# Patient Record
Sex: MALE | Race: WHITE | NOT HISPANIC OR LATINO | Employment: UNEMPLOYED | URBAN - METROPOLITAN AREA
[De-identification: names, ages, dates, MRNs, and addresses within clinical notes are randomized per-mention and may not be internally consistent; named-entity substitution may affect disease eponyms.]

---

## 2017-03-24 ENCOUNTER — OFFICE VISIT - HEALTHEAST (OUTPATIENT)
Dept: FAMILY MEDICINE | Facility: CLINIC | Age: 57
End: 2017-03-24

## 2017-03-24 DIAGNOSIS — I10 ESSENTIAL HYPERTENSION WITH GOAL BLOOD PRESSURE LESS THAN 140/90: ICD-10-CM

## 2017-03-24 DIAGNOSIS — E78.2 MIXED HYPERLIPIDEMIA: ICD-10-CM

## 2017-03-24 DIAGNOSIS — M10.9 GOUT, UNSPECIFIED: ICD-10-CM

## 2017-03-24 ASSESSMENT — MIFFLIN-ST. JEOR: SCORE: 1602.11

## 2017-08-01 ENCOUNTER — COMMUNICATION - HEALTHEAST (OUTPATIENT)
Dept: FAMILY MEDICINE | Facility: CLINIC | Age: 57
End: 2017-08-01

## 2017-08-01 DIAGNOSIS — I10 ESSENTIAL HYPERTENSION WITH GOAL BLOOD PRESSURE LESS THAN 140/90: ICD-10-CM

## 2017-08-01 DIAGNOSIS — M10.9 GOUT, UNSPECIFIED: ICD-10-CM

## 2017-09-11 ENCOUNTER — OFFICE VISIT - HEALTHEAST (OUTPATIENT)
Dept: FAMILY MEDICINE | Facility: CLINIC | Age: 57
End: 2017-09-11

## 2017-09-11 ENCOUNTER — COMMUNICATION - HEALTHEAST (OUTPATIENT)
Dept: FAMILY MEDICINE | Facility: CLINIC | Age: 57
End: 2017-09-11

## 2017-09-11 DIAGNOSIS — M10.9 GOUT, UNSPECIFIED: ICD-10-CM

## 2017-09-11 DIAGNOSIS — E87.6 HYPOKALEMIA: ICD-10-CM

## 2017-09-11 DIAGNOSIS — E78.2 MIXED HYPERLIPIDEMIA: ICD-10-CM

## 2017-09-11 DIAGNOSIS — Z09 HOSPITAL DISCHARGE FOLLOW-UP: ICD-10-CM

## 2017-09-11 DIAGNOSIS — I50.23 ACUTE ON CHRONIC SYSTOLIC HEART FAILURE, NYHA CLASS 3 (H): ICD-10-CM

## 2017-09-11 DIAGNOSIS — I25.10 CAD (CORONARY ARTERY DISEASE): ICD-10-CM

## 2017-09-11 DIAGNOSIS — E83.42 HYPOMAGNESEMIA: ICD-10-CM

## 2017-09-11 DIAGNOSIS — Z86.2 HISTORY OF ANEMIA: ICD-10-CM

## 2017-09-11 DIAGNOSIS — I10 ESSENTIAL HYPERTENSION WITH GOAL BLOOD PRESSURE LESS THAN 140/90: ICD-10-CM

## 2017-09-11 DIAGNOSIS — I73.9 PAD (PERIPHERAL ARTERY DISEASE) (H): ICD-10-CM

## 2017-09-11 DIAGNOSIS — Z87.898 HISTORY OF ALCOHOL USE: ICD-10-CM

## 2017-09-11 LAB
CHOLEST SERPL-MCNC: 218 MG/DL
FASTING STATUS PATIENT QL REPORTED: NO
HDLC SERPL-MCNC: 37 MG/DL
LDLC SERPL CALC-MCNC: 151 MG/DL
TRIGL SERPL-MCNC: 148 MG/DL

## 2017-09-11 ASSESSMENT — MIFFLIN-ST. JEOR: SCORE: 1607.76

## 2017-09-12 ENCOUNTER — COMMUNICATION - HEALTHEAST (OUTPATIENT)
Dept: FAMILY MEDICINE | Facility: CLINIC | Age: 57
End: 2017-09-12

## 2017-09-12 DIAGNOSIS — E78.2 MIXED HYPERLIPIDEMIA: ICD-10-CM

## 2017-09-12 DIAGNOSIS — I25.10 CAD (CORONARY ARTERY DISEASE): ICD-10-CM

## 2017-09-12 DIAGNOSIS — I73.9 PAD (PERIPHERAL ARTERY DISEASE) (H): ICD-10-CM

## 2017-10-06 ENCOUNTER — COMMUNICATION - HEALTHEAST (OUTPATIENT)
Dept: CARDIOLOGY | Facility: CLINIC | Age: 57
End: 2017-10-06

## 2017-10-06 ENCOUNTER — OFFICE VISIT - HEALTHEAST (OUTPATIENT)
Dept: CARDIOLOGY | Facility: CLINIC | Age: 57
End: 2017-10-06

## 2017-10-06 DIAGNOSIS — I42.9 CARDIOMYOPATHY (H): ICD-10-CM

## 2017-10-06 ASSESSMENT — MIFFLIN-ST. JEOR: SCORE: 1597.78

## 2017-10-09 ENCOUNTER — OFFICE VISIT - HEALTHEAST (OUTPATIENT)
Dept: FAMILY MEDICINE | Facility: CLINIC | Age: 57
End: 2017-10-09

## 2017-10-09 DIAGNOSIS — I25.10 CAD (CORONARY ARTERY DISEASE): ICD-10-CM

## 2017-10-09 DIAGNOSIS — I10 ESSENTIAL HYPERTENSION WITH GOAL BLOOD PRESSURE LESS THAN 140/90: ICD-10-CM

## 2017-10-09 DIAGNOSIS — Z86.2 HISTORY OF ANEMIA: ICD-10-CM

## 2017-10-09 DIAGNOSIS — Z12.11 COLON CANCER SCREENING: ICD-10-CM

## 2017-10-09 DIAGNOSIS — E78.2 MIXED HYPERLIPIDEMIA: ICD-10-CM

## 2017-10-09 ASSESSMENT — MIFFLIN-ST. JEOR: SCORE: 1606.73

## 2017-10-12 ENCOUNTER — HOSPITAL ENCOUNTER (OUTPATIENT)
Dept: CARDIOLOGY | Facility: HOSPITAL | Age: 57
Discharge: HOME | End: 2017-10-12
Attending: INTERNAL MEDICINE

## 2017-11-09 ENCOUNTER — COMMUNICATION - HEALTHEAST (OUTPATIENT)
Dept: ADMINISTRATIVE | Facility: CLINIC | Age: 57
End: 2017-11-09

## 2018-01-23 ENCOUNTER — OFFICE VISIT - HEALTHEAST (OUTPATIENT)
Dept: CARDIOLOGY | Facility: CLINIC | Age: 58
End: 2018-01-23

## 2018-01-23 DIAGNOSIS — E78.2 MIXED HYPERLIPIDEMIA: ICD-10-CM

## 2018-01-23 DIAGNOSIS — I10 ESSENTIAL HYPERTENSION WITH GOAL BLOOD PRESSURE LESS THAN 140/90: ICD-10-CM

## 2018-01-23 DIAGNOSIS — Z72.0 TOBACCO USE: ICD-10-CM

## 2018-01-23 DIAGNOSIS — I73.9 PERIPHERAL VASCULAR DISEASE (H): ICD-10-CM

## 2018-01-23 DIAGNOSIS — I25.5 ISCHEMIC CARDIOMYOPATHY: ICD-10-CM

## 2018-01-23 DIAGNOSIS — I25.10 CORONARY ARTERY DISEASE INVOLVING NATIVE CORONARY ARTERY OF NATIVE HEART WITHOUT ANGINA PECTORIS: ICD-10-CM

## 2018-01-23 DIAGNOSIS — I10 ACCELERATED HYPERTENSION: ICD-10-CM

## 2018-01-23 ASSESSMENT — MIFFLIN-ST. JEOR: SCORE: 1615.35

## 2018-01-24 ENCOUNTER — RECORDS - HEALTHEAST (OUTPATIENT)
Dept: ADMINISTRATIVE | Facility: OTHER | Age: 58
End: 2018-01-24

## 2018-01-24 ENCOUNTER — OFFICE VISIT - HEALTHEAST (OUTPATIENT)
Dept: FAMILY MEDICINE | Facility: CLINIC | Age: 58
End: 2018-01-24

## 2018-01-24 DIAGNOSIS — F17.200 SMOKING: ICD-10-CM

## 2018-01-24 DIAGNOSIS — R06.02 SHORTNESS OF BREATH: ICD-10-CM

## 2018-01-24 DIAGNOSIS — I10 ESSENTIAL HYPERTENSION WITH GOAL BLOOD PRESSURE LESS THAN 140/90: ICD-10-CM

## 2018-01-24 DIAGNOSIS — I25.5 ISCHEMIC CARDIOMYOPATHY: ICD-10-CM

## 2018-01-24 DIAGNOSIS — R06.01 ORTHOPNEA: ICD-10-CM

## 2018-01-24 ASSESSMENT — MIFFLIN-ST. JEOR: SCORE: 1588.13

## 2018-01-31 ENCOUNTER — COMMUNICATION - HEALTHEAST (OUTPATIENT)
Dept: FAMILY MEDICINE | Facility: CLINIC | Age: 58
End: 2018-01-31

## 2018-02-27 ENCOUNTER — COMMUNICATION - HEALTHEAST (OUTPATIENT)
Dept: MEDSURG UNIT | Facility: HOSPITAL | Age: 58
End: 2018-02-27

## 2018-02-27 DIAGNOSIS — I50.23 ACUTE ON CHRONIC SYSTOLIC HEART FAILURE, NYHA CLASS 3 (H): ICD-10-CM

## 2018-03-21 ENCOUNTER — COMMUNICATION - HEALTHEAST (OUTPATIENT)
Dept: FAMILY MEDICINE | Facility: CLINIC | Age: 58
End: 2018-03-21

## 2018-07-17 ENCOUNTER — COMMUNICATION - HEALTHEAST (OUTPATIENT)
Dept: FAMILY MEDICINE | Facility: CLINIC | Age: 58
End: 2018-07-17

## 2018-07-19 ENCOUNTER — COMMUNICATION - HEALTHEAST (OUTPATIENT)
Dept: SCHEDULING | Facility: CLINIC | Age: 58
End: 2018-07-19

## 2018-07-19 ENCOUNTER — OFFICE VISIT - HEALTHEAST (OUTPATIENT)
Dept: FAMILY MEDICINE | Facility: CLINIC | Age: 58
End: 2018-07-19

## 2018-07-19 DIAGNOSIS — E83.42 HYPOMAGNESEMIA: ICD-10-CM

## 2018-07-19 DIAGNOSIS — M10.9 GOUT: ICD-10-CM

## 2018-07-19 DIAGNOSIS — I25.5 ISCHEMIC CARDIOMYOPATHY: ICD-10-CM

## 2018-07-19 DIAGNOSIS — E78.2 MIXED HYPERLIPIDEMIA: ICD-10-CM

## 2018-07-19 DIAGNOSIS — Z87.898 HISTORY OF ALCOHOL USE: ICD-10-CM

## 2018-07-19 DIAGNOSIS — R73.09 ELEVATED GLUCOSE: ICD-10-CM

## 2018-07-19 DIAGNOSIS — Z09 HOSPITAL DISCHARGE FOLLOW-UP: ICD-10-CM

## 2018-07-19 DIAGNOSIS — I25.10 CAD (CORONARY ARTERY DISEASE): ICD-10-CM

## 2018-07-19 DIAGNOSIS — I10 ESSENTIAL HYPERTENSION WITH GOAL BLOOD PRESSURE LESS THAN 140/90: ICD-10-CM

## 2018-07-19 DIAGNOSIS — Z91.148 NONCOMPLIANCE WITH MEDICATION REGIMEN: ICD-10-CM

## 2018-07-19 DIAGNOSIS — E78.5 HYPERLIPIDEMIA WITH TARGET LDL LESS THAN 70: ICD-10-CM

## 2018-07-19 DIAGNOSIS — R10.9 RIGHT FLANK PAIN: ICD-10-CM

## 2018-07-19 LAB
ALBUMIN SERPL-MCNC: 3.4 G/DL (ref 3.5–5)
ALP SERPL-CCNC: 73 U/L (ref 45–120)
ALT SERPL W P-5'-P-CCNC: 13 U/L (ref 0–45)
ANION GAP SERPL CALCULATED.3IONS-SCNC: 10 MMOL/L (ref 5–18)
AST SERPL W P-5'-P-CCNC: 16 U/L (ref 0–40)
BILIRUB SERPL-MCNC: 0.6 MG/DL (ref 0–1)
BUN SERPL-MCNC: 14 MG/DL (ref 8–22)
CALCIUM SERPL-MCNC: 9.7 MG/DL (ref 8.5–10.5)
CHLORIDE BLD-SCNC: 102 MMOL/L (ref 98–107)
CHOLEST SERPL-MCNC: 217 MG/DL
CO2 SERPL-SCNC: 28 MMOL/L (ref 22–31)
CREAT SERPL-MCNC: 1.03 MG/DL (ref 0.7–1.3)
FASTING STATUS PATIENT QL REPORTED: ABNORMAL
GFR SERPL CREATININE-BSD FRML MDRD: >60 ML/MIN/1.73M2
GGT SERPL-CCNC: 58 U/L (ref 0–50)
GLUCOSE BLD-MCNC: 56 MG/DL (ref 70–125)
HBA1C MFR BLD: 5.8 % (ref 3.5–6)
HDLC SERPL-MCNC: 46 MG/DL
LDLC SERPL CALC-MCNC: 142 MG/DL
LIPASE SERPL-CCNC: 27 U/L (ref 0–52)
MAGNESIUM SERPL-MCNC: 1.8 MG/DL (ref 1.8–2.6)
POTASSIUM BLD-SCNC: 4 MMOL/L (ref 3.5–5)
PROT SERPL-MCNC: 6.8 G/DL (ref 6–8)
SODIUM SERPL-SCNC: 140 MMOL/L (ref 136–145)
TRIGL SERPL-MCNC: 144 MG/DL
URATE SERPL-MCNC: 7.1 MG/DL (ref 3–8)

## 2018-07-19 ASSESSMENT — MIFFLIN-ST. JEOR: SCORE: 1557.52

## 2018-07-20 ENCOUNTER — HOSPITAL ENCOUNTER (OUTPATIENT)
Dept: ULTRASOUND IMAGING | Facility: HOSPITAL | Age: 58
Discharge: HOME | End: 2018-07-20
Attending: FAMILY MEDICINE

## 2018-07-20 ENCOUNTER — COMMUNICATION - HEALTHEAST (OUTPATIENT)
Dept: NURSING | Facility: CLINIC | Age: 58
End: 2018-07-20

## 2018-07-20 DIAGNOSIS — Z87.898 HISTORY OF ALCOHOL USE: ICD-10-CM

## 2018-07-20 DIAGNOSIS — R10.9 RIGHT FLANK PAIN: ICD-10-CM

## 2018-07-24 ENCOUNTER — COMMUNICATION - HEALTHEAST (OUTPATIENT)
Dept: NURSING | Facility: CLINIC | Age: 58
End: 2018-07-24

## 2018-07-27 ENCOUNTER — COMMUNICATION - HEALTHEAST (OUTPATIENT)
Dept: NURSING | Facility: CLINIC | Age: 58
End: 2018-07-27

## 2018-08-06 ENCOUNTER — AMBULATORY - HEALTHEAST (OUTPATIENT)
Dept: NURSING | Facility: CLINIC | Age: 58
End: 2018-08-06

## 2018-08-06 ENCOUNTER — COMMUNICATION - HEALTHEAST (OUTPATIENT)
Dept: FAMILY MEDICINE | Facility: CLINIC | Age: 58
End: 2018-08-06

## 2018-08-06 ENCOUNTER — COMMUNICATION - HEALTHEAST (OUTPATIENT)
Dept: NURSING | Facility: CLINIC | Age: 58
End: 2018-08-06

## 2018-08-07 ENCOUNTER — COMMUNICATION - HEALTHEAST (OUTPATIENT)
Dept: NURSING | Facility: CLINIC | Age: 58
End: 2018-08-07

## 2018-08-08 ENCOUNTER — AMBULATORY - HEALTHEAST (OUTPATIENT)
Dept: NURSING | Facility: CLINIC | Age: 58
End: 2018-08-08

## 2018-08-09 ENCOUNTER — COMMUNICATION - HEALTHEAST (OUTPATIENT)
Dept: CARE COORDINATION | Facility: CLINIC | Age: 58
End: 2018-08-09

## 2018-08-09 ENCOUNTER — COMMUNICATION - HEALTHEAST (OUTPATIENT)
Dept: NURSING | Facility: CLINIC | Age: 58
End: 2018-08-09

## 2018-08-14 ENCOUNTER — AMBULATORY - HEALTHEAST (OUTPATIENT)
Dept: NURSING | Facility: CLINIC | Age: 58
End: 2018-08-14

## 2018-08-30 ENCOUNTER — COMMUNICATION - HEALTHEAST (OUTPATIENT)
Dept: CARE COORDINATION | Facility: CLINIC | Age: 58
End: 2018-08-30

## 2018-08-31 ENCOUNTER — COMMUNICATION - HEALTHEAST (OUTPATIENT)
Dept: NURSING | Facility: CLINIC | Age: 58
End: 2018-08-31

## 2018-09-11 ENCOUNTER — COMMUNICATION - HEALTHEAST (OUTPATIENT)
Dept: NURSING | Facility: CLINIC | Age: 58
End: 2018-09-11

## 2018-09-12 ENCOUNTER — COMMUNICATION - HEALTHEAST (OUTPATIENT)
Dept: CARE COORDINATION | Facility: CLINIC | Age: 58
End: 2018-09-12

## 2018-09-12 ENCOUNTER — COMMUNICATION - HEALTHEAST (OUTPATIENT)
Dept: NURSING | Facility: CLINIC | Age: 58
End: 2018-09-12

## 2018-09-19 ENCOUNTER — COMMUNICATION - HEALTHEAST (OUTPATIENT)
Dept: NURSING | Facility: CLINIC | Age: 58
End: 2018-09-19

## 2018-09-20 ENCOUNTER — COMMUNICATION - HEALTHEAST (OUTPATIENT)
Dept: NURSING | Facility: CLINIC | Age: 58
End: 2018-09-20

## 2018-10-01 ENCOUNTER — COMMUNICATION - HEALTHEAST (OUTPATIENT)
Dept: NURSING | Facility: CLINIC | Age: 58
End: 2018-10-01

## 2018-10-03 ENCOUNTER — COMMUNICATION - HEALTHEAST (OUTPATIENT)
Dept: CARE COORDINATION | Facility: CLINIC | Age: 58
End: 2018-10-03

## 2018-10-03 ENCOUNTER — COMMUNICATION - HEALTHEAST (OUTPATIENT)
Dept: NURSING | Facility: CLINIC | Age: 58
End: 2018-10-03

## 2018-10-05 ENCOUNTER — COMMUNICATION - HEALTHEAST (OUTPATIENT)
Dept: NURSING | Facility: CLINIC | Age: 58
End: 2018-10-05

## 2018-10-08 ENCOUNTER — COMMUNICATION - HEALTHEAST (OUTPATIENT)
Dept: CARE COORDINATION | Facility: CLINIC | Age: 58
End: 2018-10-08

## 2018-10-26 ENCOUNTER — COMMUNICATION - HEALTHEAST (OUTPATIENT)
Dept: CARE COORDINATION | Facility: CLINIC | Age: 58
End: 2018-10-26

## 2018-10-26 ENCOUNTER — COMMUNICATION - HEALTHEAST (OUTPATIENT)
Dept: NURSING | Facility: CLINIC | Age: 58
End: 2018-10-26

## 2019-05-02 ENCOUNTER — COMMUNICATION - HEALTHEAST (OUTPATIENT)
Dept: FAMILY MEDICINE | Facility: CLINIC | Age: 59
End: 2019-05-02

## 2019-05-14 ENCOUNTER — COMMUNICATION - HEALTHEAST (OUTPATIENT)
Dept: FAMILY MEDICINE | Facility: CLINIC | Age: 59
End: 2019-05-14

## 2019-05-15 ENCOUNTER — COMMUNICATION - HEALTHEAST (OUTPATIENT)
Dept: CARE COORDINATION | Facility: CLINIC | Age: 59
End: 2019-05-15

## 2019-05-16 ENCOUNTER — OFFICE VISIT - HEALTHEAST (OUTPATIENT)
Dept: FAMILY MEDICINE | Facility: CLINIC | Age: 59
End: 2019-05-16

## 2019-05-16 DIAGNOSIS — Z12.11 SCREEN FOR COLON CANCER: ICD-10-CM

## 2019-05-16 DIAGNOSIS — Z09 HOSPITAL DISCHARGE FOLLOW-UP: ICD-10-CM

## 2019-05-16 DIAGNOSIS — Z59.9 FINANCIAL DIFFICULTIES: ICD-10-CM

## 2019-05-16 DIAGNOSIS — I48.0 PAROXYSMAL ATRIAL FIBRILLATION (H): ICD-10-CM

## 2019-05-16 DIAGNOSIS — Z59.71 INSURANCE COVERAGE PROBLEMS: ICD-10-CM

## 2019-05-16 DIAGNOSIS — I48.3 TYPICAL ATRIAL FLUTTER (H): ICD-10-CM

## 2019-05-16 DIAGNOSIS — Z72.0 TOBACCO ABUSE: ICD-10-CM

## 2019-05-16 DIAGNOSIS — I50.23 ACUTE ON CHRONIC SYSTOLIC CONGESTIVE HEART FAILURE (H): ICD-10-CM

## 2019-05-16 DIAGNOSIS — Z91.148 NONCOMPLIANCE WITH MEDICATION REGIMEN: ICD-10-CM

## 2019-05-16 DIAGNOSIS — Z12.11 COLON CANCER SCREENING: ICD-10-CM

## 2019-05-16 DIAGNOSIS — I25.5 ISCHEMIC CARDIOMYOPATHY: ICD-10-CM

## 2019-05-16 DIAGNOSIS — I10 ESSENTIAL HYPERTENSION: ICD-10-CM

## 2019-05-16 SDOH — ECONOMIC STABILITY - INCOME SECURITY: PROBLEM RELATED TO HOUSING AND ECONOMIC CIRCUMSTANCES, UNSPECIFIED: Z59.9

## 2019-05-16 ASSESSMENT — MIFFLIN-ST. JEOR: SCORE: 1591.28

## 2019-05-17 ENCOUNTER — COMMUNICATION - HEALTHEAST (OUTPATIENT)
Dept: PHARMACY | Facility: CLINIC | Age: 59
End: 2019-05-17

## 2019-05-17 ENCOUNTER — COMMUNICATION - HEALTHEAST (OUTPATIENT)
Dept: NURSING | Facility: CLINIC | Age: 59
End: 2019-05-17

## 2019-05-21 ENCOUNTER — COMMUNICATION - HEALTHEAST (OUTPATIENT)
Dept: FAMILY MEDICINE | Facility: CLINIC | Age: 59
End: 2019-05-21

## 2019-05-24 ENCOUNTER — COMMUNICATION - HEALTHEAST (OUTPATIENT)
Dept: NURSING | Facility: CLINIC | Age: 59
End: 2019-05-24

## 2019-05-28 ENCOUNTER — COMMUNICATION - HEALTHEAST (OUTPATIENT)
Dept: NURSING | Facility: CLINIC | Age: 59
End: 2019-05-28

## 2019-06-04 ENCOUNTER — COMMUNICATION - HEALTHEAST (OUTPATIENT)
Dept: FAMILY MEDICINE | Facility: CLINIC | Age: 59
End: 2019-06-04

## 2019-06-04 ENCOUNTER — COMMUNICATION - HEALTHEAST (OUTPATIENT)
Dept: NURSING | Facility: CLINIC | Age: 59
End: 2019-06-04

## 2019-07-12 ENCOUNTER — COMMUNICATION - HEALTHEAST (OUTPATIENT)
Dept: FAMILY MEDICINE | Facility: CLINIC | Age: 59
End: 2019-07-12

## 2019-07-15 ENCOUNTER — AMBULATORY - HEALTHEAST (OUTPATIENT)
Dept: FAMILY MEDICINE | Facility: CLINIC | Age: 59
End: 2019-07-15

## 2019-07-15 ENCOUNTER — COMMUNICATION - HEALTHEAST (OUTPATIENT)
Dept: CARE COORDINATION | Facility: CLINIC | Age: 59
End: 2019-07-15

## 2019-07-15 DIAGNOSIS — Z59.71 INSURANCE COVERAGE PROBLEMS: ICD-10-CM

## 2019-07-15 DIAGNOSIS — Z59.9 FINANCIAL PROBLEMS: ICD-10-CM

## 2019-07-15 DIAGNOSIS — Z91.148 NON COMPLIANCE W MEDICATION REGIMEN: ICD-10-CM

## 2019-07-15 DIAGNOSIS — Z79.899 HIGH RISK MEDICATION USE: ICD-10-CM

## 2019-07-15 SDOH — ECONOMIC STABILITY - INCOME SECURITY: PROBLEM RELATED TO HOUSING AND ECONOMIC CIRCUMSTANCES, UNSPECIFIED: Z59.9

## 2019-07-16 ENCOUNTER — COMMUNICATION - HEALTHEAST (OUTPATIENT)
Dept: NURSING | Facility: CLINIC | Age: 59
End: 2019-07-16

## 2019-07-17 ENCOUNTER — COMMUNICATION - HEALTHEAST (OUTPATIENT)
Dept: FAMILY MEDICINE | Facility: CLINIC | Age: 59
End: 2019-07-17

## 2019-08-01 ENCOUNTER — COMMUNICATION - HEALTHEAST (OUTPATIENT)
Dept: FAMILY MEDICINE | Facility: CLINIC | Age: 59
End: 2019-08-01

## 2019-08-01 DIAGNOSIS — I50.9 ACUTE ON CHRONIC CONGESTIVE HEART FAILURE, UNSPECIFIED HEART FAILURE TYPE (H): ICD-10-CM

## 2019-08-02 ENCOUNTER — COMMUNICATION - HEALTHEAST (OUTPATIENT)
Dept: FAMILY MEDICINE | Facility: CLINIC | Age: 59
End: 2019-08-02

## 2019-08-06 ENCOUNTER — AMBULATORY - HEALTHEAST (OUTPATIENT)
Dept: FAMILY MEDICINE | Facility: CLINIC | Age: 59
End: 2019-08-06

## 2019-08-06 DIAGNOSIS — I48.4 ATYPICAL ATRIAL FLUTTER (H): ICD-10-CM

## 2019-08-06 DIAGNOSIS — I25.5 ISCHEMIC CARDIOMYOPATHY: ICD-10-CM

## 2019-08-06 DIAGNOSIS — I48.0 PAROXYSMAL ATRIAL FIBRILLATION (H): ICD-10-CM

## 2019-10-24 ENCOUNTER — OFFICE VISIT - HEALTHEAST (OUTPATIENT)
Dept: FAMILY MEDICINE | Facility: CLINIC | Age: 59
End: 2019-10-24

## 2019-10-24 ENCOUNTER — RECORDS - HEALTHEAST (OUTPATIENT)
Dept: GENERAL RADIOLOGY | Facility: CLINIC | Age: 59
End: 2019-10-24

## 2019-10-24 DIAGNOSIS — Z91.148 NON COMPLIANCE W MEDICATION REGIMEN: ICD-10-CM

## 2019-10-24 DIAGNOSIS — I10 ESSENTIAL HYPERTENSION: ICD-10-CM

## 2019-10-24 DIAGNOSIS — M25.551 HIP PAIN, RIGHT: ICD-10-CM

## 2019-10-24 DIAGNOSIS — Z59.71 INSURANCE COVERAGE PROBLEMS: ICD-10-CM

## 2019-10-24 DIAGNOSIS — I48.4 ATYPICAL ATRIAL FLUTTER (H): ICD-10-CM

## 2019-10-24 DIAGNOSIS — M25.551 PAIN IN RIGHT HIP: ICD-10-CM

## 2019-10-24 DIAGNOSIS — I48.0 PAROXYSMAL ATRIAL FIBRILLATION (H): ICD-10-CM

## 2019-10-24 ASSESSMENT — MIFFLIN-ST. JEOR: SCORE: 1623.03

## 2019-10-31 ENCOUNTER — COMMUNICATION - HEALTHEAST (OUTPATIENT)
Dept: FAMILY MEDICINE | Facility: CLINIC | Age: 59
End: 2019-10-31

## 2019-11-20 ENCOUNTER — COMMUNICATION - HEALTHEAST (OUTPATIENT)
Dept: ADMINISTRATIVE | Facility: CLINIC | Age: 59
End: 2019-11-20

## 2019-12-02 ENCOUNTER — RECORDS - HEALTHEAST (OUTPATIENT)
Dept: GENERAL RADIOLOGY | Facility: CLINIC | Age: 59
End: 2019-12-02

## 2019-12-02 ENCOUNTER — OFFICE VISIT - HEALTHEAST (OUTPATIENT)
Dept: FAMILY MEDICINE | Facility: CLINIC | Age: 59
End: 2019-12-02

## 2019-12-02 DIAGNOSIS — Z72.0 TOBACCO ABUSE: ICD-10-CM

## 2019-12-02 DIAGNOSIS — I50.9 ACUTE ON CHRONIC CONGESTIVE HEART FAILURE, UNSPECIFIED HEART FAILURE TYPE (H): ICD-10-CM

## 2019-12-02 DIAGNOSIS — I25.10 CAD (CORONARY ARTERY DISEASE): ICD-10-CM

## 2019-12-02 DIAGNOSIS — Z79.899 POLYPHARMACY: ICD-10-CM

## 2019-12-02 DIAGNOSIS — R06.02 SHORTNESS OF BREATH: ICD-10-CM

## 2019-12-02 DIAGNOSIS — E87.1 HYPONATREMIA: ICD-10-CM

## 2019-12-02 DIAGNOSIS — Z91.148 POOR COMPLIANCE WITH MEDICATION: ICD-10-CM

## 2019-12-02 DIAGNOSIS — I73.9 PAD (PERIPHERAL ARTERY DISEASE) (H): ICD-10-CM

## 2019-12-02 DIAGNOSIS — Z09 ENCOUNTER FOR FOLLOW-UP EXAMINATION AFTER COMPLETED TREATMENT FOR CONDITIONS OTHER THAN MALIGNANT NEOPLASM: ICD-10-CM

## 2019-12-02 DIAGNOSIS — I48.0 PAROXYSMAL ATRIAL FIBRILLATION (H): ICD-10-CM

## 2019-12-02 DIAGNOSIS — Z09 HOSPITAL DISCHARGE FOLLOW-UP: ICD-10-CM

## 2019-12-02 DIAGNOSIS — I42.9 CARDIOMYOPATHY (H): ICD-10-CM

## 2019-12-02 DIAGNOSIS — I48.4 ATYPICAL ATRIAL FLUTTER (H): ICD-10-CM

## 2019-12-02 DIAGNOSIS — J18.1 LOBAR PNEUMONIA, UNSPECIFIED ORGANISM (H): ICD-10-CM

## 2019-12-02 DIAGNOSIS — R50.81 FEVER IN OTHER DISEASES: ICD-10-CM

## 2019-12-02 DIAGNOSIS — R50.81 FEVER PRESENTING WITH CONDITIONS CLASSIFIED ELSEWHERE: ICD-10-CM

## 2019-12-02 DIAGNOSIS — J18.9 COMMUNITY ACQUIRED PNEUMONIA OF LEFT LOWER LOBE OF LUNG: ICD-10-CM

## 2019-12-02 DIAGNOSIS — E78.2 MIXED HYPERLIPIDEMIA: ICD-10-CM

## 2019-12-02 DIAGNOSIS — D72.829 LEUKOCYTOSIS, UNSPECIFIED TYPE: ICD-10-CM

## 2019-12-02 DIAGNOSIS — I25.5 ISCHEMIC CARDIOMYOPATHY: ICD-10-CM

## 2019-12-02 LAB
ANION GAP SERPL CALCULATED.3IONS-SCNC: 13 MMOL/L (ref 5–18)
BASOPHILS # BLD AUTO: 0 THOU/UL (ref 0–0.2)
BASOPHILS NFR BLD AUTO: 0 % (ref 0–2)
BUN SERPL-MCNC: 9 MG/DL (ref 8–22)
C REACTIVE PROTEIN LHE: 28.5 MG/DL (ref 0–0.8)
CALCIUM SERPL-MCNC: 8.9 MG/DL (ref 8.5–10.5)
CHLORIDE BLD-SCNC: 97 MMOL/L (ref 98–107)
CO2 SERPL-SCNC: 25 MMOL/L (ref 22–31)
CREAT SERPL-MCNC: 0.95 MG/DL (ref 0.7–1.3)
EOSINOPHIL # BLD AUTO: 0 THOU/UL (ref 0–0.4)
EOSINOPHIL NFR BLD AUTO: 0 % (ref 0–6)
ERYTHROCYTE [DISTWIDTH] IN BLOOD BY AUTOMATED COUNT: 14.4 % (ref 11–14.5)
FLUAV AG SPEC QL IA: NORMAL
FLUBV AG SPEC QL IA: NORMAL
GFR SERPL CREATININE-BSD FRML MDRD: >60 ML/MIN/1.73M2
GLUCOSE BLD-MCNC: 88 MG/DL (ref 70–125)
HCT VFR BLD AUTO: 43.5 % (ref 40–54)
HGB BLD-MCNC: 14.4 G/DL (ref 14–18)
LACTATE SERPL-SCNC: 1.6 MMOL/L (ref 0.5–2.2)
LYMPHOCYTES # BLD AUTO: 1.4 THOU/UL (ref 0.8–4.4)
LYMPHOCYTES NFR BLD AUTO: 15 % (ref 20–40)
MCH RBC QN AUTO: 27.2 PG (ref 27–34)
MCHC RBC AUTO-ENTMCNC: 33.1 G/DL (ref 32–36)
MCV RBC AUTO: 82 FL (ref 80–100)
MONOCYTES # BLD AUTO: 1.8 THOU/UL (ref 0–0.9)
MONOCYTES NFR BLD AUTO: 19 % (ref 2–10)
NEUTROPHILS # BLD AUTO: 5.9 THOU/UL (ref 2–7.7)
NEUTROPHILS NFR BLD AUTO: 65 % (ref 50–70)
PLATELET # BLD AUTO: 196 THOU/UL (ref 140–440)
PMV BLD AUTO: 11.7 FL (ref 8.5–12.5)
POTASSIUM BLD-SCNC: 3.1 MMOL/L (ref 3.5–5)
PROCALCITONIN SERPL-MCNC: 0.3 NG/ML (ref 0–0.49)
RBC # BLD AUTO: 5.29 MILL/UL (ref 4.4–6.2)
SODIUM SERPL-SCNC: 135 MMOL/L (ref 136–145)
WBC: 9.1 THOU/UL (ref 4–11)

## 2019-12-02 ASSESSMENT — MIFFLIN-ST. JEOR: SCORE: 1618.5

## 2019-12-03 LAB
ATRIAL RATE - MUSE: 87 BPM
DIASTOLIC BLOOD PRESSURE - MUSE: NORMAL
INTERPRETATION ECG - MUSE: NORMAL
P AXIS - MUSE: NORMAL
PR INTERVAL - MUSE: NORMAL
QRS DURATION - MUSE: 102 MS
QT - MUSE: 394 MS
QTC - MUSE: 474 MS
R AXIS - MUSE: 6 DEGREES
SYSTOLIC BLOOD PRESSURE - MUSE: NORMAL
T AXIS - MUSE: 88 DEGREES
VENTRICULAR RATE- MUSE: 87 BPM

## 2019-12-04 ENCOUNTER — COMMUNICATION - HEALTHEAST (OUTPATIENT)
Dept: FAMILY MEDICINE | Facility: CLINIC | Age: 59
End: 2019-12-04

## 2019-12-05 ENCOUNTER — COMMUNICATION - HEALTHEAST (OUTPATIENT)
Dept: CARE COORDINATION | Facility: CLINIC | Age: 59
End: 2019-12-05

## 2019-12-06 ENCOUNTER — OFFICE VISIT - HEALTHEAST (OUTPATIENT)
Dept: FAMILY MEDICINE | Facility: CLINIC | Age: 59
End: 2019-12-06

## 2019-12-06 DIAGNOSIS — J18.9 PNEUMONIA OF LEFT LOWER LOBE DUE TO INFECTIOUS ORGANISM: ICD-10-CM

## 2019-12-06 DIAGNOSIS — Z09 HOSPITAL DISCHARGE FOLLOW-UP: ICD-10-CM

## 2019-12-06 ASSESSMENT — MIFFLIN-ST. JEOR: SCORE: 1615.64

## 2019-12-09 ENCOUNTER — COMMUNICATION - HEALTHEAST (OUTPATIENT)
Dept: FAMILY MEDICINE | Facility: CLINIC | Age: 59
End: 2019-12-09

## 2019-12-12 ENCOUNTER — COMMUNICATION - HEALTHEAST (OUTPATIENT)
Dept: FAMILY MEDICINE | Facility: CLINIC | Age: 59
End: 2019-12-12

## 2019-12-13 ENCOUNTER — COMMUNICATION - HEALTHEAST (OUTPATIENT)
Dept: FAMILY MEDICINE | Facility: CLINIC | Age: 59
End: 2019-12-13

## 2019-12-16 ENCOUNTER — AMBULATORY - HEALTHEAST (OUTPATIENT)
Dept: PHARMACY | Facility: CLINIC | Age: 59
End: 2019-12-16

## 2019-12-16 DIAGNOSIS — I48.92 PAROXYSMAL ATRIAL FLUTTER (H): ICD-10-CM

## 2019-12-16 DIAGNOSIS — Z72.0 TOBACCO ABUSE: ICD-10-CM

## 2019-12-16 DIAGNOSIS — J18.9 PNEUMONIA: ICD-10-CM

## 2019-12-16 DIAGNOSIS — I50.22 CHRONIC SYSTOLIC HEART FAILURE (H): ICD-10-CM

## 2019-12-16 DIAGNOSIS — I25.5 ISCHEMIC CARDIOMYOPATHY: ICD-10-CM

## 2020-01-13 ENCOUNTER — COMMUNICATION - HEALTHEAST (OUTPATIENT)
Dept: FAMILY MEDICINE | Facility: CLINIC | Age: 60
End: 2020-01-13

## 2020-01-14 ENCOUNTER — COMMUNICATION - HEALTHEAST (OUTPATIENT)
Dept: FAMILY MEDICINE | Facility: CLINIC | Age: 60
End: 2020-01-14

## 2020-02-07 ENCOUNTER — COMMUNICATION - HEALTHEAST (OUTPATIENT)
Dept: FAMILY MEDICINE | Facility: CLINIC | Age: 60
End: 2020-02-07

## 2020-02-27 ENCOUNTER — COMMUNICATION - HEALTHEAST (OUTPATIENT)
Dept: FAMILY MEDICINE | Facility: CLINIC | Age: 60
End: 2020-02-27

## 2020-03-19 ENCOUNTER — COMMUNICATION - HEALTHEAST (OUTPATIENT)
Dept: FAMILY MEDICINE | Facility: CLINIC | Age: 60
End: 2020-03-19

## 2020-03-19 ENCOUNTER — OFFICE VISIT - HEALTHEAST (OUTPATIENT)
Dept: FAMILY MEDICINE | Facility: CLINIC | Age: 60
End: 2020-03-19

## 2020-03-19 ENCOUNTER — COMMUNICATION - HEALTHEAST (OUTPATIENT)
Dept: SCHEDULING | Facility: CLINIC | Age: 60
End: 2020-03-19

## 2020-03-19 DIAGNOSIS — Z91.148 NONCOMPLIANCE WITH MEDICATION REGIMEN: ICD-10-CM

## 2020-03-19 DIAGNOSIS — I25.5 ISCHEMIC CARDIOMYOPATHY: ICD-10-CM

## 2020-03-19 DIAGNOSIS — I50.22 CHRONIC SYSTOLIC HEART FAILURE (H): ICD-10-CM

## 2020-03-19 DIAGNOSIS — R06.02 SHORTNESS OF BREATH: ICD-10-CM

## 2020-08-05 ENCOUNTER — COMMUNICATION - HEALTHEAST (OUTPATIENT)
Dept: FAMILY MEDICINE | Facility: CLINIC | Age: 60
End: 2020-08-05

## 2020-09-23 ENCOUNTER — COMMUNICATION - HEALTHEAST (OUTPATIENT)
Dept: SCHEDULING | Facility: CLINIC | Age: 60
End: 2020-09-23

## 2020-12-28 ENCOUNTER — COMMUNICATION - HEALTHEAST (OUTPATIENT)
Dept: FAMILY MEDICINE | Facility: CLINIC | Age: 60
End: 2020-12-28

## 2020-12-28 DIAGNOSIS — I10 BENIGN ESSENTIAL HYPERTENSION: ICD-10-CM

## 2020-12-31 ENCOUNTER — COMMUNICATION - HEALTHEAST (OUTPATIENT)
Dept: SCHEDULING | Facility: CLINIC | Age: 60
End: 2020-12-31

## 2021-01-05 ENCOUNTER — COMMUNICATION - HEALTHEAST (OUTPATIENT)
Dept: FAMILY MEDICINE | Facility: CLINIC | Age: 61
End: 2021-01-05

## 2021-03-02 ENCOUNTER — COMMUNICATION - HEALTHEAST (OUTPATIENT)
Dept: FAMILY MEDICINE | Facility: CLINIC | Age: 61
End: 2021-03-02

## 2021-03-02 DIAGNOSIS — I48.91 ATRIAL FIBRILLATION WITH RVR (H): ICD-10-CM

## 2021-03-02 DIAGNOSIS — J44.9 CHRONIC OBSTRUCTIVE PULMONARY DISEASE, UNSPECIFIED COPD TYPE (H): ICD-10-CM

## 2021-03-02 DIAGNOSIS — R06.02 SHORTNESS OF BREATH: ICD-10-CM

## 2021-03-02 DIAGNOSIS — I25.5 ISCHEMIC CARDIOMYOPATHY: ICD-10-CM

## 2021-03-02 DIAGNOSIS — I48.91 ATRIAL FIBRILLATION, RAPID (H): ICD-10-CM

## 2021-03-06 ENCOUNTER — COMMUNICATION - HEALTHEAST (OUTPATIENT)
Dept: SCHEDULING | Facility: CLINIC | Age: 61
End: 2021-03-06

## 2021-03-09 ENCOUNTER — RECORDS - HEALTHEAST (OUTPATIENT)
Dept: ADMINISTRATIVE | Facility: OTHER | Age: 61
End: 2021-03-09

## 2021-03-09 ENCOUNTER — HOSPITAL ENCOUNTER (INPATIENT)
Facility: CLINIC | Age: 61
LOS: 14 days | Discharge: HOME HEALTH | DRG: 057 | End: 2021-03-23
Attending: PHYSICAL MEDICINE & REHABILITATION | Admitting: PHYSICAL MEDICINE & REHABILITATION
Payer: COMMERCIAL

## 2021-03-09 DIAGNOSIS — I63.50 RIGHT PONTINE STROKE (H): Primary | ICD-10-CM

## 2021-03-09 PROCEDURE — 128N000003 HC R&B REHAB

## 2021-03-09 PROCEDURE — 99221 1ST HOSP IP/OBS SF/LOW 40: CPT | Performed by: PHYSICAL MEDICINE & REHABILITATION

## 2021-03-09 PROCEDURE — 250N000013 HC RX MED GY IP 250 OP 250 PS 637: Performed by: PHYSICIAN ASSISTANT

## 2021-03-09 RX ORDER — GABAPENTIN 300 MG/1
300 CAPSULE ORAL DAILY
Status: DISCONTINUED | OUTPATIENT
Start: 2021-03-10 | End: 2021-03-23 | Stop reason: HOSPADM

## 2021-03-09 RX ORDER — AMOXICILLIN 250 MG
1-2 CAPSULE ORAL 2 TIMES DAILY PRN
Status: DISCONTINUED | OUTPATIENT
Start: 2021-03-09 | End: 2021-03-23 | Stop reason: HOSPADM

## 2021-03-09 RX ORDER — ATORVASTATIN CALCIUM 80 MG/1
80 TABLET, FILM COATED ORAL EVERY EVENING
Status: DISCONTINUED | OUTPATIENT
Start: 2021-03-09 | End: 2021-03-23 | Stop reason: HOSPADM

## 2021-03-09 RX ORDER — GABAPENTIN 300 MG/1
300 CAPSULE ORAL DAILY
COMMUNITY
End: 2022-03-25

## 2021-03-09 RX ORDER — SPIRONOLACTONE 25 MG/1
25 TABLET ORAL DAILY
Status: ON HOLD | COMMUNITY
End: 2021-03-22

## 2021-03-09 RX ORDER — LOSARTAN POTASSIUM 100 MG/1
100 TABLET ORAL DAILY
Status: ON HOLD | COMMUNITY
End: 2021-03-22

## 2021-03-09 RX ORDER — SPIRONOLACTONE 25 MG/1
25 TABLET ORAL DAILY
Status: DISCONTINUED | OUTPATIENT
Start: 2021-03-10 | End: 2021-03-23 | Stop reason: HOSPADM

## 2021-03-09 RX ORDER — ALBUTEROL SULFATE 90 UG/1
2 AEROSOL, METERED RESPIRATORY (INHALATION) EVERY 6 HOURS PRN
Status: DISCONTINUED | OUTPATIENT
Start: 2021-03-09 | End: 2021-03-23 | Stop reason: HOSPADM

## 2021-03-09 RX ORDER — METOPROLOL TARTRATE 50 MG
50 TABLET ORAL 2 TIMES DAILY
Status: DISCONTINUED | OUTPATIENT
Start: 2021-03-09 | End: 2021-03-23 | Stop reason: HOSPADM

## 2021-03-09 RX ORDER — FUROSEMIDE 20 MG
40 TABLET ORAL DAILY
Status: DISCONTINUED | OUTPATIENT
Start: 2021-03-10 | End: 2021-03-23 | Stop reason: HOSPADM

## 2021-03-09 RX ORDER — ATORVASTATIN CALCIUM 80 MG/1
80 TABLET, FILM COATED ORAL EVERY EVENING
Status: ON HOLD | COMMUNITY
End: 2021-03-22

## 2021-03-09 RX ORDER — MAGNESIUM OXIDE 400 MG/1
400 TABLET ORAL 3 TIMES DAILY
Status: DISCONTINUED | OUTPATIENT
Start: 2021-03-09 | End: 2021-03-23 | Stop reason: HOSPADM

## 2021-03-09 RX ORDER — ALBUTEROL SULFATE 90 UG/1
2 AEROSOL, METERED RESPIRATORY (INHALATION) EVERY 6 HOURS PRN
COMMUNITY
End: 2022-03-25

## 2021-03-09 RX ORDER — FUROSEMIDE 40 MG
40 TABLET ORAL DAILY
Status: ON HOLD | COMMUNITY
End: 2021-03-22

## 2021-03-09 RX ORDER — ASPIRIN 81 MG/1
81 TABLET, CHEWABLE ORAL DAILY
Status: ON HOLD | COMMUNITY
End: 2021-03-22

## 2021-03-09 RX ORDER — METOPROLOL TARTRATE 50 MG
50 TABLET ORAL 2 TIMES DAILY
Status: ON HOLD | COMMUNITY
End: 2021-03-22

## 2021-03-09 RX ORDER — ASPIRIN 81 MG/1
81 TABLET, CHEWABLE ORAL DAILY
Status: DISCONTINUED | OUTPATIENT
Start: 2021-03-10 | End: 2021-03-23 | Stop reason: HOSPADM

## 2021-03-09 RX ORDER — POLYETHYLENE GLYCOL 3350 17 G/17G
17 POWDER, FOR SOLUTION ORAL DAILY PRN
Status: DISCONTINUED | OUTPATIENT
Start: 2021-03-09 | End: 2021-03-23 | Stop reason: HOSPADM

## 2021-03-09 RX ORDER — ACETAMINOPHEN 325 MG/1
325-650 TABLET ORAL EVERY 6 HOURS PRN
Status: DISCONTINUED | OUTPATIENT
Start: 2021-03-09 | End: 2021-03-23 | Stop reason: HOSPADM

## 2021-03-09 RX ORDER — POLYETHYLENE GLYCOL 3350 17 G/17G
17 POWDER, FOR SOLUTION ORAL DAILY
Status: DISCONTINUED | OUTPATIENT
Start: 2021-03-10 | End: 2021-03-09

## 2021-03-09 RX ORDER — LOSARTAN POTASSIUM 100 MG/1
100 TABLET ORAL DAILY
Status: DISCONTINUED | OUTPATIENT
Start: 2021-03-10 | End: 2021-03-23 | Stop reason: HOSPADM

## 2021-03-09 RX ADMIN — MAGNESIUM OXIDE TAB 400 MG (241.3 MG ELEMENTAL MG) 400 MG: 400 (241.3 MG) TAB at 20:24

## 2021-03-09 RX ADMIN — ATORVASTATIN CALCIUM 80 MG: 80 TABLET, FILM COATED ORAL at 20:24

## 2021-03-09 RX ADMIN — METOPROLOL TARTRATE 50 MG: 50 TABLET, FILM COATED ORAL at 20:24

## 2021-03-09 RX ADMIN — RIVAROXABAN 20 MG: 10 TABLET, FILM COATED ORAL at 18:22

## 2021-03-09 RX ADMIN — MAGNESIUM OXIDE TAB 400 MG (241.3 MG ELEMENTAL MG) 400 MG: 400 (241.3 MG) TAB at 14:42

## 2021-03-09 ASSESSMENT — MIFFLIN-ST. JEOR: SCORE: 1618.19

## 2021-03-09 NOTE — PLAN OF CARE
FOCUS/GOAL  Bladder management, Nutrition/Feeding/Swallowing precautions, and Mobility    ASSESSMENT, INTERVENTIONS AND CONTINUING PLAN FOR GOAL:  Pt arrived to unit at 1230. Is alert and oriented x4. Denies pain or SOB. Transfers with assist of 1 and gait belt with walker. Ate lunch after he arrived. Dinner and breakfast ordered. Is cont of bladder using toilet. Last BM this morning per report from other facility. Pt has left sided weakness affecting arm and leg.

## 2021-03-09 NOTE — H&P
[facility]   Acute Rehabilitation Unit  Admission History and Physical    CHIEF COMPLAINT   Acute right pontine stroke    HISTORY OF PRESENT ILLNESS  Eber Jin is a 60 year old right hand dominant male with PMHx of afib on Xarelto, HTN, CAD, PAD, CHF with EF 40%, ischemic cardiomyopathy, MI, COPD, tobacco use, and HLD who presented on 3/5/21 with left-sided weakness. At presentation, he reported onset of blurry vision, imbalance, and left-sided weakness the day prior.  MRI brain showed a 1.5 cm acute infarct in lower right mata.  MRA neck with 60% stenosis of left ICA.  MRA head with multifocal age-indeterminate vessel occlusion or flow-limiting stenoses.  Considered CTA for further evaluation; however deferred given patient out of window for acute intervention and low NIHSS score.  Determined to be outside window for tPA.  Patient on Xarelto PTA for afib, ASA started 3/5.  Echo with EF 45% and wall motion abnormalities consistent with prior MI in right coronary artery or left circumflex arteries.  Noted to have ongoing left hemiparesis, incoordination, mild dysphagia, mild dysarthria.    During acute hospitalization, patient was seen and evaluated by PT and OT, who collectively recommended that patient would benefit from ongoing therapies in the acute inpatient rehabilitation setting.      In review of the therapy notes, patient presents with impaired LLE strength, impaired balance, impaired coordination, decreased safety awareness, impaired cognition, L neglect, dysphagia, and dysarthria.  He is currently requiring cues for safety and scanning environment.  He also requires standby to contact guard assist for bed mobility, contact guard assist for transfers with cues.  He is ambulating 60' with FWW and contact guard assist with wheelchair follow due to poor navigation and knee buckling.  He is also needing max assist to don socks, min assist for  grooming/hygiene, contact guard assist for seated balance, mod assist for standing balance.  Tolerating regular diet and thin liquids with small sips and chin tucks, VFSS completed 3/8.    Upon arrival to the unit, patient reports most notable concern to be left-sided weakness.  He does not feel this has improved since admission.  He denies pain other than mild left shoulder pain with ROM.  He denies headache, dizziness/lightheadedness.      PAST MEDICAL HISTORY   Reviewed and updated in Epic.  No past medical history on file.    SURGICAL HISTORY  Reviewed and updated in Epic.  No past surgical history on file.    SOCIAL HISTORY  Reviewed and updated in Epic.  Marital Status:   Living situation: lives in [facility] with wife, ~14 stairs within  Family support: spouse, however she has also had a stroke so unclear what level of assistance she is able to provide.  Patient denies any other family or friends in the area to assist.  Vocational History: retired, worked in building materials  Tobacco use: smokes 1/2 ppd x30 years  Alcohol use: 1-2 beers per week  Illicit drug use: denies  Social History     Socioeconomic History     Marital status:      Spouse name: Not on file     Number of children: Not on file     Years of education: Not on file     Highest education level: Not on file   Occupational History     Not on file   Social Needs     Financial resource strain: Not on file     Food insecurity     Worry: Not on file     Inability: Not on file     Transportation needs     Medical: Not on file     Non-medical: Not on file   Tobacco Use     Smoking status: Not on file   Substance and Sexual Activity     Alcohol use: Not on file     Drug use: Not on file     Sexual activity: Not on file   Lifestyle     Physical activity     Days per week: Not on file     Minutes per session: Not on file     Stress: Not on file   Relationships     Social connections     Talks on phone: Not on file     Gets together: Not on  "file     Attends [employer] service: Not on file     Active member of club or organization: Not on file     Attends meetings of clubs or organizations: Not on file     Relationship status: Not on file     Intimate partner violence     Fear of current or ex partner: Not on file     Emotionally abused: Not on file     Physically abused: Not on file     Forced sexual activity: Not on file   Other Topics Concern     Not on file   Social History Narrative     Not on file       FAMILY HISTORY  Reviewed and updated in Epic.  No family history on file.      PRIOR FUNCTIONAL HISTORY   Pt was independent with all ADLs/IADLs, transfers, mobility and gait.      MEDICATIONS  Scheduled meds  No medications prior to admission.       ALLERGIES   Not on File      REVIEW OF SYSTEMS  A 10 point ROS was performed and negative unless otherwise noted in HPI.     PHYSICAL EXAM  VITAL SIGNS:  /72   Pulse 75   Temp 98.2  F (36.8  C) (Oral)   Resp 18   Ht 1.803 m (5' 11\")   Wt 79.6 kg (175 lb 8 oz)   SpO2 98%   BMI 24.48 kg/m    BMI:  Estimated body mass index is 24.48 kg/m  as calculated from the following:    Height as of this encounter: 1.803 m (5' 11\").    Weight as of this encounter: 79.6 kg (175 lb 8 oz).     General: awake, in NAD  HEENT: eomi, ncat, there is slight left sided facial weakness  Pulmonary: no distress, symmetrical chest rise  Cardiovascular: 2+ radial pulse, well perfused  Abdominal: soft, nontender  Extremities: warm, no edema in bilateral lower extremities, no tenderness in calves   1. MSK/neuro:   Mental Status:  alert and oriented x3   2.  Cranial Nerves: grossly normal , slight left sided upper trap and facial weakness   Sensory: Normal to light touch in bilateral upper and lower extremities    Strength: 5/5 in all muscle groups of the upper and lower extremities on R, there is 4- c5, 2 c6, 2 on c7, and 4- finger flexion and abduction out of 5.  There is trace L hip flexion and 4- knee extension, " dorsiflexion on L     Lowery's test: negative bilaterally    Tone per modified Leonardo Scale: none   Abnormal movements: dysdiadochokinesia on L    Coordination: + dysmetria on finger to nose L   Speech: no aphasia but dysarthria    Cognition: appears intact, slight delay    Gait: using FWW with assistance        LABS  K 3.9 on 3/9/2021  Mg 1.6 on 3/9/2021    Lipids 3/7/2021  Triglycerides 110   Cholesterol 160     HDL 32    BMP 3/6/2021  Sodium 136 136 - 145 mmol/L   Potassium 3.7 3.5 - 5.0 mmol/L   Chloride 101 98 - 107 mmol/L   CO2 25 22 - 31 mmol/L   Anion Gap, Calculation 10 5 - 18 mmol/L   Glucose 92 70 - 125 mg/dL   Calcium 9.0 8.5 - 10.5 mg/dL   BUN 20 8 - 22 mg/dL   Creatinine 0.94 0.70 - 1.30 mg/dL   GFR MDRD Af Amer >60 >60 mL/min/1.73m2   GFR MDRD Non Af Amer >60 >60 mL/min/1.73m2     CBC 3/6/2021  WBC 6.4 4.0 - 11.0 thou/uL  LABORATORY     RBC 4.64 4.40 - 6.20 mill/uL  LABORATORY     Hemoglobin 13.5 (L) 14.0 - 18.0 g/dL  LABORATORY     Hematocrit 40.6 40.0 - 54.0 %  LABORATORY     MCV 88 80 - 100 fL  LABORATORY     MCH 29.1 27.0 - 34.0 pg  LABORATORY     MCHC 33.3 32.0 - 36.0 g/dL  LABORATORY     RDW 15.1 (H) 11.0 - 14.5 %  LABORATORY     Platelets 154 140 - 440 thou/uL  LABORATORY     MPV 10.4 8.5 - 12.5 fL  LABORATORY     Neutrophils % 52 50 - 70 %  LABORATORY     Lymphocytes % 33 20 - 40 %  LABORATORY     Monocytes % 14 (H) 2 - 10 %  LABORATORY     Eosinophils % 1 0 - 6 %  LABORATORY     Basophils % 0 0 - 2 %  LABORATORY     Immature Granulocyte % 0 <=0 %  LABORATORY     Neutrophils Absolute 3.3 2.0 - 7.7 thou/uL  LABORATORY     Lymphocytes Absolute 2.1 0.8 - 4.4 thou/uL  LABORATORY     Monocytes Absolute 0.9 0.0 - 0.9 thou/uL  LABORATORY     Eosinophils Absolute 0.1 0.0 - 0.4 thou/uL JN LABORATORY     Basophils Absolute 0.0 0.0 - 0.2 thou/uL  LABORATORY     Immature Granulocyte Absolute 0.0 <=0.0 thou/uL       ECHO 3/7/2021   Left ventricular ejection  fraction is mildly decreased with regional   akinesis. The estimated left ventricular ejection fraction is 45%. Wall   motion abnormalities suggest prior myocardial infarction the the territory   of the right coronary and/or left circumflex arteries.    Normal right ventricular size and systolic function.    The aortic valve is sclerotic without significant aortic stenosis. Mild   aortic regurgitation.    The ascending aorta is mildly dilated measuring 4.0 cm.    When compared to the previous study dated 1/1/2021, the degree of mitral   regurgitation has decreased. Findings are otherwise similar.    MRI head, MRA head/neck 3/5/2021  HEAD MRI:  1.  1.5 cm acute infarct in the lower right mata.  2.  No hemorrhage.  3.  Mild to moderate atrophy. Age-related white matter changes.    HEAD MRA:  1.  There is no flow-related enhancement within the distal intracranial vertebral arteries or proximal to mid basilar artery. This is consistent with multifocal age-indeterminate vessel occlusion or flow-limiting stenoses. Consider evaluation with CTA of   the head.    NECK MRA:  1.  Approximately 60% stenosis within the left internal carotid artery 2 cm from its origin.  2.  No hemodynamically significant narrowing in the right carotid artery by NASCET criteria.  3.  Nondominant right vertebral artery with intermittently visualized antegrade flow likely due to a combination of its small size and superimposed atherosclerotic disease and diminished filling.  4.  Patent, dominant left vertebral artery.      IMPRESSION/PLAN:  Eber Jin is a 60 year old right hand dominant male with PMHx afib on Xarelto, HTN, CAD, PAD, CHF, ischemic cardiomyopathy, MI, COPD, tobacco use, and HLD who presented on 3/5/21 with left-sided weakness and was found to have small acute right pontine stroke.  Admitted to ARU on 3/9 for rehabilitation and ongoing medical management.     Admission to acute inpatient rehab 03/09/21.    Impairment  group code: 01.1 L body involvement, R brain stroke; R acute ischemic pontine stroke      1. PT, OT and SLP 60 minutes of each on a daily basis, in addition to rehab nursing and close management of physiatrist.      2. Impairment of ADL's: Noted to have impairments in strength, coordination, balance, and cognition which are affecting his ability to safely and independently perform ADLs.  Goal for mod I for all ADLs and ADL transfers.    3. Impairment of mobility:  Noted to have impairments in strength, coordination, balance, decreased safety awareness, and L neglect all affecting his ability to safely and independently perform mobility.  Goals for mod I with all transfers and household mobility.    4. Impairment of cognition/language/swallow:  Noted to have mildly impaired oral motor function and impaired cognition.  Goals to tolerate safest, least restrictive diet and improved cognitive/linguistic skills.    5. Medical Conditions  Acute small right pontine stroke  MRI brain with 1.5 cm acute infarct in right lower mata, MRA head with multifocal age indeterminate vessel occlusion or flow-limiting stenosis.  MRA neck with 60% stenosis of left ICA.  Outside window for tPA.  Echo with EF 45%, mild aortic regurgitation, mild dilation of ascending aorta.  EKG/tele with NSR.  .  Last A1c in chart 5.8% on 7/19/2018.  Noted to have ongoing left hemiparesis and incoordination.  Bedside swallow completed by SLP on 3/8, VFSS completed 3/8, noted mild oral and mild pharyngeal dysphagia.   - Continue high dose statin  - Continue PTA Xarelto  - Continue ASA 81 mg daily (restarted 3/6)  - Continue   - BP management as below  - Encourage smoking cessation  - Regular diet, thin liquids by small sips with chin tuck  - Continue PT, OT, SLP    Paroxysmal atrial fibrillation/flutter.  Per chart review, patient admitted 12/31 - 1/3 for frostbite and found to be in afib with RVR with concerns for medication noncompliance.    -  Continue PTA Xarelto 20 mg daily    HTN.  PTA on losartan, furosemide, spironolactone, metoprolol.  Hx poor medication compliance.  Initially allowed permissive HTN, but since resumed losartan, furosemide, and spironolactone.  - Continue losartan 100 mg daily  - Continue furosemide, spironolactone  - Metoprolol on hold, resume as indicated  - Monitor BP and adjust medications as indicated    CAD  Hx MI  CHF with EF 45%  Ischemic cardiomyopathy  - Continue ASA 81 mg daily  - Continue furosemide 40 mg daily  - Continue spironolactone 25 mg daily  - Continue metoprolol tartrate 50 mg BID (resumed 3/8).  Of note, per last hospital admission (discharged 1/3), dose was increased to 100 mg BID at that time.  Resume as appropriate.    HLD.  Unclear whether patient on statin PTA.   on 3/7/21, high-intensity statin therapy recommended, started atorvastatin.  - Continue atorvastatin 80 mg    Hypomagnesemia.  On Mag-Ox PTA.  Most recent Mg on 3/9 at 1.6.  - Continue Mag-Ox 400 mg TID  - Trend BMP    COPD.  PTA on albuterol inhaler PRN.  Also appears to have used Ellipta in the recent past as well.  No evidence of exacerbation during acute hospitalization.  Tobacco use disorder.  Current smoker, 0.5 packs per day, 30 year history.    - Continue albuterol inhaler PRN  - Encourage smoking cessation and provide education      Neuropathic pain.  Patient on gabapentin PTA, he is unaware of indication.  Appears to have been prescribed by provider at burn clinic, so suspect may be related to recent frostbite.  - Continue gabapentin 300 mg daily    6. Adjustment to disability:  Clinical psychology to eval and treat as indicated  7. FEN: regular diet, thin liquids with strategies per SLP  8. Bowel: continent, monitor for constipation, PRN bowel meds available  9. Bladder: continent, monitor PVRs at admission and ISC as indicated  10. DVT Prophylaxis: Xarelto  11. GI Prophylaxis: regular diet  12. Code: full  13. Disposition: home  with family support and home vs. Outpatient therapies  14. ELOS:  10 days  15. Rehab prognosis:  good  16. Follow up Appointments on Discharge: PCP, stroke neurology, PM&R        Discussed with Dr. Patrick Mays, PM&R staff physician     Pilar Negro PA-C  Physical Medicine & Rehabilitation        Physician Attestation   I, Patrick Mays, was present with the YOVANI who participated in the service and in the documentation of the note.  I have verified the history and personally performed the physical exam and medical decision making.  I agree with the assessment and plan of care as documented in the note.      I personally reviewed vital signs, medications, labs and imaging.    Patient with L hemiparesis.  Awake and alert, interacting well with slight delay.  Start post stroke therapy regimen.     Patrick Mays,   Date of Service (when I saw the patient): 03/09/21      I spent a total of 35 minutes face to face and coordinating care of Eber Jin. Over 50% of my time on the unit was spent counseling the patient and /or coordinating care regarding recent CVA with left hemiparesis.

## 2021-03-10 ENCOUNTER — APPOINTMENT (OUTPATIENT)
Dept: SPEECH THERAPY | Facility: CLINIC | Age: 61
End: 2021-03-10
Payer: COMMERCIAL

## 2021-03-10 ENCOUNTER — APPOINTMENT (OUTPATIENT)
Dept: PHYSICAL THERAPY | Facility: CLINIC | Age: 61
End: 2021-03-10
Payer: COMMERCIAL

## 2021-03-10 ENCOUNTER — APPOINTMENT (OUTPATIENT)
Dept: OCCUPATIONAL THERAPY | Facility: CLINIC | Age: 61
End: 2021-03-10
Payer: COMMERCIAL

## 2021-03-10 LAB
ANION GAP SERPL CALCULATED.3IONS-SCNC: 5 MMOL/L (ref 3–14)
BUN SERPL-MCNC: 17 MG/DL (ref 7–30)
CALCIUM SERPL-MCNC: 9 MG/DL (ref 8.5–10.1)
CHLORIDE SERPL-SCNC: 104 MMOL/L (ref 94–109)
CO2 SERPL-SCNC: 28 MMOL/L (ref 20–32)
CREAT SERPL-MCNC: 0.77 MG/DL (ref 0.66–1.25)
ERYTHROCYTE [DISTWIDTH] IN BLOOD BY AUTOMATED COUNT: 15.3 % (ref 10–15)
GFR SERPL CREATININE-BSD FRML MDRD: >90 ML/MIN/{1.73_M2}
GLUCOSE SERPL-MCNC: 84 MG/DL (ref 70–99)
HCT VFR BLD AUTO: 39.9 % (ref 40–53)
HGB BLD-MCNC: 13.2 G/DL (ref 13.3–17.7)
MAGNESIUM SERPL-MCNC: 1.8 MG/DL (ref 1.6–2.3)
MCH RBC QN AUTO: 29.3 PG (ref 26.5–33)
MCHC RBC AUTO-ENTMCNC: 33.1 G/DL (ref 31.5–36.5)
MCV RBC AUTO: 89 FL (ref 78–100)
PLATELET # BLD AUTO: 220 10E9/L (ref 150–450)
POTASSIUM SERPL-SCNC: 4.3 MMOL/L (ref 3.4–5.3)
RBC # BLD AUTO: 4.51 10E12/L (ref 4.4–5.9)
SODIUM SERPL-SCNC: 137 MMOL/L (ref 133–144)
WBC # BLD AUTO: 8.6 10E9/L (ref 4–11)

## 2021-03-10 PROCEDURE — 250N000013 HC RX MED GY IP 250 OP 250 PS 637: Performed by: PHYSICIAN ASSISTANT

## 2021-03-10 PROCEDURE — 128N000003 HC R&B REHAB

## 2021-03-10 PROCEDURE — 83735 ASSAY OF MAGNESIUM: CPT | Performed by: PHYSICIAN ASSISTANT

## 2021-03-10 PROCEDURE — 97535 SELF CARE MNGMENT TRAINING: CPT | Mod: GO | Performed by: OCCUPATIONAL THERAPIST

## 2021-03-10 PROCEDURE — 36415 COLL VENOUS BLD VENIPUNCTURE: CPT | Performed by: PHYSICIAN ASSISTANT

## 2021-03-10 PROCEDURE — 92523 SPEECH SOUND LANG COMPREHEN: CPT | Mod: GN

## 2021-03-10 PROCEDURE — 99232 SBSQ HOSP IP/OBS MODERATE 35: CPT | Performed by: PHYSICAL MEDICINE & REHABILITATION

## 2021-03-10 PROCEDURE — 85027 COMPLETE CBC AUTOMATED: CPT | Performed by: PHYSICIAN ASSISTANT

## 2021-03-10 PROCEDURE — 97166 OT EVAL MOD COMPLEX 45 MIN: CPT | Mod: GO | Performed by: OCCUPATIONAL THERAPIST

## 2021-03-10 PROCEDURE — 97162 PT EVAL MOD COMPLEX 30 MIN: CPT | Mod: GP

## 2021-03-10 PROCEDURE — 80048 BASIC METABOLIC PNL TOTAL CA: CPT | Performed by: PHYSICIAN ASSISTANT

## 2021-03-10 PROCEDURE — 97530 THERAPEUTIC ACTIVITIES: CPT | Mod: GP

## 2021-03-10 RX ADMIN — MAGNESIUM OXIDE TAB 400 MG (241.3 MG ELEMENTAL MG) 400 MG: 400 (241.3 MG) TAB at 20:13

## 2021-03-10 RX ADMIN — ASPIRIN 81 MG CHEWABLE TABLET 81 MG: 81 TABLET CHEWABLE at 07:57

## 2021-03-10 RX ADMIN — GABAPENTIN 300 MG: 300 CAPSULE ORAL at 07:55

## 2021-03-10 RX ADMIN — LOSARTAN POTASSIUM 100 MG: 100 TABLET, FILM COATED ORAL at 07:55

## 2021-03-10 RX ADMIN — FUROSEMIDE 40 MG: 20 TABLET ORAL at 07:55

## 2021-03-10 RX ADMIN — Medication 25 MG: at 07:57

## 2021-03-10 RX ADMIN — METOPROLOL TARTRATE 50 MG: 50 TABLET, FILM COATED ORAL at 07:56

## 2021-03-10 RX ADMIN — MAGNESIUM OXIDE TAB 400 MG (241.3 MG ELEMENTAL MG) 400 MG: 400 (241.3 MG) TAB at 13:15

## 2021-03-10 RX ADMIN — RIVAROXABAN 20 MG: 10 TABLET, FILM COATED ORAL at 17:19

## 2021-03-10 RX ADMIN — ATORVASTATIN CALCIUM 80 MG: 80 TABLET, FILM COATED ORAL at 20:13

## 2021-03-10 RX ADMIN — MAGNESIUM OXIDE TAB 400 MG (241.3 MG ELEMENTAL MG) 400 MG: 400 (241.3 MG) TAB at 07:56

## 2021-03-10 ASSESSMENT — ACTIVITIES OF DAILY LIVING (ADL): PREVIOUS_RESPONSIBILITIES: MEAL PREP;HOUSEKEEPING;LAUNDRY;YARDWORK;MEDICATION MANAGEMENT;FINANCES

## 2021-03-10 NOTE — CONSULTS
"   03/10/21 1320   Living Arrangements   People in home spouse   Able to Return to Prior Arrangements yes   Living Arrangement Comments [facility] with 14 STI    Home Safety   Patient Feels Safe Living in Home? yes   CM/SW Discharge Planning   Expected Discharge Date 21   Patient/Family Anticipates Transition to home with family;home with help/services   Concerns to be Addressed discharge planning   Concerns Comments Wife had stroke, limited physical assist    Patient/family educated on Medicare website which has current facility and service quality ratings no   Transportation Anticipated family or friend will provide  (Wife does not drive, stepchildren assist )   Anticipated Discharge Disposition (CM/SW) Home Care;Outpatient Rehab (PT, OT, SLP, Cardiac or Pulmonary)   Patient/Family in Agreement with Plan yes       Social Work: Initial Assessment with Discharge Plan    Patient Name: Eber Jin  : 1960  Age: 60 year old  MRN: [id number]  Completed assessment with: Chart review and interview with pt at bedside   Admitted to ARU: 2021    Presenting Information   Date of SW assessment: March 10, 2021  Health Care Directive: Health Care Directive Agent (if patient not able to make decisions)  Primary Health Care Agent: Patient/self   Secondary Health Care Agent: Pt spouse, RAJENDRAK   Living Situation: [facility] with spouse in [place], [place]. 14 STI. ANASTASIA unknown.   Previous Functional Status: Pt was independent with all ADLs/IADLs, transfers, mobility and gait. Unemployed. Drove, managed medication and finances PTA. No falls reported.    DME available: See therapy eval   Patient and family understanding of hospitalization: \"I had a stroke\"   Cultural/Language/Spiritual Considerations: 61 y/o  male, english-speaking, -american, Restoration not listed or discussed       Physical Health  Reason for admission: Per H&P--Eber Jin is a 60 year old right hand dominant male with " PMHx of afib on Xarelto, HTN, CAD, PAD, CHF with EF 40%, ischemic cardiomyopathy, MI, COPD, tobacco use, and HLD who presented on 3/5/21 with left-sided weakness. At presentation, he reported onset of blurry vision, imbalance, and left-sided weakness the day prior.  MRI brain showed a 1.5 cm acute infarct in lower right mata.  MRA neck with 60% stenosis of left ICA.  MRA head with multifocal age-indeterminate vessel occlusion or flow-limiting stenoses.  Considered CTA for further evaluation; however deferred given patient out of window for acute intervention and low NIHSS score.  Determined to be outside window for tPA.  Patient on Xarelto PTA for afib, ASA started 3/5.  Echo with EF 45% and wall motion abnormalities consistent with prior MI in right coronary artery or left circumflex arteries.  Noted to have ongoing left hemiparesis, incoordination, mild dysphagia, mild dysarthria.    Provider Information   [facility] [address] [place], [place] [id number]  PH: [phone]  : None reported     Mental Health/Chemical Dependency:   Diagnosis: Pt denied   Alcohol/Tobacco/Narcotis: smokes 1/2 ppd x30 years, 1-2 beers per week and denied illicit drug use  Support/Services in Place: None reported   Services Needed/Recommended: Supportive services available by consult (Health Psychology and [place] services)   Sexuality/Intimacy: Not discussed     Support System  Marital Status: . Pt spouse also had a stroke, physical assistance and ability to help unknown at this time. Uses a walker and does not drive.   Family support: Two step children who live in [place].   Other support available: Pt denied      Community Resources  Current in home services: None reported   Previous services: None reported     Financial/Employment/Education  Employment Status: retired/unempolyed, worked in building materials  Income Source: unemployment   Education:  [employer]  Financial Concerns:  Pt denied   Insurance: [employer] [place] [place] (NO TCU BENEFITS)      Discharge Plan   Patient and family discharge goal: Home with A and HC vs OP pending progress   Provided Education on discharge plan: Yes  Patient agreeable to discharge plan:  Yes  Provided education and attained signature for Medicare IM and IRF Patient Rights and Privacy Information provided to patient : N/A  Provided patient with [place] [facility] Resources: Added to AVS   Barriers to discharge: Physical assist and supervision at home and wife ability to provide    Discharge Recommendations   Disposition: See above   Transportation Needs: Family assistance   Name of Transportation Company and Phone: N/A     Additional comments   ELOS 10 days. Evals scheduled for today. Discharge needs pending. Pt denied additional SW needs or concerns at this time. SW will remain available and continue to follow.     Please invite to Care Conference:  N/A but if needed anticipate pt wife to attend     ERIN Diaz, [facility]  [place] Acute Rehab Unit   Phone: [phone]  I   Pager: [phone]

## 2021-03-10 NOTE — PLAN OF CARE
Discharge Planner Post-Acute Rehab OT:     Discharge Plan: Home with HC therapies     Precautions: Falls, L hemiparesis      Current Status:  ADLs: CGA during functional transfers using fww, CGA toilet transfer using grab bar, CGA g/h while standing EOS with fww, Mod A dressing- physical assistance for LB clothing.   IADLs: to be assessed.   Vision/Cognition: Hospital notes indicate L neglect, however this was not present during initial evaluation, will continue to assess. Mild deficits with recall and problem solving during functional tasks, baseline level unclear.     Assessment: Initial evaluation completed and plan of care established. Pt limited by L sided weakness and incoordination, impacting ADL/IADL performance. Pt previously IND with ADL/IADL and caring for wife. Pt would benefit from skilled occupational therapy to address L side weakness through intervention focusing on functional mobility and neuromuscular reeducation. Goals to progress to Mod I using fww. ELOS 2 weeks.     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 Pt stated that his children live close but support is unclear.   Equipment recommendations: walker, shower chair, and potentially dressing AE pending progress.

## 2021-03-10 NOTE — PLAN OF CARE
Alert and oriented x4, cont of B/B LBM 3/9. Voiding spontaneously, PVR's this shift were 38 and 58. Good appetite ate 75% of dinner. No reports of pain but states baseline numbness in L foot. Skin is intact, Just old scars and bruising. Note left for MD about placing podiatrist consult, toenails look to be growing onto pad of toes. Bed alarm on, pt uses call light appropriately.

## 2021-03-10 NOTE — PHARMACY-MEDICATION REGIMEN REVIEW
Pharmacy Medication Regimen Review  Eber Jin is a 60 year old male who is currently in the Acute Rehab Unit.    Assessment: Upon review of the medications and patient chart the following irregularities were found:    Medications that require additional monitoring include: resuming PTA BP meds as able, current metoprolol tartrate at 50 mg BID (PTA dose = 100 mg BID), also on losartan, spironolactone, furosemide     Plan:   - please add hold parameters to metoprolol,  losartan, spironolactone, furosemide  - continue current medication regimen     Attending provider will be sent this note for review.  If there are any emergent issues noted above, pharmacist will contact provider directly by phone.      Pharmacy will periodically review the resident's medication regimen for any PRN medications not administered in > 72 hours and discontinue them. The pharmacist will discuss gradual dose reductions of psychopharmacologic medications with interdisciplinary team on a regular basis.    Please contact pharmacy if the above does not answer specific medication questions/concerns.    Background:  A pharmacist has reviewed all medications and pertinent medical history today.  Medications were reviewed for appropriate use and any irregularities found are listed with recommendations.      Current Facility-Administered Medications:      acetaminophen (TYLENOL) tablet 325-650 mg, 325-650 mg, Oral, Q6H PRN, Pilar Negro PA-C     albuterol (PROAIR HFA/PROVENTIL HFA/VENTOLIN HFA) 108 (90 Base) MCG/ACT inhaler 2 puff, 2 puff, Inhalation, Q6H PRN, Pilar Negro PA-C     aspirin (ASA) chewable tablet 81 mg, 81 mg, Oral, Daily, Pilar Negro PA-C, 81 mg at 03/10/21 0757     atorvastatin (LIPITOR) tablet 80 mg, 80 mg, Oral, QPM, Pilar Negro PA-C, 80 mg at 03/09/21 2024     furosemide (LASIX) tablet 40 mg, 40 mg, Oral, Daily, Pilar Negro PA-C, 40 mg at 03/10/21 0755     gabapentin  (NEURONTIN) capsule 300 mg, 300 mg, Oral, Daily, Pilar Negro PA-C, 300 mg at 03/10/21 0755     [START ON 3/18/2021] influenza recomb quadrivalent PF (FLUBLOK) injection 0.5 mL, 0.5 mL, Intramuscular, Prior to discharge, Patrick Mays DO     losartan (COZAAR) tablet 100 mg, 100 mg, Oral, Daily, Pilar Negro PA-C, 100 mg at 03/10/21 0755     magnesium oxide (MAG-OX) tablet 400 mg, 400 mg, Oral, TID, Pilar Negro PA-C, 400 mg at 03/10/21 1315     metoprolol tartrate (LOPRESSOR) tablet 50 mg, 50 mg, Oral, BID, Pilar Negro PA-C, 50 mg at 03/10/21 0756     Patient is already receiving anticoagulation with heparin, enoxaparin (LOVENOX), warfarin (COUMADIN)  or other anticoagulant medication, , Does not apply, Continuous PRN, Pilar Negro PA-C     polyethylene glycol (MIRALAX) Packet 17 g, 17 g, Oral, Daily PRN, Pilar Negro PA-C     rivaroxaban ANTICOAGULANT (XARELTO) tablet 20 mg, 20 mg, Oral, Daily with supper, Pilar Negro PA-C, 20 mg at 03/09/21 1822     senna-docusate (SENOKOT-S/PERICOLACE) 8.6-50 MG per tablet 1-2 tablet, 1-2 tablet, Oral, BID PRN, Pilar Negro PA-C     spironolactone (ALDACTONE) half-tab 25 mg, 25 mg, Oral, Daily, Pilar Negro PA-C, 25 mg at 03/10/21 0757  No current outpatient prescriptions on file.   PMH: Acute right pontine stroke, afib on Xarelto, HTN, CAD, PAD, CHF with EF 40%, ischemic cardiomyopathy, MI, COPD, tobacco use, and HLD

## 2021-03-10 NOTE — PROGRESS NOTES
03/10/21 1100   Quick Adds   Type of Visit Initial Occupational Therapy Evaluation       Present no   Language English   Living Environment   People in home spouse   Current Living Arrangements house   Home Accessibility stairs within home   Stair Railings, Within Home, Primary railing on left side (ascending)   Living Environment Comments OT: Pt lives in a town home with spouse, stairs within home. Bathroom set up: no shower chair for tub shower, grab bars next to toilet. Spouse had stroke about 1 yr prior and pt is primary caregiver. Pt reports have 2 children that live nearby but pt uncertain about level of support they can provide.    Self-Care   Usual Activity Tolerance moderate   Current Activity Tolerance fair   Regular Exercise No   Equipment Currently Used at Home none   Activity/Exercise/Self-Care Comment OT: Retired. 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 Pt reports being active with walking up and down stairs, going for walks, fishing off of the dock.    Disability/Function   Hearing Difficulty or Deaf no   Wear Glasses or Blind yes   Vision Management reading glasses   Concentrating, Remembering or Making Decisions Difficulty no   Difficulty Communicating no   Difficulty Eating/Swallowing other (see comments)  (will continue to monitor but pt does not express difficulty )   Walking or Climbing Stairs Difficulty no   Dressing/Bathing Difficulty no   Toileting issues no   Doing Errands Independently Difficulty (such as shopping) no   Fall history within last six months yes   Number of times patient has fallen within last six months 1   Change in Functional Status Since Onset of Current Illness/Injury yes   General Information   Onset of Illness/Injury or Date of Surgery 03/05/21   Referring Physician Dr. Mays   Patient/Family Therapy Goal Statement (OT) To get home as soon as possible.   Additional Occupational Profile Info/Pertinent History of  "Current Problem Eber Jin is a 60 year old right hand dominant male with PMHx of afib on Xarelto, HTN, CAD, PAD, CHF with EF 40%, ischemic cardiomyopathy, MI, COPD, tobacco use, and HLD who presented on 3/5/21 with left-sided weakness. At presentation, he reported onset of blurry vision, imbalance, and left-sided weakness the day prior.  MRI brain showed a 1.5 cm acute infarct in lower right mata.    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    Existing Precautions/Restrictions fall;swallowing   Limitations/Impairments other (see comments)   Left Upper Extremity (Weight-bearing Status) full weight-bearing (FWB)   Right Upper Extremity (Weight-bearing Status) full weight-bearing (FWB)   Left Lower Extremity (Weight-bearing Status) full weight-bearing (FWB)   Right Lower Extremity (Weight-bearing Status) full weight-bearing (FWB)   General Observations and Info Pt currently functioning below baseline due to L side weakness and incoordination.    Cognitive Status Examination   Memory Deficit minimal deficit   Attention Deficit minimal deficit   Executive Function Deficit minimal deficit   Cognitive Status Comments Pt affect appearing flat. No impulsiveness during functional tasks. At times, requiring cueing for comprehension and problem solving. Baseline level unclear.   Visual Perception   Visual Acuity Wears glasses for reading   Visual Attention Hospital charting indicating \"Left neglect\" however pt did not demonstrate significant neglect during testing or functional tasks. Will continue to assess in future sessions.   Impact of Vision Impairment on Function (Vision) Vision appearing to be intact, pt reporting no blurry or double vision. Oculomotor function and peripheral vision appearing to be intact.    Sensory   Sensory Quick Adds No deficits were identified   Sensory Comments Mild deficits with proprioception. Pt able to identify " light touch and proprioception on LUE.   Pain Assessment   Patient Currently in Pain No   Posture   Posture not impaired   Range of Motion Comprehensive   General Range of Motion upper extremity range of motion deficits identified   General Upper Extremity Assessment (Range of Motion)   Upper Extremity: Range of Motion shoulder, left: UE ROM;elbow/forearm, left: UE ROM;wrist, left: UE ROM   Comment: Upper Extremity ROM LUE AROM impacted by stroke sh flexion ~70 degrees, PROM WNL.    Strength Comprehensive (MMT)   General Manual Muscle Testing (MMT) Assessment upper extremity strength deficits identified;hand strength deficits identified   Comment, General Manual Muscle Testing (MMT) Assessment See fugl willingham assessment in POC note for further information. Decreased strength in LUE   Upper Extremity (Manual Muscle Testing)   Comment, MMT: Upper Extremity LUE muscle strength impaired.    Hand Strength   Left hand  (pounds) 28 lbs   Right hand  (pounds) 94 lbs   Muscle Tone Assessment   Muscle Tone Quick Adds No deficits were identified   Coordination   Upper Extremity Coordination Left UE impaired   Left hand, nine hole peg test (seconds) 85   Right hand, nine hole peg test (seconds) 35   Functional Limitations Decreased speed;Fine motor ADL performance impaired;Impaired ability to perform bilateral tasks;Object transport impaired;Reach to targets impaired   ARC Assessment Only   Acute Rehab Functional Assessment See IP Rehab Daily Documentation Flowsheet for Functional Mobility/ADL Assessment   Balance   Balance Comments Static standing balance is fair, dynamic standing balance is poor due to L hemiparesis   Instrumental Activities of Daily Living (IADL)   Previous Responsibilities meal prep;housekeeping;laundry;yardwork;medication management;finances   IADL Comments previously IND, caring for wife and house.    Clinical Impression   Criteria for Skilled Therapeutic Interventions Met (OT) yes   OT Diagnosis  Pt experiencing occupational dysfunction with ADL/IADL post stroke. L sided weakness and incoordination impacting performance.    OT Problem List-Impairments impacting ADL activity tolerance impaired;balance;coordination;mobility;motor control;range of motion (ROM);strength   ADL comments/analysis Decreased IND with ADL currently.    Assessment of Occupational Performance 5 or more Performance Deficits   Identified Performance Deficits dressing, toileting, g/h, house keeping, meal prep, caregiver, functional mobility   Planned Therapy Interventions (OT) ADL retraining;IADL retraining;fine motor coordination training;motor coordination training;neuromuscular re-education;ROM;strengthening;transfer training;home program guidelines;progressive activity/exercise;risk factor education   Intervention Comments Pt would benefit from skilled OT to address ADL/IADL performance deficits.    Clinical Decision Making Complexity (OT) moderate complexity   Therapy Frequency (OT) Daily   Predicted Duration of Therapy 2 weeks   Anticipated Equipment Needs Upon Discharge (OT) bathing equipment;cane, quad;walker, rolling   Risk & Benefits of therapy have been explained risks/benefits reviewed;current/potential barriers reviewed;patient;evaluation/treatment results reviewed;care plan/treatment goals reviewed;participants voiced agreement with care plan;participants included   Comment-Clinical Impression Pt is functioning below baseline and would benefit from skilled occuaptional therapy to address L side hemiparesis.    Total Evaluation Time (Minutes)   Total Evaluation Time (Minutes) 30

## 2021-03-10 NOTE — PLAN OF CARE
FOCUS/GOAL  Bladder management, Medical management, and Mobility    ASSESSMENT, INTERVENTIONS AND CONTINUING PLAN FOR GOAL:  Pt is alert and oriented x4. Denies any pain, SOB or nausea. Is cont of bladder using urinal and toilet. PVR elevated this morning to 168/93, upon recheck after BP meds it was 94/56. Pt is transferring using assist of 1 and walker. Using call light appropriately.

## 2021-03-10 NOTE — PLAN OF CARE
FOCUS/GOAL  Medical management    ASSESSMENT, INTERVENTIONS AND CONTINUING PLAN FOR GOAL:  Patient had a good first night of sleep. Denied pain. VSS  No concerns or complaints. Will continue with POC.

## 2021-03-10 NOTE — PLAN OF CARE
Patient came to ARU on regular/thin diet after dysphagia therapy in previous hospital. aspiration/penetration concerns arose again during evaluation resulting in a need for further dysphagia evaluation. Pt also presents with dysarthria and is aware of this deficit. He is wiling to continue working with SLP on dysarthria strategies to incrase social/expressive language. further testing is needed for cognition to determine exact cognitive targets in therapy but cognitive deficits also noted. Pt is demonstrating swallowing, speech, and cognition below baseline and would benefit from skill intervention to increase indepence, continue being a caregiver, safety, and social interactioins with others.

## 2021-03-10 NOTE — PROGRESS NOTES
03/10/21 1000   Quick Adds   Type of Visit Initial PT Evaluation      Language English    Living Environment   People in home spouse   Current Living Arrangements house   Home Accessibility stairs within home   Number of Stairs, Within Home, Primary other (see comments)  (2 flights with landing between, 14 total )   Stair Railings, Within Home, Primary railing on left side (ascending);railing on right side (ascending)   Transportation Anticipated family or friend will provide   Living Environment Comments PT: Pt lives in a town home with his wife. There are no ANASTASIA with 2 flights and a total of 14 stairs within the home. Railing on L for one flight and railing on R for other. Pt does not own any equipment. Pt and wife are retired and wife is unable to provide any physical assist d/t her previous stroke. Pt states there is no one who will be able to assist physically upon discharge.     Self-Care   Usual Activity Tolerance moderate   Current Activity Tolerance fair   Regular Exercise No   Equipment Currently Used at Home none   Activity/Exercise/Self-Care Comment PT: Pt reports completing outdoor chores for exercise. No regular physical activity. Pt does not own any equipment or ADs.    Disability/Function   Hearing Difficulty or Deaf no   Wear Glasses or Blind yes   Vision Management Reading glasses   Concentrating, Remembering or Making Decisions Difficulty no   Difficulty Communicating no   Difficulty Eating/Swallowing no   Walking or Climbing Stairs Difficulty no   Dressing/Bathing Difficulty no   Toileting issues no   Doing Errands Independently Difficulty (such as shopping) no   Fall history within last six months yes   Number of times patient has fallen within last six months 1  (Fall during initial stroke, no injuries)   Change in Functional Status Since Onset of Current Illness/Injury yes   General Information   Onset of Illness/Injury or Date of Surgery 03/05/21  (Date of stroke)   Referring  Physician Patrick Mays, DO   Patient/Family Therapy Goals Statement (PT) To walk and return home safely    Pertinent History of Current Problem (include personal factors and/or comorbidities that impact the POC) Eber Jin is a 60 year old right hand dominant male with PMHx afib on Xarelto, HTN, CAD, PAD, CHF, ischemic cardiomyopathy, MI, COPD, tobacco use, and HLD who presented on 3/5/21 with left-sided weakness and was found to have small acute right pontine stroke.  Admitted to ARU on 3/9 for rehabilitation and ongoing medical management.    Existing Precautions/Restrictions cardiac;fall   Heart Disease Risk Factors Race;Gender;Medical history;High blood pressure;Lack of physical activity;Smoking   General Observations PT: pt with very flat affect throughout session.  (Simultaneous filing. User may not have seen previous data.)   Cognition   Orientation Status (Cognition) oriented x 4   Affect/Mental Status (Cognition) WFL   Follows Commands (Cognition) WFL   Cognitive Status Comments PT: No overt deficits. Answered questions and followed directions appropriately throughout session.   (Simultaneous filing. User may not have seen previous data.)   Pain Assessment   Patient Currently in Pain No   Integumentary/Edema   Integumentary/Edema Comments PT: Thickened skin over B plantar feet, discoloration over B calves d/t PAD   Posture    Posture Forward head position;Protracted shoulders   Range of Motion (ROM)   ROM Comment PT: PROM WNL B, AROM WNL on R, limited on L in all planes and joints d/t weakness. L ankle PF/DF AROM significantly limited.    Strength   Manual Muscle Testing Quick Adds MMT: Hip;MMT: Knee;MMT: Ankle   MMT: Hip, Rehab Eval   Hip Flexion - Left Side (1/5) trace, left   Hip Flexion - Right Side (4/5) good, right   Hip ABduction - Left Side (3/5) fair, left   Hip ADduction - Left Side (3/5) fair, left   Hip ABduction - Right Side (5/5) normal,right   Hip ADduction - Right Side  (5/5) normal,right   MMT: Knee, Rehab Eval   Knee Flexion - Left Side (3/5) fair, left   Knee Extension - Left Side (3/5) fair, left   Knee Flexion - Right Side (5/5) normal,right   Knee Extension - Right Side (5/5) normal,right   MMT: Ankle, Rehab Eval   Ankle Dorsiflexion - Left Side 1/5) trace, left   Ankle Dorsiflexion - Right Side 5/5) normal,right   Ankle Plantarflexion - Left Side 1/5) trace, left   Ankle Plantarflexion - Right Side 5/5) normal, right   ARC Assessment Only   Acute Rehab Functional Assessment See IP Rehab Daily Documentation Flowsheet for Functional Mobility/ADL Assessment   Gait/Stairs (Locomotion)   Comment (Gait/Stairs) PT: Pt ambulates with L toe drag and L knee buckling/hyperextension d/t instability and weakness. Improved L toe drag with AFO.   (Simultaneous filing. User may not have seen previous data.)   Balance   Balance Comments PT: intact sitting balance without use of UEs, standing balance maintained >30s w/o UE support, however, pt relying heavily on R LE. Decreased stability when instructed to WB evenly through LEs.    Sensory Examination   Sensory Perception Comments PT: light touch, protective sensation, proprioception, and hot/cold detection intact in B LEs   Coordination   Coordination no deficits were identified   Muscle Tone   Muscle Tone no deficits were identified   Clinical Impression   Criteria for Skilled Therapeutic Intervention yes, treatment indicated   PT Diagnosis (PT) PT: L sided weakness    Influenced by the following impairments See clinical impression   Functional limitations due to impairments See clinical impression   Clinical Presentation Evolving/Changing   Clinical Presentation Rationale PT: Pt presents with multisystem involvement including the nervous and cardiac system. Absence of physical assist at discharge and limited home accessibility add to the complexity of clinical decision making.    Clinical Decision Making (Complexity) moderate complexity    Therapy Frequency (PT) Daily   Predicted Duration of Therapy Intervention (days/wks) 14 days    Planned Therapy Interventions (PT) balance training;bed mobility training;gait training;home exercise program;neuromuscular re-education;patient/family education;ROM (range of motion);strengthening;transfer training   Anticipated Equipment Needs at Discharge (PT) walker, standard   Risk & Benefits of therapy have been explained evaluation/treatment results reviewed;care plan/treatment goals reviewed;risks/benefits reviewed;current/potential barriers reviewed;participants voiced agreement with care plan;participants included;patient   Clinical Impression Comments PT: Pt presents with L sided weakness leading to gait impairments, decreased AROM, and impaired balance. Pt will benefit from skilled PT for strengthening, neuromuscular re-education, ROM, and gait/transfer training in order to increase independence with functional mobility. POC complicated by limited home accessibility and lack of physical support at home.    Total Evaluation Time   Total Evaluation Time (Minutes) 30

## 2021-03-10 NOTE — PROGRESS NOTES
"  [facility]   Acute Rehabilitation Unit  Daily progress note    INTERVAL HISTORY  Eber Jin was seen and examined at bedside.  No acute events overnight.  Denies chest pain, shortness of breath, no fever or chills.  BP elevated this AM, will monitor with medications given throughout day.  Labs reviewed, no acute issues.        Functionally, will undergo evals today.       ROS: 10 point ROS neg other than the symptoms noted above in the HPI.          MEDICATIONS  Scheduled meds    aspirin  81 mg Oral Daily     atorvastatin  80 mg Oral QPM     furosemide  40 mg Oral Daily     gabapentin  300 mg Oral Daily     [START ON 3/18/2021] influenza recomb quadrivalent PF  0.5 mL Intramuscular Prior to discharge     losartan  100 mg Oral Daily     magnesium oxide  400 mg Oral TID     metoprolol tartrate  50 mg Oral BID     rivaroxaban ANTICOAGULANT  20 mg Oral Daily with supper     spironolactone  25 mg Oral Daily       PRN meds:  acetaminophen, albuterol, - MEDICATION INSTRUCTIONS -, polyethylene glycol, senna-docusate      PHYSICAL EXAM  BP (!) 168/93 (BP Location: Left arm)   Pulse 64   Temp 98  F (36.7  C) (Oral)   Resp 16   Ht 1.803 m (5' 11\")   Wt 79.6 kg (175 lb 8 oz)   SpO2 98%   BMI 24.48 kg/m    Gen: resting in bed, NAD  HEENT: NCAT, EOMI, L sided facial weakness  Cardio: 2+ radial pulse, well perfused  Pulm: non-labored on RA, symmetrical chest rise  Abd: soft, nontender  Ext: no edema, no calf tenderness  Neuro/MSK: AAOx3, dysarthric, L facial weakness,  Strength: 5/5 in all muscle groups of the upper and lower extremities on R, there is 4- c5, 3 c6, 3 on c7, and 4- finger flexion and abduction out of 5.  There is trace L hip flexion and 4- knee extension, dorsiflexion on L    LABS    Lab Results   Component Value Date    WBC 8.6 03/10/2021     Lab Results   Component Value Date    RBC 4.51 03/10/2021     Lab Results   Component Value Date    HGB 13.2 " 03/10/2021     Lab Results   Component Value Date    HCT 39.9 03/10/2021     Lab Results   Component Value Date    MCV 89 03/10/2021     Lab Results   Component Value Date    MCH 29.3 03/10/2021     Lab Results   Component Value Date    MCHC 33.1 03/10/2021     Lab Results   Component Value Date    RDW 15.3 03/10/2021     Lab Results   Component Value Date     03/10/2021     Last Comprehensive Metabolic Panel:  Sodium   Date Value Ref Range Status   03/10/2021 137 133 - 144 mmol/L Final     Potassium   Date Value Ref Range Status   03/10/2021 4.3 3.4 - 5.3 mmol/L Final     Chloride   Date Value Ref Range Status   03/10/2021 104 94 - 109 mmol/L Final     Carbon Dioxide   Date Value Ref Range Status   03/10/2021 28 20 - 32 mmol/L Final     Anion Gap   Date Value Ref Range Status   03/10/2021 5 3 - 14 mmol/L Final     Glucose   Date Value Ref Range Status   03/10/2021 84 70 - 99 mg/dL Final     Urea Nitrogen   Date Value Ref Range Status   03/10/2021 17 7 - 30 mg/dL Final     Creatinine   Date Value Ref Range Status   03/10/2021 0.77 0.66 - 1.25 mg/dL Final     GFR Estimate   Date Value Ref Range Status   03/10/2021 >90 >60 mL/min/[1.73_m2] Final     Comment:     Non  GFR Calc  Starting 12/18/2018, serum creatinine based estimated GFR (eGFR) will be   calculated using the Chronic Kidney Disease Epidemiology Collaboration   (CKD-EPI) equation.       Calcium   Date Value Ref Range Status   03/10/2021 9.0 8.5 - 10.1 mg/dL Final         ASSESSMENT AND PLAN    Eber Jin is a 60 year old right hand dominant male with PMHx afib on Xarelto, HTN, CAD, PAD, CHF, ischemic cardiomyopathy, MI, COPD, tobacco use, and HLD who presented on 3/5/21 with left-sided weakness and was found to have small acute right pontine stroke.  Admitted to ARU on 3/9 for rehabilitation and ongoing medical management.      Admission to acute inpatient rehab 03/09/21.    Impairment group code: 01.1 L body involvement,  R brain stroke; R acute ischemic pontine stroke        1. PT, OT and SLP 60 minutes of each on a daily basis, in addition to rehab nursing and close management of physiatrist.       2. Impairment of ADL's: Noted to have impairments in strength, coordination, balance, and cognition which are affecting his ability to safely and independently perform ADLs.  Goal for mod I for all ADLs and ADL transfers.     3. Impairment of mobility:  Noted to have impairments in strength, coordination, balance, decreased safety awareness, and L neglect all affecting his ability to safely and independently perform mobility.  Goals for mod I with all transfers and household mobility.     4. Impairment of cognition/language/swallow:  Noted to have mildly impaired oral motor function and impaired cognition.  Goals to tolerate safest, least restrictive diet and improved cognitive/linguistic skills.     5. Medical Conditions  Acute small right pontine stroke  MRI brain with 1.5 cm acute infarct in right lower mata, MRA head with multifocal age indeterminate vessel occlusion or flow-limiting stenosis.  MRA neck with 60% stenosis of left ICA.  Outside window for tPA.  Echo with EF 45%, mild aortic regurgitation, mild dilation of ascending aorta.  EKG/tele with NSR.  .  Last A1c in chart 5.8% on 7/19/2018.  Noted to have ongoing left hemiparesis and incoordination.  Bedside swallow completed by SLP on 3/8, VFSS completed 3/8, noted mild oral and mild pharyngeal dysphagia.   - Continue high dose statin  - Continue PTA Xarelto  - Continue ASA 81 mg daily (restarted 3/6)  - Continue   - BP management as below  - Encourage smoking cessation  - Regular diet, thin liquids by small sips with chin tuck  - Continue PT, OT, SLP     Paroxysmal atrial fibrillation/flutter.  Per chart review, patient admitted 12/31 - 1/3 for frostbite and found to be in afib with RVR with concerns for medication noncompliance.    - Continue PTA Xarelto 20 mg  daily     HTN.  PTA on losartan, furosemide, spironolactone, metoprolol.  Hx poor medication compliance.  Initially allowed permissive HTN, but since resumed losartan, furosemide, and spironolactone.  - Continue losartan 100 mg daily  - Continue furosemide, spironolactone  - Metoprolol on hold, resume as indicated  - Monitor BP and adjust medications as indicated     CAD  Hx MI  CHF with EF 45%  Ischemic cardiomyopathy  - Continue ASA 81 mg daily  - Continue furosemide 40 mg daily  - Continue spironolactone 25 mg daily  - Continue metoprolol tartrate 50 mg BID (resumed 3/8).  Of note, per last hospital admission (discharged 1/3), dose was increased to 100 mg BID at that time.  Resume as appropriate.     HLD.  Unclear whether patient on statin PTA.   on 3/7/21, high-intensity statin therapy recommended, started atorvastatin.  - Continue atorvastatin 80 mg     Hypomagnesemia.  On Mag-Ox PTA.  Most recent Mg on 3/9 at 1.6.  - Continue Mag-Ox 400 mg TID  - Trend BMP  --3/10 1.8      COPD.  PTA on albuterol inhaler PRN.  Also appears to have used Ellipta in the recent past as well.  No evidence of exacerbation during acute hospitalization.  Tobacco use disorder.  Current smoker, 0.5 packs per day, 30 year history.    - Continue albuterol inhaler PRN  - Encourage smoking cessation and provide education        Neuropathic pain.  Patient on gabapentin PTA, he is unaware of indication.  Appears to have been prescribed by provider at burn clinic, so suspect may be related to recent frostbite.  - Continue gabapentin 300 mg daily     6. Adjustment to disability:  Clinical psychology to eval and treat as indicated  7. FEN: regular diet, thin liquids with strategies per SLP  8. Bowel: continent, monitor for constipation, PRN bowel meds available  9. Bladder: continent, monitor PVRs at admission and ISC as indicated  10. DVT Prophylaxis: Xarelto  11. GI Prophylaxis: regular diet  12. Code: full  13. Disposition: home with  family support and home vs. Outpatient therapies  14. ELOS:  10 days  15. Rehab prognosis:  good  16. Follow up Appointments on Discharge: PCP, stroke neurology, PM&R          Patrick Mays, DO  Physical Medicine & Rehabilitation    I spent a total of 25 minutes face to face and coordinating care of Eber Jin.  Over 50% of my time on the unit was spent counseling the patient and /or coordinating care regarding recent CVA.

## 2021-03-10 NOTE — PROGRESS NOTES
03/10/21 1100   General Information   Onset of Illness/Injury or Date of Surgery 03/05/21   Referring Physician Pilar Negro PA-C   Patient/Family Therapy Goal Statement (SLP) to get back to independence and manage himself   General Observations pt is demonstrating awareness of swallowing and speech deficits. Unable to determine if cognition is impaired. Watches television majority of day after FCI with wife who previously had a stroke 8 months ago.   Disability/Function   Hearing Difficulty or Deaf no   Wear Glasses or Blind yes   Vision Management reading glasses   Concentrating, Remembering or Making Decisions Difficulty no   Difficulty Communicating no   Difficulty Eating/Swallowing other (see comments)  (will continue to monitor but pt does not express difficulty )   Walking or Climbing Stairs Difficulty no   Dressing/Bathing Difficulty no   Toileting issues no   Doing Errands Independently Difficulty (such as shopping) no   Equipment Currently Used at Home none   Fall history within last six months yes   Number of times patient has fallen within last six months 1   Change in Functional Status Since Onset of Current Illness/Injury yes   Type of Evaluation   Type of Evaluation Speech, Language, Cognition   Vocal Quality/Secretion Management (Oral Motor)   Vocal Quality (Oral Motor) wet/gurgly   Comment, Vocal Quality/Secretion Management (Oral Motor) Pt had just got done eating at clinician arrival. pt had wet vocal quality which got better as session progressed. Willl continue to assess swallowing fucntion   Speech   Speech Intelligibility (Motor Speech) conversational level   Articulation (Motor Speech) errors with increasing word length;imprecise articulation   Breath Support (Motor Speech) minimal impairment   Deficits in Speech Respiration Shallow   Comment, Motor Speech Assessment pt has dysarthria and is noted to become more fatigued the longer he expresses phrases/stories. Dysarthria  "mildly impacting voice quality in reference to breathiness.    Conversational Level, Speech Intelligibility (Motor Speech) impaired  (longer sentecnes/stories are more fatigued he gets)   Auditory Comprehension   Follows Commands (Auditory Comprehension) 2-step commands   Functional Assessment Scale (Auditory Comprehension) No Impairment   Comment, Assessment (Auditory Comprehension) pt engaged in 2 step command given to him by clinician. In one instance, pt continued to do what the previous command was. no impairment noted.    2 Step, Follows Commands (Auditory Comprehension) over 90% accuracy   Verbal Expression   Word Finding Skills (Verbal Expression) generative naming   Functional Assessment Scale (Verbal Expression) No Impairment   Comment, Assessment (Verbal Expression) score: 29 (avg is mid-high 20s). pt did very well in naming animals in a minute. when pt was asked to name as many 'm' words as he could (was also given rules) he began to say names (proper words). he was then again reminded that he could not use names and struggled to complete this exercise efficiently. Generally intact, assuming due to attention pt was unable to complete effectively     Generative Naming, Word Finding (Verbal Expression) intact;achieved with cues   Reading Comprehension   Oral Reading Ability (Reading Comprehension) sentence level   Comment, Assessment (Reading Comprehension) pt engaged in answering questions after reading a basic sentence/couple sentences. Pt received 8/10. One question pt got wrong was considered \"easy level\".    Sentence Level, Oral Reading Ability (Reading Comprehension) minimal impairment   Written Language   Written Expression (Written Language) single letters/symbols   Letters/Symbols, Written Expression (Written Language) intact   Cognition   Cognitive Function attention deficit;memory deficit   Additional cognitive-linguistic evaluation indicated  Recommended   Cognitive Status Exam Comments Need to " assess cognition further. Language function is adequate to allow for more formal cognitive assessment.   Attention Deficit (Cognition) moderate deficit   Memory Deficit (Cognition) moderate deficit   General Therapy Interventions   Planned Therapy Interventions Dysphagia Treatment;Cognitive Treatment;Language   Dysphagia treatment Instruction of safe swallow strategies   Language Verbal expression   SLP Therapy Assessment/Plan   Criteria for Skilled Therapeutic Interventions Met (SLP Eval) yes   SLP Diagnosis dysphagia, dysarthria   Rehab Potential (SLP Eval) good, to achieve stated therapy goals   Therapy Frequency (SLP Eval) daily   Predicted Duration of Therapy Intervention (SLP Eval) 2 weeks    Activity Limitations Related to Problem List (SLP) pt lives with spouse who recently had a stroke as well. Pt is limited to independence and safety due to dysphagia as well as interactions socially with others due to dysarthria. further evaluation needed for congition.    Comment, Therapy Assessment/Plan (SLP) Patient came to ARU on regular/thin diet. aspiration/penetration concerns arose during evaluation resulting in need for further dysphagia evaluation. Pt also presents with dysarthria and is aware of this deficit. He is wiling to continue working with SLP on dysarthria strategies to incrase social/expressive language. further testing is needed for cognition to determine exact cognitive targets in therapy but cognitive deficits also noted. Pt is demonstrating swallowing, speech, and cognition below baseline and would benefit from skill intervention to increase indepence, continue being a caregiver, safety, and social interactioins with others.     Total Evaluation Time   Total Evaluation Time (Minutes) 60

## 2021-03-10 NOTE — PLAN OF CARE
FUGL-CHU ASSESSMENT   UPPER EXTREMITY  Assessment of sensorimotor function   Stroke specific, performance-based impairment index. It is designed to assess motor functioning, sensation, and joint functioning in patients with post-stroke hemiplegia.    Scoring  The FMA is an impairment measure, consisting of 33 motor tasks and 30 sensory/pain observations.   Motor and sensory items are scored on a 0-2 scale: 0 none, 1 partial, and 2 full.  Motor Function   Upper Extremity 24/36   Wrist 7/10   Hand 12/14   Coordination/Speed 4/6   Total 47/66     Sensory Function   Sensation 11/12   Joint Pain 24/24   Passive Joint Motion 24/24     Motor Classification  Very Severe (0-12)  Severe-Moderate (13-30)  Moderate-Mild (31-47)  Mild (48-60)    Sensory/joint observations (light touch, proprioception, PROM, PMHx impacting UE function):  Mild impairment with proprioception, otherwise light touch and sharp/dull sensation intact.  Pt does not have any passive range or joint pain deficits that would be a barrier to UE functioning.    References  FMA-UE PROTOCOL Rehabilitation Medicine, [facility]  Approved by Alejandral-Chu AR 2010  Alejandral-Brayan ALEXANDRE, Jena L, Marcelino I, Arianne S, Antonio S: The post-stroke hemiplegic patient. A method for evaluation of physical performance. Scand J Rehabil Med 1975, 7:13-31.  Brandee dueñas al.: Determining Levels of Upper Extremity Movement Impairment by Applying a Cluster Analysis to the Fugl-Chu Assessment of the Upper Extremity in Chronic Stroke. Archives of Physical Medicine and Rehabilitation 2016, 98: 456-462.

## 2021-03-10 NOTE — PLAN OF CARE
Discharge Planner Post-Acute Rehab PT:     Discharge Plan: home with FWW and OPPT vs HHPT     Precautions: cardiac, fall, L AFO on during transfers and ambulation     Current Status:  Bed Mobility: min A rolling to R, IND rolling to L   Transfer: CGA with FWW and L AFO  Gait: 150ft with CGA, FWW and L AFO to prevent L toe drag and knee buckling   Stairs: Not yet tested   Balance: Intact sitting balance without UE support, dynamic standing balance with UE support on FWW    Assessment: Pt presents with L sided weakness leading to gait impairments, decreased AROM, and impaired balance. Pt will benefit from skilled PT for strengthening, neuromuscular re-education, ROM, and gait/transfer training in order to increase independence with functional mobility. POC complicated by limited home accessibility and lack of physical support at home.     Other Barriers to Discharge (DME, Family Training, etc): Family training, lack of physical support at home, FWW, AFO

## 2021-03-11 ENCOUNTER — APPOINTMENT (OUTPATIENT)
Dept: SPEECH THERAPY | Facility: CLINIC | Age: 61
End: 2021-03-11
Payer: COMMERCIAL

## 2021-03-11 ENCOUNTER — APPOINTMENT (OUTPATIENT)
Dept: PHYSICAL THERAPY | Facility: CLINIC | Age: 61
End: 2021-03-11
Payer: COMMERCIAL

## 2021-03-11 ENCOUNTER — APPOINTMENT (OUTPATIENT)
Dept: OCCUPATIONAL THERAPY | Facility: CLINIC | Age: 61
End: 2021-03-11
Payer: COMMERCIAL

## 2021-03-11 PROCEDURE — 97116 GAIT TRAINING THERAPY: CPT | Mod: GP | Performed by: PHYSICAL THERAPIST

## 2021-03-11 PROCEDURE — 250N000013 HC RX MED GY IP 250 OP 250 PS 637: Performed by: PHYSICIAN ASSISTANT

## 2021-03-11 PROCEDURE — 92610 EVALUATE SWALLOWING FUNCTION: CPT | Mod: GN

## 2021-03-11 PROCEDURE — 97530 THERAPEUTIC ACTIVITIES: CPT | Mod: GP

## 2021-03-11 PROCEDURE — 97110 THERAPEUTIC EXERCISES: CPT | Mod: GP

## 2021-03-11 PROCEDURE — 97116 GAIT TRAINING THERAPY: CPT | Mod: GP

## 2021-03-11 PROCEDURE — 97130 THER IVNTJ EA ADDL 15 MIN: CPT

## 2021-03-11 PROCEDURE — 97129 THER IVNTJ 1ST 15 MIN: CPT

## 2021-03-11 PROCEDURE — 97530 THERAPEUTIC ACTIVITIES: CPT | Mod: GO

## 2021-03-11 PROCEDURE — 128N000003 HC R&B REHAB

## 2021-03-11 PROCEDURE — 97535 SELF CARE MNGMENT TRAINING: CPT | Mod: GO

## 2021-03-11 RX ADMIN — MAGNESIUM OXIDE TAB 400 MG (241.3 MG ELEMENTAL MG) 400 MG: 400 (241.3 MG) TAB at 19:58

## 2021-03-11 RX ADMIN — ASPIRIN 81 MG CHEWABLE TABLET 81 MG: 81 TABLET CHEWABLE at 08:13

## 2021-03-11 RX ADMIN — GABAPENTIN 300 MG: 300 CAPSULE ORAL at 08:13

## 2021-03-11 RX ADMIN — MAGNESIUM OXIDE TAB 400 MG (241.3 MG ELEMENTAL MG) 400 MG: 400 (241.3 MG) TAB at 14:05

## 2021-03-11 RX ADMIN — METOPROLOL TARTRATE 50 MG: 50 TABLET, FILM COATED ORAL at 19:58

## 2021-03-11 RX ADMIN — Medication 25 MG: at 08:13

## 2021-03-11 RX ADMIN — RIVAROXABAN 20 MG: 10 TABLET, FILM COATED ORAL at 17:10

## 2021-03-11 RX ADMIN — METOPROLOL TARTRATE 50 MG: 50 TABLET, FILM COATED ORAL at 08:13

## 2021-03-11 RX ADMIN — ATORVASTATIN CALCIUM 80 MG: 80 TABLET, FILM COATED ORAL at 20:00

## 2021-03-11 RX ADMIN — LOSARTAN POTASSIUM 100 MG: 100 TABLET, FILM COATED ORAL at 08:14

## 2021-03-11 RX ADMIN — FUROSEMIDE 40 MG: 20 TABLET ORAL at 08:13

## 2021-03-11 RX ADMIN — MAGNESIUM OXIDE TAB 400 MG (241.3 MG ELEMENTAL MG) 400 MG: 400 (241.3 MG) TAB at 08:13

## 2021-03-11 ASSESSMENT — MIFFLIN-ST. JEOR: SCORE: 1597.78

## 2021-03-11 NOTE — PLAN OF CARE
FOCUS/GOAL  Medical management    ASSESSMENT, INTERVENTIONS AND CONTINUING PLAN FOR GOAL:  Slept throughout the night w/o any c/o. BP  Was. Used urinal to void. Denied pain. Will continue with POC.

## 2021-03-11 NOTE — PROGRESS NOTES
03/11/21 1000   General Information   Onset of Illness/Injury or Date of Surgery 03/05/21   Referring Physician Pilar Negro PA-C   Patient/Family Therapy Goal Statement (SLP) to get back to independence and manage himself   General Observations Patient was seen by speech therapy during acute care hospitalization for dysphagia management.  VFSS was completed showing effectiveness of chin tuck maneuver to decrease penetration and aspiration risk.  Was discharged on a regular texture diet and thin liquids with instruction to utilize strategies.   Type of Evaluation   Type of Evaluation Swallow Evaluation   Oral Motor   Oral Musculature anomalies present   Structural Abnormalities none present   Dentition (Oral Motor) some missing teeth   Comment, Dentition (Oral Motor) Functional for needs   Facial Symmetry (Oral Motor)   Facial Symmetry (Oral Motor) left side impairment   Left Side Facial Asymmetry minimal impairment   Lip Function (Oral Motor)   Lip Range of Motion (Oral Motor) WNL   Comment, Lip Function (Oral Motor) Some weakness on left side noted functional   Tongue Function (Oral Motor)   Tongue ROM (Oral Motor) WNL   Comment, Tongue Function (Oral Motor) Some weakness on left side noted but again functional   Jaw Function (Oral Motor)   Jaw Function (Oral Motor) WNL   Cough/Swallow/Gag Reflex (Oral Motor)   Volitional Throat Clear/Cough (Oral Motor) WNL   Volitional Swallow (Oral Motor) WNL   General Swallowing Observations   Current Diet/Method of Nutritional Intake (General Swallowing Observations, NIS) thin liquids;regular diet   Comment, General Swallowing Observations Patient has been seen by speech therapy for dysphagia management.   Swallowing Evaluation Clinical swallow evaluation   Clinical Swallow Evaluation   Clinical Swallow Evaluation Textures Trialed Thin Liquids;Puree Textures;Semi-Solid;Solid Foods   Clinical Swallow Eval: Thin Liquid Texture Trial   Mode of Presentation, Thin Liquids  cup;self-fed   Volume of Liquid or Food Presented Single swallows.  1 episode taken multiple swallows   Oral Phase of Swallow WFL   Pharyngeal Phase of Swallow intact   Successful Strategies Trialed During Procedure chin tuck   Diagnostic Statement Towards the end of the meal patient started having some wet vocal quality   Clinical Swallow Evaluation: Puree Solid Texture Trial   Mode of Presentation, Puree spoon;self-fed   Volume of Puree Presented Single bites   Oral Phase, Puree WFL   Pharyngeal Phase, Puree wet vocal quality after swallow   Diagnostic Statement Towards the end of the meal noting some wet vocal quality and patient beginning to take some deeper bites of oatmeal and some liquid drinks   Clinical Swallow Evaluation: Semisolid Texture Trial   Mode of Presentation, Semisolid spoon;self-fed   Volume of Semisolid Food Presented Single bites   Oral Phase, Semisolid WFL   Pharyngeal Phase, Semisolid intact   Diagnostic Statement No difficulties noted   Clinical Swallow Evaluation: Solid Food Texture Trial   Mode of Presentation, Solid spoon   Volume of Solid Food Presented Single bites   Oral Phase, Solid WFL   Pharyngeal Phase, Solid intact   Diagnostic Statement Tolerated without difficulties   Swallowing Recommendations   Diet Consistency Recommendations thin liquids;regular diet   Supervision Level for Intake distant supervision needed   Mode of Delivery Recommendations bolus size, small   Postural Recommendations chin tuck   SLP Therapy Assessment/Plan   Criteria for Skilled Therapeutic Interventions Met (SLP Eval) yes   SLP Diagnosis Mild dysphagia   Rehab Potential (SLP Eval) good, to achieve stated therapy goals   Therapy Frequency (SLP Eval) daily   Predicted Duration of Therapy Intervention (SLP Eval) 1 week   Activity Limitations Related to Problem List (SLP) Decreased swallowing safety   Comment, Therapy Assessment/Plan (SLP) Patient able to tolerate regular texture diet and thin liquids  without overt signs and symptoms of aspiration when sitting fully upright at the edge of the bed and utilizing a head down position.  When patient was seen yesterday was observed casually to be drinking while not in an ideal position and showing overt signs and symptoms of aspiration.  Recommend patient continue with regular/thin but reinforced need to be fully upright and utilizing safe swallow strategies.  We will plan to follow-up with patient over the course of the next few days to ensure consistent tolerance.  Patient would benefit from skilled intervention to maximize swallowing safety.    Total Evaluation Time   Total Evaluation Time (Minutes) 30

## 2021-03-11 NOTE — PLAN OF CARE
FOCUS/GOAL  Bowel management, Bladder management, Pain management, Mobility, and Cognition/Memory/Judgment/Problem solving    ASSESSMENT, INTERVENTIONS AND CONTINUING PLAN FOR GOAL:    A&Ox4, Ax1 WW. LBM 3/10, voids in urinal. Continent B+B. Denies pain.  Held Metoprolol tonight, order parameters not met. Will continue with POC

## 2021-03-11 NOTE — PLAN OF CARE
Discharge Planner Post-Acute Rehab OT:      Discharge Plan: Home with HH therapies      Precautions: Falls, L hemiparesis       Current Status:  ADLs: Incorporates LUE into ADLs without cues, slow pace and incoordination but great rehab potential to improve. SBA UB dressing with ely tech, mod A LB dressing, Min A bathing. CGA with transfers, mobility, and standing for h/g at sink.  IADLs: to be assessed.   Vision/Cognition: Assist for LLE placement during transfers but does not appear as inattention. Mild deficits with recall and problem solving during functional tasks, baseline level unclear.      Assessment: Pt limited by L hemiparesis LLE>LUE and incoordination, impacting ADL/IADL performance. Pt previously IND with ADL/IADL and caring for wife. Requires OT to address L side weakness through intervention focusing on functional mobility and neuromuscular reeducation. Goals to progress to Mod I using fww. ELOS 2 weeks.      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 Pt stated that his children live close but support is unclear.   Equipment recommendations: walker, shower chair, and potentially dressing AE pending progress.

## 2021-03-11 NOTE — PROGRESS NOTES
"  [facility]   Acute Rehabilitation Unit  Daily progress note    INTERVAL HISTORY  Eber Jin was seen and examined at bedside. No reported issues overnight, feeling the same as he did yesterday, no changes in function or concerns. Therapy going \"OK\" so far. Was eating lunch at the time of assessment.    No reported chest pain, N/V/D, HA, abdominal pain, or other concerns.    Speech  Initial Assessment: Patient came to ARU on regular/thin diet. aspiration/penetration concerns arose during evaluation resulting in need for further dysphagia evaluation. Pt also presents with dysarthria and is aware of this deficit. He is wiling to continue working with SLP on dysarthria strategies to incrase social/expressive language. further testing is needed for cognition to determine exact cognitive targets in therapy but cognitive deficits also noted. Pt is demonstrating swallowing, speech, and cognition below baseline and would benefit from skill intervention to increase indepence, continue being a caregiver, safety, and social interactioins with others.     PT  IBed Mobility: min A rolling to R, IND rolling to L   Transfer: CGA with FWW and L AFO  Gait: 150ft with CGA, FWW and L AFO to prevent L toe drag and knee buckling   Stairs: Not yet tested   Balance: Intact sitting balance without UE support, dynamic standing balance with UE support on FWW     Assessment: Pt presents with L sided weakness leading to gait impairments, decreased AROM, and impaired balance. Pt will benefit from skilled PT for strengthening, neuromuscular re-education, ROM, and gait/transfer training in order to increase independence with functional mobility. POC complicated by limited home accessibility and lack of physical support at home.      OT  ADLs: CGA during functional transfers using fww, CGA toilet transfer using grab bar, CGA g/h while standing EOS with fww, Mod A dressing- physical assistance for " LB clothing.   IADLs: to be assessed.   Vision/Cognition: Hospital notes indicate L neglect, however this was not present during initial evaluation, will continue to assess. Mild deficits with recall and problem solving during functional tasks, baseline level unclear.      Assessment: Initial evaluation completed and plan of care established. Pt limited by L sided weakness and incoordination, impacting ADL/IADL performance. Pt previously IND with ADL/IADL and caring for wife. Pt would benefit from skilled occupational therapy to address L side weakness through intervention focusing on functional mobility and neuromuscular reeducation. Goals to progress to Mod I using fww. ELOS 2 weeks.      MEDICATIONS    aspirin  81 mg Oral Daily     atorvastatin  80 mg Oral QPM     furosemide  40 mg Oral Daily     gabapentin  300 mg Oral Daily     [START ON 3/18/2021] influenza recomb quadrivalent PF  0.5 mL Intramuscular Prior to discharge     losartan  100 mg Oral Daily     magnesium oxide  400 mg Oral TID     metoprolol tartrate  50 mg Oral BID     rivaroxaban ANTICOAGULANT  20 mg Oral Daily with supper     spironolactone  25 mg Oral Daily        acetaminophen, albuterol, - MEDICATION INSTRUCTIONS -, polyethylene glycol, senna-docusate     PHYSICAL EXAM  24 Hour Vital Signs Summary:  Temp: 96.5  F (35.8  C) Temp  Min: 96.5  F (35.8  C)  Max: 98  F (36.7  C)  Resp: 15 Resp  Min: 15  Max: 15  SpO2: 98 % SpO2  Min: 97 %  Max: 98 %  Pulse: 63 Pulse  Min: 63  Max: 71    No data recorded  BP: (!) 144/84 Systolic (24hrs), Av , Min:94 , Max:144   Diastolic (24hrs), Av, Min:56, Max:84    Intake/Output Summary (Last 24 hours) at 3/11/2021 0927  Last data filed at 3/11/2021 0500  Gross per 24 hour   Intake 360 ml   Output 2400 ml   Net -2040 ml     Net IO Since Admission: -2,490 mL [21]    Gen: Alert, oriented, cooperative with exam, NAD  HEENT: Normocephalic, atraumatic.  Cardio: RRR, no murmur, rub, or S3/S4  Pulm:  CTAB, no distress  Abd: soft, nontender  Ext: no calf tenderness, no edema  Neuro/MSK: L hemiparesis, dysarthria     LABS    Lab Results   Component Value Date    WBC 8.6 03/10/2021     Lab Results   Component Value Date    RBC 4.51 03/10/2021     Lab Results   Component Value Date    HGB 13.2 03/10/2021     Lab Results   Component Value Date    HCT 39.9 03/10/2021     Lab Results   Component Value Date    MCV 89 03/10/2021     Lab Results   Component Value Date    MCH 29.3 03/10/2021     Lab Results   Component Value Date    MCHC 33.1 03/10/2021     Lab Results   Component Value Date    RDW 15.3 03/10/2021     Lab Results   Component Value Date     03/10/2021     Last Comprehensive Metabolic Panel:  Sodium   Date Value Ref Range Status   03/10/2021 137 133 - 144 mmol/L Final     Potassium   Date Value Ref Range Status   03/10/2021 4.3 3.4 - 5.3 mmol/L Final     Chloride   Date Value Ref Range Status   03/10/2021 104 94 - 109 mmol/L Final     Carbon Dioxide   Date Value Ref Range Status   03/10/2021 28 20 - 32 mmol/L Final     Anion Gap   Date Value Ref Range Status   03/10/2021 5 3 - 14 mmol/L Final     Glucose   Date Value Ref Range Status   03/10/2021 84 70 - 99 mg/dL Final     Urea Nitrogen   Date Value Ref Range Status   03/10/2021 17 7 - 30 mg/dL Final     Creatinine   Date Value Ref Range Status   03/10/2021 0.77 0.66 - 1.25 mg/dL Final     GFR Estimate   Date Value Ref Range Status   03/10/2021 >90 >60 mL/min/[1.73_m2] Final     Comment:     Non  GFR Calc  Starting 12/18/2018, serum creatinine based estimated GFR (eGFR) will be   calculated using the Chronic Kidney Disease Epidemiology Collaboration   (CKD-EPI) equation.       Calcium   Date Value Ref Range Status   03/10/2021 9.0 8.5 - 10.1 mg/dL Final         Rehabilitation - continue comprehensive acute inpatient rehabilitation program with multidisciplinary approach including therapies, rehab nursing, and physiatry following. See  interval history for updates.      ASSESSMENT AND PLAN    Eber Jin is a 60 year old right hand dominant male with PMHx afib on Xarelto, HTN, CAD, PAD, CHF, ischemic cardiomyopathy, MI, COPD, tobacco use, and HLD who presented on 3/5/21 with left-sided weakness and was found to have small acute right pontine stroke.  Admitted to ARU on 3/9 for rehabilitation and ongoing medical management.      Admission to acute inpatient rehab 03/09/21.    Impairment group code: 01.1 L body involvement, R brain stroke; R acute ischemic pontine stroke      1. PT, OT and SLP: 60 minutes of each on a daily basis, in addition to rehab nursing and close management of physiatrist.       2. Impairment of ADL's: Noted to have impairments in strength, coordination, balance, and cognition which are affecting his ability to safely and independently perform ADLs.  Goal for mod I for all ADLs and ADL transfers.     3. Impairment of mobility:  Noted to have impairments in strength, coordination, balance, decreased safety awareness, and L neglect all affecting his ability to safely and independently perform mobility.  Goals for mod I with all transfers and household mobility.     4. Impairment of cognition/language/swallow:  Noted to have mildly impaired oral motor function and impaired cognition.  Goals to tolerate safest, least restrictive diet and improved cognitive/linguistic skills.     5. Medical Conditions  Acute small right pontine stroke  MRI brain with 1.5 cm acute infarct in right lower mata, MRA head with multifocal age indeterminate vessel occlusion or flow-limiting stenosis.  MRA neck with 60% stenosis of left ICA.  Outside window for tPA.  Echo with EF 45%, mild aortic regurgitation, mild dilation of ascending aorta.  EKG/tele with NSR.  .  Last A1c in chart 5.8% on 7/19/2018.  Noted to have ongoing left hemiparesis and incoordination.  Bedside swallow completed by SLP on 3/8, VFSS completed 3/8, noted mild  oral and mild pharyngeal dysphagia.   - Continue high dose statin  - Continue PTA Xarelto  - Continue ASA 81 mg daily (restarted 3/6)  - Continue   - BP management as below  - Encourage smoking cessation  - Regular diet, thin liquids by small sips with chin tuck  - Continue PT, OT, SLP     Paroxysmal atrial fibrillation/flutter  Per chart review, patient admitted 12/31 - 1/3 for frostbite and found to be in afib with RVR with concerns for medication noncompliance.    - Continue PTA Xarelto 20 mg daily     HTN  PTA on losartan, furosemide, spironolactone, metoprolol. Hx poor medication compliance. Initially allowed permissive HTN, but since resumed losartan, furosemide, and spironolactone.  - Continue losartan 100 mg daily  - Continue furosemide, spironolactone  - Metoprolol on hold, resume as indicated  - Monitor BP and adjust medications as indicated     CAD  Hx MI  CHF with EF 45%  Ischemic cardiomyopathy  - Continue ASA 81 mg daily  - Continue furosemide 40 mg daily  - Continue spironolactone 25 mg daily  - Continue metoprolol tartrate 50 mg BID (resumed 3/8).  Of note, per last hospital admission (discharged 1/3), dose was increased to 100 mg BID at that time.  Resume as appropriate.     HLD  Unclear whether patient on statin PTA.   on 3/7/21, high-intensity statin therapy recommended, started atorvastatin.  - Continue atorvastatin 80 mg     Hypomagnesemia  On Mag-Ox PTA.  Most recent Mg on 3/9 at 1.6.  - Continue Mag-Ox 400 mg TID  - Trend BMP  --3/10 1.8      COPD  Tobacco use disorder  PTA on albuterol inhaler PRN.  Also appears to have used Ellipta in the recent past as well.  No evidence of exacerbation during acute hospitalization. Current smoker, 0.5 packs per day, 30 year history.    - Continue albuterol inhaler PRN  - Encourage smoking cessation and provide education      Neuropathic pain  Patient on gabapentin PTA, he is unaware of indication.  Appears to have been prescribed by provider at burn  clinic, so suspect may be related to recent frostbite.  - Continue gabapentin 300 mg daily     6. Adjustment to disability:  Clinical psychology to eval and treat as indicated  7. FEN: regular diet, thin liquids with strategies per SLP  8. Bowel: continent, monitor for constipation, PRN bowel meds available  9. Bladder: continent, monitor PVRs at admission and ISC as indicated  10. DVT Prophylaxis: Xarelto  11. GI Prophylaxis: regular diet  12. Code: full  13. Disposition: home with family support and home vs. Outpatient therapies  14. ELOS:  10 days  15. Rehab prognosis:  good  16. Follow up Appointments on Discharge: PCP, stroke neurology, PM&R    Patient was seen and discussed with the Attending Physician, Dr. Mays.    Boston Quick, MS4  [facility]      I spent a total of 25 minutes face to face and coordinating care of Eber Jin. Over 50% of my time on the unit was spent counseling the patient and /or coordinating care regarding recent CVA.

## 2021-03-11 NOTE — PLAN OF CARE
Denies pain, continent of bladder on the toilet, had good appetite at breakfast and lunch, will continue POC

## 2021-03-11 NOTE — PLAN OF CARE
Discharge Planner Post-Acute Rehab PT:     Discharge Plan: home with FWW and OPPT vs HHPT     Precautions: cardiac, fall, L AFO on during transfers and ambulation     Current Status:  Bed Mobility: min A rolling to R, IND rolling to L   Transfer: CGA with FWW and L AFO  Gait: 170ft with CGA, FWW and L AFO to prevent L toe drag and knee buckling   Stairs: CGA/min a 12 steps with B rails  Balance: Intact sitting balance without UE support, dynamic standing balance with UE support on FWW    Assessment:Pt did well on stairs, CGA/min A for reciprocal steps, CGA for single step pattern. Improved amb step thru and weight shift with L  ft x 2 ww, CGA. Pt motivated to improve.  Other Barriers to Discharge (DME, Family Training, etc): Family training, lack of physical support at home, FWW, AFO

## 2021-03-11 NOTE — PROGRESS NOTES
"Repeatable Battery for the Assessment of Neuropsychological Status (RBANS) FORM A   Immediate Memory Visuospatial/  Constructional Language Attention Delayed Memory Total Scale   Index Score 53 81 101 68 56 64   Percentile Rank . 1  10 53 2 . 2 1       SLP:  Pt seen for administration of RBANS Form  A. Results are based on a mean of 100 and a standard deviation of +/- 15.   Interpretation: Pt demonstrating moderate to severe deficits in immediate and delayed memory. Pt expressed after administration that he feels his memory was \"not good\" and he \"would've done better\" before his health decline. Pt is also demonstrating attention deficits. Pt did not appear distracted with noise around him. Will continue with speech therapy to address cognition among other deficits targeting memory and attention.   Face to Face Administration: 30 minutes  Scoring/Interpretation: 15 minutes  Total Time: 45 minutes      "

## 2021-03-12 ENCOUNTER — APPOINTMENT (OUTPATIENT)
Dept: SPEECH THERAPY | Facility: CLINIC | Age: 61
End: 2021-03-12
Payer: COMMERCIAL

## 2021-03-12 ENCOUNTER — APPOINTMENT (OUTPATIENT)
Dept: PHYSICAL THERAPY | Facility: CLINIC | Age: 61
End: 2021-03-12
Payer: COMMERCIAL

## 2021-03-12 ENCOUNTER — APPOINTMENT (OUTPATIENT)
Dept: OCCUPATIONAL THERAPY | Facility: CLINIC | Age: 61
End: 2021-03-12
Payer: COMMERCIAL

## 2021-03-12 PROCEDURE — 92507 TX SP LANG VOICE COMM INDIV: CPT | Mod: GN | Performed by: SPEECH-LANGUAGE PATHOLOGIST

## 2021-03-12 PROCEDURE — 97110 THERAPEUTIC EXERCISES: CPT | Mod: GP | Performed by: PHYSICAL THERAPIST

## 2021-03-12 PROCEDURE — 128N000003 HC R&B REHAB

## 2021-03-12 PROCEDURE — 99232 SBSQ HOSP IP/OBS MODERATE 35: CPT | Performed by: PHYSICAL MEDICINE & REHABILITATION

## 2021-03-12 PROCEDURE — 97116 GAIT TRAINING THERAPY: CPT | Mod: GP | Performed by: PHYSICAL THERAPIST

## 2021-03-12 PROCEDURE — 92526 ORAL FUNCTION THERAPY: CPT | Mod: GN | Performed by: SPEECH-LANGUAGE PATHOLOGIST

## 2021-03-12 PROCEDURE — 97530 THERAPEUTIC ACTIVITIES: CPT | Mod: GP | Performed by: PHYSICAL THERAPIST

## 2021-03-12 PROCEDURE — 97535 SELF CARE MNGMENT TRAINING: CPT | Mod: GO

## 2021-03-12 PROCEDURE — 97110 THERAPEUTIC EXERCISES: CPT | Mod: GO

## 2021-03-12 PROCEDURE — 250N000013 HC RX MED GY IP 250 OP 250 PS 637: Performed by: PHYSICIAN ASSISTANT

## 2021-03-12 RX ADMIN — MAGNESIUM OXIDE TAB 400 MG (241.3 MG ELEMENTAL MG) 400 MG: 400 (241.3 MG) TAB at 07:56

## 2021-03-12 RX ADMIN — MAGNESIUM OXIDE TAB 400 MG (241.3 MG ELEMENTAL MG) 400 MG: 400 (241.3 MG) TAB at 21:16

## 2021-03-12 RX ADMIN — LOSARTAN POTASSIUM 100 MG: 100 TABLET, FILM COATED ORAL at 07:56

## 2021-03-12 RX ADMIN — GABAPENTIN 300 MG: 300 CAPSULE ORAL at 07:56

## 2021-03-12 RX ADMIN — METOPROLOL TARTRATE 50 MG: 50 TABLET, FILM COATED ORAL at 07:56

## 2021-03-12 RX ADMIN — MAGNESIUM OXIDE TAB 400 MG (241.3 MG ELEMENTAL MG) 400 MG: 400 (241.3 MG) TAB at 13:19

## 2021-03-12 RX ADMIN — FUROSEMIDE 40 MG: 20 TABLET ORAL at 07:56

## 2021-03-12 RX ADMIN — ASPIRIN 81 MG CHEWABLE TABLET 81 MG: 81 TABLET CHEWABLE at 07:56

## 2021-03-12 RX ADMIN — RIVAROXABAN 20 MG: 10 TABLET, FILM COATED ORAL at 17:15

## 2021-03-12 RX ADMIN — ATORVASTATIN CALCIUM 80 MG: 80 TABLET, FILM COATED ORAL at 21:16

## 2021-03-12 RX ADMIN — METOPROLOL TARTRATE 50 MG: 50 TABLET, FILM COATED ORAL at 21:16

## 2021-03-12 RX ADMIN — Medication 25 MG: at 07:56

## 2021-03-12 ASSESSMENT — MIFFLIN-ST. JEOR: SCORE: 1607.31

## 2021-03-12 NOTE — PLAN OF CARE
"A&Ox4. Denies pain, SOB, dizziness, numb/tingling, N/V. States he is voiding in toilet okay. LBM today per report. Eats all of his meals by himself- reg, thin diet. Takes pills whole with eater. L AFO on during day, can be taken off at night. Wife (Dinorah) called and stated she would like social work to call her to talk about a \"personal problem\" pt has. Writer called MARIELA and told her about this and gave her Dinorah's cell phone number- MARIELA says she will follow up. Pt is participating in therapies and calling for staff help appropriately. Continue POC.  "

## 2021-03-12 NOTE — PROGRESS NOTES
Individualized Overall Plan Of Care (IOPOC)      Rehab diagnosis/Impairment Group Code: 01.1 l body involvement, r brain stroke; r acute ischemic pontine stroke  Right pontine stroke (h)       Expected functional outcome: to achieve a level of mod I     Clinical Impression Comments:  Left hemiapresis post CVA    Mobility: PT: Pt presents with L sided weakness leading to gait impairments, decreased AROM, and impaired balance. Pt will benefit from skilled PT for strengthening, neuromuscular re-education, ROM, and gait/transfer training in order to increase independence with functional mobility. POC complicated by limited home accessibility and lack of physical support at home.     ADL: Pt is functioning below baseline and would benefit from skilled occuaptional therapy to address L side hemiparesis.     Communication/Cognition/Swallow: Patient able to tolerate regular texture diet and thin liquids without overt signs and symptoms of aspiration when sitting fully upright at the edge of the bed and utilizing a head down position.  When patient was seen yesterday was observed casually to be drinking while not in an ideal position and showing overt signs and symptoms of aspiration.  Recommend patient continue with regular/thin but reinforced need to be fully upright and utilizing safe swallow strategies.  We will plan to follow-up with patient over the course of the next few days to ensure consistent tolerance.  Patient would benefit from skilled intervention to maximize swallowing safety.     Intensity of therapy:   PT 60 minutes, Daily, for 14 days   OT 60 minutes, Daily, for 2 weeks  SLP 60 minutes, daily, for 1 week    Orthotics AFO may be needed   Education stroke  Neuropsychology Testing: No        Medical Prognosis: good       Physician summary statement: patient with CVA and L hemiparesis     Discharge destination: prior home  Discharge rehabilitation needs: home care, PT, OT and SLP      Estimated length of stay:  14 days       Rehabilitation Physician Patrick Mays DO

## 2021-03-12 NOTE — PLAN OF CARE
FOCUS/GOAL  Medical management    ASSESSMENT, INTERVENTIONS AND CONTINUING PLAN FOR GOAL:  Pt is alert and oriented. No complaints of pain. Assist of 1 with walker. Continent of bladder. Appeared to be sleeping on rounds.

## 2021-03-12 NOTE — PLAN OF CARE
Discharge Planner Post-Acute Rehab OT:      Discharge Plan: Home with HH therapies      Precautions: Falls, L hemiparesis       Current Status:  ADLs: Incorporates LUE into ADLs without cues, slow pace and incoordination but great rehab potential to improve. SBA UB dressing with ely tech, mod A LB dressing, Min A bathing. CGA with transfers, mobility, and standing for h/g at sink.  IADLs: CGA standing/hanging clothing, continue to assess IADLS further.    Vision/Cognition: Mild deficits with recall and problem solving during functional tasks, baseline level unclear.      Assessment: pt demo'd use of bilateral upper extremities during functional ADL/IADL tasks without cues, able to complete tasks w/extra time and CGA-SBA when standing for safety. Tx focus today to increase LUE strength and coordination w/tasks, and education re: walker safety. Pt will benefit from further training for LUE and walker safety during tasks. Cont with POC.     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 Pt stated that his children live close but support is unclear.   Equipment recommendations: walker, shower chair, and potentially dressing AE pending progress.

## 2021-03-12 NOTE — PLAN OF CARE
Patient transferred from bed to chair with CGA and walker.    Continent of urine using the urinal independently.    LBM 3/11/21.   States Xarelto is not a new medication for him and he declines the need for further education.  /69. Oral fluids encouraged, has been drinking a lot of gingerale.  At bedtime /72.

## 2021-03-12 NOTE — PROGRESS NOTES
[facility]   Acute Rehabilitation Unit  Daily progress note    INTERVAL HISTORY  Seen at bedside and was reclining in bed napping after having breakfast prior to therapy sessions. Feeling about the same as before today, thinks that his left hand is doing alright. When asked states that L AFO has been helpful for him while engaging in therapies.     No reported chest pain, N/V/D, HA, abdominal pain, or other concerns.    Speech  Assessment: Patient able to tolerate regular texture diet and thin liquids without overt signs and symptoms of aspiration when sitting fully upright at the edge of the bed and utilizing a head down position.  When patient was seen yesterday was observed casually to be drinking while not in an ideal position and showing overt signs and symptoms of aspiration.  Recommend patient continue with regular/thin but reinforced need to be fully upright and utilizing safe swallow strategies.  We will plan to follow-up with patient over the course of the next few days to ensure consistent tolerance.  Patient would benefit from skilled intervention to maximize swallowing safety.    PT  Bed Mobility: min A rolling to R, IND rolling to L   Transfer: CGA with FWW and L AFO  Gait: 170ft with CGA, FWW and L AFO to prevent L toe drag and knee buckling   Stairs: CGA/min a 12 steps with B rails  Balance: Intact sitting balance without UE support, dynamic standing balance with UE support on FWW     Assessment: Pt did well on stairs, CGA/min A for reciprocal steps, CGA for single step pattern. Improved amb step thru and weight shift with L  ft x 2 ww, CGA. Pt motivated to improve.     OT  ADLs: Incorporates LUE into ADLs without cues, slow pace and incoordination but great rehab potential to improve. SBA UB dressing with ely tech, mod A LB dressing, Min A bathing. CGA with transfers, mobility, and standing for h/g at sink.  IADLs: to be  assessed.   Vision/Cognition: Assist for LLE placement during transfers but does not appear as inattention. Mild deficits with recall and problem solving during functional tasks, baseline level unclear.      Assessment: Pt limited by L hemiparesis LLE>LUE and incoordination, impacting ADL/IADL performance. Pt previously IND with ADL/IADL and caring for wife. Requires OT to address L side weakness through intervention focusing on functional mobility and neuromuscular reeducation. Goals to progress to Mod I using fww. ELOS 2 weeks.    MEDICATIONS   Current Facility-Administered Medications   Medication     acetaminophen (TYLENOL) tablet 325-650 mg     albuterol (PROAIR HFA/PROVENTIL HFA/VENTOLIN HFA) 108 (90 Base) MCG/ACT inhaler 2 puff     aspirin (ASA) chewable tablet 81 mg     atorvastatin (LIPITOR) tablet 80 mg     furosemide (LASIX) tablet 40 mg     gabapentin (NEURONTIN) capsule 300 mg     [START ON 3/18/2021] influenza recomb quadrivalent PF (FLUBLOK) injection 0.5 mL     losartan (COZAAR) tablet 100 mg     magnesium oxide (MAG-OX) tablet 400 mg     metoprolol tartrate (LOPRESSOR) tablet 50 mg     Patient is already receiving anticoagulation with heparin, enoxaparin (LOVENOX), warfarin (COUMADIN)  or other anticoagulant medication     polyethylene glycol (MIRALAX) Packet 17 g     rivaroxaban ANTICOAGULANT (XARELTO) tablet 20 mg     senna-docusate (SENOKOT-S/PERICOLACE) 8.6-50 MG per tablet 1-2 tablet     spironolactone (ALDACTONE) half-tab 25 mg       PHYSICAL EXAM  24 Hour Vital Signs Summary:  Temp: 98.1  F (36.7  C) Temp  Min: 98.1  F (36.7  C)  Max: 98.1  F (36.7  C)  Resp: 18 Resp  Min: 18  Max: 18  SpO2: 95 % SpO2  Min: 95 %  Max: 95 %  Pulse: 68 Pulse  Min: 68  Max: 81    No data recorded  BP: (!) 162/86 Systolic (24hrs), Av , Min:107 , Max:162   Diastolic (24hrs), Av, Min:69, Max:86    Gen: Alert, oriented, cooperative with exam, NAD, flat affect  HEENT: Normocephalic, atraumatic.  Cardio: RRR,  no murmur, rub, or S3/S4  Pulm: CTAB, no increased WOB on room air.  Abd: soft, nontender  Ext: no calf tenderness, no edema  Neuro/MSK: L hemiparesis, dysarthria consistent with prior examinations    LABS  CBC RESULTS:   Recent Labs   Lab 03/10/21  0712   WBC 8.6   RBC 4.51   HGB 13.2*   HCT 39.9*   MCV 89   MCH 29.3   MCHC 33.1   RDW 15.3*         CMP/BMP/Hepatic Panel:   Recent Labs   Lab 03/10/21  0712      POTASSIUM 4.3   CHLORIDE 104   CO2 28   ANIONGAP 5   GLC 84   BUN 17   CR 0.77   LISA 9.0     Mg/Phos:   Recent Labs   Lab 03/10/21  0712   MAG 1.8     Rehabilitation - continue comprehensive acute inpatient rehabilitation program with multidisciplinary approach including therapies, rehab nursing, and physiatry following. See interval history for updates.      ASSESSMENT AND PLAN    Eber Jin is a 60 year old right hand dominant male with PMHx afib on Xarelto, HTN, CAD, PAD, CHF, ischemic cardiomyopathy, MI, COPD, tobacco use, and HLD who presented on 3/5/21 with left-sided weakness and was found to have small acute right pontine stroke. Admitted to ARU on 3/9 for rehabilitation and ongoing medical management.      Admission to acute inpatient rehab 03/09/21.  Impairment group code: 01.1 L body involvement, R brain stroke; R acute ischemic pontine stroke      1. PT, OT and SLP: 60 minutes of each on a daily basis, in addition to rehab nursing and close management of physiatrist.     2. Impairment of ADL's: Noted to have impairments in strength, coordination, balance, and cognition which are affecting his ability to safely and independently perform ADLs. Goal for mod I for all ADLs and ADL transfers.     3. Impairment of mobility:  Noted to have impairments in strength, coordination, balance, decreased safety awareness, and L neglect all affecting his ability to safely and independently perform mobility. Goals for mod I with all transfers and household mobility.     4. Impairment of  cognition/language/swallow:  Noted to have mildly impaired oral motor function and impaired cognition. Goals to tolerate safest, least restrictive diet and improved cognitive/linguistic skills.     5. Medical Conditions  Acute small right pontine stroke  MRI brain with 1.5 cm acute infarct in right lower mata, MRA head with multifocal age indeterminate vessel occlusion or flow-limiting stenosis. MRA neck with 60% stenosis of left ICA. Outside window for tPA. Echo with EF 45%, mild aortic regurgitation, mild dilation of ascending aorta. EKG/tele with NSR. . Last A1c in chart 5.8% on 7/19/2018.  Noted to have ongoing left hemiparesis and incoordination. Bedside swallow completed by SLP on 3/8, VFSS completed 3/8, noted mild oral and mild pharyngeal dysphagia.   - Continue high dose statin  - Continue PTA Xarelto  - Continue ASA 81 mg daily (restarted 3/6)  - Continue   - BP management as below  - Encourage smoking cessation  - Regular diet, thin liquids by small sips with chin tuck  - Continue PT, OT, SLP     Paroxysmal atrial fibrillation/flutter  Per chart review, patient admitted 12/31 - 1/3 for frostbite and found to be in afib with RVR with concerns for medication noncompliance.    - Continue PTA Xarelto 20 mg daily     HTN  PTA on losartan, furosemide, spironolactone, metoprolol. Hx poor medication compliance. Initially allowed permissive HTN, but since resumed losartan, furosemide, and spironolactone.  - Continue losartan 100 mg daily  - Continue furosemide, spironolactone  - Metoprolol on hold, resume as indicated  - Monitor BP and adjust medications as indicated     CAD  Hx MI  CHF with EF 45%  Ischemic cardiomyopathy  - Continue ASA 81 mg daily  - Continue furosemide 40 mg daily  - Continue spironolactone 25 mg daily  - Continue metoprolol tartrate 50 mg BID (resumed 3/8).  Of note, per last hospital admission (discharged 1/3), dose was increased to 100 mg BID at that time. Resume as  appropriate.     HLD  Unclear whether patient on statin PTA.  on 3/7/21, high-intensity statin therapy recommended, started atorvastatin.  - Continue atorvastatin 80 mg     Hypomagnesemia  On Mag-Ox PTA. Improved to 1.8 on 3/10  - Continue Mag-Ox 400 mg TID  - Trend BMP q7 days (Mondays)     COPD  Tobacco use disorder  PTA on albuterol inhaler PRN. Also appears to have used Ellipta in the recent past as well.  No evidence of exacerbation during acute hospitalization. Current smoker, 0.5 packs per day, 30 year history.    - Continue albuterol inhaler PRN  - Encourage smoking cessation and provide education      Neuropathic pain  Patient on gabapentin PTA, he is unaware of indication. Appears to have been prescribed by provider at burn clinic, so suspect may be related to recent frostbite.  - Continue gabapentin 300 mg daily     6. Adjustment to disability:  Clinical psychology to eval and treat as indicated  7. FEN: regular diet, thin liquids with strategies per SLP  8. Bowel: continent, monitor for constipation, PRN bowel meds available  9. Bladder: continent, monitor PVRs at admission and ISC as indicated  10. DVT Prophylaxis: Xarelto  11. GI Prophylaxis: regular diet  12. Code: full  13. Disposition: home with family support and home vs. Outpatient therapies  14. ELOS:  10 days  15. Rehab prognosis:  good  16. Follow up Appointments on Discharge: PCP, stroke neurology, PM&R    Patient was seen and discussed with the Attending Physician, Dr. Mays.    Boston Quick, MS4  [facility]      Physician Attestation   I, Patrick Mays, was present with the medical/YOVANI student who participated in the service and in the documentation of the note.  I have verified the history and personally performed the physical exam and medical decision making.  I agree with the assessment and plan of care as documented in the note.      I personally reviewed vital signs, medications and labs.    Patient awake  and alert.  Interacting well.  Left sided hemiparesis and dysarthria. Improving function.  Will continue to monitor BP.      Patrick Mays,   Date of Service (when I saw the patient): 03/12/21      I spent a total of 25 minutes face to face and coordinating care of Eber Jin. Over 50% of my time on the unit was spent counseling the patient and /or coordinating care regarding left hemiparesis 2/2 CVA.

## 2021-03-12 NOTE — PLAN OF CARE
Discharge Planner Post-Acute Rehab SLP:     Discharge Plan: home with home therapies    Precautions: cardiac, fall, L AFO on during transfers and ambulation     Current Status:  Communication: WFL  Cognition:  Pt demonstrating moderate to severe deficits in immediate and delayed memory  Swallow: Regular with thin liquids    Assessment: Demonstrates some insight into deficits, questionable depth of understanding. Open to address STM deficits, including with use of smart phone. Mildly decreased thoroughness of recall of daily activities. Few higher cognitive demand activities at baseline, but include driving.     Appears to be tolerating current diet of regular textures and thin liquids without significant difficulty. Appropriate to continue. Swallowing goal met.     Other Barriers to Discharge (Family Training, etc): none anticipated from speech

## 2021-03-12 NOTE — PLAN OF CARE
"Discharge Planner Post-Acute Rehab PT:     Discharge Plan: home with FWW and OPPT vs HHPT      Precautions: cardiac, fall, L AFO on during transfers and ambulation     Current Status:  Bed Mobility: independent sit to/from supine on therapy mat  Transfer: SBA for sit to/from stand from EOB, armchair & toilet with use of WW  Gait: SBA/CGA to amb on level 200 ft with WW & left AFO  Stairs: SBA/CGA to ascend/descend 6\" steps with single rail & step-to pattern  Balance: sitting - good without U/E support at EOB; standing - fair without U/E support    Assessment:  good participation; motivated to improve gait ability; exhibiting mobility impairments related to left hemiparesis    Other Barriers to Discharge (DME, Family Training, etc): Family training, lack of physical support at home, FWW, AFO    "

## 2021-03-13 ENCOUNTER — APPOINTMENT (OUTPATIENT)
Dept: PHYSICAL THERAPY | Facility: CLINIC | Age: 61
End: 2021-03-13
Payer: COMMERCIAL

## 2021-03-13 ENCOUNTER — APPOINTMENT (OUTPATIENT)
Dept: SPEECH THERAPY | Facility: CLINIC | Age: 61
End: 2021-03-13
Payer: COMMERCIAL

## 2021-03-13 ENCOUNTER — APPOINTMENT (OUTPATIENT)
Dept: OCCUPATIONAL THERAPY | Facility: CLINIC | Age: 61
End: 2021-03-13
Payer: COMMERCIAL

## 2021-03-13 PROCEDURE — 97116 GAIT TRAINING THERAPY: CPT | Mod: GP

## 2021-03-13 PROCEDURE — 250N000013 HC RX MED GY IP 250 OP 250 PS 637: Performed by: PHYSICIAN ASSISTANT

## 2021-03-13 PROCEDURE — 92507 TX SP LANG VOICE COMM INDIV: CPT | Mod: GN | Performed by: SPEECH-LANGUAGE PATHOLOGIST

## 2021-03-13 PROCEDURE — 97530 THERAPEUTIC ACTIVITIES: CPT | Mod: GO

## 2021-03-13 PROCEDURE — 97535 SELF CARE MNGMENT TRAINING: CPT | Mod: GO

## 2021-03-13 PROCEDURE — 128N000003 HC R&B REHAB

## 2021-03-13 PROCEDURE — 97110 THERAPEUTIC EXERCISES: CPT | Mod: GP

## 2021-03-13 RX ADMIN — METOPROLOL TARTRATE 50 MG: 50 TABLET, FILM COATED ORAL at 20:23

## 2021-03-13 RX ADMIN — MAGNESIUM OXIDE TAB 400 MG (241.3 MG ELEMENTAL MG) 400 MG: 400 (241.3 MG) TAB at 07:34

## 2021-03-13 RX ADMIN — MAGNESIUM OXIDE TAB 400 MG (241.3 MG ELEMENTAL MG) 400 MG: 400 (241.3 MG) TAB at 14:01

## 2021-03-13 RX ADMIN — GABAPENTIN 300 MG: 300 CAPSULE ORAL at 07:35

## 2021-03-13 RX ADMIN — FUROSEMIDE 40 MG: 20 TABLET ORAL at 07:34

## 2021-03-13 RX ADMIN — METOPROLOL TARTRATE 50 MG: 50 TABLET, FILM COATED ORAL at 07:35

## 2021-03-13 RX ADMIN — MAGNESIUM OXIDE TAB 400 MG (241.3 MG ELEMENTAL MG) 400 MG: 400 (241.3 MG) TAB at 20:23

## 2021-03-13 RX ADMIN — Medication 25 MG: at 07:34

## 2021-03-13 RX ADMIN — RIVAROXABAN 20 MG: 10 TABLET, FILM COATED ORAL at 17:05

## 2021-03-13 RX ADMIN — ATORVASTATIN CALCIUM 80 MG: 80 TABLET, FILM COATED ORAL at 20:23

## 2021-03-13 RX ADMIN — LOSARTAN POTASSIUM 100 MG: 100 TABLET, FILM COATED ORAL at 07:34

## 2021-03-13 RX ADMIN — ASPIRIN 81 MG CHEWABLE TABLET 81 MG: 81 TABLET CHEWABLE at 07:35

## 2021-03-13 ASSESSMENT — MIFFLIN-ST. JEOR: SCORE: 1608.67

## 2021-03-13 NOTE — PLAN OF CARE
"Discharge Planner Post-Acute Rehab PT:      Discharge Plan: home with FWW and OPPT vs HHPT      Precautions: cardiac, fall, L AFO on during transfers and ambulation      Current Status:  Bed Mobility: independent sit to/from supine on therapy mat  Transfer: SBA for sit to/from stand from EOB, armchair & toilet with use of WW  Gait: SBA/CGA to amb on level 200 ft with WW & left AFO  Stairs: SBA/CGA to ascend/descend 6\" steps with single rail & step-to pattern  Balance: sitting - good without U/E support at EOB; standing - fair without U/E support     Assessment: Activities focused on L knee control, noted improved gait following these activities with improved knee extension during stance, cues for trunk and hip extension; pt noticeably fatigued for afternoon session, decreased gait speed and step height of L LE, modified session to focus on L LE strength in supine. -10 minutes due to fatigue.      Other Barriers to Discharge (DME, Family Training, etc): Family training, lack of physical support at home, FWW, AFO    "

## 2021-03-13 NOTE — PROGRESS NOTES
Pt A&O x4.  Offered no complaints this shift. Denies pain, chest pain, n/v, SOB. Cont B&B; LBM 3-12-21. Transfers with CGA and one assist. Continue with current POC.

## 2021-03-13 NOTE — PLAN OF CARE
Discharge Planner Post-Acute Rehab OT:      Discharge Plan: Home with HH therapies      Precautions: Falls, L hemiparesis       Current Status:  ADLs: Incorporates LUE into ADLs without cues, slow pace and incoordination but great rehab potential to improve. SBA UB dressing with ely tech, CGA A LB dressing (needs increased time and cues for L shoe&AFO), Min A bathing. CGA with transfers, mobility, and standing for h/g at sink.  IADLs: CGA standing/hanging clothing, continue to assess IADLS further.    Vision/Cognition: Mild deficits with recall and problem solving during functional tasks, baseline level unclear.      Assessment: pt demo'd use of bilateral upper extremities during functional ADL/IADL tasks without cues, able to complete tasks w/extra time and CGA-SBA when standing for safety. Tx focus today on L shoe/AFO donning, dynamic standing LUE/LLE WB and reaching,  Pt will benefit from further training for LUE and walker safety during tasks. Cont with POC.      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 Pt stated that his children live close but support is unclear.   Equipment recommendations: walker, shower chair, and potentially dressing AE pending progress.

## 2021-03-13 NOTE — PLAN OF CARE
Pt is alert and oriented x 4. Continent of bowel and bladder, last bm today. Reg diet, thin liquids. CGA with gait belt and walker. Denied pain, nausea or vomiting, numbness or tingling. Alarms on, call light within reach, will continue to monitor.

## 2021-03-13 NOTE — PLAN OF CARE
Discharge Planner Post-Acute Rehab SLP:      Discharge Plan: home with home therapies     Precautions: cardiac, fall, L AFO on during transfers and ambulation      Current Status:  Communication: WFL  Cognition:  Pt demonstrating moderate to severe deficits in immediate and delayed memory  Swallow: Regular with thin liquids     Assessment: SLP educated pt regarding dysarthria coping strategies and provided pt with written handout containing strategies. SLP facilitated visual memory task for which pt had to recall details in picture scene; pt demonstrated 78% accuracy given MILD verbal cues. Pt completed 5-word attribute inclusion task w/ 80% accuracy given MILD verbal cues.     Appears to be tolerating current diet of regular textures and thin liquids without significant difficulty. Appropriate to continue. Swallowing goal met.      Other Barriers to Discharge (Family Training, etc): none anticipated from speech

## 2021-03-13 NOTE — PLAN OF CARE
FOCUS/GOAL  Medical management    ASSESSMENT, INTERVENTIONS AND CONTINUING PLAN FOR GOAL:    Pt is alert and oriented. Uses the call light appropriately. Denies pain, sob and any new weakness. CGAo1 with the walker and gait belt with transfers. Continent of bowel and bladder. LBM this shift 3/13. Uses the urinal independently for voiding w/ staff to empty it. Sleeping on and off, pt said he has been sleeping early evening but is planning to go back to sleep. Will continue POC.

## 2021-03-14 ENCOUNTER — APPOINTMENT (OUTPATIENT)
Dept: OCCUPATIONAL THERAPY | Facility: CLINIC | Age: 61
End: 2021-03-14
Payer: COMMERCIAL

## 2021-03-14 ENCOUNTER — APPOINTMENT (OUTPATIENT)
Dept: SPEECH THERAPY | Facility: CLINIC | Age: 61
End: 2021-03-14
Payer: COMMERCIAL

## 2021-03-14 ENCOUNTER — APPOINTMENT (OUTPATIENT)
Dept: PHYSICAL THERAPY | Facility: CLINIC | Age: 61
End: 2021-03-14
Payer: COMMERCIAL

## 2021-03-14 PROCEDURE — 97130 THER IVNTJ EA ADDL 15 MIN: CPT | Performed by: SPEECH-LANGUAGE PATHOLOGIST

## 2021-03-14 PROCEDURE — 250N000013 HC RX MED GY IP 250 OP 250 PS 637: Performed by: PHYSICIAN ASSISTANT

## 2021-03-14 PROCEDURE — 97110 THERAPEUTIC EXERCISES: CPT | Mod: GP

## 2021-03-14 PROCEDURE — 97116 GAIT TRAINING THERAPY: CPT | Mod: GP

## 2021-03-14 PROCEDURE — 128N000003 HC R&B REHAB

## 2021-03-14 PROCEDURE — 97535 SELF CARE MNGMENT TRAINING: CPT | Mod: GO

## 2021-03-14 PROCEDURE — 97129 THER IVNTJ 1ST 15 MIN: CPT | Performed by: SPEECH-LANGUAGE PATHOLOGIST

## 2021-03-14 PROCEDURE — 99232 SBSQ HOSP IP/OBS MODERATE 35: CPT | Mod: GC | Performed by: PHYSICAL MEDICINE & REHABILITATION

## 2021-03-14 PROCEDURE — 97530 THERAPEUTIC ACTIVITIES: CPT | Mod: GP

## 2021-03-14 RX ADMIN — GABAPENTIN 300 MG: 300 CAPSULE ORAL at 07:51

## 2021-03-14 RX ADMIN — MAGNESIUM OXIDE TAB 400 MG (241.3 MG ELEMENTAL MG) 400 MG: 400 (241.3 MG) TAB at 20:39

## 2021-03-14 RX ADMIN — Medication 25 MG: at 07:50

## 2021-03-14 RX ADMIN — ASPIRIN 81 MG CHEWABLE TABLET 81 MG: 81 TABLET CHEWABLE at 07:50

## 2021-03-14 RX ADMIN — MAGNESIUM OXIDE TAB 400 MG (241.3 MG ELEMENTAL MG) 400 MG: 400 (241.3 MG) TAB at 07:50

## 2021-03-14 RX ADMIN — METOPROLOL TARTRATE 50 MG: 50 TABLET, FILM COATED ORAL at 07:50

## 2021-03-14 RX ADMIN — MAGNESIUM OXIDE TAB 400 MG (241.3 MG ELEMENTAL MG) 400 MG: 400 (241.3 MG) TAB at 13:57

## 2021-03-14 RX ADMIN — FUROSEMIDE 40 MG: 20 TABLET ORAL at 07:51

## 2021-03-14 RX ADMIN — RIVAROXABAN 20 MG: 10 TABLET, FILM COATED ORAL at 17:33

## 2021-03-14 RX ADMIN — ATORVASTATIN CALCIUM 80 MG: 80 TABLET, FILM COATED ORAL at 20:39

## 2021-03-14 RX ADMIN — LOSARTAN POTASSIUM 100 MG: 100 TABLET, FILM COATED ORAL at 07:50

## 2021-03-14 ASSESSMENT — MIFFLIN-ST. JEOR: SCORE: 1605.95

## 2021-03-14 NOTE — PLAN OF CARE
"Discharge Planner Post-Acute Rehab PT:      Discharge Plan: home with FWW and OPPT vs HHPT      Precautions: cardiac, fall, L AFO on during transfers and ambulation      Current Status:  Bed Mobility: independent sit to/from supine on therapy mat  Transfer: SBA for sit to/from stand from EOB, armchair & toilet with use of WW  Gait: SBA/CGA to amb on level 200 ft with WW & left AFO  Stairs: SBA/CGA to ascend/descend 6\" steps with single rail & step-to pattern  Balance: sitting - good without U/E support at EOB; standing - fair without U/E support     Assessment: Pt engaged in dynamic standing and reaching activities without UE support and no overt LOB, able to reach and  objects with L hand. Ambulating to and from gym with FWW, close SBA, cues for trunk, L hip and knee extension.      Other Barriers to Discharge (DME, Family Training, etc): Family training, lack of physical support at home, FWW, AFO  "

## 2021-03-14 NOTE — PLAN OF CARE
FOCUS/GOAL  Medical management    ASSESSMENT, INTERVENTIONS AND CONTINUING PLAN FOR GOAL:    Pt is alert and oriented. Sleeping well tonight without any interruption. Continent of bowel and bladder. Uses the urinal independently for voiding with staff to empty. CGAo1 w/ the walker in transfers. No complaints of pain or sob. Will continue POC.

## 2021-03-14 NOTE — PLAN OF CARE
Discharge Planner Post-Acute Rehab SLP:      Discharge Plan: home with home therapies     Precautions: cardiac, fall, L AFO on during transfers and ambulation      Current Status:  Communication: mild dysarthria  Cognition:  Pt demonstrating moderate deficits in immediate and delayed memory, as well as reasoning  Swallow: Regular with thin liquids    Assessment:SLP engaged in memory tasks on IPAD( matching, copying increasing sequences. ) moderate difficulty especially as number increased. Pt scored 11th percentile in standardized test of verbal reasoning/analogies (WJ-R) added goal for reasoning, executive function         Other Barriers to Discharge (Family Training, etc): none anticipated from speech

## 2021-03-14 NOTE — PROGRESS NOTES
Pt alert and oriented x4. Uses call light to make needs known. Cont of B&B; LBM 3/13; uses urinal indepen, assist with emptying. Denies pain, SOB, chest pain, n/v, numbness/tingling. Pt offers no complaints. Continue with current POC.

## 2021-03-14 NOTE — PROGRESS NOTES
03/14/21 1630   Signing Clinician's Name / Credentials   Signing clinician's name / credentials [facility] [place]   Quick Adds   Rehab Discipline SLP   Additional Documentation   SLP Plan SLP: added goal for reasoning, also working on speech, attention, memory (did flow to 7x7, memory matching, crazy copy this date), may incorporate any cell phone use/apps . ?assess writing, swallow goal reportedly met, can see if pt using recommended chin tuck   Total Session Time   Total Session Time (minutes) 30 minutes  (cogntiive)   SLP - Acute Rehab Center Time   Individual Time (minutes) - enter zero if not applicable - SLP 30   Group Time (minutes) - enter zero if not applicable  - SLP 0   Concurrent Time (minutes) - enter zero if not applicable  - SLP 0   Co-Treatment Time (minutes) - enter zero if not applicable  - SLP 0   ARC Total Session Time (minutes) - SLP 30   Problem Solving   Problem Solving Comment SLP: WJ-R test of verbal analogies pt scored 11th percentile   Memory   Memory Comment SLP engaged in memory tasks on IPAD( matching, copying increasing sequences. ) moderate difficulty especially as number increased

## 2021-03-14 NOTE — PLAN OF CARE
Pt is alert and oriented x 4. Continent of bowel and bladder, had a bm this shift. Reg diet, thin liquids. CGA with gait belt and walker. Pt's wife called to inquire about appropriate shoe for pt, left a sticky note. Denied pain, nausea or vomiting, numbness or tingling. Alarms on, call light within reach, will continue to monitor.

## 2021-03-14 NOTE — PROGRESS NOTES
[facility], [place]   Physical Medicine and Rehabilitation Daily Note           Assessment and Plan of Care:   Eber Jin is a 60 year old right hand dominant male with PMHx afib on Xarelto, HTN, CAD, PAD, CHF, ischemic cardiomyopathy, MI, COPD, tobacco use, and HLD who presented on 3/5/21 with left-sided weakness and was found to have small acute right pontine stroke. Admitted to ARU on 3/9 for rehabilitation and ongoing medical management.     --Vitals stable. No lab today.  --Continue ongoing medical management.  --Continue therapies and plan of care.  --Orthotics consult to modify L AFO.         Interval history:   No acute overnight events. Slept well. Cont of B/B. Denies fever, chills, CP, SOB, N/V, abdominal pain, new pain or weakness/numbness/tingling.           Physical Exam:     Vitals:    03/13/21 1551 03/13/21 2021 03/14/21 0636 03/14/21 0749   BP: 117/81 (!) 146/94  (!) 154/84   BP Location: Right arm Right arm  Left arm   Pulse: 78 78  68   Resp: 16   16   Temp: 97.8  F (36.6  C)   97.7  F (36.5  C)   TempSrc: Oral   Axillary   SpO2: 98%   98%   Weight:   78.4 kg (172 lb 12.8 oz)    Height:         Gen: NAD, working with therapy  Heart: RRR  Lungs: clear breath sounds b/l  Abd: soft and non-tender  Ext: wwp  MSK/neuro: alert and oriented. speech fluent. moves BUE and BLE volitionally.          Data:   Scheduled meds    aspirin  81 mg Oral Daily     atorvastatin  80 mg Oral QPM     furosemide  40 mg Oral Daily     gabapentin  300 mg Oral Daily     [START ON 3/18/2021] influenza recomb quadrivalent PF  0.5 mL Intramuscular Prior to discharge     losartan  100 mg Oral Daily     magnesium oxide  400 mg Oral TID     metoprolol tartrate  50 mg Oral BID     rivaroxaban ANTICOAGULANT  20 mg Oral Daily with supper     spironolactone  25 mg Oral Daily     PRN meds:  acetaminophen, albuterol, - MEDICATION INSTRUCTIONS -, polyethylene glycol, senna-docusate    Geremias  MD [facility]  Physical Medicine and Rehabilitation

## 2021-03-14 NOTE — PLAN OF CARE
Discharge Planner Post-Acute Rehab OT:      Discharge Plan: Home with HH therapies      Precautions: Falls, L hemiparesis       Current Status:  ADLs: SBA with functional mobility in room and bathroom using FWW.  Incorporates LUE into ADLs without cues.      Dressing- SBA (needs increased time and cues for L shoe&AFO),   Bathing -Min A , CGA with transfers,   G/H - SBA  Toileting - SBA using FWW and grab bar    IADLs: CGA standing/hanging clothing, continue to assess IADLS further.    Vision/Cognition: Mild deficits with recall and problem solving during functional tasks, baseline level unclear.      Assessment: pt demo'd use of bilateral upper extremities during functional ADL/IADL tasks without cues, able to complete tasks w/extra time and SBA when standing for safety. Tx focused on L shoe/AFO donning, dynamic standing,  LUE/LLE WB and reaching and L hand FMC.  Pt will benefit from further training for LUE and walker safety during tasks. Cont with POC.      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 Pt stated that his children live close but support is unclear.   Equipment recommendations: walker, shower chair, and potentially dressing AE pending progress

## 2021-03-14 NOTE — PROGRESS NOTES
Pt alert and oriented x4. Uses call light to make needs known. Cont of B&B; LBM 3/14; uses urinal indepen and assist with emptying. Denies pain, SOB, chest pain, n/v, numbness/tingling. New ortho consult for complaints of L, lateral foot tenderness from AFO rubbing on area; skin remains intact. HS metoprolol held due to B/P out of parameters. Continue with current POC.

## 2021-03-15 ENCOUNTER — APPOINTMENT (OUTPATIENT)
Dept: OCCUPATIONAL THERAPY | Facility: CLINIC | Age: 61
End: 2021-03-15
Payer: COMMERCIAL

## 2021-03-15 ENCOUNTER — APPOINTMENT (OUTPATIENT)
Dept: PHYSICAL THERAPY | Facility: CLINIC | Age: 61
End: 2021-03-15
Payer: COMMERCIAL

## 2021-03-15 ENCOUNTER — APPOINTMENT (OUTPATIENT)
Dept: SPEECH THERAPY | Facility: CLINIC | Age: 61
End: 2021-03-15
Payer: COMMERCIAL

## 2021-03-15 LAB
ANION GAP SERPL CALCULATED.3IONS-SCNC: 3 MMOL/L (ref 3–14)
BUN SERPL-MCNC: 16 MG/DL (ref 7–30)
CALCIUM SERPL-MCNC: 9 MG/DL (ref 8.5–10.1)
CHLORIDE SERPL-SCNC: 104 MMOL/L (ref 94–109)
CO2 SERPL-SCNC: 30 MMOL/L (ref 20–32)
CREAT SERPL-MCNC: 0.85 MG/DL (ref 0.66–1.25)
ERYTHROCYTE [DISTWIDTH] IN BLOOD BY AUTOMATED COUNT: 15.5 % (ref 10–15)
GFR SERPL CREATININE-BSD FRML MDRD: >90 ML/MIN/{1.73_M2}
GLUCOSE SERPL-MCNC: 83 MG/DL (ref 70–99)
HCT VFR BLD AUTO: 42.2 % (ref 40–53)
HGB BLD-MCNC: 13.5 G/DL (ref 13.3–17.7)
MCH RBC QN AUTO: 28.8 PG (ref 26.5–33)
MCHC RBC AUTO-ENTMCNC: 32 G/DL (ref 31.5–36.5)
MCV RBC AUTO: 90 FL (ref 78–100)
PLATELET # BLD AUTO: 286 10E9/L (ref 150–450)
POTASSIUM SERPL-SCNC: 4.8 MMOL/L (ref 3.4–5.3)
RBC # BLD AUTO: 4.69 10E12/L (ref 4.4–5.9)
SODIUM SERPL-SCNC: 137 MMOL/L (ref 133–144)
WBC # BLD AUTO: 9.6 10E9/L (ref 4–11)

## 2021-03-15 PROCEDURE — 97535 SELF CARE MNGMENT TRAINING: CPT | Mod: GO | Performed by: OCCUPATIONAL THERAPIST

## 2021-03-15 PROCEDURE — 250N000013 HC RX MED GY IP 250 OP 250 PS 637: Performed by: PHYSICIAN ASSISTANT

## 2021-03-15 PROCEDURE — 97112 NEUROMUSCULAR REEDUCATION: CPT | Mod: GO | Performed by: OCCUPATIONAL THERAPIST

## 2021-03-15 PROCEDURE — 97530 THERAPEUTIC ACTIVITIES: CPT | Mod: GO | Performed by: OCCUPATIONAL THERAPIST

## 2021-03-15 PROCEDURE — 128N000003 HC R&B REHAB

## 2021-03-15 PROCEDURE — 36415 COLL VENOUS BLD VENIPUNCTURE: CPT | Performed by: PHYSICIAN ASSISTANT

## 2021-03-15 PROCEDURE — 97130 THER IVNTJ EA ADDL 15 MIN: CPT

## 2021-03-15 PROCEDURE — 85027 COMPLETE CBC AUTOMATED: CPT | Performed by: PHYSICIAN ASSISTANT

## 2021-03-15 PROCEDURE — 80048 BASIC METABOLIC PNL TOTAL CA: CPT | Performed by: PHYSICIAN ASSISTANT

## 2021-03-15 PROCEDURE — 97116 GAIT TRAINING THERAPY: CPT | Mod: GP

## 2021-03-15 PROCEDURE — 99232 SBSQ HOSP IP/OBS MODERATE 35: CPT | Performed by: PHYSICAL MEDICINE & REHABILITATION

## 2021-03-15 PROCEDURE — 97750 PHYSICAL PERFORMANCE TEST: CPT | Mod: GP

## 2021-03-15 PROCEDURE — 97129 THER IVNTJ 1ST 15 MIN: CPT

## 2021-03-15 RX ADMIN — RIVAROXABAN 20 MG: 10 TABLET, FILM COATED ORAL at 20:20

## 2021-03-15 RX ADMIN — GABAPENTIN 300 MG: 300 CAPSULE ORAL at 07:37

## 2021-03-15 RX ADMIN — ASPIRIN 81 MG CHEWABLE TABLET 81 MG: 81 TABLET CHEWABLE at 07:37

## 2021-03-15 RX ADMIN — ATORVASTATIN CALCIUM 80 MG: 80 TABLET, FILM COATED ORAL at 20:06

## 2021-03-15 RX ADMIN — METOPROLOL TARTRATE 50 MG: 50 TABLET, FILM COATED ORAL at 07:38

## 2021-03-15 RX ADMIN — MAGNESIUM OXIDE TAB 400 MG (241.3 MG ELEMENTAL MG) 400 MG: 400 (241.3 MG) TAB at 13:15

## 2021-03-15 RX ADMIN — FUROSEMIDE 40 MG: 20 TABLET ORAL at 07:38

## 2021-03-15 RX ADMIN — METOPROLOL TARTRATE 50 MG: 50 TABLET, FILM COATED ORAL at 20:07

## 2021-03-15 RX ADMIN — MAGNESIUM OXIDE TAB 400 MG (241.3 MG ELEMENTAL MG) 400 MG: 400 (241.3 MG) TAB at 07:37

## 2021-03-15 RX ADMIN — LOSARTAN POTASSIUM 100 MG: 100 TABLET, FILM COATED ORAL at 07:37

## 2021-03-15 RX ADMIN — MAGNESIUM OXIDE TAB 400 MG (241.3 MG ELEMENTAL MG) 400 MG: 400 (241.3 MG) TAB at 20:06

## 2021-03-15 RX ADMIN — Medication 25 MG: at 07:37

## 2021-03-15 NOTE — PLAN OF CARE
FOCUS/GOAL  Medication management, Mobility, and Safety management    ASSESSMENT, INTERVENTIONS AND CONTINUING PLAN FOR GOAL:  Pt is Aox4 able to make needs known.  VSS,  BP was slightly high this morning but corrected from scheduled medications.  LSCTA, denies cough, sob, pain, N/V, N/T, dizziness.  Pt reports LBM today. Reg/thin diet, took pill whole with water.  Working with therapy. Nursing will continue to monitor.

## 2021-03-15 NOTE — PROGRESS NOTES
LATE ENTRY (03/15/21): William notified by RN that pt wife requesting to speak with Caroliner about 'personal matter'. With permission from pt, SWer returned Dinorah's call PH: [phone]. Dinorah appreciative of SW return call and stated that she wanted the team to be aware of pt alcohol abuse at home, wanting to team to address it with the pt but not wanting the pt to know that Dinorah brought it to the team's attention. Dinorah stated that since pt was laid off in 2020 pt alcohol use has 'gotten out of control'. Pt has a hx of DUI (2 years ago on Mother's Day) and since his DUI, pt has continued to drive and has lost the set of keys twice. Dinorah stated that his  has been notified but does not appear supportive. Now that his keys are misplaced, pt will either have a friend or get a cab to go to the liquor store and drinks about a 5th/day. Dinorah has contacted that friend multiple times and demanded/requested that the friend does not drive him to the liquor store as he is enabling the pt addiction. Pt has been hiding alcohol in the house and 'becomes the devil' when he drinks. Dinorah is a  by background and believes that pt has mental health issues and possibly PTSD from his childhood when his mom  when he was 13. Pt and Dinorah have been  since . Pt did not know his father and was the oldest of 8 children. Pt family all in [place] and pt was moved to [place] with his aunt and uncle. Pt has many cousins and extended family in the [facility] but an even closer relationship with his sister and brother in [place]. Since pt hospitalization, Dinorah has called pt's brother and sister (who he is mostly close with) to share her concerns with them as well and to ask for their support. Pt does have a son who lives in [place] but pt does not have a relationship with his son, despite his son wanting one. Dinorah stated that pt did raise 7 kids from his ex-wife but that relationship at this time is unknown.  Dinorah shared that the pt has been raising his step-grandson and feels that the step-grandson could be a big motivation for pt since the step-grandson does not have a father and needs a positive role model. Pt and the step-grandson have a good relationship and he is 7 y/o. Dinorah shared that she had a stroke recently and goes to HD 3x/week. Dinorah's dtr drives her to HD but Dinorah acknowledged that they do not have a lot of physical support at home. Dinorah appreciative of SW time. Dinorah plans to look for a home more accessible in the near future.     SW plans to meet with pt and to ask about alcohol use/mental health concerns and offer resources and CD consult. Dinorah stated that she is unsure of pt's willingness to change or insight at this time. Team notified.     ERIN Diaz, [facility]   Phone: [phone]  I   Pager: [phone]

## 2021-03-15 NOTE — PLAN OF CARE
Discharge Planner Post-Acute Rehab SLP:      Discharge Plan: home with home therapies     Precautions: cardiac, fall, L AFO on during transfers and ambulation      Current Status:  Communication: mild dysarthria  Cognition:  Pt demonstrating moderate deficits in immediate and delayed memory, as well as reasoning  Swallow: Regular with thin liquids     Assessment: patient engaging in multiple activities throughout day with clinician.  Cognition: pt engaged in problem solving tasks that encouraged him to use strategies as well as basic reasoning to determine best possible answers. He also engaged in memory tasks such as remembering his dysarthria strategies or things the clinician has told him throughout day. Pt did well with problem solving. He needed moderate cueing with some discussion in majority of instances to complete tasks accurately, but is working towards baseline and increasing care giver/independent abilities.     Language: Pt using dysarthria strategies. Noted differences between reading/speaking without strategies vs with. Pt beginning to understand how to use these strategies during tasks. Will continue to engage him in activities that target clear and intelligible language    Swallowing: Pt demonstrating a wet vocal quality. May look back at swallowing in hopes to gain more insight on why vocal quality is changing throughout the day. Pt agreed to revisiting at a mealtime in the near future.          Other Barriers to Discharge (Family Training, etc): none anticipated from speech

## 2021-03-15 NOTE — PLAN OF CARE
FOCUS/GOAL  Medical management    ASSESSMENT, INTERVENTIONS AND CONTINUING PLAN FOR GOAL:    Pt is A&Ox4. Make needs known. Sleeping good tonight. No complaints of pain or sob. Continent of bowel and bladder. Uses the urinal ind with staff to empty. CGAo1 with the walker and gb but stayed in bed all night. Will continue POC.

## 2021-03-15 NOTE — PROGRESS NOTES
[facility]   Acute Rehabilitation Unit  Daily progress note    INTERVAL HISTORY  Seen while sitting upright in chair next to bed. No concerns at this time, feels he is working hard in therapy and maybe doing a bit better. Some discomfort at the gonzalez of foot at a site where the AFO is rubbing, slightly better today, however.     No reported chest pain, N/V/D, HA, abdominal pain, or other concerns.    Speech  Communication: mild dysarthria  Cognition:  Pt demonstrating moderate deficits in immediate and delayed memory, as well as reasoning  Swallow: Regular with thin liquids     Assessment:SLP engaged in memory tasks on IPAD( matching, copying increasing sequences. ) moderate difficulty especially as number increased. Pt scored 11th percentile in standardized test of verbal reasoning/analogies (WJ-R) added goal for reasoning, executive function    PT  ADLs: SBA with functional mobility in room and bathroom using FWW.  Incorporates LUE into ADLs without cues.       Dressing- SBA (needs increased time and cues for L shoe&AFO),   Bathing -Min A , CGA with transfers,   G/H - SBA  Toileting - SBA using FWW and grab bar     IADLs: CGA standing/hanging clothing, continue to assess IADLS further.    Vision/Cognition: Mild deficits with recall and problem solving during functional tasks, baseline level unclear.      Assessment: pt demo'd use of bilateral upper extremities during functional ADL/IADL tasks without cues, able to complete tasks w/extra time and SBA when standing for safety. Tx focused on L shoe/AFO donning, dynamic standing,  LUE/LLE WB and reaching and L hand FMC.  Pt will benefit from further training for LUE and walker safety during tasks. Cont with POC.     OT  Bed Mobility: independent sit to/from supine on therapy mat  Transfer: SBA for sit to/from stand from EOB, armchair & toilet with use of WW  Gait: SBA/CGA to amb on level 200 ft with WW & left AFO  Stairs: SBA/CGA  "to ascend/descend 6\" steps with single rail & step-to pattern  Balance: sitting - good without U/E support at EOB; standing - fair without U/E support     Assessment: Pt engaged in dynamic standing and reaching activities without UE support and no overt LOB, able to reach and  objects with L hand. Ambulating to and from gym with FWW, close SBA, cues for trunk, L hip and knee extension.    MEDICATIONS   Current Facility-Administered Medications   Medication     acetaminophen (TYLENOL) tablet 325-650 mg     albuterol (PROAIR HFA/PROVENTIL HFA/VENTOLIN HFA) 108 (90 Base) MCG/ACT inhaler 2 puff     aspirin (ASA) chewable tablet 81 mg     atorvastatin (LIPITOR) tablet 80 mg     furosemide (LASIX) tablet 40 mg     gabapentin (NEURONTIN) capsule 300 mg     [START ON 3/18/2021] influenza recomb quadrivalent PF (FLUBLOK) injection 0.5 mL     losartan (COZAAR) tablet 100 mg     magnesium oxide (MAG-OX) tablet 400 mg     metoprolol tartrate (LOPRESSOR) tablet 50 mg     Patient is already receiving anticoagulation with heparin, enoxaparin (LOVENOX), warfarin (COUMADIN)  or other anticoagulant medication     polyethylene glycol (MIRALAX) Packet 17 g     rivaroxaban ANTICOAGULANT (XARELTO) tablet 20 mg     senna-docusate (SENOKOT-S/PERICOLACE) 8.6-50 MG per tablet 1-2 tablet     spironolactone (ALDACTONE) half-tab 25 mg     PHYSICAL EXAM  24 Hour Vital Signs Summary:  Temp: 96.6  F (35.9  C) Temp  Min: 96.4  F (35.8  C)  Max: 96.6  F (35.9  C)  Resp: 16 Resp  Min: 16  Max: 16  SpO2: 98 % SpO2  Min: 98 %  Max: 99 %  Pulse: 69 Pulse  Min: 61  Max: 76  BP: 108/84 Systolic (24hrs), Av , Min:105 , Max:141   Diastolic (24hrs), Av, Min:74, Max:98    Gen: Alert, oriented, cooperative with exam, NAD, more reactive affect.  HEENT: Normocephalic, atraumatic  Cardio: RRR, no murmur, rub, or S3/S4  Pulm: CTAB, no increased WOB on room air  Abd: Soft, non-tender, non-distended. NBS  Ext: No peripheral edema, no " tenderness.  Neuro/MSK: L hemiparesis, dysarthria consistent with prior examinations. Slowness of left hand with rapid alternating movement ongoing. Some improvement in ability to close and open left hand.    LABS  CBC RESULTS:   Recent Labs   Lab 03/15/21  0605 03/10/21  0712   WBC 9.6 8.6   RBC 4.69 4.51   HGB 13.5 13.2*   HCT 42.2 39.9*   MCV 90 89   MCH 28.8 29.3   MCHC 32.0 33.1   RDW 15.5* 15.3*    220      CMP/BMP/Hepatic Panel:   Recent Labs   Lab 03/15/21  0605 03/10/21  0712    137   POTASSIUM 4.8 4.3   CHLORIDE 104 104   CO2 30 28   ANIONGAP 3 5   GLC 83 84   BUN 16 17   CR 0.85 0.77   LISA 9.0 9.0       Rehabilitation - continue comprehensive acute inpatient rehabilitation program with multidisciplinary approach including therapies, rehab nursing, and physiatry following. See interval history for updates.      ASSESSMENT AND PLAN    Eber Jin is a 60 year old right hand dominant male with PMHx afib on Xarelto, HTN, CAD, PAD, CHF, ischemic cardiomyopathy, MI, COPD, tobacco use, and HLD who presented on 3/5/21 with left-sided weakness and was found to have small acute right pontine stroke. Admitted to ARU on 3/9 for rehabilitation and ongoing medical management.      Admission to acute inpatient rehab 03/09/21.  Impairment group code: 01.1 L body involvement, R brain stroke; R acute ischemic pontine stroke      1. PT, OT and SLP: 60 minutes of each on a daily basis, in addition to rehab nursing and close management of physiatrist.     2. Impairment of ADL's: Noted to have impairments in strength, coordination, balance, and cognition which are affecting his ability to safely and independently perform ADLs. Goal for mod I for all ADLs and ADL transfers.     3. Impairment of mobility:  Noted to have impairments in strength, coordination, balance, decreased safety awareness, and L neglect all affecting his ability to safely and independently perform mobility. Goals for mod I with all  transfers and household mobility.     4. Impairment of cognition/language/swallow:  Noted to have mildly impaired oral motor function and impaired cognition. Goals to tolerate safest, least restrictive diet and improved cognitive/linguistic skills.     5. Medical Conditions  Acute small right pontine stroke  MRI brain with 1.5 cm acute infarct in right lower mata, MRA head with multifocal age indeterminate vessel occlusion or flow-limiting stenosis. MRA neck with 60% stenosis of left ICA. Outside window for tPA. Echo with EF 45%, mild aortic regurgitation, mild dilation of ascending aorta. EKG/tele with NSR. . Last A1c in chart 5.8% on 7/19/2018.  Noted to have ongoing left hemiparesis and incoordination. Bedside swallow completed by SLP on 3/8, VFSS completed 3/8, noted mild oral and mild pharyngeal dysphagia.   - Continue high dose statin  - Continue PTA Xarelto  - Continue ASA 81 mg daily (restarted 3/6)  - BP management as below  - Encourage smoking cessation  - Regular diet, thin liquids by small sips with chin tuck  - Continue PT, OT, SLP     Paroxysmal atrial fibrillation/flutter  Per chart review, patient admitted 12/31 - 1/3 for frostbite and found to be in afib with RVR with concerns for medication noncompliance.    - Continue PTA Xarelto 20 mg daily     HTN  PTA on losartan, furosemide, spironolactone, metoprolol. Hx poor medication compliance. Initially allowed permissive HTN, but since resumed losartan, furosemide, and spironolactone.  - Continue losartan 100 mg daily  - Continue furosemide, spironolactone  - Metoprolol on hold, resume as indicated  - Monitor BP and adjust medications as indicated     CAD  Hx MI  CHF with EF 45%  Ischemic cardiomyopathy  - Continue ASA 81 mg daily  - Continue furosemide 40 mg daily  - Continue spironolactone 25 mg daily  - Continue metoprolol tartrate 50 mg BID (resumed 3/8).  Of note, per last hospital admission (discharged 1/3), dose was increased to 100 mg BID  at that time. Resume as appropriate.     HLD  Unclear whether patient on statin PTA.  on 3/7/21, high-intensity statin therapy recommended, started atorvastatin.  - Continue atorvastatin 80 mg     Hypomagnesemia  On Mag-Ox PTA. Improved to 1.8 on 3/10  - Continue Mag-Ox 400 mg TID  - Trend BMP q7 days (Mondays)     COPD  Tobacco use disorder  PTA on albuterol inhaler PRN. Also appears to have used Ellipta in the recent past as well.  No evidence of exacerbation during acute hospitalization. Current smoker, 0.5 packs per day, 30 year history.    - Continue albuterol inhaler PRN  - Encourage smoking cessation and provide education      Neuropathic pain  Patient on gabapentin PTA, he is unaware of indication. Appears to have been prescribed by provider at burn clinic, so suspect may be related to recent frostbite.  - Continue gabapentin 300 mg daily     6. Adjustment to disability:  Clinical psychology to eval and treat as indicated  7. FEN: regular diet, thin liquids with strategies per SLP  8. Bowel: continent, monitor for constipation, PRN bowel meds available  9. Bladder: continent, monitor PVRs at admission and ISC as indicated  10. DVT Prophylaxis: Xarelto  11. GI Prophylaxis: regular diet  12. Code: full  13. Disposition: home with family support and home vs. Outpatient therapies  14. ELOS:  10 days  15. Rehab prognosis:  good  16. Follow up Appointments on Discharge: PCP, stroke neurology, PM&R    Patient was seen and discussed with the Attending Physician, Dr. Mays.    Boston Quick, MS4  [facility]        Physician Attestation   I, Patrick Mays, was present with the medical/YOVANI student who participated in the service and in the documentation of the note.  I have verified the history and personally performed the physical exam and medical decision making.  I agree with the assessment and plan of care as documented in the note.      I personally reviewed vital signs, medications and  labs.    Patient is awake and alert, interacting well.  Making gains with therapy regimen.  Continue to monitor BP.      Patrick Mays,   Date of Service (when I saw the patient): 03/15/21      I spent a total of 25 minutes face to face and coordinating care of Eber Jin. Over 50% of my time on the unit was spent counseling the patient and /or coordinating care regarding recent CVA.

## 2021-03-15 NOTE — PROGRESS NOTES
S: patient seen today at [facility] for evaluation and casting for left  as ordered by     O/G: (prevent foot drop)(assist in ambulation)    A: Patient was evaluated for a AFO. I was able to speak with the PT and a carbon fiber AFO was suggested.   Patient stated his shoe size was 10.5. I have ordered the Left Large anthony Emily walk on AFO. Patient will be fit 3/16/2021  P: patient instructed to contact this clinic if any issues arise  JAQUELIN Coleman.

## 2021-03-15 NOTE — PROGRESS NOTES
Discharge Planner Post-Acute Rehab OT:      Discharge Plan: Home with HH therapies      Precautions: Falls, L hemiparesis       Current Status:  ADLs: SBA with functional mobility in room with FWW.  Incorporates LUE into ADLs without cues.    Dressing- SBA with FWW cues for safety. G/H - SBA standing with FWW. Toileting - SBA using FWW and grab bar     IADLs: CGA standing/hanging clothing, continue to assess IADLS further.    Vision/Cognition: Mild deficits with recall and problem solving during functional tasks, baseline level unclear.      Assessment: Pt continuing to progress with ADLs with one handed dressing technique and with FWW. PT progressed pt to SEC with mobility and will address safety with SEC with ADLs next session. Pt would benefit from skilled OT services to maximize IND and safety with ADLs/IADLs, further assess cognition and improve LUE strengthening and coordination.      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 Pt stated that his children live close but support is unclear.   Equipment recommendations: walker, shower chair, and potentially dressing AE pending progress

## 2021-03-15 NOTE — PROGRESS NOTES
03/15/21 1000   Signing Clinician's Name / Credentials   Signing clinician's name / credentials Evelin Bridges, DPDA   Sorensen Balance Scale (GRIFFIN MCMAHAN, MAI S, BARBIE SR, AYLA MUSTAFA: MEASURING BALANCE IN THE ELDERLY: VALIDATION OF AN INSTRUMENT. CAN. J. PUB. HEALTH, JULY/AUGUST SUPPLEMENT 2:S7-11, 1992.)   Sit To Stand 4   Standing Unsupported 4   Sitting Unsupported 4   Stand to Sit 4   Transfers 4   Standing with Eyes Closed 4   Standing Unsupported, Feet Together 3   Reach Forward With Outstretched Arm 4   Retrieve Object From Floor 4   Turning to Look Behind 4   Turn 360 Degrees 2   Placing Alternate Foot on Stool (4-6 inches) 1   Unsupported Tandem Stand (Demonstrate to Subject) 2   One Leg Stand 1   Total Score (A score of 45 or less has been correlated with an increased risk of falls)   Total Score (out of 56) 45     Sorensen Balance Scale (BBS) Cutoff Scores for CVA Population:  Patient Score: 45/56    The BBS is a measure of static and dynamic standing balance that has been validated in community dwelling elderly individuals and individuals who have Parkinson's Disease, MS, and those who are s/p CVA and TBI. The test is administered without an assistive device. Scores from the Sorensen are used to determine the probability of falling based on the patient's previous history of falls and their test performance.     0-20 High risk for falling- Corresponded with w/c bound status  21-40 Medium risk for falling- Able to walk with assistance  41-56 Low risk for falling- Able to walk independently  According to [facility].  Available at http://www.strokecenter.org/.  Accessibility verified April 10, 2013.  Minimal Detectable Change = 6.5 according to Carlos & Seney 2008    Assessment (rationale for performing, application to patient s function & care plan): Patient performed BBS with shoes on and L AFO. He had no significant LOB, and appears steady during basic tasks. He demo hesitation and resistance  to relying heavily on LLE for support. Overall the results of this test indicate readiness for gait training with cane or no assistive device, and positive prognosis for ambulation safety in the future.     Minutes billed as physical performance test: 20

## 2021-03-15 NOTE — PLAN OF CARE
"Discharge Planner Post-Acute Rehab PT:      Discharge Plan: home with FWW and OPPT vs HHPT      Precautions: cardiac, fall, L AFO on during transfers and ambulation      Current Status:  Bed Mobility: independent sit to/from supine on therapy mat  Transfer: SBA stand pivot with cane  Gait: SBA with SEC and L AFO up to 200 ft  Stairs: SBA to 6\" stairs with single rail  Balance:     MOYA 45/56 indicating low risk for falls in post-stroke population (3/15/21)     Assessment:  Patient progressing to cane use today for improved mobility and gait speed. Tolerates well. Connected with orthotist to arrange for L walk-on AFO. Hopeful a better fitting AFO will improve knee stability. Will initiate Xcite for ankle muscle re-training.     Other Barriers to Discharge (DME, Family Training, etc): Family training, lack of physical support at home, FWW, AFO    "

## 2021-03-16 ENCOUNTER — APPOINTMENT (OUTPATIENT)
Dept: OCCUPATIONAL THERAPY | Facility: CLINIC | Age: 61
End: 2021-03-16
Payer: COMMERCIAL

## 2021-03-16 ENCOUNTER — APPOINTMENT (OUTPATIENT)
Dept: PHYSICAL THERAPY | Facility: CLINIC | Age: 61
End: 2021-03-16
Payer: COMMERCIAL

## 2021-03-16 ENCOUNTER — APPOINTMENT (OUTPATIENT)
Dept: SPEECH THERAPY | Facility: CLINIC | Age: 61
End: 2021-03-16
Payer: COMMERCIAL

## 2021-03-16 PROCEDURE — 97535 SELF CARE MNGMENT TRAINING: CPT | Mod: GO | Performed by: OCCUPATIONAL THERAPIST

## 2021-03-16 PROCEDURE — 99233 SBSQ HOSP IP/OBS HIGH 50: CPT | Performed by: PHYSICAL MEDICINE & REHABILITATION

## 2021-03-16 PROCEDURE — 999N000150 HC STATISTIC PT MED CONFERENCE < 30 MIN

## 2021-03-16 PROCEDURE — 250N000013 HC RX MED GY IP 250 OP 250 PS 637: Performed by: PHYSICIAN ASSISTANT

## 2021-03-16 PROCEDURE — 128N000003 HC R&B REHAB

## 2021-03-16 PROCEDURE — 999N000125 HC STATISTIC PATIENT MED CONFERENCE < 30 MIN

## 2021-03-16 PROCEDURE — 97129 THER IVNTJ 1ST 15 MIN: CPT

## 2021-03-16 PROCEDURE — 97112 NEUROMUSCULAR REEDUCATION: CPT | Mod: GP

## 2021-03-16 PROCEDURE — 999N000125 HC STATISTIC PATIENT MED CONFERENCE < 30 MIN: Performed by: OCCUPATIONAL THERAPIST

## 2021-03-16 PROCEDURE — 97130 THER IVNTJ EA ADDL 15 MIN: CPT

## 2021-03-16 RX ADMIN — METOPROLOL TARTRATE 50 MG: 50 TABLET, FILM COATED ORAL at 20:22

## 2021-03-16 RX ADMIN — MAGNESIUM OXIDE TAB 400 MG (241.3 MG ELEMENTAL MG) 400 MG: 400 (241.3 MG) TAB at 20:21

## 2021-03-16 RX ADMIN — ASPIRIN 81 MG CHEWABLE TABLET 81 MG: 81 TABLET CHEWABLE at 08:07

## 2021-03-16 RX ADMIN — METOPROLOL TARTRATE 50 MG: 50 TABLET, FILM COATED ORAL at 08:06

## 2021-03-16 RX ADMIN — RIVAROXABAN 20 MG: 10 TABLET, FILM COATED ORAL at 20:21

## 2021-03-16 RX ADMIN — MAGNESIUM OXIDE TAB 400 MG (241.3 MG ELEMENTAL MG) 400 MG: 400 (241.3 MG) TAB at 13:06

## 2021-03-16 RX ADMIN — MAGNESIUM OXIDE TAB 400 MG (241.3 MG ELEMENTAL MG) 400 MG: 400 (241.3 MG) TAB at 08:07

## 2021-03-16 RX ADMIN — LOSARTAN POTASSIUM 100 MG: 100 TABLET, FILM COATED ORAL at 08:06

## 2021-03-16 RX ADMIN — FUROSEMIDE 40 MG: 20 TABLET ORAL at 08:07

## 2021-03-16 RX ADMIN — GABAPENTIN 300 MG: 300 CAPSULE ORAL at 08:07

## 2021-03-16 RX ADMIN — Medication 25 MG: at 08:07

## 2021-03-16 RX ADMIN — ATORVASTATIN CALCIUM 80 MG: 80 TABLET, FILM COATED ORAL at 20:21

## 2021-03-16 ASSESSMENT — MIFFLIN-ST. JEOR: SCORE: 1600.5

## 2021-03-16 NOTE — CARE CONFERENCE
Acute Rehab Care Conference/Team Rounds    Type: Team Rounds    Present:  Dr. Patrick Mays, Resident MD Geremias Arora, Evelin Bridges PT, Cori Sheppard OT, Boston Valentin SLP, Genna SIERRA, Elisabeth Mike RD, Brianna Flores Resident Jer, Jennifer Thomas , Alexi Alcazar RN, Patient Eber Jin    Discharge Barriers/Treatment/Education    Rehab Diagnosis: left hemiparesis 2/2 CVA    Active Medical Co-morbidities/Prognosis:     Patient Active Problem List   Diagnosis Code     Right pontine stroke (H) I63.50     afib on Xarelto, HTN, CAD, PAD, CHF with EF 40%, ischemic cardiomyopathy, MI, COPD, tobacco use, and HLD     Safety: Alert and oriented. Alarms.    Pain: Denies    Medications, Skin, Tubes/Lines: Medications whole. No skin concerns, no lines/drains.    Swallowing/Nutrition: reg with thins.  Patient does continue to present with intermittent wet vocal quality and benefits from use of chin tuck maneuver though inconsistent in following through with utilizing strategy.  We will continue to monitor swallowing function.    Bowel/Bladder: Continent of bowel and bladder. LBM 3/15.    Psychosocial: , lives with wife. Wife recently had stroke. Indep PTA. Unemployed, laid off Nov 2020. Alcohol abuse reported by pt wife. No financial concerns reported.     ADLs/IADLs: Pt making steady progress with ADLs with SEC. Pt requires S/SBA with FB dressing tasks. Pt requires SBA with toileting with SEC and Supervision with G/H tasks standing at EOS with SEC. 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 F/U OT services upon discharge. Will need assistance with transportation upon discharge.     Mobility: Making good progress towards PT goals. Utilizing L AFO to stabilize ankle. Xcite initiated today for sensorimotor reintegration of gastroc and anterior tib. Utilizing cane for improved gait speed and efficiency.   Bed Mobility: independent sit to/from supine on therapy  "mat  Transfer: SBA stand pivot with cane  Gait: SBA with SEC and L AFO up to 200 ft  Stairs: SBA to 6\" stairs with single rail   MOYA 45/56 indicating low risk for falls in post-stroke population (3/15/21)     Cognition/Language: Patient presenting with mild dysarthria but does not significantly impact intelligibility of speech.  Patient with significant memory impairment with limited insights into his impairments.  Reasoning/problem solving tasks, patient has been able to complete with minimal difficulties.  Anticipate need for ongoing SLP interventions upon facility discharge.  Will need to be relatively independent from a cognitive perspective in order to discharge home.    Community Re-Entry: will require assistance with driving, but goal is to achieve a level of mod I     Transportation: Not a  - family will need to provide    Decision maker: self    Plan of Care and goals reviewed and updated.    Discharge Plan/Recommendations    Fall Precautions: continue    Patient/Family input to goals: yes     Estimated length of stay: 14 days     Overall plan for the patient: reach a level of mod I       Utilization Review and Continued Stay Justification    Medical Necessity Criteria:    For any criteria that is not met, please document reason and plan for discharge, transfer, or modification of plan of care to address.    Requires intensive rehabilitation program to treat functional deficits?: Yes    Requires 3x per week or greater involvement of rehabilitation physician to oversee rehabilitation program?: Yes    Requires rehabilitation nursing interventions?: Yes    Patient is making functional progress?: Yes    There is a potential for additional functional progress? Yes    Patient is participating in therapy 3 hours per day a minimum of 5 days per week or 15 hours per week in 7 day period?:Yes    Has discharge needs that require coordinated discharge planning approach?:Yes      Barriers/Concerns related to " meeting medical necessity criteria:  None     Team Plan to Address Concern:  As needed       Final Physician Sign off    Statement of Approval:  Patrick Mays, DO      Patient Goals  Social Work Goals: Confirm discharge recommendations with therapy, coordinate safe discharge plan and remain available to support and assist as needed.     OT Frequency: 60-90 min daily  OT goal: hygiene/grooming: independent  OT goal: upper body dressing: Independent  OT goal: lower body dressing: Modified independent, Independent  OT goal: upper body bathing: Modified independent  OT goal: lower body bathing: Modified independent  OT goal: bed mobility: Independent  OT Goal: transfer: Modified independent  OT goal: toilet transfer/toileting: Modified independent, Independent  OT goal: meal preparation: Modified independent  OT goal: home management: Modified independent, Independent  OT goal: perform aerobic activity with stable cardiovascular response: continuous activity, 10 minutes  OT goal 1: Pt will demo understanding of UB HEP focusing on increasing strength and coordination in LUE prior to discharge.  OT goal 2: Pt will demo safety Mod I shower transfer using fww onto shower bench prior to discharge.     PT Frequency: Daily for 14 days  PT goal: bed mobility: Independent, Supine to/from sit, Rolling, Bridging  PT goal: transfers: Modified independent, Sit to/from stand, Bed to/from chair, Assistive device(LRAD)  PT goal: gait: Greater than 200 feet, Modified independent(LRAD)  PT goal: stairs: Rail on both sides, Greater than 10 stairs, Modified independent  PT goal 1: Pt will demonstrate ability to complete car transfer with SBA safely  PT Goal 2: Pt will complete floor transfer independently and safely     SLP Frequency: daily  SLP Swallow Goal:  Safely tolerate diet without signs/symptoms of aspiration: thin liquids, regular diet, independently   SLP goal 1: patient will demonstrate ability to have clear intelligible  speech while using dysarthria strategies with 90% accuracy.  SLP goal 2: patient will demonstrate ability to use memory strategies for functional tasks relevant to his life with minimal cueing and 80% accuracy  SLP goal 3: with environmental distractions present, patient will attain to a task with limited with one or less instances of loss of attention (change of subject, slowing in task completion, etc) with 80% accuracy and minimal cueing  SLP goal 4: sLP: pt to improve verbal/written reasoning, and executive function 80% at moderate complex level,  min cues for increased independence with IALDs     Nursing Goals  Bowel and Bladder care  Fall prevention   Medication Education  Skin Care protection

## 2021-03-16 NOTE — PROGRESS NOTES
[facility]   Acute Rehabilitation Unit  Daily progress note    INTERVAL HISTORY  Patient seen and examined at bedside.  No acute events overnight.  Was seen by orthotist yesterday due to rubbing/irritation from current AFO; will be fitted with a carbon fiber AFO per orthotist. No noted concerns overnight. No N/V/D, chest pain, abdominal pain, HA, or other issues.    Pt seen as a part of interdisciplinary rounds today; please see associated not for full update on pt progression.    MEDICATIONS   Current Facility-Administered Medications   Medication     acetaminophen (TYLENOL) tablet 325-650 mg     albuterol (PROAIR HFA/PROVENTIL HFA/VENTOLIN HFA) 108 (90 Base) MCG/ACT inhaler 2 puff     aspirin (ASA) chewable tablet 81 mg     atorvastatin (LIPITOR) tablet 80 mg     furosemide (LASIX) tablet 40 mg     gabapentin (NEURONTIN) capsule 300 mg     [START ON 3/18/2021] influenza recomb quadrivalent PF (FLUBLOK) injection 0.5 mL     losartan (COZAAR) tablet 100 mg     magnesium oxide (MAG-OX) tablet 400 mg     metoprolol tartrate (LOPRESSOR) tablet 50 mg     Patient is already receiving anticoagulation with heparin, enoxaparin (LOVENOX), warfarin (COUMADIN)  or other anticoagulant medication     polyethylene glycol (MIRALAX) Packet 17 g     rivaroxaban ANTICOAGULANT (XARELTO) tablet 20 mg     senna-docusate (SENOKOT-S/PERICOLACE) 8.6-50 MG per tablet 1-2 tablet     spironolactone (ALDACTONE) half-tab 25 mg     PHYSICAL EXAM  24 Hour Vital Signs Summary:  Temp: 97.8  F (36.6  C) Temp  Min: 95.9  F (35.5  C)  Max: 97.8  F (36.6  C)  Resp: 16 Resp  Min: 16  Max: 16  SpO2: 99 % SpO2  Min: 99 %  Max: 99 %  Pulse: 77 Pulse  Min: 65  Max: 77  BP: 121/76 Systolic (24hrs), Av , Min:105 , Max:135   Diastolic (24hrs), Av, Min:72, Max:93    Gen: Alert, oriented, cooperative with exam, NAD, more reactive affect.  HEENT: Normocephalic, atraumatic  Cardio: RRR, no murmur, rub, or  S3/S4  Pulm: CTAB, no increased WOB on room air  Abd: Soft, non-tender, non-distended. NBS  Ext: No peripheral edema, no tenderness.  Neuro/MSK: L hemiparesis, dysarthria consistent with prior examinations-improving. Slowness of left hand with rapid alternating movement ongoing. Some improvement in ability to close and open left hand.    LABS  CBC RESULTS:   Recent Labs   Lab 03/15/21  0605 03/10/21  0712   WBC 9.6 8.6   RBC 4.69 4.51   HGB 13.5 13.2*   HCT 42.2 39.9*   MCV 90 89   MCH 28.8 29.3   MCHC 32.0 33.1   RDW 15.5* 15.3*    220      CMP/BMP/Hepatic Panel:   Recent Labs   Lab 03/15/21  0605 03/10/21  0712    137   POTASSIUM 4.8 4.3   CHLORIDE 104 104   CO2 30 28   ANIONGAP 3 5   GLC 83 84   BUN 16 17   CR 0.85 0.77   LISA 9.0 9.0       Rehabilitation - continue comprehensive acute inpatient rehabilitation program with multidisciplinary approach including therapies, rehab nursing, and physiatry following. See interval history for updates.      ASSESSMENT AND PLAN    Eber Jin is a 60 year old right hand dominant male with PMHx afib on Xarelto, HTN, CAD, PAD, CHF, ischemic cardiomyopathy, MI, COPD, tobacco use, and HLD who presented on 3/5/21 with left-sided weakness and was found to have small acute right pontine stroke. Admitted to ARU on 3/9 for rehabilitation and ongoing medical management.      Admission to acute inpatient rehab 03/09/21.  Impairment group code: 01.1 L body involvement, R brain stroke; R acute ischemic pontine stroke      1. PT, OT and SLP: 60 minutes of each on a daily basis, in addition to rehab nursing and close management of physiatrist.     2. Impairment of ADL's: Noted to have impairments in strength, coordination, balance, and cognition which are affecting his ability to safely and independently perform ADLs. Goal for mod I for all ADLs and ADL transfers.     3. Impairment of mobility:  Noted to have impairments in strength, coordination, balance, decreased  safety awareness, and L neglect all affecting his ability to safely and independently perform mobility. Goals for mod I with all transfers and household mobility.     4. Impairment of cognition/language/swallow:  Noted to have mildly impaired oral motor function and impaired cognition. Goals to tolerate safest, least restrictive diet and improved cognitive/linguistic skills.     5. Medical Conditions  Acute small right pontine stroke  MRI brain with 1.5 cm acute infarct in right lower mata, MRA head with multifocal age indeterminate vessel occlusion or flow-limiting stenosis. MRA neck with 60% stenosis of left ICA. Outside window for tPA. Echo with EF 45%, mild aortic regurgitation, mild dilation of ascending aorta. EKG/tele with NSR. . Last A1c in chart 5.8% on 7/19/2018.  Noted to have ongoing left hemiparesis and incoordination. Bedside swallow completed by SLP on 3/8, VFSS completed 3/8, noted mild oral and mild pharyngeal dysphagia.   - Continue high dose statin  - Continue PTA Xarelto  - Continue ASA 81 mg daily (restarted 3/6)  - BP management as below  - Encourage smoking cessation  - Regular diet, thin liquids by small sips with chin tuck  - Continue PT, OT, SLP     Paroxysmal atrial fibrillation/flutter  Per chart review, patient admitted 12/31 - 1/3 for frostbite and found to be in afib with RVR with concerns for medication noncompliance.    - Continue PTA Xarelto 20 mg daily     HTN  PTA on losartan, furosemide, spironolactone, metoprolol. Hx poor medication compliance. Initially allowed permissive HTN, but since resumed losartan, furosemide, and spironolactone.  - Continue losartan 100 mg daily  - Continue furosemide  - Continue spironolactone  - Continue metoprolol tartrate 50 mg BID  - Monitor BP and adjust medications as indicated     CAD  Hx MI  CHF with EF 45%  Ischemic cardiomyopathy  Of note, per last hospital admission (discharged 1/3), metoprolol dose was increased to 100 mg BID at that  time; currently 50mg BID, will consider increasing to PTA dose as appropriate.  - Continue ASA 81 mg daily  - Continue furosemide 40 mg daily  - Continue spironolactone 25 mg daily  - Continue metoprolol tartrate 50 mg BID     HLD  Unclear whether patient on statin PTA.  on 3/7/21, high-intensity statin therapy recommended, started atorvastatin.  - Continue atorvastatin 80 mg     Hypomagnesemia  On Mag-Ox PTA. Improved to 1.8 on 3/10  - Continue Mag-Ox 400 mg TID  - Trend BMP q7 days (Mondays)     COPD  Tobacco use disorder  PTA on albuterol inhaler PRN. Also appears to have used Ellipta in the recent past as well.  No evidence of exacerbation during acute hospitalization. Current smoker, 0.5 packs per day, 30 year history.    - Continue albuterol inhaler PRN  - Encourage smoking cessation and provide education      Neuropathic pain  Patient on gabapentin PTA, he is unaware of indication. Appears to have been prescribed by provider at burn clinic, so suspect may be related to recent frostbite.  - Continue gabapentin 300 mg daily     6. Adjustment to disability:  Clinical psychology to eval and treat as indicated  7. FEN: regular diet, thin liquids with strategies per SLP  8. Bowel: continent, monitor for constipation, PRN bowel meds available  9. Bladder: continent, monitor PVRs at admission and ISC as indicated  10. DVT Prophylaxis: Xarelto  11. GI Prophylaxis: regular diet  12. Code: full  13. Disposition: home with family support and home vs. Outpatient therapies  14. ELOS:  10 days  15. Rehab prognosis:  good  16. Follow up Appointments on Discharge: PCP, stroke neurology, PM&R    Patient was seen and discussed with the Attending Physician, Dr. Mays.    Boston Quick, MS4  [facility]        Physician Attestation   I, Patrick Mays, was present with the medical/YOVANI student who participated in the service and in the documentation of the note.  I have verified the history and personally  performed the physical exam and medical decision making.  I agree with the assessment and plan of care as documented in the note.      I personally reviewed vital signs, medications and labs.    Patient awake and alert, interacting well.  L sided hemiparesis improving.  Discussed progress with patient and family.  Will start planning for discharge home.    Patrick Mays,   Date of Service (when I saw the patient): 03/16/21      I spent a total of 35 minutes face to face and coordinating care of Eber Jin. Over 50% of my time on the unit was spent counseling the patient and /or coordinating care regarding left hemiparesis 2/2 CVA.

## 2021-03-16 NOTE — PLAN OF CARE
FOCUS/GOAL  Medical management    ASSESSMENT, INTERVENTIONS AND CONTINUING PLAN FOR GOAL:  Pt is alert and oriented. No complaints of pain. CGA with cane. Continent of bladder. Appeared to be sleeping on rounds.

## 2021-03-16 NOTE — PROGRESS NOTES
S: patient seen today at [facility] for fitting of a large anthony ann marie walk on AFO    O/G: (prevent drop foot)(assist in ambulation)    A: patient seen today for fitting of left large anthony ann marie walk on . Patient has been instructed on proper donning/doffing, use, care and break-in period.  Patient observed in a walking trial and both the patient and I are satisfied with the fit and function of the AFO at this time.     P: patient has been instructed to contact us if any issues arise.  Skip CARVALHO,LO.

## 2021-03-16 NOTE — PROGRESS NOTES
"CLINICAL NUTRITION SERVICES - ASSESSMENT NOTE     Nutrition Prescription    RECOMMENDATIONS FOR MDs/PROVIDERS TO ORDER:  Consider Thiamine and Folic acid supplementation in light of reported ETOH use prior to hospitalization     Malnutrition Status:    Non-severe malnutrition in the context of acute illness    Recommendations already ordered by Registered Dietitian (RD):  Food snack BID: Baked chips and cheese stick at 2 PM, ice cream and apple pie at 8 PM    Future/Additional Recommendations:  Monitor meal and snack intakes  Monitor weight trends      REASON FOR ASSESSMENT  Eber Jin is a/an 60 year old male assessed by the dietitian for LOS    NUTRITION/MEDICAL HISTORY  Per chart review: Pt admits to ARU for rehabilitation in the setting of acute small right pontine stroke and paroxysmal atrial fibrillation/flutter. Past medical history of HTN, CAD, PAD, CHF with EF 40%, ischemic cardiomyopathy, MI, COPD, tobacco use, and HLD noted.     Per pt visit: Pt reports tolerating meals well, ordering 3 meals per day and taking >50% of all meals. RD reviewed weight trends below, weight slightly down from usual trend, RD reviewed the importance of adequate nutritional intakes for continued improvements in health status, reviewed options for nutrition interventions, pt agreeing to snacks BID as above.     Pt was reviewed during care team rounds, concern for history of ETOH use reviewed by LSW. RD requested Providers consider thiamine and folic acid supplementation in light of information provided.    CURRENT NUTRITION ORDERS  Diet: Regular  Intake/Tolerance: Mostly 100% per flow sheets, only one meal noted at 25%    LABS  Labs reviewed    MEDICATIONS  Mag-ox, Lipitor, Cozaar, Lasix, Spironolactone    ANTHROPOMETRICS  Height: 180.3 cm (5' 11\")  Most Recent Weight: 77.8 kg (171 lb 9.6 oz)    IBW: 78.2 kg  BMI: Normal BMI  UBW: Pt reports around 175 lbs   Weight History:   78.5 kg (173 lb 2 oz) 03/08/2021 Per CE "     Weight assessment: Significant 1.1% weight loss in 1 week.     Dosing Weight: 77.8 kg    ASSESSED NUTRITION NEEDS  Estimated Energy Needs: 3196-3542 kcals/day (25 - 30 kcals/kg)  Justification: Maintenance  Estimated Protein Needs: 60-75 grams protein/day (0.8 - 1 grams of pro/kg)  Justification: Maintenance  Estimated Fluid Needs: 7831-7458 mL/day (25 - 30 mL/kg)    Justification: Maintenance    MALNUTRITION  % Intake: No decreased intake noted  % Weight Loss: Up to 1-2% in 1 week (non-severe)  Subcutaneous Fat Loss: Lower arm: mild  Muscle Loss: Upper/lower arm  (forearm): mild   Fluid Accumulation/Edema: None noted  Malnutrition Diagnosis: Non-severe malnutrition in the context of acute illness     NUTRITION DIAGNOSIS  Predicted inadequate nutrient intake related to LOS/menu fatigue     INTERVENTIONS  Implementation  Nutrition Education: RD reviewed the importance of adequate nutritional intakes for improved health status    Modify composition of meals/snacks - ordered as above      Goals  Patient to consume % of nutritionally adequate meal trays TID, or the equivalent with supplements/snacks.     Monitoring/Evaluation  Progress toward goals will be monitored and evaluated per protocol.    Elisabeth Mike RD, CNSC, LD  [facility] RD pager: [phone]

## 2021-03-16 NOTE — PLAN OF CARE
Discharge Planner Post-Acute Rehab SLP:      Discharge Plan: home with home therapies     Precautions: cardiac, fall, L AFO on during transfers and ambulation      Current Status:  Communication: mild dysarthria  Cognition:  Pt demonstrating moderate deficits in immediate and delayed memory, as well as reasoning  Swallow: Regular with thin liquids     Assessment: pt engaged in problem solving task involving meal planning. He was able to complete planning packet with minimal cueing which simply included redirected. With lumsoity, pt was able to indepednetly download applicaiton onto his phone. he struggled to understand directions for majority of tasks. Attention was most impaired during lumosity tasks. During attn task, was unalbe to engage in multiple tasks at once (something he says he does frequently at home). Pt completed form for [employer] with clinician and discussed MAP pt demonstrating inability to independently recall dysarthria strategies. When using strategies (task at end of session), he is able to be understood and speaks relatively clearly.          Other Barriers to Discharge (Family Training, etc): none anticipated from speech

## 2021-03-16 NOTE — PLAN OF CARE
FOCUS/GOAL  Medication management, Mobility, and Safety management     ASSESSMENT, INTERVENTIONS AND CONTINUING PLAN FOR GOAL:  Pt is Aox4 able to make needs known.  SBA with cane, writer got reports of self transferring from therapy team, watching closely for self transfers and did seem like pt was about to use restroom alone at 1345 but writer just walked in and pt asked for help. VSS.  LSCTA, denies cough, sob, pain, N/V, N/T, dizziness.  Pt reports LBM today. Reg/thin diet, took pill whole with water.  Pt had rounds today progressing well, started on MAP and did call for afternoon medications. Working with therapy. Nursing will continue to monitor.

## 2021-03-16 NOTE — PROGRESS NOTES
Team rounds this morning. Pt on track to discharge home Tues 03/23. MAP starting today. Wife coming in tomorrow for stroke class. HC recommended. Pt agreeable to discharge plans and denied immediate needs or concerns.     SW stayed back to discuss HC. Discussed HC services, SOC, insurance auth, transition to OP and offered choice. Pt denied preference. With permission, referral for RN, PT, OT, SLP, HA and SW sent to [facility]. [facility] willing and able to accept. Contact information added to pt AVS.     SW discussed [facility] and referral. With permission, SW completed referral online on pt behalf and asked for f/u 1-2 weeks post discharge.     SLP assisted pt with completed [employer] application. SW completed other half and placed in the outgoing mailbox this afternoon. Pt aware that it can take 20-30 days to get a f/u call about the application. In the meantime, SW called pt insurance company. Pt DOES have transportation benefits. SWer notified pt of this and add information to pt AVS.     Discussed  with pt and provided pt with information and print off RE: their services. Pt appreciative. Stated he is getting unemployment at this time.     SWer address pt wife concerns RE: alcohol abuse. Pt denied his abuse. Stated that he has a DWI from 3 years ago, still on probation and hasn't drank in 6 months. Pt denied any withdrawal, cravings or intentions on drinking at discharge. Pt stated that his family is supportive and denied need for alcohol resources. SWer provided the resources to pt anyway, pt was ok with that.     Pt denied additional needs at this time. Resources (listed below) added to pt AVS and printed sepearately for pt on word document. Discussed all information on the document. Pt expressed gratitude and denied any further questions or concerns.     Genna Thomas, [facility], [facility] Acute Rehab Unit   Phone: [phone]  I   Pager:  [phone]    ---------------------------------------------------------------------------------------------------------------------  Home Health Care:   [facility] PH: [phone] (previously known as: [facility])  Nurse, physical therapy, occupational therapy, speech therapy, home health aide and    -You will get a call after you have discharged from Acute Rehab Hospital to schedule the first home care visit. The home health nurse should come out within the first 24-48 hours. If you don't get a call from them within the first 48 hours, please call to follow up (number above).   ---------------------------------------------------------------------------------------------------------------------  [employer]   Call [phone] to check on the status of your application   *Your application was mailed in on 03/16/2021, they will contact you when reviewed.     [employer]  PH: [phone] or PH: [phone]  FREE SERVICE to follow-up appointment     [employer]  Toll Free: [phone]  Direct: [phone]  Fax: [phone]  [email or web address]    Our representatives will explain how Social Security s rules apply to your claim and help you complete all initial paperwork. They will oversee everything that happens throughout your claim and will also appear with you in front of an  if necessary.  All of our Representatives have passed the rigorous Social Security exam which allows them to be directly paid by the Social Security Administration.    -----------------------------------------------------------------------------  [place] Stroke [facility] Brain Injury [place] and Association  Additional resources and contact information online   Www.[email or web address] & [email or web address]  *Referral on your behalf completed 03/16/2021, a 'resources facilitator' will be in contact with you 1-2 weeks after  your discharge.     Types of services that Stroke/Brain Injury Resource Facilitation offers include:  Emotional support in coping with a  new normal   Finding mental health support and counselors who understand brain injury and stroke  Finding a support group  Finding or re-connecting to rehabilitation services  Finding where and how to get a brain injury assessed  Finding or re-connecting with a primary care physician  Supporting caregivers by connecting them with resources  Education about diagnosis and symptoms -  Is this normal   Helping educate family and loved ones of the survivor s  invisible  symptoms  Figuring out the logistics of returning to work, vocational rehab and understanding ADA rights  If not going back to work, support in finding meaningful ways to structure your day and exploring volunteer options  Coaching on navigation the federal, state and [facility] health and disability service system with additional support and conference calls as needed  Help finding appropriate legal support for appealing and navigating Social Security Disability, providing advocacy on the individual s behalf as needed and connecting with cost effective alternatives to  as needed  Supporting parents/students with how to discuss return to school and sports with educators and connecting to a TBI specialist or parent advocate group if needed  Connecting to addiction (alcohol/drug/gambling/smoking) support and treatment services  Support with creative problem solving to life barriers.  *These are just a few examples of common things that Resource Facilitation can help with. They are not limited to what is listed here so if you are curious if they can help, just ask. Call us at [phone] or [phone].    -----------------------------------------------------------------------  Alcohol Resources:   Substance Use Treatment (Rule 25) Information -     To access chemical dependency treatment you must have an assessment  complete or have an updated assessment within 30 days of starting any program.   Information for this to be completed and to secure funding if you have medical assistance or no insurance can be found through your [facility]'s chemical health intake line. Chemical intake lines can be found below.    [facility] RULE 25 INFORMATION -   [place] - [phone]  [place]  [phone]  [place] - [phone]  [place] - [phone]  [facility] [phone]  [place] - [phone]  [place] - [phone]  [facility] [phone]    If you have private insurance contact the customer service number on the back of your insurance card to determine the required steps for accessing services through your insurance provider.     Once approved for funding you can connect with a facility that does Rule 25 assessment. Treatment locations can be discussed with a Rule 25 , known as a Licensed Drug and Alcohol Counselor (LADC). They will send the completed Rule 25 to the treatment facility of choice.    The following facilities offer Rule 25 assessments -     [facility]  [phone]  [facility] - Outpatient Mental Health and Addiction   [address] [facility] [address] SUDS  [phone]  [address]  [place] [address]   [facility]  [phone]  [address] [facility] [address] [facility] Services   [phone]  [facility]  [address]  [facility] [address]   [facility]   [phone]  [address] [facility] [address] [facility]  [phone] - press 1  Multiple clinic locations   [facility]   [phone]  [address]  [facility] [address] [facility]  [phone]  [address] [facility] [address]   [facility] ([facility])  [phone]  [address] [facility] [address] [place]  [phone]  [address]  [place] [address]     If you are intoxicated You may  be required to detox at a detox facility before starting treatment.   [facility]- [address] [facility]. [place], [place].   [phone]    [facility]: [phone]  [facility]: [phone]  [facility]: [phone]    *All information subject to change by facility.     AA Meetings List Online for the [facility]:  [email or web address]    Mental Health Clinics- [facility]- [phone]  [facility]- [address] [facility]- [address] [facility]- [phone]  [place]- [address] [facility]- [address] [facility]- [address] [place] [address] Mental Health Clinics- [phone]  [place]- [address]  [place]- [address] [facility]- [address]  [place]- [address], [address] [facility]  [place]- [phone] [address], [address] [address]  [place] [phone] [address]  [place]- [phone] [address]   [facility]-   [place]- [phone] [address] [place] [address], [address]  [place]- [phone] [address] [facility], [address]  [place]- [phone] [address] [facility], [address] Walk-In Counseling Services- [phone]  Free counseling offered on a walk-in basis.  [facility]- [address] [facility] [address]  [place] [address], Monday, Wednesday 5-7  [facility]- [address] [place] [address]- Tuesday, Thursday 6-8   [facility]- [phone]  [place]- [phone]- [address] [facility]- [phone]- [address]     [facility] Crisis Phone Numbers    [facility] [phone] [place] [phone]   [place] [phone] [place] [phone]   [facility] [phone] [facility] [phone]     **Please note that due to COVID-19  "Outpatient Mental Health and/or Chemical Dependency services are operating via \"tele health\" meaning appointments and assessments/ meetings are being held via [employer], reBounces, iCurrent, or other face to face computer meetings. Please check with providers for your appointment which platform they are using so that you have the same technology available.**  ---------------------------------------------------------------------------------------------------------------------  "

## 2021-03-16 NOTE — PROGRESS NOTES
FOCUS/GOAL  Bowel management, Bladder management, Medical management, Mobility and Cognition/Memory/Judgment/Problem solving    ASSESSMENT, INTERVENTIONS AND CONTINUING PLAN FOR GOAL:  Alert and oriented x4 used callight appropriately and verbalized needs well   Pt is now using a cane for transfer and ambulation CCGA   Bp before the Metoprolol is 119/73   Use urinal , staff empties and set it up at bedside , continent of BM in the toilet LBM this AM   Continue to assist patient with safe transfer and ambulation in room .

## 2021-03-17 ENCOUNTER — APPOINTMENT (OUTPATIENT)
Dept: OCCUPATIONAL THERAPY | Facility: CLINIC | Age: 61
End: 2021-03-17
Payer: COMMERCIAL

## 2021-03-17 ENCOUNTER — APPOINTMENT (OUTPATIENT)
Dept: SPEECH THERAPY | Facility: CLINIC | Age: 61
End: 2021-03-17
Payer: COMMERCIAL

## 2021-03-17 ENCOUNTER — APPOINTMENT (OUTPATIENT)
Dept: EDUCATION SERVICES | Facility: CLINIC | Age: 61
End: 2021-03-17
Attending: PHYSICAL MEDICINE & REHABILITATION
Payer: COMMERCIAL

## 2021-03-17 LAB
LABORATORY COMMENT REPORT: NORMAL
SARS-COV-2 RNA RESP QL NAA+PROBE: NEGATIVE
SPECIMEN SOURCE: NORMAL

## 2021-03-17 PROCEDURE — 97130 THER IVNTJ EA ADDL 15 MIN: CPT | Performed by: SPEECH-LANGUAGE PATHOLOGIST

## 2021-03-17 PROCEDURE — 250N000013 HC RX MED GY IP 250 OP 250 PS 637: Performed by: PHYSICAL MEDICINE & REHABILITATION

## 2021-03-17 PROCEDURE — 128N000003 HC R&B REHAB

## 2021-03-17 PROCEDURE — 99232 SBSQ HOSP IP/OBS MODERATE 35: CPT | Performed by: PHYSICAL MEDICINE & REHABILITATION

## 2021-03-17 PROCEDURE — 97535 SELF CARE MNGMENT TRAINING: CPT | Mod: GO | Performed by: OCCUPATIONAL THERAPIST

## 2021-03-17 PROCEDURE — 97129 THER IVNTJ 1ST 15 MIN: CPT | Performed by: SPEECH-LANGUAGE PATHOLOGIST

## 2021-03-17 PROCEDURE — 250N000013 HC RX MED GY IP 250 OP 250 PS 637: Performed by: PHYSICIAN ASSISTANT

## 2021-03-17 PROCEDURE — 97110 THERAPEUTIC EXERCISES: CPT | Mod: GO | Performed by: OCCUPATIONAL THERAPIST

## 2021-03-17 PROCEDURE — 87635 SARS-COV-2 COVID-19 AMP PRB: CPT | Performed by: PHYSICAL MEDICINE & REHABILITATION

## 2021-03-17 RX ORDER — NAPROXEN 250 MG/1
500 TABLET ORAL 2 TIMES DAILY WITH MEALS
Status: DISCONTINUED | OUTPATIENT
Start: 2021-03-17 | End: 2021-03-17

## 2021-03-17 RX ORDER — COLCHICINE 0.6 MG/1
1.2 TABLET ORAL DAILY
Status: COMPLETED | OUTPATIENT
Start: 2021-03-17 | End: 2021-03-17

## 2021-03-17 RX ADMIN — GABAPENTIN 300 MG: 300 CAPSULE ORAL at 07:40

## 2021-03-17 RX ADMIN — Medication 25 MG: at 07:40

## 2021-03-17 RX ADMIN — MAGNESIUM OXIDE TAB 400 MG (241.3 MG ELEMENTAL MG) 400 MG: 400 (241.3 MG) TAB at 13:25

## 2021-03-17 RX ADMIN — RIVAROXABAN 20 MG: 10 TABLET, FILM COATED ORAL at 18:12

## 2021-03-17 RX ADMIN — MAGNESIUM OXIDE TAB 400 MG (241.3 MG ELEMENTAL MG) 400 MG: 400 (241.3 MG) TAB at 20:49

## 2021-03-17 RX ADMIN — LOSARTAN POTASSIUM 100 MG: 100 TABLET, FILM COATED ORAL at 07:40

## 2021-03-17 RX ADMIN — ASPIRIN 81 MG CHEWABLE TABLET 81 MG: 81 TABLET CHEWABLE at 07:40

## 2021-03-17 RX ADMIN — MAGNESIUM OXIDE TAB 400 MG (241.3 MG ELEMENTAL MG) 400 MG: 400 (241.3 MG) TAB at 07:39

## 2021-03-17 RX ADMIN — METOPROLOL TARTRATE 50 MG: 50 TABLET, FILM COATED ORAL at 20:49

## 2021-03-17 RX ADMIN — COLCHICINE 1.2 MG: 0.6 TABLET, FILM COATED ORAL at 10:06

## 2021-03-17 RX ADMIN — ATORVASTATIN CALCIUM 80 MG: 80 TABLET, FILM COATED ORAL at 20:49

## 2021-03-17 RX ADMIN — METOPROLOL TARTRATE 50 MG: 50 TABLET, FILM COATED ORAL at 07:40

## 2021-03-17 RX ADMIN — FUROSEMIDE 40 MG: 20 TABLET ORAL at 07:40

## 2021-03-17 RX ADMIN — ACETAMINOPHEN 650 MG: 325 TABLET, FILM COATED ORAL at 20:51

## 2021-03-17 ASSESSMENT — MIFFLIN-ST. JEOR: SCORE: 1602.32

## 2021-03-17 NOTE — PLAN OF CARE
FOCUS/GOAL  Medication management, Mobility, and Safety management     ASSESSMENT, INTERVENTIONS AND CONTINUING PLAN FOR GOAL:  Pt is Aox4 able to make needs known.  SBA with cane, no self transfers reported to writer on shift. VSS.  LSCTA, denies cough, sob, pain, N/V, N/T, dizziness.  Pt reports LBM 3/16/2021. Reg/thin diet, took pill whole with water. Pt participated in MAP this morning but forgot to call for noon medication but knows which med pt needs at noon.  Working with therapy. Nursing will continue to monitor.

## 2021-03-17 NOTE — CONSULTS
Stroke Education Note    The following information has been reviewed with the patient: Offered to call wife too but patient declined.     1. Warning signs of stroke    2. Calling 911 if having warning signs of stroke    3. All modifiable risk factors: hypertension, CAD, atrial fib, diabetes, hypercholesterolemia, smoking, substance abuse, diet, physical inactivity, obesity, sleep apnea.    4. Patient's risk factors for stroke which include: A fib, HTN, CAD, HLD, smokers, unhealthy diet, physical inactivity    5. Follow-up plan for after discharge    6. Discharge medications which include: Aspirin, Lipitor, Lasix, Cozaar, Metoprolol, Xarelto     In addition, the [facility] Stroke Class Handout has been given to the patient.    Learner's response to risk factors / lifestyle modification education: Taking steps     BETY Callaway RN

## 2021-03-17 NOTE — PROGRESS NOTES
[facility]   Acute Rehabilitation Unit  Daily progress note    INTERVAL HISTORY  Patient seen and examined at bedside.  No acute events overnight.  Denies chest pain, shortness of breath, no fever or chills.  States his left great toe and ankle have gout flare.  Will give one time colchicine and monitor.      Functional  OT:  ADLs: SBA with functional mobility in room with FWW.  Incorporates LUE into ADLs without cues.    Dressing- SBA with FWW cues for safety. G/H - SBA standing with FWW. Toileting - SBA using FWW and grab bar     IADLs: CGA standing/hanging clothing, continue to assess IADLS further.    Vision/Cognition: Mild deficits with recall and problem solving during functional tasks, baseline level unclear.      ROS: 10 point ROS neg other than the symptoms noted above in the HPI.      MEDICATIONS   Current Facility-Administered Medications   Medication     - Medication Assessment Program - Rehab Services     acetaminophen (TYLENOL) tablet 325-650 mg     albuterol (PROAIR HFA/PROVENTIL HFA/VENTOLIN HFA) 108 (90 Base) MCG/ACT inhaler 2 puff     aspirin (ASA) chewable tablet 81 mg     atorvastatin (LIPITOR) tablet 80 mg     colchicine (COLCYRS) tablet 1.2 mg     furosemide (LASIX) tablet 40 mg     gabapentin (NEURONTIN) capsule 300 mg     [START ON 3/18/2021] influenza recomb quadrivalent PF (FLUBLOK) injection 0.5 mL     losartan (COZAAR) tablet 100 mg     magnesium oxide (MAG-OX) tablet 400 mg     metoprolol tartrate (LOPRESSOR) tablet 50 mg     Patient is already receiving anticoagulation with heparin, enoxaparin (LOVENOX), warfarin (COUMADIN)  or other anticoagulant medication     polyethylene glycol (MIRALAX) Packet 17 g     rivaroxaban ANTICOAGULANT (XARELTO) tablet 20 mg     senna-docusate (SENOKOT-S/PERICOLACE) 8.6-50 MG per tablet 1-2 tablet     spironolactone (ALDACTONE) tablet 25 mg     PHYSICAL EXAM  24 Hour Vital Signs Summary:  Temp: 97.3  F (36.3  C) Temp   Min: 96.4  F (35.8  C)  Max: 97.3  F (36.3  C)  Resp: 16 Resp  Min: 16  Max: 16  SpO2: 98 % SpO2  Min: 97 %  Max: 98 %  Pulse: 67 Pulse  Min: 67  Max: 83  BP: 123/88 Systolic (24hrs), Av , Min:106 , Max:123   Diastolic (24hrs), Av, Min:73, Max:88    Gen: Alert, oriented, cooperative with exam, NAD  HEENT: Normocephalic, atraumatic  Cardio: RRR, no murmur  Pulm: CTAB, no increased WOB on room air  Abd: Soft, non-tender, non-distended. NBS  Ext: No peripheral edema, no tenderness.  Neuro/MSK: L hemiparesis, dysarthria consistent with prior examinations-improving. Slowness of left hand with rapid alternating movement ongoing. Some improvement in ability to close and open left hand.  There is tenderness to L great toe and ankle.     LABS  CBC RESULTS:   Recent Labs   Lab 03/15/21  0605   WBC 9.6   RBC 4.69   HGB 13.5   HCT 42.2   MCV 90   MCH 28.8   MCHC 32.0   RDW 15.5*         CMP/BMP/Hepatic Panel:   Recent Labs   Lab 03/15/21  0605      POTASSIUM 4.8   CHLORIDE 104   CO2 30   ANIONGAP 3   GLC 83   BUN 16   CR 0.85   LISA 9.0       Rehabilitation - continue comprehensive acute inpatient rehabilitation program with multidisciplinary approach including therapies, rehab nursing, and physiatry following. See interval history for updates.      ASSESSMENT AND PLAN    Eber Jin is a 60 year old right hand dominant male with PMHx afib on Xarelto, HTN, CAD, PAD, CHF, ischemic cardiomyopathy, MI, COPD, tobacco use, and HLD who presented on 3/5/21 with left-sided weakness and was found to have small acute right pontine stroke. Admitted to ARU on 3/9 for rehabilitation and ongoing medical management.      Admission to acute inpatient rehab 21.  Impairment group code: 01.1 L body involvement, R brain stroke; R acute ischemic pontine stroke      1. PT, OT and SLP: 60 minutes of each on a daily basis, in addition to rehab nursing and close management of physiatrist.     2. Impairment of  ADL's: Noted to have impairments in strength, coordination, balance, and cognition which are affecting his ability to safely and independently perform ADLs. Goal for mod I for all ADLs and ADL transfers.     3. Impairment of mobility:  Noted to have impairments in strength, coordination, balance, decreased safety awareness, and L neglect all affecting his ability to safely and independently perform mobility. Goals for mod I with all transfers and household mobility.     4. Impairment of cognition/language/swallow:  Noted to have mildly impaired oral motor function and impaired cognition. Goals to tolerate safest, least restrictive diet and improved cognitive/linguistic skills.     5. Medical Conditions  Acute small right pontine stroke  MRI brain with 1.5 cm acute infarct in right lower mata, MRA head with multifocal age indeterminate vessel occlusion or flow-limiting stenosis. MRA neck with 60% stenosis of left ICA. Outside window for tPA. Echo with EF 45%, mild aortic regurgitation, mild dilation of ascending aorta. EKG/tele with NSR. . Last A1c in chart 5.8% on 7/19/2018.  Noted to have ongoing left hemiparesis and incoordination. Bedside swallow completed by SLP on 3/8, VFSS completed 3/8, noted mild oral and mild pharyngeal dysphagia.   - Continue high dose statin  - Continue PTA Xarelto  - Continue ASA 81 mg daily (restarted 3/6)  - BP management as below  - Encourage smoking cessation  - Regular diet, thin liquids by small sips with chin tuck  - Continue PT, OT, SLP     Paroxysmal atrial fibrillation/flutter  Per chart review, patient admitted 12/31 - 1/3 for frostbite and found to be in afib with RVR with concerns for medication noncompliance.    - Continue PTA Xarelto 20 mg daily     HTN  PTA on losartan, furosemide, spironolactone, metoprolol. Hx poor medication compliance. Initially allowed permissive HTN, but since resumed losartan, furosemide, and spironolactone.  - Continue losartan 100 mg  daily  - Continue furosemide  - Continue spironolactone  - Continue metoprolol tartrate 50 mg BID  - Monitor BP and adjust medications as indicated     CAD  Hx MI  CHF with EF 45%  Ischemic cardiomyopathy  Of note, per last hospital admission (discharged 1/3), metoprolol dose was increased to 100 mg BID at that time; currently 50mg BID, will consider increasing to PTA dose as appropriate.  - Continue ASA 81 mg daily  - Continue furosemide 40 mg daily  - Continue spironolactone 25 mg daily  - Continue metoprolol tartrate 50 mg BID     HLD  Unclear whether patient on statin PTA.  on 3/7/21, high-intensity statin therapy recommended, started atorvastatin.  - Continue atorvastatin 80 mg     Hypomagnesemia  On Mag-Ox PTA. Improved to 1.8 on 3/10  - Continue Mag-Ox 400 mg TID  - Trend BMP q7 days (Mondays)     COPD  Tobacco use disorder  PTA on albuterol inhaler PRN. Also appears to have used Ellipta in the recent past as well.  No evidence of exacerbation during acute hospitalization. Current smoker, 0.5 packs per day, 30 year history.    - Continue albuterol inhaler PRN  - Encourage smoking cessation and provide education      Neuropathic pain  Patient on gabapentin PTA, he is unaware of indication. Appears to have been prescribed by provider at burn clinic, so suspect may be related to recent frostbite.  - Continue gabapentin 300 mg daily    Gouty Flare, L great toe  --given start of Xarelto, will avoid NSAID, will give one time dose of colchicine and monitor  --In past patient was on Allopurinol      6. Adjustment to disability:  Clinical psychology to eval and treat as indicated  7. FEN: regular diet, thin liquids with strategies per SLP  8. Bowel: continent, monitor for constipation, PRN bowel meds available  9. Bladder: continent, monitor PVRs at admission and ISC as indicated  10. DVT Prophylaxis: Xarelto  11. GI Prophylaxis: regular diet  12. Code: full  13. Disposition: home with family support and home  vs. Outpatient therapies  14. ELOS:  10 days  15. Rehab prognosis:  good  16. Follow up Appointments on Discharge: PCP, stroke neurology, PM&R      Patrick Mays,   [facility]        I spent a total of 25 minutes face to face and coordinating care of Eber Jin. Over 50% of my time on the unit was spent counseling the patient and /or coordinating care regarding recent CVA.

## 2021-03-17 NOTE — PLAN OF CARE
Discharge Planner Post-Acute Rehab SLP:      Discharge Plan: home with home therapies     Precautions: cardiac, fall, L AFO on during transfers and ambulation      Current Status:  Communication: mild dysarthria  Cognition:  Pt demonstrating moderate deficits in immediate and delayed memory, as well as reasoning  Swallow: Regular with thin liquids     Assessment: Attempted a task involving planning an event based on certain criteria - needing to scan options and choose the bes- started this task but did not have time to finish as pt needing to use the bathroom--- able to reason through some choices independently but missing some details   -10 mins due to needing to use bathroom .          Other Barriers to Discharge (Family Training, etc): none anticipated from speech

## 2021-03-17 NOTE — PLAN OF CARE
PT: pt refused AM session d/t pain in L foot. States he believes he has gout and requested to speak to MD prior to walking on his foot. -60 minutes    Per OT, AFO not fitting well in shoe. Upon inspection it appears AFO base is wider than shoe. Consulted Orthotists to make adjustments and recommendations as necessary.

## 2021-03-17 NOTE — PLAN OF CARE
FOCUS/GOAL  Medical management    ASSESSMENT, INTERVENTIONS AND CONTINUING PLAN FOR GOAL:    Pt is A&O. Sleeping during rounds. Calls appropriately. Denies pain and sob. Continent of bowel and bladder. Uses the urinal for voiding, w/ staff to empty urinal. No attempts to self transfer this shift. A1 with the walker in transfers. Pt on MAP. Will continue POC.

## 2021-03-17 NOTE — PROGRESS NOTES
Discharge Planner Post-Acute Rehab OT:      Discharge Plan: Home with HH therapies. Anticipated discharge 3/23/21     Precautions: Falls, L hemiparesis       Current Status:  ADLs: SBA with functional mobility in room with FWW.  Incorporates LUE into ADLs without cues.    Dressing- SBA with FWW cues for safety. G/H - SBA standing with FWW. Toileting - SBA using FWW and grab bar  IADLs: CGA standing/hanging clothing. Pt requires increased time and supervision with bill paying tasks.   Vision/Cognition: Mild deficits with recall and problem solving during functional tasks, baseline level unclear.      Assessment: Pt limited with mobility d/t left great toe pain and improper fit of left AFO. PT left voicemail to orthotics regarding AFO. Focused on functional cognition with organizational tasks with pt requiring increased time. Pt would benefit from skilled OT services to maximize IND and safety with ADLs/IADLs, further assess cognition and improve LUE strengthening and coordination.      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 Pt stated that his children live close but support is unclear.   Equipment recommendations: walker, shower chair, and potentially dressing AE pending progress    -10 d/t left ft pain

## 2021-03-18 ENCOUNTER — APPOINTMENT (OUTPATIENT)
Dept: SPEECH THERAPY | Facility: CLINIC | Age: 61
End: 2021-03-18
Payer: COMMERCIAL

## 2021-03-18 ENCOUNTER — APPOINTMENT (OUTPATIENT)
Dept: OCCUPATIONAL THERAPY | Facility: CLINIC | Age: 61
End: 2021-03-18
Payer: COMMERCIAL

## 2021-03-18 ENCOUNTER — APPOINTMENT (OUTPATIENT)
Dept: PHYSICAL THERAPY | Facility: CLINIC | Age: 61
End: 2021-03-18
Payer: COMMERCIAL

## 2021-03-18 PROCEDURE — 97530 THERAPEUTIC ACTIVITIES: CPT | Mod: GO | Performed by: OCCUPATIONAL THERAPIST

## 2021-03-18 PROCEDURE — 250N000013 HC RX MED GY IP 250 OP 250 PS 637: Performed by: PHYSICAL MEDICINE & REHABILITATION

## 2021-03-18 PROCEDURE — 250N000013 HC RX MED GY IP 250 OP 250 PS 637: Performed by: PHYSICIAN ASSISTANT

## 2021-03-18 PROCEDURE — 128N000003 HC R&B REHAB

## 2021-03-18 PROCEDURE — 250N000013 HC RX MED GY IP 250 OP 250 PS 637: Performed by: STUDENT IN AN ORGANIZED HEALTH CARE EDUCATION/TRAINING PROGRAM

## 2021-03-18 PROCEDURE — 99232 SBSQ HOSP IP/OBS MODERATE 35: CPT | Performed by: PHYSICAL MEDICINE & REHABILITATION

## 2021-03-18 PROCEDURE — 97130 THER IVNTJ EA ADDL 15 MIN: CPT

## 2021-03-18 PROCEDURE — 97129 THER IVNTJ 1ST 15 MIN: CPT

## 2021-03-18 PROCEDURE — 97535 SELF CARE MNGMENT TRAINING: CPT | Mod: GO | Performed by: OCCUPATIONAL THERAPIST

## 2021-03-18 PROCEDURE — 97110 THERAPEUTIC EXERCISES: CPT | Mod: GP

## 2021-03-18 PROCEDURE — 96125 COGNITIVE TEST BY HC PRO: CPT | Mod: GN

## 2021-03-18 PROCEDURE — 97530 THERAPEUTIC ACTIVITIES: CPT | Mod: GP

## 2021-03-18 RX ORDER — NAPROXEN 250 MG/1
500 TABLET ORAL 2 TIMES DAILY WITH MEALS
Status: COMPLETED | OUTPATIENT
Start: 2021-03-18 | End: 2021-03-19

## 2021-03-18 RX ADMIN — MAGNESIUM OXIDE TAB 400 MG (241.3 MG ELEMENTAL MG) 400 MG: 400 (241.3 MG) TAB at 13:10

## 2021-03-18 RX ADMIN — LOSARTAN POTASSIUM 100 MG: 100 TABLET, FILM COATED ORAL at 08:55

## 2021-03-18 RX ADMIN — ASPIRIN 81 MG CHEWABLE TABLET 81 MG: 81 TABLET CHEWABLE at 08:55

## 2021-03-18 RX ADMIN — ATORVASTATIN CALCIUM 80 MG: 80 TABLET, FILM COATED ORAL at 21:16

## 2021-03-18 RX ADMIN — MAGNESIUM OXIDE TAB 400 MG (241.3 MG ELEMENTAL MG) 400 MG: 400 (241.3 MG) TAB at 08:58

## 2021-03-18 RX ADMIN — MAGNESIUM OXIDE TAB 400 MG (241.3 MG ELEMENTAL MG) 400 MG: 400 (241.3 MG) TAB at 21:16

## 2021-03-18 RX ADMIN — Medication 25 MG: at 08:55

## 2021-03-18 RX ADMIN — METOPROLOL TARTRATE 50 MG: 50 TABLET, FILM COATED ORAL at 08:55

## 2021-03-18 RX ADMIN — RIVAROXABAN 20 MG: 10 TABLET, FILM COATED ORAL at 18:38

## 2021-03-18 RX ADMIN — FUROSEMIDE 40 MG: 20 TABLET ORAL at 08:55

## 2021-03-18 RX ADMIN — NAPROXEN 500 MG: 250 TABLET ORAL at 18:38

## 2021-03-18 RX ADMIN — GABAPENTIN 300 MG: 300 CAPSULE ORAL at 08:55

## 2021-03-18 RX ADMIN — ACETAMINOPHEN 650 MG: 325 TABLET, FILM COATED ORAL at 13:20

## 2021-03-18 ASSESSMENT — MIFFLIN-ST. JEOR: SCORE: 1604.13

## 2021-03-18 NOTE — PLAN OF CARE
FOCUS/GOAL  Bowel management and Medical management    ASSESSMENT, INTERVENTIONS AND CONTINUING PLAN FOR GOAL:    Pt is A&O. Able to make needs known. Denied pain, sob, N/V or any numbness/tingling. Left big toe still red but pt denied pain. Continent of bowel and bladder. Uses the urinal ind with staff to empty. Ambulated to the bathroom for bm, had 4 loose stool this shift (2 small, 1 med & 1 large). Offered ginger ale which helped for a while. Denied any abdominal discomfort. A1 w/ the cane. Tolerating AFO brace on left foot. Pt at one time called for assistance and was found already sitting up on the eob with the gait belt on. Bed alarm was on but did not go off. Reminded to wait for assistance from staff to avoid any falls/injury. Pt understood. On MAP. Will continue POC.

## 2021-03-18 NOTE — PLAN OF CARE
Repeatable Battery for the Assessment of Neuropsychological Status (RBANS) FORM B   Immediate Memory Visuospatial/  Constructional Language Attention Delayed Memory Total Scale   Index Score 90 87 98 82 81 438   Percentile Rank 25 19 45 12 10 13               SLP:  Pt seen for administration of RBANS B Form Results are based on a mean of 100 and a standard deviation of +/- 15.   Interpretation: Pt initially was given the RBANS form A when he arrived at [facility]. Pt is demonstrating positive outcomes from therapy as evidence by scores on RBANS Form B. Both immediate and delayed memory have improved tremendously over the course of pt being at ARU from the . 1 and . 2 percentiles to 25 and 10 percentiles. All scores have improved over time with skilled intervention. Though still in the below average range, pt is making positive progress in therapy.     Face to Face Administration: 35   Scoring/Interpretation: 15  Total Time: 45

## 2021-03-18 NOTE — PLAN OF CARE
"Discharge Planner Post-Acute Rehab PT:      Discharge Plan: home with FWW and OPPT vs HHPT      Precautions: cardiac, fall, L AFO on during transfers and ambulation      Current Status:  Bed Mobility: independent sit to/from supine  Transfer: SBA with cane and L AFO. However, today refused AFO d/t L foot pain.  Gait: SBA with SEC and L AFO up to 200 ft (3/15), today only short distance <> bathroom, no AFO, refused  Stairs: SBA to 6\" stairs with single rail (3/15)  Balance: Steady in standing while washing hands.     MOYA 45/56 indicating low risk for falls in post-stroke population (3/15/21)     Assessment: Pt refused ambulatory activity today d/t L foot pain. He appeared fatigued today, though participated well in sitting and supine strength exercises. Orthotist contacted re: L AFO as of 3/17.     Other Barriers to Discharge (DME, Family Training, etc): Family training, lack of physical support at home, FWW, AFO            "

## 2021-03-18 NOTE — PROGRESS NOTES
Pt A&O. Uses call light to make needs known. Pt complained of gout pain to L great toe this evening; admin x1 dose colcyrs on day shift and prn tylenol admin this evening. Area continues to be reddened, swollen and tender. covid test results negative today. Pt on MAP and called approp and patient chose the correct meds for this time. Offers no further complaints/concerns. Alarms activated and call light within reach. Continue with current POC.

## 2021-03-18 NOTE — PROGRESS NOTES
[facility]   Acute Rehabilitation Unit  Daily progress note    INTERVAL HISTORY  Pt seen at bedside. Notes ongoing pain in left great toe and at the site of the AFO. Pain is limiting ability to engage in PT at this time. Plan to give a single dose of naproxen. Discussed importance of not using alcohol due to number of existing health conditions and increased risk for fall with alcohol use.    Functional:  PT - refused PT yesterday 2/2 foot pain from gout    Current Status - OT  ADLs: SBA with functional mobility in room with FWW.  Incorporates LUE into ADLs without cues.    Dressing- SBA with FWW cues for safety. G/H - SBA standing with FWW. Toileting - SBA using FWW and grab bar  IADLs: CGA standing/hanging clothing. Pt requires increased time and supervision with bill paying tasks.   Vision/Cognition: Mild deficits with recall and problem solving during functional tasks, baseline level unclear.      Assessment: Pt limited with mobility d/t left great toe pain and improper fit of left AFO. PT left voicemail to orthotics regarding AFO. Focused on functional cognition with organizational tasks with pt requiring increased time. Pt would benefit from skilled OT services to maximize IND and safety with ADLs/IADLs, further assess cognition and improve LUE strengthening and coordination.     ROS  10 point ROS neg other than the symptoms noted above in the HPI.    MEDICATIONS   Current Facility-Administered Medications   Medication     - Medication Assessment Program - Rehab Services     acetaminophen (TYLENOL) tablet 325-650 mg     albuterol (PROAIR HFA/PROVENTIL HFA/VENTOLIN HFA) 108 (90 Base) MCG/ACT inhaler 2 puff     aspirin (ASA) chewable tablet 81 mg     atorvastatin (LIPITOR) tablet 80 mg     furosemide (LASIX) tablet 40 mg     gabapentin (NEURONTIN) capsule 300 mg     influenza recomb quadrivalent PF (FLUBLOK) injection 0.5 mL     losartan (COZAAR) tablet 100 mg     magnesium  oxide (MAG-OX) tablet 400 mg     metoprolol tartrate (LOPRESSOR) tablet 50 mg     Patient is already receiving anticoagulation with heparin, enoxaparin (LOVENOX), warfarin (COUMADIN)  or other anticoagulant medication     polyethylene glycol (MIRALAX) Packet 17 g     rivaroxaban ANTICOAGULANT (XARELTO) tablet 20 mg     senna-docusate (SENOKOT-S/PERICOLACE) 8.6-50 MG per tablet 1-2 tablet     spironolactone (ALDACTONE) tablet 25 mg     PHYSICAL EXAM  24 Hour Vital Signs Summary:  Temp: 95.5  F (35.3  C) Temp  Min: 95.5  F (35.3  C)  Max: 97.8  F (36.6  C)  Resp: 18 Resp  Min: 16  Max: 18  SpO2: 99 % SpO2  Min: 98 %  Max: 99 %  Pulse: 71 Pulse  Min: 71  Max: 76  BP: 138/83 Systolic (24hrs), Av , Min:115 , Max:138   Diastolic (24hrs), Av, Min:72, Max:83    Gen: Alert, oriented, cooperative with exam, NAD  HEENT: Normocephalic, atraumatic  Cardio: RRR, no murmur  Pulm: CTAB, no increased WOB on room air  Abd: Soft, non-tender, non-distended. NBS  Ext: No peripheral edema, no tenderness.  Neuro/MSK: L hemiparesis, dysarthria consistent with prior examinations-improving. Slowness of left hand with rapid alternating movement ongoing. Some improvement in ability to close and open left hand. There is tenderness to L great toe and ankle.     LABS  CBC RESULTS:   Recent Labs   Lab 03/15/21  0605   WBC 9.6   RBC 4.69   HGB 13.5   HCT 42.2   MCV 90   MCH 28.8   MCHC 32.0   RDW 15.5*         CMP/BMP/Hepatic Panel:   Recent Labs   Lab 03/15/21  0605      POTASSIUM 4.8   CHLORIDE 104   CO2 30   ANIONGAP 3   GLC 83   BUN 16   CR 0.85   LISA 9.0       Rehabilitation - continue comprehensive acute inpatient rehabilitation program with multidisciplinary approach including therapies, rehab nursing, and physiatry following. See interval history for updates.      ASSESSMENT AND PLAN    Eber Jin is a 60 year old right hand dominant male with PMHx afib on Xarelto, HTN, CAD, PAD, CHF, ischemic  cardiomyopathy, MI, COPD, tobacco use, and HLD who presented on 3/5/21 with left-sided weakness and was found to have small acute right pontine stroke. Admitted to ARU on 3/9 for rehabilitation and ongoing medical management.      Admission to acute inpatient rehab 03/09/21.  Impairment group code: 01.1 L body involvement, R brain stroke; R acute ischemic pontine stroke      1. PT, OT and SLP: 60 minutes of each on a daily basis, in addition to rehab nursing and close management of physiatrist.     2. Impairment of ADL's: Noted to have impairments in strength, coordination, balance, and cognition which are affecting his ability to safely and independently perform ADLs. Goal for mod I for all ADLs and ADL transfers.     3. Impairment of mobility:  Noted to have impairments in strength, coordination, balance, decreased safety awareness, and L neglect all affecting his ability to safely and independently perform mobility. Goals for mod I with all transfers and household mobility.     4. Impairment of cognition/language/swallow:  Noted to have mildly impaired oral motor function and impaired cognition. Goals to tolerate safest, least restrictive diet and improved cognitive/linguistic skills.     5. Medical Conditions  Acute small right pontine stroke  MRI brain with 1.5 cm acute infarct in right lower mata, MRA head with multifocal age indeterminate vessel occlusion or flow-limiting stenosis. MRA neck with 60% stenosis of left ICA. Outside window for tPA. Echo with EF 45%, mild aortic regurgitation, mild dilation of ascending aorta. EKG/tele with NSR. . Last A1c in chart 5.8% on 7/19/2018.  Noted to have ongoing left hemiparesis and incoordination. Bedside swallow completed by SLP on 3/8, VFSS completed 3/8, noted mild oral and mild pharyngeal dysphagia.   - Continue high dose statin  - Continue PTA Xarelto  - Continue ASA 81 mg daily (restarted 3/6)  - BP management as below  - Encourage smoking cessation  -  Regular diet, thin liquids by small sips with chin tuck  - Continue PT, OT, SLP     Paroxysmal atrial fibrillation/flutter  Per chart review, patient admitted 12/31 - 1/3 for frostbite and found to be in afib with RVR with concerns for medication noncompliance.    - Continue PTA Xarelto 20 mg daily     HTN  PTA on losartan, furosemide, spironolactone, metoprolol. Hx poor medication compliance. Initially allowed permissive HTN, but since resumed losartan, furosemide, and spironolactone.  - Continue losartan 100 mg daily  - Continue furosemide  - Continue spironolactone  - Continue metoprolol tartrate 50 mg BID  - Monitor BP and adjust medications as indicated     CAD  Hx MI  CHF with EF 45%  Ischemic cardiomyopathy  Of note, per last hospital admission (discharged 1/3), metoprolol dose was increased to 100 mg BID at that time; currently 50mg BID, will consider increasing to PTA dose as appropriate.  - Continue ASA 81 mg daily  - Continue furosemide 40 mg daily  - Continue spironolactone 25 mg daily  - Continue metoprolol tartrate 50 mg BID     HLD  Unclear whether patient on statin PTA.  on 3/7/21, high-intensity statin therapy recommended, started atorvastatin.  - Continue atorvastatin 80 mg     Hypomagnesemia  On Mag-Ox PTA. Improved to 1.8 on 3/10  - Continue Mag-Ox 400 mg TID  - Trend BMP q7 days (Mondays)     COPD  Tobacco use disorder  PTA on albuterol inhaler PRN. Also appears to have used Ellipta in the recent past as well.  No evidence of exacerbation during acute hospitalization. Current smoker, 0.5 packs per day, 30 year history.    - Continue albuterol inhaler PRN  - Encourage smoking cessation and provide education      Neuropathic pain  Patient on gabapentin PTA, he is unaware of indication. Appears to have been prescribed by provider at burn clinic, so suspect may be related to recent frostbite.  - Continue gabapentin 300 mg daily    Gouty Flare, L great toe  Given start of Xarelto, will avoid  NSAID, gave one time dose of colchicine , then one time dose of naproxen; will continue to monitor.  --In past patient was on Allopurinol      6. Adjustment to disability:  Clinical psychology to eval and treat as indicated  7. FEN: regular diet, thin liquids with strategies per SLP  8. Bowel: continent, monitor for constipation, PRN bowel meds available  9. Bladder: continent, monitor PVRs at admission and ISC as indicated  10. DVT Prophylaxis: Xarelto  11. GI Prophylaxis: regular diet  12. Code: full  13. Disposition: home with family support and home vs. Outpatient therapies  14. ELOS:  10 days  15. Rehab prognosis:  good  16. Follow up Appointments on Discharge: PCP, stroke neurology, PM&R    Patient was seen and discussed with the Attending Physician, Dr. Mays.    Boston Quick, MS4 - [facility]      Physician Attestation   I, Patrick Mays, was present with the medical/YOVANI student who participated in the service and in the documentation of the note.  I have verified the history and personally performed the physical exam and medical decision making.  I agree with the assessment and plan of care as documented in the note.      I personally reviewed vital signs, medications and labs.    Will add daily naproxen if toe pain persists, cautious due to anticoagulation.      Patrick Mays, DO  Date of Service (when I saw the patient): 03/18/21      I spent a total of 25 minutes face to face and coordinating care of Eber Jin. Over 50% of my time on the unit was spent counseling the patient and /or coordinating care regarding recent CVA.

## 2021-03-18 NOTE — PLAN OF CARE
FOCUS/GOAL  Bowel management, Bladder management, Medication management, and Mobility    ASSESSMENT, INTERVENTIONS AND CONTINUING PLAN FOR GOAL:  [id number]: Ao1 with cane/gaitbelt for transfers, pt declined to use AFO on LLE for nursing transfers today d/t pain in left foot. New order noted for scheduled naproxen (see MAR), pt also given PRN Tylenol x1 which was partially effective. Continent of bladder using urinal, continent of x1 loose BM using toilet in bathroom. Independent w/ pericare. Continued on MAP today, called appropriately and was able to select the correct meds from box. Will continue with POC.     [id number]: Pt did not call for evening or HS meds this shift; found pt asleep in bed when in to check on him at HS. Pt was then able to select correct medications after writer woke him up and reminded him about meds. BP at /77, metoprolol held per order parameters; Dr Arora notified with no new orders received. Will continue with POC.

## 2021-03-18 NOTE — PROGRESS NOTES
Discharge Planner Post-Acute Rehab OT:      Discharge Plan: Home with HH therapies. Anticipated discharge 3/23/21     Precautions: Falls, L hemiparesis       Current Status:  ADLs: CGA/min A with functional mobility in room with SEC.  Incorporates LUE into ADLs without cues.    Dressing- SBA with SEC cues for safety. G/H - SBA standing. Toileting - SBA using SEC and grab bar  IADLs: CGA standing/hanging clothing. Pt requires increased time and supervision with bill paying tasks and med mgmt tasks  Vision/Cognition: Mild deficits with recall and problem solving during functional tasks, baseline level unclear.      Assessment: Pt limited with mobility d/t left great toe pain and ankle pain limiting pt's weightbearing into LLE. Pt now requires CGA/min A with functional mobility d/t LLE pain. Focused on functional cognition with driving/maps wksts for pathfinding skills with pt requiring increased time and mod verbal cues to complete correctly.  Pt would benefit from skilled OT services to maximize IND and safety with ADLs/IADLs, further assess cognition and improve LUE strengthening and coordination.      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 Pt stated that his children live close but support is unclear.   Equipment recommendations: walker, shower chair, and potentially dressing AE pending progress    -8 d/t LLE pain

## 2021-03-18 NOTE — PROGRESS NOTES
Received call from pt wife Dinorah, MARIELA and Dinorah spoke for about 35 minutes. Dinorah wondering about conversation with pt about alcohol use at home. Informed Dinorah about HC set up, [employer] mobility application, insurance transportation, disability resources and [employer] resources. Informed Dinorah that pt denied alcohol use but was agreeable to sending pt home with the resources. Dinorah acknowledged alcoholics need insight and motivation to change. Dinorah expressed her appreciation for SW attempt to discuss with pt and denied additional needs at this time.     Genna Thomas, ERIN, [facility] Acute Rehab Unit   Phone: [phone]  I   Pager: [phone]

## 2021-03-18 NOTE — PROGRESS NOTES
Repeatable Battery for the Assessment of Neuropsychological Status (RBANS) FORM B    Immediate Memory Visuospatial/  Constructional Language Attention Delayed Memory Total Scale   Index Score 90 87 98 82 81 83   Percentile Rank 25 19 45 12 10 13         SLP:  Pt seen for administration of RBANS B Form Results are based on a mean of 100 and a standard deviation of +/- 15.   Interpretation: Pt initially was given the RBANS form A when he arrived at [facility]. Pt is demonstrating positive outcomes from therapy as evidence by scores on RBANS Form B. Both immediate and delayed memory have improved tremendously over the course of pt being at ARU from the . 1 and . 2 percentiles to 25 and 10 percentiles. All scores have improved over time with skilled intervention. Though still in the below average range, pt is making positive progress in therapy.      Face to Face Administration: 35    Scoring/Interpretation: 15  Total Time: 50 minutes

## 2021-03-19 ENCOUNTER — APPOINTMENT (OUTPATIENT)
Dept: SPEECH THERAPY | Facility: CLINIC | Age: 61
End: 2021-03-19
Payer: COMMERCIAL

## 2021-03-19 ENCOUNTER — APPOINTMENT (OUTPATIENT)
Dept: OCCUPATIONAL THERAPY | Facility: CLINIC | Age: 61
End: 2021-03-19
Payer: COMMERCIAL

## 2021-03-19 ENCOUNTER — APPOINTMENT (OUTPATIENT)
Dept: PHYSICAL THERAPY | Facility: CLINIC | Age: 61
End: 2021-03-19
Payer: COMMERCIAL

## 2021-03-19 PROCEDURE — 97110 THERAPEUTIC EXERCISES: CPT | Mod: GO

## 2021-03-19 PROCEDURE — 250N000013 HC RX MED GY IP 250 OP 250 PS 637: Performed by: STUDENT IN AN ORGANIZED HEALTH CARE EDUCATION/TRAINING PROGRAM

## 2021-03-19 PROCEDURE — 97530 THERAPEUTIC ACTIVITIES: CPT | Mod: GP

## 2021-03-19 PROCEDURE — 97130 THER IVNTJ EA ADDL 15 MIN: CPT

## 2021-03-19 PROCEDURE — 97530 THERAPEUTIC ACTIVITIES: CPT | Mod: GO

## 2021-03-19 PROCEDURE — 128N000003 HC R&B REHAB

## 2021-03-19 PROCEDURE — 97535 SELF CARE MNGMENT TRAINING: CPT | Mod: GO

## 2021-03-19 PROCEDURE — 250N000013 HC RX MED GY IP 250 OP 250 PS 637: Performed by: PHYSICIAN ASSISTANT

## 2021-03-19 PROCEDURE — 250N000013 HC RX MED GY IP 250 OP 250 PS 637: Performed by: PHYSICAL MEDICINE & REHABILITATION

## 2021-03-19 PROCEDURE — 97129 THER IVNTJ 1ST 15 MIN: CPT

## 2021-03-19 PROCEDURE — 97110 THERAPEUTIC EXERCISES: CPT | Mod: GP

## 2021-03-19 PROCEDURE — 99232 SBSQ HOSP IP/OBS MODERATE 35: CPT | Mod: GC | Performed by: PHYSICAL MEDICINE & REHABILITATION

## 2021-03-19 RX ADMIN — RIVAROXABAN 20 MG: 10 TABLET, FILM COATED ORAL at 17:58

## 2021-03-19 RX ADMIN — MAGNESIUM OXIDE TAB 400 MG (241.3 MG ELEMENTAL MG) 400 MG: 400 (241.3 MG) TAB at 14:42

## 2021-03-19 RX ADMIN — GABAPENTIN 300 MG: 300 CAPSULE ORAL at 08:29

## 2021-03-19 RX ADMIN — METOPROLOL TARTRATE 50 MG: 50 TABLET, FILM COATED ORAL at 08:29

## 2021-03-19 RX ADMIN — METOPROLOL TARTRATE 50 MG: 50 TABLET, FILM COATED ORAL at 20:22

## 2021-03-19 RX ADMIN — Medication 25 MG: at 08:30

## 2021-03-19 RX ADMIN — MAGNESIUM OXIDE TAB 400 MG (241.3 MG ELEMENTAL MG) 400 MG: 400 (241.3 MG) TAB at 20:22

## 2021-03-19 RX ADMIN — FUROSEMIDE 40 MG: 20 TABLET ORAL at 08:27

## 2021-03-19 RX ADMIN — LOSARTAN POTASSIUM 100 MG: 100 TABLET, FILM COATED ORAL at 08:23

## 2021-03-19 RX ADMIN — MAGNESIUM OXIDE TAB 400 MG (241.3 MG ELEMENTAL MG) 400 MG: 400 (241.3 MG) TAB at 08:27

## 2021-03-19 RX ADMIN — ATORVASTATIN CALCIUM 80 MG: 80 TABLET, FILM COATED ORAL at 20:22

## 2021-03-19 RX ADMIN — NAPROXEN 500 MG: 250 TABLET ORAL at 08:30

## 2021-03-19 RX ADMIN — ASPIRIN 81 MG CHEWABLE TABLET 81 MG: 81 TABLET CHEWABLE at 08:26

## 2021-03-19 NOTE — PROGRESS NOTES
Curt jesus walk on was adjusted patient ambulated and stated that it felt better. The patient will trial the adjustment and Follow-up PRN.   JAQUELIN Coleman.

## 2021-03-19 NOTE — PLAN OF CARE
FOCUS/GOAL  Medical management    ASSESSMENT, INTERVENTIONS AND CONTINUING PLAN FOR GOAL:    Pt is alert and oriented. Woke up around 3Am, said he rested well and slept early. Denied pain, sob, N/V or any stomach issues. Left foot looks better, no swelling or redness noted. Continent of bowel and bladder. Uses the urinal independently and staff to empty. A1 with the cane and gait belt in transfers. Encouraged to get more sleep.     At around 0645, NA went in to take pt's vitals. Pt was upset bec of early therapy schedule. Would prefer later therapy times. Left a note for PMR. Will continue POC.

## 2021-03-19 NOTE — PLAN OF CARE
Discharge Planner Post-Acute Rehab OT:      Discharge Plan: Home with HH therapies. Anticipated discharge 3/23/21     Precautions: Falls, L hemiparesis       Current Status:  ADLs: Mod I with functional mobility in room with SEC.  Incorporates LUE into ADLs without cues.    Dressing- SBA with SEC cues for safety. G/H - SBA standing. Toileting - SBA using SEC and grab bar  IADLs: CGA standing/hanging clothing, SBA-Mod I w/cane kitchen mobility tasks. Pt requires increased time and supervision with bill paying tasks and med mgmt tasks  Vision/Cognition: Mild deficits with recall and problem solving during functional tasks, baseline level unclear.      Assessment:  Pt c/o pain with new AFO for LLE, was Mod I w/cane during todays tx sessions. Tx focus today kitchen mobility (SBA-Mod I w/cane), tub transfer w/extended bench (Mod I), and continued strengthening for functional tasks. Pt states he has extended bench at home, and raised toilet seat with handles. Continue with OT POC to maximize [place] and safety with tasks.     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 Pt stated that his children live close but support is unclear.   Equipment recommendations: walker, and potentially dressing AE pending progress,  Pt states he has extended bench at home, and raised toilet seat with handles.

## 2021-03-19 NOTE — PLAN OF CARE
Pt is alert and oriented x 4. Continent of bowel and bladder, last bm yesterday. Reg diet, thin liquids. Mod-I in room with cane. Pt is on MAP and able to identify correct medications. Denied pain, nausea or vomiting, numbness or tingling. No new concern at this time. Will continue to monitor.

## 2021-03-19 NOTE — PROGRESS NOTES
"  [facility]   Acute Rehabilitation Unit  Daily progress note    INTERVAL HISTORY  No acute overnight events. Slept well, though was reported to be awake at 0300. States his foot pain is improving with the naproxen yesterday. Noted to be continent of bowel and bladder. Looking forward to discharge next week. Denies new pain, CP, SOB, nausea, or other acute concern today.    Functional:  Bed Mobility: independent sit to/from supine  Transfer: SBA with cane and L AFO. However, today refused AFO d/t L foot pain.  Gait: SBA with SEC and L AFO up to 200 ft (3/15), today only short distance <> bathroom, no AFO, refused  Stairs: SBA to 6\" stairs with single rail (3/15)  Balance: Steady in standing while washing hands.     MOYA 45/56 indicating low risk for falls in post-stroke population (3/15/21)    MEDICATIONS   Current Facility-Administered Medications   Medication     - Medication Assessment Program - Rehab Services     acetaminophen (TYLENOL) tablet 325-650 mg     albuterol (PROAIR HFA/PROVENTIL HFA/VENTOLIN HFA) 108 (90 Base) MCG/ACT inhaler 2 puff     aspirin (ASA) chewable tablet 81 mg     atorvastatin (LIPITOR) tablet 80 mg     furosemide (LASIX) tablet 40 mg     gabapentin (NEURONTIN) capsule 300 mg     influenza recomb quadrivalent PF (FLUBLOK) injection 0.5 mL     losartan (COZAAR) tablet 100 mg     magnesium oxide (MAG-OX) tablet 400 mg     metoprolol tartrate (LOPRESSOR) tablet 50 mg     Patient is already receiving anticoagulation with heparin, enoxaparin (LOVENOX), warfarin (COUMADIN)  or other anticoagulant medication     polyethylene glycol (MIRALAX) Packet 17 g     rivaroxaban ANTICOAGULANT (XARELTO) tablet 20 mg     senna-docusate (SENOKOT-S/PERICOLACE) 8.6-50 MG per tablet 1-2 tablet     spironolactone (ALDACTONE) tablet 25 mg     PHYSICAL EXAM  24 Hour Vital Signs Summary:  Temp: 97.2  F (36.2  C) Temp  Min: 95.6  F (35.3  C)  Max: 97.2  F (36.2  C)  Resp: 16 Resp  " Min: 16  Max: 18  SpO2: 98 % SpO2  Min: 97 %  Max: 98 %  Pulse: 72 Pulse  Min: 72  Max: 76  BP: (!) 158/76 Systolic (24hrs), Av , Min:100 , Max:158   Diastolic (24hrs), Av, Min:69, Max:91    Gen: Pleasant male, NAD, sitting edge of bed  HEENT: Normocephalic, atraumatic  Cardio: RRR, no murmur  Pulm: CTAB  Abd: Soft, NT, ND  Ext: No tenderness  Neuro/MSK: Speech clear, fluent. Moving all extremities antigravity. Seen walking well down [place] with therapies    LABS  CBC RESULTS:   Recent Labs   Lab 03/15/21  0605   WBC 9.6   RBC 4.69   HGB 13.5   HCT 42.2   MCV 90   MCH 28.8   MCHC 32.0   RDW 15.5*         CMP/BMP/Hepatic Panel:   Recent Labs   Lab 03/15/21  0605      POTASSIUM 4.8   CHLORIDE 104   CO2 30   ANIONGAP 3   GLC 83   BUN 16   CR 0.85   LISA 9.0     Rehabilitation - continue comprehensive acute inpatient rehabilitation program with multidisciplinary approach including therapies, rehab nursing, and physiatry following. See interval history for updates.      ASSESSMENT AND PLAN    Eber Jin is a 60 year old right hand dominant male with PMHx afib on Xarelto, HTN, CAD, PAD, CHF, ischemic cardiomyopathy, MI, COPD, tobacco use, and HLD who presented on 3/5/21 with left-sided weakness and was found to have small acute right pontine stroke. Admitted to ARU on 3/9 for rehabilitation and ongoing medical management.      Admission to acute inpatient rehab 21.  Impairment group code: 01.1 L body involvement, R brain stroke; R acute ischemic pontine stroke      1. PT, OT and SLP: 60 minutes of each on a daily basis, in addition to rehab nursing and close management of physiatrist.     2. Impairment of ADL's: Noted to have impairments in strength, coordination, balance, and cognition which are affecting his ability to safely and independently perform ADLs. Goal for mod I for all ADLs and ADL transfers.     3. Impairment of mobility:  Noted to have impairments in strength,  coordination, balance, decreased safety awareness, and L neglect all affecting his ability to safely and independently perform mobility. Goals for mod I with all transfers and household mobility.     4. Impairment of cognition/language/swallow:  Noted to have mildly impaired oral motor function and impaired cognition. Goals to tolerate safest, least restrictive diet and improved cognitive/linguistic skills.     5. Medical Conditions  Acute small right pontine stroke  MRI brain with 1.5 cm acute infarct in right lower mata, MRA head with multifocal age indeterminate vessel occlusion or flow-limiting stenosis. MRA neck with 60% stenosis of left ICA. Outside window for tPA. Echo with EF 45%, mild aortic regurgitation, mild dilation of ascending aorta. EKG/tele with NSR. . Last A1c in chart 5.8% on 7/19/2018.  Noted to have ongoing left hemiparesis and incoordination. Bedside swallow completed by SLP on 3/8, VFSS completed 3/8, noted mild oral and mild pharyngeal dysphagia.   - Continue high dose statin  - Continue PTA Xarelto  - Continue ASA 81 mg daily (restarted 3/6)  - BP management as below  - Encourage smoking cessation  - Regular diet, thin liquids by small sips with chin tuck  - Continue PT, OT, SLP     Paroxysmal atrial fibrillation/flutter  Per chart review, patient admitted 12/31 - 1/3 for frostbite and found to be in afib with RVR with concerns for medication noncompliance.    - Continue PTA Xarelto 20 mg daily     HTN  PTA on losartan, furosemide, spironolactone, metoprolol. Hx poor medication compliance. Initially allowed permissive HTN, but since resumed losartan, furosemide, and spironolactone.  - Continue losartan 100 mg daily  - Continue furosemide  - Continue spironolactone  - Continue metoprolol tartrate 50 mg BID  - Monitor BP and adjust medications as indicated     CAD  Hx MI  CHF with EF 45%  Ischemic cardiomyopathy  Of note, per last hospital admission (discharged 1/3), metoprolol dose was  increased to 100 mg BID at that time; currently 50mg BID, will consider increasing to PTA dose as appropriate.  - Continue ASA 81 mg daily  - Continue furosemide 40 mg daily  - Continue spironolactone 25 mg daily  - Continue metoprolol tartrate 50 mg BID     HLD  Unclear whether patient on statin PTA.  on 3/7/21, high-intensity statin therapy recommended, started atorvastatin.  - Continue atorvastatin 80 mg     Hypomagnesemia  On Mag-Ox PTA. Improved to 1.8 on 3/10  - Continue Mag-Ox 400 mg TID  - Trend BMP q7 days (Mondays)     COPD  Tobacco use disorder  PTA on albuterol inhaler PRN. Also appears to have used Ellipta in the recent past as well.  No evidence of exacerbation during acute hospitalization. Current smoker, 0.5 packs per day, 30 year history.    - Continue albuterol inhaler PRN  - Encourage smoking cessation and provide education      Neuropathic pain  Patient on gabapentin PTA, he is unaware of indication. Appears to have been prescribed by provider at burn clinic, so suspect may be related to recent frostbite.  - Continue gabapentin 300 mg daily    Gouty Flare, L great toe  Given start of Xarelto, will avoid NSAID, gave one time dose of colchicine, then one time dose of naproxen; will continue to monitor.  --In past patient was on Allopurinol      6. Adjustment to disability:  Clinical psychology to eval and treat as indicated  7. FEN: regular diet, thin liquids with strategies per SLP  8. Bowel: continent, monitor for constipation, PRN bowel meds available  9. Bladder: continent, monitor PVRs at admission and ISC as indicated  10. DVT Prophylaxis: Xarelto  11. GI Prophylaxis: regular diet  12. Code: full  13. Disposition: home with family support and home vs. Outpatient therapies  14. ELOS:  3/23/21  15. Rehab prognosis:  good  16. Follow up Appointments on Discharge: PCP, stroke neurology, PM&R    Geremias Arora MD  PGY-3, PM&R

## 2021-03-19 NOTE — PLAN OF CARE
Discharge Planner Post-Acute Rehab PT:      Discharge Plan: home with FWW and OPPT vs HHPT      Precautions: cardiac, fall, L AFO on during transfers and ambulation      Current Status: OK for MOD I in room with cane, AFO.  Bed Mobility: independent sit to/from supine  Transfer: MOD I with AFO, cane.   Gait: Amb with cane and L AFO, MOD I with WW.  Stairs: SBA with single rail.   Balance: Steady in standing while washing hands.     MOYA 45/56 indicating low risk for falls in post-stroke population (3/15/21)     Assessment: Patient tolerates mobility well - improved foot pain today. Demo stable gait both on and off the unit, and demo appropriate balance reactions when needed.      Other Barriers to Discharge (DME, Family Training, etc): Family training, lack of physical support at home, FWW, AFO

## 2021-03-20 ENCOUNTER — APPOINTMENT (OUTPATIENT)
Dept: SPEECH THERAPY | Facility: CLINIC | Age: 61
End: 2021-03-20
Payer: COMMERCIAL

## 2021-03-20 ENCOUNTER — APPOINTMENT (OUTPATIENT)
Dept: PHYSICAL THERAPY | Facility: CLINIC | Age: 61
End: 2021-03-20
Payer: COMMERCIAL

## 2021-03-20 ENCOUNTER — APPOINTMENT (OUTPATIENT)
Dept: OCCUPATIONAL THERAPY | Facility: CLINIC | Age: 61
End: 2021-03-20
Payer: COMMERCIAL

## 2021-03-20 PROCEDURE — 97112 NEUROMUSCULAR REEDUCATION: CPT | Mod: GO | Performed by: OCCUPATIONAL THERAPIST

## 2021-03-20 PROCEDURE — 97750 PHYSICAL PERFORMANCE TEST: CPT | Mod: GP

## 2021-03-20 PROCEDURE — 97130 THER IVNTJ EA ADDL 15 MIN: CPT | Performed by: SPEECH-LANGUAGE PATHOLOGIST

## 2021-03-20 PROCEDURE — 97535 SELF CARE MNGMENT TRAINING: CPT | Mod: GO | Performed by: OCCUPATIONAL THERAPIST

## 2021-03-20 PROCEDURE — 128N000003 HC R&B REHAB

## 2021-03-20 PROCEDURE — 250N000013 HC RX MED GY IP 250 OP 250 PS 637: Performed by: PHYSICAL MEDICINE & REHABILITATION

## 2021-03-20 PROCEDURE — 99232 SBSQ HOSP IP/OBS MODERATE 35: CPT | Performed by: PHYSICAL MEDICINE & REHABILITATION

## 2021-03-20 PROCEDURE — 250N000013 HC RX MED GY IP 250 OP 250 PS 637: Performed by: PHYSICIAN ASSISTANT

## 2021-03-20 PROCEDURE — 97129 THER IVNTJ 1ST 15 MIN: CPT | Performed by: SPEECH-LANGUAGE PATHOLOGIST

## 2021-03-20 PROCEDURE — 97530 THERAPEUTIC ACTIVITIES: CPT | Mod: GP

## 2021-03-20 RX ORDER — FLUTICASONE PROPIONATE 50 MCG
1 SPRAY, SUSPENSION (ML) NASAL DAILY
Status: COMPLETED | OUTPATIENT
Start: 2021-03-20 | End: 2021-03-22

## 2021-03-20 RX ORDER — LORATADINE 10 MG/1
10 TABLET ORAL DAILY
Status: DISCONTINUED | OUTPATIENT
Start: 2021-03-20 | End: 2021-03-23 | Stop reason: HOSPADM

## 2021-03-20 RX ADMIN — METOPROLOL TARTRATE 50 MG: 50 TABLET, FILM COATED ORAL at 21:24

## 2021-03-20 RX ADMIN — ATORVASTATIN CALCIUM 80 MG: 80 TABLET, FILM COATED ORAL at 21:25

## 2021-03-20 RX ADMIN — GABAPENTIN 300 MG: 300 CAPSULE ORAL at 08:53

## 2021-03-20 RX ADMIN — LOSARTAN POTASSIUM 100 MG: 100 TABLET, FILM COATED ORAL at 08:53

## 2021-03-20 RX ADMIN — METOPROLOL TARTRATE 50 MG: 50 TABLET, FILM COATED ORAL at 08:53

## 2021-03-20 RX ADMIN — MAGNESIUM OXIDE TAB 400 MG (241.3 MG ELEMENTAL MG) 400 MG: 400 (241.3 MG) TAB at 08:53

## 2021-03-20 RX ADMIN — ASPIRIN 81 MG CHEWABLE TABLET 81 MG: 81 TABLET CHEWABLE at 08:53

## 2021-03-20 RX ADMIN — MAGNESIUM OXIDE TAB 400 MG (241.3 MG ELEMENTAL MG) 400 MG: 400 (241.3 MG) TAB at 14:25

## 2021-03-20 RX ADMIN — FLUTICASONE PROPIONATE 1 SPRAY: 50 SPRAY, METERED NASAL at 10:58

## 2021-03-20 RX ADMIN — MAGNESIUM OXIDE TAB 400 MG (241.3 MG ELEMENTAL MG) 400 MG: 400 (241.3 MG) TAB at 21:24

## 2021-03-20 RX ADMIN — RIVAROXABAN 20 MG: 10 TABLET, FILM COATED ORAL at 18:07

## 2021-03-20 RX ADMIN — Medication 25 MG: at 08:53

## 2021-03-20 RX ADMIN — LORATADINE 10 MG: 10 TABLET ORAL at 10:58

## 2021-03-20 RX ADMIN — FUROSEMIDE 40 MG: 20 TABLET ORAL at 08:53

## 2021-03-20 ASSESSMENT — MIFFLIN-ST. JEOR: SCORE: 1612.3

## 2021-03-20 NOTE — PROGRESS NOTES
03/20/21 1100   Signing Clinician's Name / Credentials   Signing clinician's name / credentials Evelin Bridges, DPDA   Sorensen Balance Scale (GRIFFIN MCMAHAN, MAI S, BARBIE SR, AYLA MUSTAFA: MEASURING BALANCE IN THE ELDERLY: VALIDATION OF AN INSTRUMENT. CAN. J. PUB. HEALTH, JULY/AUGUST SUPPLEMENT 2:S7-11, 1992.)   Sit To Stand 4   Standing Unsupported 4   Sitting Unsupported 4   Stand to Sit 4   Transfers 4   Standing with Eyes Closed 4   Standing Unsupported, Feet Together 4   Reach Forward With Outstretched Arm 4   Retrieve Object From Floor 4   Turning to Look Behind 4   Turn 360 Degrees 3   Placing Alternate Foot on Stool (4-6 inches) 2   Unsupported Tandem Stand (Demonstrate to Subject) 2   One Leg Stand 2   Total Score (A score of 45 or less has been correlated with an increased risk of falls)   Total Score (out of 56) 49     Sorensen Balance Scale (BBS) Cutoff Scores for CVA Population:  Patient Score: 49/56    The BBS is a measure of static and dynamic standing balance that has been validated in community dwelling elderly individuals and individuals who have Parkinson's Disease, MS, and those who are s/p CVA and TBI. The test is administered without an assistive device. Scores from the Sorensen are used to determine the probability of falling based on the patient's previous history of falls and their test performance.     0-20 High risk for falling- Corresponded with w/c bound status  21-40 Medium risk for falling- Able to walk with assistance  41-56 Low risk for falling- Able to walk independently  According to [facility].  Available at http://www.strokecenter.org/.  Accessibility verified April 10, 2013.  Minimal Detectable Change = 6.5 according to Carlos & Seney 2008    Assessment (rationale for performing, application to patient s function & care plan): Pt improved his BBS score from 45 to 49/56. This is below the MDC for post-stroke recovery, however he is now above the threshold for falls risk in  community-dwelling older adults. Other confounding factors to the results include use of AFO on last test and no AFO on this test. This writer's impression is that pt no longer requires AFO, and pt has made significant progress in his balance and safety since initial MOYA testing.     Minutes billed as physical performance test: 15

## 2021-03-20 NOTE — PLAN OF CARE
FOCUS/GOAL  Medical management    ASSESSMENT, INTERVENTIONS AND CONTINUING PLAN FOR GOAL:  Pt is alert and oriented. He requested/demanded  sandwich for a snack at the start of the shift. Was provided by ALBERTO flyer. No complain of pain and no sob noted. REJI in room. Independent w/ urinal use. Seen sleeping during safety round checks.

## 2021-03-20 NOTE — PROGRESS NOTES
[facility]   Acute Rehabilitation Unit  Daily progress note    INTERVAL HISTORY  No acute overnight events. Seen and examined at bedside. Denies new pain, CP, SOB, nausea, or abdominal pain.  He does have sinus congestion.  Will add claritin as well as nasal spray.  Continues to make excellent progress, goal is for discharge this week.     Functional:  MOD I in room with cane      ROS: 10 point ROS neg other than the symptoms noted above in the HPI.      MEDICATIONS   Current Facility-Administered Medications   Medication     - Medication Assessment Program - Rehab Services     acetaminophen (TYLENOL) tablet 325-650 mg     albuterol (PROAIR HFA/PROVENTIL HFA/VENTOLIN HFA) 108 (90 Base) MCG/ACT inhaler 2 puff     aspirin (ASA) chewable tablet 81 mg     atorvastatin (LIPITOR) tablet 80 mg     fluticasone (FLONASE) 50 MCG/ACT spray 1 spray     furosemide (LASIX) tablet 40 mg     gabapentin (NEURONTIN) capsule 300 mg     influenza recomb quadrivalent PF (FLUBLOK) injection 0.5 mL     loratadine (CLARITIN) tablet 10 mg     losartan (COZAAR) tablet 100 mg     magnesium oxide (MAG-OX) tablet 400 mg     metoprolol tartrate (LOPRESSOR) tablet 50 mg     Patient is already receiving anticoagulation with heparin, enoxaparin (LOVENOX), warfarin (COUMADIN)  or other anticoagulant medication     polyethylene glycol (MIRALAX) Packet 17 g     rivaroxaban ANTICOAGULANT (XARELTO) tablet 20 mg     senna-docusate (SENOKOT-S/PERICOLACE) 8.6-50 MG per tablet 1-2 tablet     spironolactone (ALDACTONE) tablet 25 mg     PHYSICAL EXAM  24 Hour Vital Signs Summary:  Temp: 96.5  F (35.8  C) Temp  Min: 96.5  F (35.8  C)  Max: 97  F (36.1  C)  Resp: 16 Resp  Min: 16  Max: 16  SpO2: 91 % SpO2  Min: 91 %  Max: 100 %  Pulse: 69 Pulse  Min: 60  Max: 69  BP: 133/74 Systolic (24hrs), Av , Min:104 , Max:133   Diastolic (24hrs), Av, Min:56, Max:74    Gen: Pleasant male, NAD, sitting edge of bed  HEENT:  Normocephalic, atraumatic, nasal congestion  Cardio: RRR, no murmur  Pulm: CTAB  Abd: Soft, NT, ND  Ext: No tenderness, no edema  Neuro/MSK: Speech clear, fluent. Moving all extremities antigravity.     LABS  CBC RESULTS:   Recent Labs   Lab 03/15/21  0605   WBC 9.6   RBC 4.69   HGB 13.5   HCT 42.2   MCV 90   MCH 28.8   MCHC 32.0   RDW 15.5*         CMP/BMP/Hepatic Panel:   Recent Labs   Lab 03/15/21  0605      POTASSIUM 4.8   CHLORIDE 104   CO2 30   ANIONGAP 3   GLC 83   BUN 16   CR 0.85   LISA 9.0     Rehabilitation - continue comprehensive acute inpatient rehabilitation program with multidisciplinary approach including therapies, rehab nursing, and physiatry following. See interval history for updates.      ASSESSMENT AND PLAN    Eber Jin is a 60 year old right hand dominant male with PMHx afib on Xarelto, HTN, CAD, PAD, CHF, ischemic cardiomyopathy, MI, COPD, tobacco use, and HLD who presented on 3/5/21 with left-sided weakness and was found to have small acute right pontine stroke. Admitted to ARU on 3/9 for rehabilitation and ongoing medical management.      Admission to acute inpatient rehab 03/09/21.  Impairment group code: 01.1 L body involvement, R brain stroke; R acute ischemic pontine stroke      1. PT, OT and SLP: 60 minutes of each on a daily basis, in addition to rehab nursing and close management of physiatrist.     2. Impairment of ADL's: Noted to have impairments in strength, coordination, balance, and cognition which are affecting his ability to safely and independently perform ADLs. Goal for mod I for all ADLs and ADL transfers.     3. Impairment of mobility:  Noted to have impairments in strength, coordination, balance, decreased safety awareness, and L neglect all affecting his ability to safely and independently perform mobility. Goals for mod I with all transfers and household mobility.     4. Impairment of cognition/language/swallow:  Noted to have mildly impaired  oral motor function and impaired cognition. Goals to tolerate safest, least restrictive diet and improved cognitive/linguistic skills.     5. Medical Conditions  Acute small right pontine stroke  MRI brain with 1.5 cm acute infarct in right lower mata, MRA head with multifocal age indeterminate vessel occlusion or flow-limiting stenosis. MRA neck with 60% stenosis of left ICA. Outside window for tPA. Echo with EF 45%, mild aortic regurgitation, mild dilation of ascending aorta. EKG/tele with NSR. . Last A1c in chart 5.8% on 7/19/2018.  Noted to have ongoing left hemiparesis and incoordination. Bedside swallow completed by SLP on 3/8, VFSS completed 3/8, noted mild oral and mild pharyngeal dysphagia.   - Continue high dose statin  - Continue PTA Xarelto  - Continue ASA 81 mg daily (restarted 3/6)  - BP management as below  - Encourage smoking cessation  - Regular diet, thin liquids by small sips with chin tuck  - Continue PT, OT, SLP     Paroxysmal atrial fibrillation/flutter  Per chart review, patient admitted 12/31 - 1/3 for frostbite and found to be in afib with RVR with concerns for medication noncompliance.    - Continue PTA Xarelto 20 mg daily     HTN  PTA on losartan, furosemide, spironolactone, metoprolol. Hx poor medication compliance. Initially allowed permissive HTN, but since resumed losartan, furosemide, and spironolactone.  - Continue losartan 100 mg daily  - Continue furosemide  - Continue spironolactone  - Continue metoprolol tartrate 50 mg BID  - Monitor BP and adjust medications as indicated     CAD  Hx MI  CHF with EF 45%  Ischemic cardiomyopathy  Of note, per last hospital admission (discharged 1/3), metoprolol dose was increased to 100 mg BID at that time; currently 50mg BID, will consider increasing to PTA dose as appropriate.  - Continue ASA 81 mg daily  - Continue furosemide 40 mg daily  - Continue spironolactone 25 mg daily  - Continue metoprolol tartrate 50 mg BID     HLD  Unclear  whether patient on statin PTA.  on 3/7/21, high-intensity statin therapy recommended, started atorvastatin.  - Continue atorvastatin 80 mg     Hypomagnesemia  On Mag-Ox PTA. Improved to 1.8 on 3/10  - Continue Mag-Ox 400 mg TID  - Trend BMP q7 days (Mondays)     COPD  Tobacco use disorder  PTA on albuterol inhaler PRN. Also appears to have used Ellipta in the recent past as well.  No evidence of exacerbation during acute hospitalization. Current smoker, 0.5 packs per day, 30 year history.    - Continue albuterol inhaler PRN  - Encourage smoking cessation and provide education      Neuropathic pain  Patient on gabapentin PTA, he is unaware of indication. Appears to have been prescribed by provider at burn clinic, so suspect may be related to recent frostbite.  - Continue gabapentin 300 mg daily    Gouty Flare, L great toe - imrpoved  Given start of Xarelto, will avoid NSAID, gave one time dose of colchicine, then one time dose of naproxen; will continue to monitor.  --In past patient was on Allopurinol     Sinus Congestion  -will add Claritin and flonase      6. Adjustment to disability:  Clinical psychology to eval and treat as indicated  7. FEN: regular diet, thin liquids with strategies per SLP  8. Bowel: continent, monitor for constipation, PRN bowel meds available  9. Bladder: continent, monitor PVRs at admission and ISC as indicated  10. DVT Prophylaxis: Xarelto  11. GI Prophylaxis: regular diet  12. Code: full  13. Disposition: home with family support and home vs. Outpatient therapies  14. ELOS:  3/23/21  15. Rehab prognosis:  good  16. Follow up Appointments on Discharge: PCP, stroke neurology, PM&R        Patrick Mays,         I spent a total of 25 minutes face to face and coordinating care of Eber Jin. Over 50% of my time on the unit was spent counseling the patient and /or coordinating care regarding .

## 2021-03-20 NOTE — PROGRESS NOTES
Discharge Planner Post-Acute Rehab OT:      Discharge Plan: Home with HH therapies. Anticipated discharge 3/23/21     Precautions: Falls, L hemiparesis       Current Status:  ADLs: Mod I with functional mobility in room with SEC.  Incorporates LUE into ADLs without cues.    Pt is mod IND with SEC with toileting, FB dressing, and G/H tasks.   IADLs: SBA with light home mgmt tasks. Mod IND with meal preparation tasks with SEC. CGA transporting grocery items with BUE's while ambulating. Pt requires increased time and supervision with bill paying tasks and med mgmt tasks  Vision/Cognition: Mild deficits with recall and problem solving during functional tasks, baseline level unclear.      Assessment:  Pt continues to demonstrate increased IND and safety with IADLs. Pt demonstrated mod IND with stovetop cooking task and mod IND with kitchen mobility. Pt stating not wanting to use SEC in the house. Focused on IADLs without SEC to ensure safety. Continue with OT POC to maximize [place] and safety with tasks.      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 Pt stated that his children live close but support is unclear.   Equipment recommendations: walker, and potentially dressing AE pending progress,  Pt states he has extended bench at home, and raised toilet seat with handles.

## 2021-03-20 NOTE — PLAN OF CARE
Discharge Planner Post-Acute Rehab PT:      Discharge Plan: home with FWW and HHPT (OK to discharge 3/22 instead of 3/23)     Precautions: use cane      Current Status: OK for MOD I in room with cane.   Bed Mobility: independent sit to/from supine  Transfer: MOD I with cane.   Gait: Amb with cane MOD I   Stairs: SBA with single rail.   Balance: Steady in standing while washing hands.     MOYA 45/56 indicating low risk for falls in post-stroke population (3/15/21)  MOYA 49/56 without AFO. No further bracing needed (3/20/21)     Assessment: Patient no longer needs AFO on L ankle. Tolerates mobility without AFO well, MOYA 49/56 without brace. MMT improved from 1/5 to 4/5 since admission in both L plantarflexion and dorsiflexion.     Other Barriers to Discharge (DME, Family Training, etc): Family training, lack of physical support at home, FWW, AFO

## 2021-03-20 NOTE — PLAN OF CARE
FOCUS/GOAL  Bowel management, Bladder management, Pain management, Mobility, and Safety management    ASSESSMENT, INTERVENTIONS AND CONTINUING PLAN FOR GOAL:  Patient is alert and oriented x 4. Denied pain. Able to make his needs known. Mod I using cane on his left side for transfers. Continent of both bladder and bowel using the toilet in the bathroom. On MAP. Was not able to call for his afternoon med since he was napping. Called for his hs meds and picked his meds correctly.

## 2021-03-20 NOTE — PLAN OF CARE
"FOCUS/GOAL  Mobility    ASSESSMENT, INTERVENTIONS AND CONTINUING PLAN FOR GOAL:  8982-4690: New order noted for Mod I in room today; pt utilizing cane for transfers in the room without difficulty reported. Pt c/o feeling \"stuffy\" today, was started on scheduled Claritin and Flonase. Continued on MAP; called appropriately this morning, but bottles were missing from box so unable to complete. Pharmacy notified to replace missing bottles. Will continue with POC.     [id number]: Pt did not call for afternoon/evening or HS meds, forgot during the afternoon and was sleeping when should've called for HS meds. Pt able to appropriately select meds when reminded, told writer that he normally takes all of his pills in the morning at home. Writer suggested he try using an alarm on his cell phone that can remind him of when meds are due. No other concerns noted this evening, will continue with POC.   "

## 2021-03-20 NOTE — PLAN OF CARE
Discharge Planner Post-Acute Rehab SLP:      Discharge Plan: home with home therapies     Precautions: cardiac, fall, L AFO on during transfers and ambulation      Current Status:  Communication: mild dysarthria  Cognition:  Pt demonstrating moderate deficits in immediate and delayed memory, as well as reasoning  Swallow: Regular with thin liquids     Assessment: Completed memory recall task -using categorization to recall a word list- with using this association strategy- pt able to recall 14/16 words. Completed a written problem solving task- numerical reasoning- word problems- pt at 4/6 accuracy with completing independently.     Other Barriers to Discharge (Family Training, etc): none anticipated from speech

## 2021-03-20 NOTE — PLAN OF CARE
Discharge Planner Post-Acute Rehab SLP:      Discharge Plan: home with home therapies     Precautions: cardiac, fall, L AFO on during transfers and ambulation      Current Status:  Communication: mild dysarthria  Cognition:  Pt demonstrating moderate deficits in immediate and delayed memory, as well as reasoning  Swallow: Regular with thin liquids     Assessment: Completed the CLQT (Cognitive Linguistic Quick Test). Patient's overall composite score was 3.8/4.0, which is WNL for his age group. He scored WNL in the areas of attention, memory, language, and visuospatial skills. His score in executive functioning fell in the mildly impaired range. His score in visuospatial skills was borderline mildly impaired. Recommend ongoing Speech Therapy to continue to address impairment in executive functioning.      Other Barriers to Discharge (Family Training, etc): none anticipated from speech

## 2021-03-21 ENCOUNTER — APPOINTMENT (OUTPATIENT)
Dept: OCCUPATIONAL THERAPY | Facility: CLINIC | Age: 61
End: 2021-03-21
Payer: COMMERCIAL

## 2021-03-21 ENCOUNTER — APPOINTMENT (OUTPATIENT)
Dept: PHYSICAL THERAPY | Facility: CLINIC | Age: 61
End: 2021-03-21
Payer: COMMERCIAL

## 2021-03-21 PROCEDURE — 250N000013 HC RX MED GY IP 250 OP 250 PS 637: Performed by: PHYSICIAN ASSISTANT

## 2021-03-21 PROCEDURE — 97112 NEUROMUSCULAR REEDUCATION: CPT | Mod: GO

## 2021-03-21 PROCEDURE — 97530 THERAPEUTIC ACTIVITIES: CPT | Mod: GO | Performed by: OCCUPATIONAL THERAPIST

## 2021-03-21 PROCEDURE — 250N000013 HC RX MED GY IP 250 OP 250 PS 637: Performed by: PHYSICAL MEDICINE & REHABILITATION

## 2021-03-21 PROCEDURE — 97535 SELF CARE MNGMENT TRAINING: CPT | Mod: GO | Performed by: OCCUPATIONAL THERAPIST

## 2021-03-21 PROCEDURE — 128N000003 HC R&B REHAB

## 2021-03-21 PROCEDURE — 97530 THERAPEUTIC ACTIVITIES: CPT | Mod: GP | Performed by: PHYSICAL THERAPIST

## 2021-03-21 PROCEDURE — 97112 NEUROMUSCULAR REEDUCATION: CPT | Mod: GP | Performed by: PHYSICAL THERAPIST

## 2021-03-21 PROCEDURE — 97112 NEUROMUSCULAR REEDUCATION: CPT | Mod: GO | Performed by: OCCUPATIONAL THERAPIST

## 2021-03-21 PROCEDURE — 97116 GAIT TRAINING THERAPY: CPT | Mod: GP | Performed by: PHYSICAL THERAPIST

## 2021-03-21 RX ADMIN — RIVAROXABAN 20 MG: 10 TABLET, FILM COATED ORAL at 18:21

## 2021-03-21 RX ADMIN — ATORVASTATIN CALCIUM 80 MG: 80 TABLET, FILM COATED ORAL at 21:50

## 2021-03-21 RX ADMIN — MAGNESIUM OXIDE TAB 400 MG (241.3 MG ELEMENTAL MG) 400 MG: 400 (241.3 MG) TAB at 07:51

## 2021-03-21 RX ADMIN — MAGNESIUM OXIDE TAB 400 MG (241.3 MG ELEMENTAL MG) 400 MG: 400 (241.3 MG) TAB at 21:50

## 2021-03-21 RX ADMIN — Medication 25 MG: at 07:50

## 2021-03-21 RX ADMIN — MAGNESIUM OXIDE TAB 400 MG (241.3 MG ELEMENTAL MG) 400 MG: 400 (241.3 MG) TAB at 14:50

## 2021-03-21 RX ADMIN — METOPROLOL TARTRATE 50 MG: 50 TABLET, FILM COATED ORAL at 21:50

## 2021-03-21 RX ADMIN — GABAPENTIN 300 MG: 300 CAPSULE ORAL at 07:51

## 2021-03-21 RX ADMIN — FUROSEMIDE 40 MG: 20 TABLET ORAL at 07:51

## 2021-03-21 RX ADMIN — ASPIRIN 81 MG CHEWABLE TABLET 81 MG: 81 TABLET CHEWABLE at 07:51

## 2021-03-21 RX ADMIN — FLUTICASONE PROPIONATE 1 SPRAY: 50 SPRAY, METERED NASAL at 07:52

## 2021-03-21 RX ADMIN — LOSARTAN POTASSIUM 100 MG: 100 TABLET, FILM COATED ORAL at 07:51

## 2021-03-21 RX ADMIN — LORATADINE 10 MG: 10 TABLET ORAL at 07:50

## 2021-03-21 RX ADMIN — METOPROLOL TARTRATE 50 MG: 50 TABLET, FILM COATED ORAL at 07:51

## 2021-03-21 NOTE — PLAN OF CARE
FOCUS/GOAL  Medical management    ASSESSMENT, INTERVENTIONS AND CONTINUING PLAN FOR GOAL:  Pt is alert and oriented. No complaints of pain. Mod I in room with cane. Continent of bladder. Pt was on phone at beginning of shift. Appeared to sleep on and off.

## 2021-03-21 NOTE — PLAN OF CARE
Discharge Planner Post-Acute Rehab PT:      Discharge Plan: home with FWW and HHPT (OK to discharge 3/22 instead of 3/23)     Precautions: use cane      Current Status: OK for MOD I in room with cane.   Bed Mobility: independent sit to/from supine  Transfer: MOD I with cane.   Gait: Amb with cane MOD I   Stairs: Mod I with single rail and SEC.  Does need to demonstrate with railing on L for ascent using SEC in RUE  Balance: Steady in standing while washing hands.     MOYA 45/56 indicating low risk for falls in post-stroke population (3/15/21)  MOYA 49/56 without AFO. No further bracing needed (3/20/21)     Assessment:Pt continues to progress and is on pace for Tuesday 3/23 discharge.  Did well with amb outside using SEC and completing 22 steps in stairwell.  Home environment has 1 flight of stairs with railing on L for ascent and pt would benefit from trailing with PT.  Curb and car Tx not complete in PT today, but has completed previously.     Other Barriers to Discharge (DME, Family Training, etc): Family training, lack of physical support at home, FWW, AFO

## 2021-03-21 NOTE — PLAN OF CARE
FOCUS/GOAL  Medication management    ASSESSMENT, INTERVENTIONS AND CONTINUING PLAN FOR GOAL:  Did not call for am med's. Writer went into room et freshened water et then asked if needed anything else et then asked for med's. Able to pick them out correctly et knows what they are for. With therapy at present. Did not call for med's before therapy. Will remind him to set alarm on phone for reminders.

## 2021-03-21 NOTE — PROGRESS NOTES
Discharge Planner Post-Acute Rehab OT:      Discharge Plan: Home with HH therapies. Anticipated discharge 3/23/21     Precautions: Falls, L hemiparesis       Current Status:  ADLs: Mod I with functional mobility in room with SEC.  Incorporates LUE into ADLs without cues.    Pt is mod IND with SEC with toileting, FB dressing, and G/H tasks.   IADLs: SBA with light home mgmt tasks. Mod IND with meal preparation tasks with SEC. CGA transporting grocery items with BUE's while ambulating. Pt requires increased time and supervision with bill paying tasks and med mgmt tasks  Vision/Cognition: Mild deficits with recall and problem solving during functional tasks, baseline level unclear.      Assessment:  Pt continues to demonstrate increased IND and safety with IADLs. Focused on LUE neuro re-education throughout both sessions for LUE. Re-assessed pt's  strength and FMC skills with dynamometer and 9 hole pegboard test with pt demonstrating increase strength and coordination. 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 Pt stated that his children live close but support is unclear.   Equipment recommendations: walker, and potentially dressing AE pending progress,  Pt states he has extended bench at home, and raised toilet seat with handles.

## 2021-03-22 ENCOUNTER — APPOINTMENT (OUTPATIENT)
Dept: OCCUPATIONAL THERAPY | Facility: CLINIC | Age: 61
End: 2021-03-22
Payer: COMMERCIAL

## 2021-03-22 ENCOUNTER — APPOINTMENT (OUTPATIENT)
Dept: PHYSICAL THERAPY | Facility: CLINIC | Age: 61
End: 2021-03-22
Payer: COMMERCIAL

## 2021-03-22 ENCOUNTER — APPOINTMENT (OUTPATIENT)
Dept: SPEECH THERAPY | Facility: CLINIC | Age: 61
End: 2021-03-22
Payer: COMMERCIAL

## 2021-03-22 LAB
ANION GAP SERPL CALCULATED.3IONS-SCNC: 3 MMOL/L (ref 3–14)
BUN SERPL-MCNC: 18 MG/DL (ref 7–30)
CALCIUM SERPL-MCNC: 9.1 MG/DL (ref 8.5–10.1)
CHLORIDE SERPL-SCNC: 108 MMOL/L (ref 94–109)
CO2 SERPL-SCNC: 28 MMOL/L (ref 20–32)
CREAT SERPL-MCNC: 0.88 MG/DL (ref 0.66–1.25)
ERYTHROCYTE [DISTWIDTH] IN BLOOD BY AUTOMATED COUNT: 15.1 % (ref 10–15)
GFR SERPL CREATININE-BSD FRML MDRD: >90 ML/MIN/{1.73_M2}
GLUCOSE SERPL-MCNC: 83 MG/DL (ref 70–99)
HCT VFR BLD AUTO: 39.8 % (ref 40–53)
HGB BLD-MCNC: 12.5 G/DL (ref 13.3–17.7)
MCH RBC QN AUTO: 28.3 PG (ref 26.5–33)
MCHC RBC AUTO-ENTMCNC: 31.4 G/DL (ref 31.5–36.5)
MCV RBC AUTO: 90 FL (ref 78–100)
PLATELET # BLD AUTO: 293 10E9/L (ref 150–450)
POTASSIUM SERPL-SCNC: 4.7 MMOL/L (ref 3.4–5.3)
RBC # BLD AUTO: 4.41 10E12/L (ref 4.4–5.9)
SODIUM SERPL-SCNC: 139 MMOL/L (ref 133–144)
WBC # BLD AUTO: 8.2 10E9/L (ref 4–11)

## 2021-03-22 PROCEDURE — 250N000013 HC RX MED GY IP 250 OP 250 PS 637: Performed by: PHYSICAL MEDICINE & REHABILITATION

## 2021-03-22 PROCEDURE — 97535 SELF CARE MNGMENT TRAINING: CPT | Mod: GO | Performed by: OCCUPATIONAL THERAPIST

## 2021-03-22 PROCEDURE — 36415 COLL VENOUS BLD VENIPUNCTURE: CPT | Performed by: PHYSICIAN ASSISTANT

## 2021-03-22 PROCEDURE — 250N000013 HC RX MED GY IP 250 OP 250 PS 637: Performed by: PHYSICIAN ASSISTANT

## 2021-03-22 PROCEDURE — 97530 THERAPEUTIC ACTIVITIES: CPT | Mod: GP

## 2021-03-22 PROCEDURE — 97130 THER IVNTJ EA ADDL 15 MIN: CPT

## 2021-03-22 PROCEDURE — 80048 BASIC METABOLIC PNL TOTAL CA: CPT | Performed by: PHYSICIAN ASSISTANT

## 2021-03-22 PROCEDURE — 99232 SBSQ HOSP IP/OBS MODERATE 35: CPT | Performed by: PHYSICAL MEDICINE & REHABILITATION

## 2021-03-22 PROCEDURE — 97129 THER IVNTJ 1ST 15 MIN: CPT

## 2021-03-22 PROCEDURE — 128N000003 HC R&B REHAB

## 2021-03-22 PROCEDURE — 85027 COMPLETE CBC AUTOMATED: CPT | Performed by: PHYSICIAN ASSISTANT

## 2021-03-22 RX ORDER — LOSARTAN POTASSIUM 100 MG/1
100 TABLET ORAL DAILY
Qty: 30 TABLET | Refills: 0 | Status: SHIPPED | OUTPATIENT
Start: 2021-03-22 | End: 2022-03-25

## 2021-03-22 RX ORDER — METOPROLOL TARTRATE 50 MG
50 TABLET ORAL 2 TIMES DAILY
Qty: 60 TABLET | Refills: 0 | Status: SHIPPED | OUTPATIENT
Start: 2021-03-22 | End: 2022-03-25

## 2021-03-22 RX ORDER — ATORVASTATIN CALCIUM 80 MG/1
80 TABLET, FILM COATED ORAL EVERY EVENING
Qty: 30 TABLET | Refills: 0 | Status: SHIPPED | OUTPATIENT
Start: 2021-03-22 | End: 2022-03-25

## 2021-03-22 RX ORDER — ASPIRIN 81 MG/1
81 TABLET, CHEWABLE ORAL DAILY
Qty: 30 TABLET | Refills: 0 | Status: SHIPPED | OUTPATIENT
Start: 2021-03-22 | End: 2021-04-21

## 2021-03-22 RX ORDER — FUROSEMIDE 40 MG
40 TABLET ORAL DAILY
Qty: 30 TABLET | Refills: 0 | Status: SHIPPED | OUTPATIENT
Start: 2021-03-22 | End: 2022-03-25

## 2021-03-22 RX ORDER — SPIRONOLACTONE 25 MG/1
25 TABLET ORAL DAILY
Qty: 30 TABLET | Refills: 0 | Status: SHIPPED | OUTPATIENT
Start: 2021-03-22 | End: 2022-03-25

## 2021-03-22 RX ORDER — MAGNESIUM OXIDE 400 MG/1
400 TABLET ORAL 3 TIMES DAILY
Qty: 90 TABLET | Refills: 0 | Status: SHIPPED | OUTPATIENT
Start: 2021-03-22 | End: 2021-04-21

## 2021-03-22 RX ADMIN — LOSARTAN POTASSIUM 100 MG: 100 TABLET, FILM COATED ORAL at 09:17

## 2021-03-22 RX ADMIN — RIVAROXABAN 20 MG: 10 TABLET, FILM COATED ORAL at 18:05

## 2021-03-22 RX ADMIN — ATORVASTATIN CALCIUM 80 MG: 80 TABLET, FILM COATED ORAL at 20:09

## 2021-03-22 RX ADMIN — MAGNESIUM OXIDE TAB 400 MG (241.3 MG ELEMENTAL MG) 400 MG: 400 (241.3 MG) TAB at 20:09

## 2021-03-22 RX ADMIN — ASPIRIN 81 MG CHEWABLE TABLET 81 MG: 81 TABLET CHEWABLE at 09:16

## 2021-03-22 RX ADMIN — FLUTICASONE PROPIONATE 1 SPRAY: 50 SPRAY, METERED NASAL at 09:20

## 2021-03-22 RX ADMIN — GABAPENTIN 300 MG: 300 CAPSULE ORAL at 09:17

## 2021-03-22 RX ADMIN — FUROSEMIDE 40 MG: 20 TABLET ORAL at 09:17

## 2021-03-22 RX ADMIN — METOPROLOL TARTRATE 50 MG: 50 TABLET, FILM COATED ORAL at 09:16

## 2021-03-22 RX ADMIN — MAGNESIUM OXIDE TAB 400 MG (241.3 MG ELEMENTAL MG) 400 MG: 400 (241.3 MG) TAB at 13:54

## 2021-03-22 RX ADMIN — LORATADINE 10 MG: 10 TABLET ORAL at 09:17

## 2021-03-22 RX ADMIN — Medication 25 MG: at 09:16

## 2021-03-22 RX ADMIN — MAGNESIUM OXIDE TAB 400 MG (241.3 MG ELEMENTAL MG) 400 MG: 400 (241.3 MG) TAB at 09:16

## 2021-03-22 ASSESSMENT — MIFFLIN-ST. JEOR: SCORE: 1634.98

## 2021-03-22 NOTE — PROGRESS NOTES
Occupational Therapy Discharge Summary    Reason for therapy discharge:    Discharged to home with home therapy.    Progress towards therapy goal(s). See goals on Care Plan in [facility] electronic health record for goal details.  Goals partially met.  Barriers to achieving goals:   discharge from facility.    Therapy recommendation(s):    Continued therapy is recommended.  Rationale/Recommendations:  Recommend HC OT services to complete home safety evaluation and increase IND and safety with ADLs/IADLs, further assess cognition. .     Pt is IND with UBD and mod IND with LBD tasks. Pt is mod IND with toileting with SEC. Pt is IND with G/H tasks standing at EOS. Pt requires supervision with shower transfers and on/off of shower chair. Pt is mod IND with kitchen mobility and simple meal prep with SEC. Pt requires SBA without assistive device with light home mgmt tasks. Recommending initial supervision with money mgmt, and med mgmt upon discharge. Recommending assistance with transportation upon discharge. Provided pt with LUE HEP with blue resistive foam and red thera-putty to increase hand strengthening and FMC skills.

## 2021-03-22 NOTE — PLAN OF CARE
"FOCUS/GOAL  Medication management and Mobility    ASSESSMENT, INTERVENTIONS AND CONTINUING PLAN FOR GOAL:  Mod I with cane in room, without difficulty reported. Continent of B/B, pt reported last BM was yesterday. Continued on MAP today, with inconsistency noted. Pt did not call for morning meds, but writer in to check on him around 9am and pt said \"I think I might need my pills\" at that time. Pt did not select the Flonase that was scheduled for this morning, and reported that he needed one furosemide tablet when he was supposed to take two. Pt did ask for 2pm med and was able to select appropriate med from box. Writer discussed MAP performance with charge RN and MARIELA charge RN to communicate to home care RN to assist with initial med setup and oversight. With patient's permission, writer spoke with pt's wife, Dinorah, by phone to update her on plan for medication management at home. Dinorah verbalized understanding, and also told writer that pickup time for discharge tomorrow 3/23 will be between 1 and 1:30. Charge RN updated. Will continue with POC.     Flu shot offered and pt declined.  "

## 2021-03-22 NOTE — PLAN OF CARE
Physical Therapy Discharge Summary    Reason for therapy discharge:    Discharged to home with home therapy.    Progress towards therapy goal(s). See goals on Care Plan in [facility] electronic health record for goal details.  Goals met    Therapy recommendation(s):    Continued therapy is recommended.  Rationale/Recommendations:  Pt is now close to baseline for functional mobility, but continues to have mild gait deviation and uses cane. Recommending home care safety assessment d/t cognition concerns in setting of new mobility deficit. Anticipate quick transition to OP PT to optimize gait, balance, and LLE strengthening. .

## 2021-03-22 NOTE — PLAN OF CARE
Speech Language Therapy Discharge Summary    Reason for therapy discharge:    Discharged to home with outpatient therapy.    Progress towards therapy goal(s). See goals on Care Plan in [facility] electronic health record for goal details.  Goals partially met.  Barriers to achieving goals:   discharge from facility.    Therapy recommendation(s):    Continued therapy is recommended.  Rationale/Recommendations:  cognition, dysarthria.     Pt arrived to facility with swallowing, dysarthria, and cognition deficits. Pt was seen for all targeted areas. Swallowing goals met shortly after arrival but noticing some wet vocal quality still throughout day. Pt was also seen for dysarthria and was using strategies such as speaking loudly and taking in breaths before speaking. When asked to remember these techniques, cognitive deficits impacted recall leading to more targeted therapy sessions of cognition (functional tasks and memory strategies). Form A was given to pt on initial arrival  and form B was administered shortly before discharge. Pt has made progress towards immediate and delayed memory as well as attention when administered RBANS. Pt would benefit from ongoing speech therapy to revisit dysarthria strategies and encourage memory strategies in functional/reaslistic setting.

## 2021-03-22 NOTE — DISCHARGE INSTRUCTIONS
Follow up Appointments    - Primary Care  You are scheduled to see Dr. Nava on Thursday, April 1st at 1:45 PM.    Address          [facility] - [address], [address]                         [place] [address]  Phone             [phone]    - Stroke Neurology  You are scheduled to see Dr. Russ on Wednesday, April 7th at 11:30 AM. Please arrive at 11:15 AM and bring your photo ID and insurance card.    Address  [facility] [address], [address]                          [place] [address]  Phone   [phone]    - PM&R  You are scheduled to see Dr. Riddle on Monday, April 26th at 1:10 PM.    Address  [facility]                          Clinics and Surgery Center, Floor 3                          [address] [place] [address]  Phone   [phone]    -----------------------------------------------------------------------------------------------------------------------------------  Home Health Care:   [facility] PH: [phone] (previously known as: [facility])  Nurse, physical therapy, occupational therapy, speech therapy, home health aide and    -You will get a call after you have discharged from [facility] to schedule the first home care visit. The home health nurse should come out within the first 24-48 hours. If you don't get a call from them within the first 48 hours, please call to follow up (number above). Home care nurse to assist with initial medication set up post discharge.  -----------------------------------------------------------------------------------------------------------------------------------  [employer]   Call [phone] to check on the status of your application   *Your application was mailed in on 03/16/2021, they will contact you when  reviewed.     [employer]  PH: [phone] or PH: [phone]  -----------------------------------------------------------------------------------------------------------------------------------  Disability [employer]ists  Toll Free: [phone]  Direct: [phone]  Fax: [phone]  [email or web address]    Our representatives will explain how Social Security s rules apply to your claim and help you complete all initial paperwork. They will oversee everything that happens throughout your claim and will also appear with you in front of an  if necessary.  All of our Representatives have passed the rigorous Social Security exam which allows them to be directly paid by the Social Security Administration.  -----------------------------------------------------------------------------------------------------------------------------------  [place] [facility] [place] and Association  Additional resources and contact information online   Www.strokemn.org & www.braininjurymn.org  *referral on your behalf completed 03/16/2021, a 'resources facilitator' will be in contact with you 1-2 weeks after your discharge.     Types of services that Stroke/Brain Injury Resource Facilitation offers include:  Emotional support in coping with a  new normal   Finding mental health support and counselors who understand brain injury and stroke  Finding a support group  Finding or re-connecting to rehabilitation services  Finding where and how to get a brain injury assessed  Finding or re-connecting with a primary care physician  Supporting caregivers by connecting them with resources  Education about diagnosis and symptoms -  Is this normal   Helping educate family and loved ones of the survivor s  invisible  symptoms  Figuring out the logistics of returning to work, vocational rehab and understanding ADA rights  If not going back to work, support in finding  meaningful ways to structure your day and exploring volunteer options  Coaching on navigation the federal, [facility] health and disability service system with additional support and conference calls as needed  Help finding appropriate legal support for appealing and navigating Social Security Disability, providing advocacy on the individual s behalf as needed and connecting with cost effective alternatives to  as needed  Supporting parents/students with how to discuss return to school and sports with educators and connecting to a TBI specialist or parent advocate group if needed  Connecting to addiction (alcohol/drug/gambling/smoking) support and treatment services  Support with creative problem solving to life barriers.  *These are just a few examples of common things that Resource Facilitation can help with. They are not limited to what is listed here so if you are curious if they can help, just ask. Call us at [phone] or [phone].    -----------------------------------------------------------------------------------------------------------------------------------  Alcohol Resources:   Substance Use Treatment (Rule 25) Information -     To access chemical dependency treatment you must have an assessment complete or have an updated assessment within 30 days of starting any program.   Information for this to be completed and to secure funding if you have medical assistance or no insurance can be found through your [facility]'s chemical health intake line. Chemical intake lines can be found below.    [facility] RULE 25 INFORMATION -   [place] - [phone]  [place] - [phone]  [place] - [phone]  [place] - [phone]  [place] - [phone]  [place] - [phone]  [place] - [phone]  [facility] [phone]    If you have private insurance contact the customer service number on the back of your insurance card to determine the required steps for accessing services through your insurance  provider.     Once approved for funding you can connect with a facility that does Rule 25 assessment. Treatment locations can be discussed with a Rule 25 , known as a Licensed Drug and Alcohol Counselor (LADC). They will send the completed Rule 25 to the treatment facility of choice.    The following facilities offer Rule 25 assessments -     [facility]  [phone]  [facility] - Outpatient Mental Health and Addiction   [address] [facility] [address] SUDS  [phone]  [address]  [place] [address]   [facility]  [phone]  [address] [facility] [address] [facility]   [phone]  [facility]  [address] [facility] [address]   [facility] [phone]  [address] [facility] [address] [facility]  [phone] - press 1  Multiple clinic locations   [facility]   [phone]  [address] [facility] [address]  [facility] [address] [facility]  [phone]  [address] [facility] [address]   [facility])  [phone]  [address] [facility] [address] [place]  [phone]  [address] [facility] [address]  [place] [address]     If you are intoxicated You may be required to detox at a detox facility before starting treatment.   [facility]- [address] [place] [address]. [place], [place].   [phone]    [facility]: [phone]  [facility]: [phone]  [facility]: [phone]    *All information subject to change by facility.     AA Meetings List Online for the [facility]:  [email or web address]    Mental Health Clinics- [facility]- [phone]  [facility]- [facility]- [address] [facility]- [phone]  [place]- [address] [place] [address] [address] [address]  "[facility] Mental Health Clinics- [phone]  [place]- [address]  [place]- [address] [facility]- [address]  [place]- [address] [facility]  [place]- [phone] [address] [place] [address]  [place] ([place] [phone] [address]  [place]- [phone] [address]   [facility]-   [place]- [phone] [address]  [place]- [phone] [address] [facility], [address]  [place]- [phone] [address] [facility], [address] Walk-In Counseling Services- [phone]  Free counseling offered on a walk-in basis.  [facility]- [address] [facility]  [place] [address], Monday, Wednesday 5-7  [facility]- [address] [place] [address]- Tuesday, Thursday 6-8   [facility]- [phone]  [place]- [phone]- [address]  [place]- [phone]- [address]     [facility] Crisis Phone Numbers    [facility] [phone] [place] [phone]   [place] [phone] [place] [phone]   [facility] [phone] [facility] [phone]     **Please note that due to COVID-19 Outpatient Mental Health and/or Chemical Dependency services are operating via \"tele health\" meaning appointments and assessments/ meetings are being held via Skype, Zoom, Facetime, or other face to face computer meetings. Please check with providers for your appointment which platform they are using so that you have the same technology available.**  -----------------------------------------------------------------------------------------------------------------------------------    "

## 2021-03-22 NOTE — PLAN OF CARE
FOCUS/GOAL  Bladder management, Nutrition/Feeding/Swallowing precautions, Medication management, and Mobility    ASSESSMENT, INTERVENTIONS AND CONTINUING PLAN FOR GOAL:  Pt is alert and oriented x4. Is mod-I in room with cane. Cont of bladder using toilet. Patient is on MAP program. Had not been calling for medications when due, pt stated he did not know how to set alarms on phone for reminders. Writer set alarms on phone and pt called appropriately for all medications the remainder of shift. Will continue MAP for 1 more day to assess. No other concerns at this time.

## 2021-03-22 NOTE — PROGRESS NOTES
Checked in with pt during OT session. Pt stepdtr will transport home tomorrow. No additional needs at this time. Bedside RN planned to connect with pt wife to discuss oversight with med-management. [facility] notified of pt needing med step up at home and additional education from home RN. No additional needs at this time.     ADDENDUM: Per RN request, MARIELA returned Dinorah call. Dinorah expressed appreciation for support during pt ARU stay and talking to pt about his alcohol use, despite pt denying use PTA. MARIELA spoke with pt at bedside, reiterated the importance of sobriety and resources in his AVS. No additional needs at this time.     Genna Thomas, ERIN, [facility]  [facility]   Phone: [phone]  I   Pager: [phone]

## 2021-03-22 NOTE — PLAN OF CARE
FOCUS/GOAL  Medical management    ASSESSMENT, INTERVENTIONS AND CONTINUING PLAN FOR GOAL:  Pt is alert and oriented. No complaints of pain. Mod I in room with cane. Continent of bladder using toilet in the BR. Appeared to be sleeping on rounds.

## 2021-03-22 NOTE — PROGRESS NOTES
[facility]   Acute Rehabilitation Unit  Daily progress note    INTERVAL HISTORY  No events overnight, reports that he is feeling well and no issues with therapy. Eager to get back home and out of the hospital.    No chest pain, N/V/D, abdominal pain, SOB, or other complaints.    Functional Status:  Current Status - PT  OK for MOD I in room with cane.   Bed Mobility: independent sit to/from supine  Transfer: MOD I with cane.   Gait: Amb with cane MOD I   Stairs: Mod I with single rail and SEC.  Does need to demonstrate with railing on L for ascent using SEC in RUE  Balance: Steady in standing while washing hands.     MOYA 45/56 indicating low risk for falls in post-stroke population (3/15/21)  MOYA 49/56 without AFO. No further bracing needed (3/20/21)     Assessment: Pt continues to progress and is on pace for Tuesday 3/23 discharge. Did well with amb outside using SEC and completing 22 steps in stairwell.  Home environment has 1 flight of stairs with railing on L for ascent and pt would benefit from trailing with PT.  Curb and car Tx not complete in PT today, but has completed previously.    ROS: 10 point ROS neg other than the symptoms noted above in the HPI.    MEDICATIONS   Current Facility-Administered Medications   Medication     - Medication Assessment Program - Rehab Services     acetaminophen (TYLENOL) tablet 325-650 mg     albuterol (PROAIR HFA/PROVENTIL HFA/VENTOLIN HFA) 108 (90 Base) MCG/ACT inhaler 2 puff     aspirin (ASA) chewable tablet 81 mg     atorvastatin (LIPITOR) tablet 80 mg     fluticasone (FLONASE) 50 MCG/ACT spray 1 spray     furosemide (LASIX) tablet 40 mg     gabapentin (NEURONTIN) capsule 300 mg     influenza recomb quadrivalent PF (FLUBLOK) injection 0.5 mL     loratadine (CLARITIN) tablet 10 mg     losartan (COZAAR) tablet 100 mg     magnesium oxide (MAG-OX) tablet 400 mg     metoprolol tartrate (LOPRESSOR) tablet 50 mg     Patient is already  receiving anticoagulation with heparin, enoxaparin (LOVENOX), warfarin (COUMADIN)  or other anticoagulant medication     polyethylene glycol (MIRALAX) Packet 17 g     rivaroxaban ANTICOAGULANT (XARELTO) tablet 20 mg     senna-docusate (SENOKOT-S/PERICOLACE) 8.6-50 MG per tablet 1-2 tablet     spironolactone (ALDACTONE) tablet 25 mg     PHYSICAL EXAM  24 Hour Vital Signs Summary:  Temp: (P) 96.2  F (35.7  C) Temp  Min: 95.6  F (35.3  C)  Max: 97.1  F (36.2  C)  Resp: (P) 16 Resp  Min: 16  Max: 16  SpO2: (P) 98 % SpO2  Min: 97 %  Max: 98 %  Pulse: (P) 52 Pulse  Min: 52  Max: 82  BP: (!) (P) 139/96 Systolic (24hrs), Av , Min:108 , Max:139   Diastolic (24hrs), Av, Min:72, Max:96    Gen: Alert, cooperative with exam, NAD, sitting at side of bed during assessment  HEENT: Normocephalic, atraumatic.   Cardio: RRR, no murmur, rub, or S3/S4  Pulm: CTAB, no increased WOB on RA  Abd: Soft, non-distended, non-tender to palpation  Ext: No tenderness or edema.  Neuro/MSK: Dysarthria improving notably; moving all extremities independently with appropriate coordination.    LABS  CBC RESULTS:   Recent Labs   Lab 21  0616   WBC 8.2   RBC 4.41   HGB 12.5*   HCT 39.8*   MCV 90   MCH 28.3   MCHC 31.4*   RDW 15.1*         CMP/BMP/Hepatic Panel:   Recent Labs   Lab 21  0616      POTASSIUM 4.7   CHLORIDE 108   CO2 28   ANIONGAP 3   GLC 83   BUN 18   CR 0.88   LISA 9.1       Rehabilitation - continue comprehensive acute inpatient rehabilitation program with multidisciplinary approach including therapies, rehab nursing, and physiatry following. See interval history for updates.      ASSESSMENT AND PLAN    Eber Jin is a 60 year old right hand dominant male with PMHx afib on Xarelto, HTN, CAD, PAD, CHF, ischemic cardiomyopathy, MI, COPD, tobacco use, and HLD who presented on 3/5/21 with left-sided weakness and was found to have small acute right pontine stroke. Admitted to ARU on 3/9 for  rehabilitation and ongoing medical management.      Admission to acute inpatient rehab 03/09/21.  Impairment group code: 01.1 L body involvement, R brain stroke; R acute ischemic pontine stroke      1. PT, OT and SLP: 60 minutes of each on a daily basis, in addition to rehab nursing and close management of physiatrist.     2. Impairment of ADL's: Noted to have impairments in strength, coordination, balance, and cognition which are affecting his ability to safely and independently perform ADLs. Goal for mod I for all ADLs and ADL transfers.     3. Impairment of mobility:  Noted to have impairments in strength, coordination, balance, decreased safety awareness, and L neglect all affecting his ability to safely and independently perform mobility. Goals for mod I with all transfers and household mobility.     4. Impairment of cognition/language/swallow:  Noted to have mildly impaired oral motor function and impaired cognition. Goals to tolerate safest, least restrictive diet and improved cognitive/linguistic skills.     5. Medical Conditions  Acute small right pontine stroke  MRI brain with 1.5 cm acute infarct in right lower mata, MRA head with multifocal age indeterminate vessel occlusion or flow-limiting stenosis. MRA neck with 60% stenosis of left ICA. Outside window for tPA. Echo with EF 45%, mild aortic regurgitation, mild dilation of ascending aorta. EKG/tele with NSR. . Last A1c in chart 5.8% on 7/19/2018.  Noted to have ongoing left hemiparesis and incoordination. Bedside swallow completed by SLP on 3/8, VFSS completed 3/8, noted mild oral and mild pharyngeal dysphagia.   - Continue high dose statin  - Continue PTA Xarelto  - Continue ASA 81 mg daily (restarted 3/6)  - BP management as below  - Encourage smoking cessation  - Regular diet, thin liquids by small sips with chin tuck  - Continue PT, OT, SLP     Paroxysmal atrial fibrillation/flutter  Per chart review, patient admitted 12/31 - 1/3 for  frostbite and found to be in afib with RVR with concerns for medication noncompliance.    - Continue PTA Xarelto 20 mg daily     HTN  PTA on losartan, furosemide, spironolactone, metoprolol. Hx poor medication compliance. Initially allowed permissive HTN, but since resumed losartan, furosemide, and spironolactone.  - Continue losartan 100 mg daily  - Continue furosemide  - Continue spironolactone  - Continue metoprolol tartrate 50 mg BID  - Monitor BP and adjust medications as indicated     CAD  Hx MI  CHF with EF 45%  Ischemic cardiomyopathy  Of note, per last hospital admission (discharged 1/3), metoprolol dose was increased to 100 mg BID at that time; currently 50mg BID, will consider increasing to PTA dose as appropriate.  - Continue ASA 81 mg daily  - Continue furosemide 40 mg daily  - Continue spironolactone 25 mg daily  - Continue metoprolol tartrate 50 mg BID     HLD  Unclear whether patient on statin PTA.  on 3/7/21, high-intensity statin therapy recommended, started atorvastatin.  - Continue atorvastatin 80 mg     Hypomagnesemia  On Mag-Ox PTA. Improved to 1.8 on 3/10  - Continue Mag-Ox 400 mg TID  - Trend BMP q7 days (Mondays)     COPD  Tobacco use disorder  PTA on albuterol inhaler PRN. Also appears to have used Ellipta in the recent past as well.  No evidence of exacerbation during acute hospitalization. Current smoker, 0.5 packs per day, 30 year history.    - Continue albuterol inhaler PRN  - Encourage smoking cessation and provide education      Neuropathic pain  Patient on gabapentin PTA, he is unaware of indication. Appears to have been prescribed by provider at burn clinic, so suspect may be related to recent frostbite.  - Continue gabapentin 300 mg daily    Gouty Flare, L great toe - imrpoved  Given start of Xarelto, will avoid NSAID, gave one time dose of colchicine, then one time dose of naproxen; will continue to monitor.  --In past patient was on Allopurinol     Sinus Congestion  -will  add Claritin and flonase      6. Adjustment to disability:  Clinical psychology to eval and treat as indicated  7. FEN: regular diet, thin liquids with strategies per SLP  8. Bowel: continent, monitor for constipation, PRN bowel meds available  9. Bladder: continent, monitor PVRs at admission and ISC as indicated  10. DVT Prophylaxis: Xarelto  11. GI Prophylaxis: regular diet  12. Code: full  13. Disposition: home with family support and home vs. Outpatient therapies  14. ELOS:  3/23/21  15. Rehab prognosis:  good  16. Follow up Appointments on Discharge: PCP, stroke neurology, PM&R    Patient was seen and discussed with the Attending Physician, Dr. Mays.    Boston Quick, MS4 - [facility]        Physician Attestation   I, Patrick Mays, was present with the medical/YOVANI student who participated in the service and in the documentation of the note.  I have verified the history and personally performed the physical exam and medical decision making.  I agree with the assessment and plan of care as documented in the note.      I personally reviewed vital signs, medications and labs.    Patient continues to make great progress.  On track for discharge home tomorrow.      Patrick Mays, DO  Date of Service (when I saw the patient): 03/22/21      I spent a total of 25 minutes face to face and coordinating care of Eber Jin. Over 50% of my time on the unit was spent counseling the patient and /or coordinating care regarding recent CVA with L hemiparesis.

## 2021-03-23 ENCOUNTER — RECORDS - HEALTHEAST (OUTPATIENT)
Dept: ADMINISTRATIVE | Facility: OTHER | Age: 61
End: 2021-03-23

## 2021-03-23 ENCOUNTER — PATIENT OUTREACH (OUTPATIENT)
Dept: CARE COORDINATION | Facility: CLINIC | Age: 61
End: 2021-03-23

## 2021-03-23 VITALS
RESPIRATION RATE: 16 BRPM | BODY MASS INDEX: 24.86 KG/M2 | HEIGHT: 71 IN | HEART RATE: 71 BPM | OXYGEN SATURATION: 97 % | TEMPERATURE: 97.2 F | DIASTOLIC BLOOD PRESSURE: 92 MMHG | SYSTOLIC BLOOD PRESSURE: 142 MMHG | WEIGHT: 177.6 LBS

## 2021-03-23 PROCEDURE — 250N000013 HC RX MED GY IP 250 OP 250 PS 637: Performed by: PHYSICIAN ASSISTANT

## 2021-03-23 PROCEDURE — 99239 HOSP IP/OBS DSCHRG MGMT >30: CPT | Performed by: PHYSICAL MEDICINE & REHABILITATION

## 2021-03-23 PROCEDURE — 250N000013 HC RX MED GY IP 250 OP 250 PS 637: Performed by: PHYSICAL MEDICINE & REHABILITATION

## 2021-03-23 RX ADMIN — Medication 25 MG: at 07:47

## 2021-03-23 RX ADMIN — METOPROLOL TARTRATE 50 MG: 50 TABLET, FILM COATED ORAL at 07:47

## 2021-03-23 RX ADMIN — LORATADINE 10 MG: 10 TABLET ORAL at 07:47

## 2021-03-23 RX ADMIN — LOSARTAN POTASSIUM 100 MG: 100 TABLET, FILM COATED ORAL at 07:47

## 2021-03-23 RX ADMIN — GABAPENTIN 300 MG: 300 CAPSULE ORAL at 07:46

## 2021-03-23 RX ADMIN — MAGNESIUM OXIDE TAB 400 MG (241.3 MG ELEMENTAL MG) 400 MG: 400 (241.3 MG) TAB at 07:47

## 2021-03-23 RX ADMIN — ASPIRIN 81 MG CHEWABLE TABLET 81 MG: 81 TABLET CHEWABLE at 07:47

## 2021-03-23 RX ADMIN — FUROSEMIDE 40 MG: 20 TABLET ORAL at 07:46

## 2021-03-23 ASSESSMENT — MIFFLIN-ST. JEOR: SCORE: 1627.72

## 2021-03-23 NOTE — PLAN OF CARE
FOCUS/GOAL  Medication management    ASSESSMENT, INTERVENTIONS AND CONTINUING PLAN FOR GOAL:  Pt was A/O, able to use call light and make needs known to staff. Denies pain, SOB, chest pain nor dizziness. Mod I for transfers. On regular diet, thin liquids, takes medicines whole. Cont of BL, no BM this shift, LBM-3/21/21. On MAP, called on time and identified medications appropriately. VSS. For discharge tomorrow, medicines will be delivered in AM. Will continue with current POC.

## 2021-03-23 NOTE — PROGRESS NOTES
[facility]   Referral received via Care Transitions team for home care services. Patient's service address is in our [facility] service area.  For any questions or concerns regarding home care services please call [phone]. I called and left a message for the patient how to get a hold of home care and nurse would call to set up appointment this later this week.

## 2021-03-23 NOTE — PLAN OF CARE
FOCUS/GOAL  Bowel management, Bladder management, Pain management and Cognition/Memory/Judgment/Problem solving    ASSESSMENT, INTERVENTIONS AND CONTINUING PLAN FOR GOAL:  Pt is alert and oriented x4, slept through the night, no reports or signs of fever, chills, CP, SOB, N/V, abdominal pain, or new weakness/numbness/tingling, continent of bowel and bladder, natanael with cane, no tubes, lines or drains, vs stable, plan to discharge by 1 pm with medications being delivered by pharmacy by that time, no further care concerns at this time continue with POC.

## 2021-03-23 NOTE — DISCHARGE SUMMARY
PM&R DISCHARGE SUMMARY   Patient Name: Eber Jin : 1960 Medical Record: [id number]    Date of Admission: 3/9/2021  Date of Discharge: 3/23/2021  Disposition: Home  Primary Care Physician: Elisabeth Nava  Attending physician: Patrick Mays DO    Allergies   Allergen Reactions     Penicillins Swelling          DISCHARGE DIAGNOSIS:     Rehab diagnosis:  Acute Right Pontine Stroke    Medical Diagnoses:  Paroxysmal atrial fibrillation/flutter  HTN  CAD  Hx MI  CHF with EF 45%  Ischemic cardiomyopathy  HLD  Hypomagnesemia  COPD  Tobacco use disorder  Neuropathic pain  Gouty Flare, L great toe  Sinus Congestion       HISTORY OF PRESENTING ILLNESS (Per H&P):   Eber Jin is a 60 year old right hand dominant male with PMHx of afib on Xarelto, HTN, CAD, PAD, CHF with EF 40%, ischemic cardiomyopathy, MI, COPD, tobacco use, and HLD who presented on 3/5/21 with left-sided weakness. At presentation, he reported onset of blurry vision, imbalance, and left-sided weakness the day prior.  MRI brain showed a 1.5 cm acute infarct in lower right mata.  MRA neck with 60% stenosis of left ICA.  MRA head with multifocal age-indeterminate vessel occlusion or flow-limiting stenoses.  Considered CTA for further evaluation; however deferred given patient out of window for acute intervention and low NIHSS score.  Determined to be outside window for tPA.  Patient on Xarelto PTA for afib, ASA started 3/5.  Echo with EF 45% and wall motion abnormalities consistent with prior MI in right coronary artery or left circumflex arteries.  Noted to have ongoing left hemiparesis, incoordination, mild dysphagia, mild dysarthria.     During acute hospitalization, patient was seen and evaluated by PT and OT, who collectively recommended that patient would benefit from ongoing therapies in the acute inpatient rehabilitation setting.      In review of the therapy notes, patient presents with impaired LLE strength,  impaired balance, impaired coordination, decreased safety awareness, impaired cognition, L neglect, dysphagia, and dysarthria.  He is currently requiring cues for safety and scanning environment.  He also requires standby to contact guard assist for bed mobility, contact guard assist for transfers with cues.  He is ambulating 60' with FWW and contact guard assist with wheelchair follow due to poor navigation and knee buckling.  He is also needing max assist to don socks, min assist for grooming/hygiene, contact guard assist for seated balance, mod assist for standing balance.  Tolerating regular diet and thin liquids with small sips and chin tucks, VFSS completed 3/8.     Upon arrival to the unit, patient reports most notable concern to be left-sided weakness.  He does not feel this has improved since admission.  He denies pain other than mild left shoulder pain with ROM.  He denies headache, dizziness/lightheadedness.       REHAB COURSE:   The patient was evaluated by PT, OT, and SLP while admitted to acute rehab for a total of 3 hours/day. Has made appropriate progress and has met goals established at admission.     Function at discharge:  PT  Bed Mobility: independent sit to/from supine  Transfer: MOD I with cane.   Gait: Amb with cane MOD I   Stairs: Mod I with single rail and SEC. Does need to demonstrate with railing on L for ascent using SEC in RUE  Balance: Steady in standing while washing hands.     MOYA 45/56 indicating low risk for falls in post-stroke population (3/15/21)  MOYA 49/56 without AFO. No further bracing needed (3/20/21)    Recs: Continued therapy is recommended.  Rationale/Recommendations:  Pt is now close to baseline for functional mobility, but continues to have mild gait deviation and uses cane. Recommending home care safety assessment d/t cognition concerns in setting of new mobility deficit. Anticipate quick transition to OP PT to optimize gait, balance, and LLE strengthening.    OT  Pt  is IND with UBD and mod IND with LBD tasks. Pt is mod IND with toileting with SEC. Pt is IND with G/H tasks standing at EOS. Pt requires supervision with shower transfers and on/off of shower chair. Pt is mod IND with kitchen mobility and simple meal prep with SEC. Pt requires SBA without assistive device with light home mgmt tasks. Recommending initial supervision with money mgmt, and med mgmt upon discharge. Recommending assistance with transportation upon discharge. Provided pt with LUE HEP with blue resistive foam and red thera-putty to increase hand strengthening and FMC skills    SLP  Pt arrived to facility with swallowing, dysarthria, and cognition deficits. Pt was seen for all targeted areas. Swallowing goals met shortly after arrival but noticing some wet vocal quality still throughout day. Pt was also seen for dysarthria and was using strategies such as speaking loudly and taking in breaths before speaking. When asked to remember these techniques, cognitive deficits impacted recall leading to more targeted therapy sessions of cognition (functional tasks and memory strategies). Form A was given to pt on initial arrival  and form B was administered shortly before discharge. Pt has made progress towards immediate and delayed memory as well as attention when administered RBANS. Pt would benefit from ongoing speech therapy to revisit dysarthria strategies and encourage memory strategies in functional/reaslistic setting.         MEDICAL COURSE:   Eber gonzalez is a 61 y/o right-hand dominant male with a PMHx of afib on Xarelto, HTN, CAD, pD, CHF with EF of 40%, ischemic cardiomyopathy, MI, COPD, tobacco doris disorder, and HLD admitted to ARU following hospitalization for acute right pontine stroke. At time of admission, pt had noted LLE strength, impaired balance, impaired coordination, decreased safety awareness, impaired cognition, left sided neglect, dysphagia, and dysarthria. Progressed appropriately  through therapy with notable improvements and meeting goals. Course complicated by gouty flare requiring one dose of colchicine, then one dose of naproxen. Flare has now resolved at time of discharge.    Acute Right Pontine Stroke  MRI brain with 1.5 cm acute infarct in right lower mata, MRA head with multifocal age indeterminate vessel occlusion or flow-limiting stenosis. MRA neck with 60% stenosis of left ICA. Outside window for tPA. Echo with EF 45%, mild aortic regurgitation, mild dilation of ascending aorta. EKG/tele with NSR. . Last A1c in chart 5.8% on 7/19/2018.  Noted to have ongoing left hemiparesis and incoordination. Bedside swallow completed by SLP on 3/8, VFSS completed 3/8, noted mild oral and mild pharyngeal dysphagia.   - Continue high dose statin  - Continue PTA Xarelto  - Continue ASA 81 mg daily (restarted 3/6)  - Encourage smoking cessation  - Regular diet, thin liquids by small sips with chin tuck     Paroxysmal atrial fibrillation/flutter  Per chart review, patient admitted 12/31 - 1/3 for frostbite and found to be in afib with RVR with concerns for medication noncompliance.    - Continue PTA Xarelto 20 mg daily     HTN  PTA on losartan, furosemide, spironolactone, metoprolol. Hx poor medication compliance. Initially allowed permissive HTN, but since resumed losartan, furosemide, and spironolactone.  - Continue losartan 100 mg daily  - Continue furosemide  - Continue spironolactone  - Continue metoprolol tartrate 50 mg BID     CAD  Hx MI  CHF with EF 45%  Ischemic cardiomyopathy  Of note, per last hospital admission (discharged 1/3), metoprolol dose was increased to 100 mg BID at that time; currently 50mg BID, will consider increasing to PTA dose as appropriate.  - Continue ASA 81 mg daily  - Continue furosemide 40 mg daily  - Continue spironolactone 25 mg daily  - Continue metoprolol tartrate 50 mg BID     HLD  Unclear whether patient on statin PTA.  on 3/7/21, high-intensity  statin therapy recommended, started atorvastatin.  - Continue atorvastatin 80 mg     Hypomagnesemia  On Mag-Ox PTA. Improved to 1.8 on 3/10  - Continue Mag-Ox 400 mg TID  - Trend BMP q7 days ()     COPD  Tobacco use disorder  PTA on albuterol inhaler PRN. Also appears to have used Ellipta in the recent past as well. No evidence of exacerbation during acute hospitalization. Current smoker, 0.5 packs per day, 30 year history.    - Continue albuterol inhaler PRN  - Encourage smoking cessation and provide education      Neuropathic pain  Patient on gabapentin PTA, he is unaware of indication. Appears to have been prescribed by provider at burn clinic, so suspect may be related to recent frostbite.  - Continue gabapentin 300 mg daily     Gouty Flare, L great toe - improved  Given start of Xarelto, will avoid NSAID, gave one time dose of colchicine, then one time dose of naproxen; will continue to monitor.  - May resume Allopurinol at discharge, but defer to PCP on resuming medication     Sinus Congestion  - Continue Claritin and flonase as needed after discharge       PHYSICAL EXAM AT DISCHARGE   24 Hour Vital Signs Summary:  Temp: 97.2  F (36.2  C) (Temp  Min: 95.9  F (35.5  C)  Max: 97.2  F (36.2  C))  Resp: 16 (Resp  Min: 16  Max: 16)  SpO2: 97 % (SpO2  Min: 97 %  Max: 99 %)  Pulse: 71 (Pulse  Min: 70  Max: 72)  BP: (!) 142/92 Systolic (24hrs), Av , Min:97 , Max:142   Diastolic (24hrs), Av, Min:68, Max:92  General: Alert, oriented, cooperative with exam, NAD  HEENT: Normocephalic, atraumatic, oral mucosa pink and moist  Pulmonary: CTAB, no increased WOB on RA  Cardiovascular: RRR, no murmur, rub, or S3/S4  Abdominal: Soft, non-distended, non-tender; NBS  Extremities: warm, well-perfused, no edema or tenderness.  MSK/neuro: EMOI, PERRL. Dysarthria markedly improved. Moving extremities x4 spontaneously. Coordination improved.   Skin: No visible erythema, rash, or lesions.         RECENT LAB STUDIES      CBC RESULTS:   Recent Labs   Lab 03/22/21  0616   WBC 8.2   RBC 4.41   HGB 12.5*   HCT 39.8*   MCV 90   MCH 28.3   MCHC 31.4*   RDW 15.1*         CMP/BMP/Hepatic Panel:   Recent Labs   Lab 03/22/21  0616      POTASSIUM 4.7   CHLORIDE 108   CO2 28   ANIONGAP 3   GLC 83   BUN 18   CR 0.88   LISA 9.1        RECENT IMAGING STUDIES   None       DISCHARGE INSTRUCTIONS and FOLLOW-UPS     After Care Instructions     Activity      Your activity upon discharge: activity as tolerated and no driving until therapy completed and OT  eval         Diet      Follow this diet upon discharge: Orders Placed This Encounter      Room Service      Snacks/Supplements Adult: Other; Food snack BID: Baked chips and cheese stick at 2 PM, ice cream and apple pie at 8 PM; Between Meals      Combination Diet Regular Diet Adult; Thin Liquids (water, ice chips, juice, milk, gelatin, ice cream, etc) (no straws)             Referrals     Future Labs/Procedures    Home care nursing referral     Comments:    RN extended hours visit. RN to assess vital signs and weight.    Your provider has ordered home care nursing services. If you have not been   contacted within 2 days of your discharge please call the inpatient   department phone number at [phone] .    Home Care OT Referral for Hospital Discharge     Comments:    OT to eval and treat    Your provider has ordered home care - occupational therapy. If you have   not been contacted within 2 days of your discharge please call the   department phone number listed on the top of this document.    Home Care PT Referral for Hospital Discharge     Comments:    PT to eval and treat    Your provider has ordered home care - physical therapy. If you have not   been contacted within 2 days of your discharge please call the department   phone number listed on the top of this document.      Follow-up Appointments     Adult [facility] Follow-up and recommended labs and tests      Follow up  with primary care provider, Elisabeth Nava, within 7 days for   hospital follow- up.  No follow up labs or test are needed.      Appointments on [place] and/or [facility] (with [facility] or [facility]   provider or service). Call [phone] if you haven't heard regarding   these appointments within 7 days of discharge.                DISCHARGE MEDICATIONS      Eber Jin   Home Medication Instructions PANTERA:[id number]    Printed on:03/23/21 1018   Medication Information                      albuterol (PROAIR HFA/PROVENTIL HFA/VENTOLIN HFA) 108 (90 Base) MCG/ACT inhaler  Inhale 2 puffs into the lungs every 6 hours as needed for shortness of breath / dyspnea or wheezing             aspirin (ASA) 81 MG chewable tablet  Take 1 tablet (81 mg) by mouth daily             atorvastatin (LIPITOR) 80 MG tablet  Take 1 tablet (80 mg) by mouth every evening             furosemide (LASIX) 40 MG tablet  Take 1 tablet (40 mg) by mouth daily             gabapentin (NEURONTIN) 300 MG capsule  Take 300 mg by mouth daily             losartan (COZAAR) 100 MG tablet  Take 1 tablet (100 mg) by mouth daily             magnesium oxide (MAG-OX) 400 MG tablet  Take 1 tablet (400 mg) by mouth 3 times daily             metoprolol tartrate (LOPRESSOR) 50 MG tablet  Take 1 tablet (50 mg) by mouth 2 times daily             rivaroxaban ANTICOAGULANT (XARELTO) 20 MG TABS tablet  Take 1 tablet (20 mg) by mouth daily (with dinner)             spironolactone (ALDACTONE) 25 MG tablet  Take 1 tablet (25 mg) by mouth daily               Patient/family counselled and verbalizes good understanding of all discharge instructions.    Patient was seen and discussed with the Attending Physician, Dr. Mays.    Boston Quick, MS4 - [facility]          Physician Attestation   I, Patrick Mays, was present with the medical/YOVANI student who participated in the service and in the documentation of the note.  I have verified the history  and personally performed the physical exam and medical decision making.  I agree with the assessment and plan of care as documented in the note.      I personally reviewed vital signs, medications and labs.    Patient stable for discharge.  Made vast functional gains with left hemiparesis with rehab program.     Patrick Mays,   Date of Service (when I saw the patient): 03/23/21      I spent a total of 35 minutes face to face and coordinating care of Eber Jin. Over 50% of my time on the unit was spent counseling the patient and /or coordinating care regarding recent CVA.

## 2021-03-23 NOTE — PLAN OF CARE
Alert and oriented x4. Continent of bowel and bladder. LBM 3/21. Mod I with cane. Denies pain. Discharge instructions reviewed with patient who voiced understanding. Discharge medications sent with patient. Pt left unit independently and transferred independently into private vehicle.

## 2021-03-24 NOTE — PROGRESS NOTES
[facility]: Post-Discharge Note  SITUATION                                                      Admission:    Admission Date: 03/09/21   Reason for Admission: Acute Right Pontine Stroke  Discharge:   Discharge Date: 03/23/21  Discharge Diagnosis: Acute Right Pontine Stroke    BACKGROUND                                                      Eber Jin is a 60 year old right hand dominant male with PMHx of afib on Xarelto, HTN, CAD, PAD, CHF with EF 40%, ischemic cardiomyopathy, MI, COPD, tobacco use, and HLD who presented on 3/5/21 with left-sided weakness. At presentation, he reported onset of blurry vision, imbalance, and left-sided weakness the day prior.  MRI brain showed a 1.5 cm acute infarct in lower right mata.  MRA neck with 60% stenosis of left ICA.  MRA head with multifocal age-indeterminate vessel occlusion or flow-limiting stenoses.  Considered CTA for further evaluation; however deferred given patient out of window for acute intervention and low NIHSS score.  Determined to be outside window for tPA.  Patient on Xarelto PTA for afib, ASA started 3/5.  Echo with EF 45% and wall motion abnormalities consistent with prior MI in right coronary artery or left circumflex arteries.  Noted to have ongoing left hemiparesis, incoordination, mild dysphagia, mild dysarthria.     During acute hospitalization, patient was seen and evaluated by PT and OT, who collectively recommended that patient would benefit from ongoing therapies in the acute inpatient rehabilitation setting.     ASSESSMENT      Discharge Assessment  Patient reports symptoms are: Improved  Does the patient have all of their medications?: Yes  Does patient know what their new medications are for?: Yes  Does patient have a follow-up appointment scheduled?: Yes  Does patient have any other questions or concerns?: No    Post-op  Did the patient have surgery or a procedure: No  Fever: No  Chills: No  Eating & Drinking: eating and  drinking without complaints/concerns  PO Intake: regular diet  Bowel Function: normal  Urinary Status: voiding without complaint/concerns        PLAN                                                      Outpatient Plan:  Adult [facility] Follow-up and recommended labs and tests      Follow up with primary care provider, Elisabeth Nava, within 7 days for   hospital follow- up.  No follow up labs or test are needed.       Appointments on [place] and/or [facility] (with [facility] or [facility]   provider or service). Call [phone] if you haven't heard regarding   these appointments within 7 days of discharge.     Future Appointments   Date Time Provider Department Center   4/7/2021 11:30 AM Parviz Russ MD [facility]   4/26/2021  1:10 PM Rupa Riddle MD [facility]           [facility]

## 2021-03-29 ENCOUNTER — COMMUNICATION - HEALTHEAST (OUTPATIENT)
Dept: HOME HEALTH SERVICES | Facility: HOME HEALTH | Age: 61
End: 2021-03-29

## 2021-03-30 ENCOUNTER — COMMUNICATION - HEALTHEAST (OUTPATIENT)
Dept: ADMINISTRATIVE | Facility: CLINIC | Age: 61
End: 2021-03-30

## 2021-03-31 ENCOUNTER — COMMUNICATION - HEALTHEAST (OUTPATIENT)
Dept: PHYSICAL THERAPY | Age: 61
End: 2021-03-31

## 2021-04-01 ENCOUNTER — OFFICE VISIT - HEALTHEAST (OUTPATIENT)
Dept: FAMILY MEDICINE | Facility: CLINIC | Age: 61
End: 2021-04-01

## 2021-04-01 ENCOUNTER — COMMUNICATION - HEALTHEAST (OUTPATIENT)
Dept: NURSING | Facility: CLINIC | Age: 61
End: 2021-04-01

## 2021-04-01 DIAGNOSIS — I48.92 PAROXYSMAL ATRIAL FLUTTER (H): ICD-10-CM

## 2021-04-01 DIAGNOSIS — Z91.148 NONCOMPLIANCE WITH MEDICATION REGIMEN: ICD-10-CM

## 2021-04-01 DIAGNOSIS — I50.22 CHRONIC SYSTOLIC HEART FAILURE (H): ICD-10-CM

## 2021-04-01 DIAGNOSIS — Z09 HOSPITAL DISCHARGE FOLLOW-UP: ICD-10-CM

## 2021-04-01 DIAGNOSIS — Z71.6 ENCOUNTER FOR SMOKING CESSATION COUNSELING: ICD-10-CM

## 2021-04-01 DIAGNOSIS — I25.5 ISCHEMIC CARDIOMYOPATHY: ICD-10-CM

## 2021-04-01 DIAGNOSIS — R53.1 LEFT-SIDED WEAKNESS: ICD-10-CM

## 2021-04-01 DIAGNOSIS — I48.91 ATRIAL FIBRILLATION WITH RVR (H): ICD-10-CM

## 2021-04-01 DIAGNOSIS — I10 BENIGN ESSENTIAL HYPERTENSION: ICD-10-CM

## 2021-04-01 DIAGNOSIS — I73.9 PAD (PERIPHERAL ARTERY DISEASE) (H): ICD-10-CM

## 2021-04-01 DIAGNOSIS — Z79.899 POLYPHARMACY: ICD-10-CM

## 2021-04-01 DIAGNOSIS — R79.0 LOW MAGNESIUM LEVEL: ICD-10-CM

## 2021-04-01 DIAGNOSIS — H53.121 TRANSIENT BLINDNESS OF RIGHT EYE: ICD-10-CM

## 2021-04-01 DIAGNOSIS — Z59.9 FINANCIAL PROBLEMS: ICD-10-CM

## 2021-04-01 DIAGNOSIS — J44.9 CHRONIC OBSTRUCTIVE PULMONARY DISEASE, UNSPECIFIED COPD TYPE (H): ICD-10-CM

## 2021-04-01 DIAGNOSIS — I63.50 RIGHT PONTINE STROKE (H): ICD-10-CM

## 2021-04-01 DIAGNOSIS — M79.2 NEUROPATHIC PAIN: ICD-10-CM

## 2021-04-01 SDOH — ECONOMIC STABILITY - INCOME SECURITY: PROBLEM RELATED TO HOUSING AND ECONOMIC CIRCUMSTANCES, UNSPECIFIED: Z59.9

## 2021-04-01 ASSESSMENT — MIFFLIN-ST. JEOR: SCORE: 1627.16

## 2021-04-02 ENCOUNTER — COMMUNICATION - HEALTHEAST (OUTPATIENT)
Dept: ADMINISTRATIVE | Facility: CLINIC | Age: 61
End: 2021-04-02

## 2021-04-02 ENCOUNTER — COMMUNICATION - HEALTHEAST (OUTPATIENT)
Dept: FAMILY MEDICINE | Facility: CLINIC | Age: 61
End: 2021-04-02

## 2021-04-02 DIAGNOSIS — I10 ESSENTIAL HYPERTENSION, BENIGN: ICD-10-CM

## 2021-04-02 DIAGNOSIS — I63.9 CEREBROVASCULAR ACCIDENT (CVA), UNSPECIFIED MECHANISM (H): ICD-10-CM

## 2021-04-19 ENCOUNTER — AMBULATORY - HEALTHEAST (OUTPATIENT)
Dept: NURSING | Facility: CLINIC | Age: 61
End: 2021-04-19

## 2021-05-03 ENCOUNTER — RECORDS - HEALTHEAST (OUTPATIENT)
Dept: ADMINISTRATIVE | Facility: OTHER | Age: 61
End: 2021-05-03

## 2021-05-10 ENCOUNTER — AMBULATORY - HEALTHEAST (OUTPATIENT)
Dept: NURSING | Facility: CLINIC | Age: 61
End: 2021-05-10

## 2021-05-13 ENCOUNTER — OFFICE VISIT - HEALTHEAST (OUTPATIENT)
Dept: FAMILY MEDICINE | Facility: CLINIC | Age: 61
End: 2021-05-13

## 2021-05-13 DIAGNOSIS — Z11.4 ENCOUNTER FOR SCREENING FOR HIV: ICD-10-CM

## 2021-05-13 DIAGNOSIS — I10 ESSENTIAL HYPERTENSION, BENIGN: ICD-10-CM

## 2021-05-13 DIAGNOSIS — Z00.00 ANNUAL PHYSICAL EXAM: ICD-10-CM

## 2021-05-13 DIAGNOSIS — Z91.89 ENCOUNTER FOR HCV SCREENING TEST FOR HIGH RISK PATIENT: ICD-10-CM

## 2021-05-13 DIAGNOSIS — I25.5 ISCHEMIC CARDIOMYOPATHY: ICD-10-CM

## 2021-05-13 DIAGNOSIS — R79.0 LOW MAGNESIUM LEVEL: ICD-10-CM

## 2021-05-13 DIAGNOSIS — Z12.5 SCREENING FOR PROSTATE CANCER: ICD-10-CM

## 2021-05-13 DIAGNOSIS — I63.9 CEREBROVASCULAR ACCIDENT (CVA), UNSPECIFIED MECHANISM (H): ICD-10-CM

## 2021-05-13 DIAGNOSIS — I48.0 PAROXYSMAL ATRIAL FIBRILLATION (H): ICD-10-CM

## 2021-05-13 DIAGNOSIS — Z12.11 SCREEN FOR COLON CANCER: ICD-10-CM

## 2021-05-13 DIAGNOSIS — Z11.59 ENCOUNTER FOR HCV SCREENING TEST FOR HIGH RISK PATIENT: ICD-10-CM

## 2021-05-13 LAB
HIV 1+2 AB+HIV1 P24 AG SERPL QL IA: NEGATIVE
MAGNESIUM SERPL-MCNC: 1.7 MG/DL (ref 1.8–2.6)
PSA SERPL-MCNC: 8.5 NG/ML (ref 0–4.5)

## 2021-05-13 ASSESSMENT — MIFFLIN-ST. JEOR: SCORE: 1648.03

## 2021-05-14 LAB — HCV AB SERPL QL IA: NEGATIVE

## 2021-05-20 ENCOUNTER — AMBULATORY - HEALTHEAST (OUTPATIENT)
Dept: FAMILY MEDICINE | Facility: CLINIC | Age: 61
End: 2021-05-20

## 2021-05-20 ENCOUNTER — COMMUNICATION - HEALTHEAST (OUTPATIENT)
Dept: FAMILY MEDICINE | Facility: CLINIC | Age: 61
End: 2021-05-20

## 2021-05-20 DIAGNOSIS — R79.0 LOW MAGNESIUM LEVEL: ICD-10-CM

## 2021-05-20 DIAGNOSIS — R97.20 ELEVATED PROSTATE SPECIFIC ANTIGEN (PSA): ICD-10-CM

## 2021-05-21 ENCOUNTER — COMMUNICATION - HEALTHEAST (OUTPATIENT)
Dept: ADMINISTRATIVE | Facility: CLINIC | Age: 61
End: 2021-05-21

## 2021-05-21 ENCOUNTER — AMBULATORY - HEALTHEAST (OUTPATIENT)
Dept: FAMILY MEDICINE | Facility: CLINIC | Age: 61
End: 2021-05-21

## 2021-05-21 DIAGNOSIS — M10.9 ACUTE GOUT, UNSPECIFIED CAUSE, UNSPECIFIED SITE: ICD-10-CM

## 2021-05-26 ENCOUNTER — RECORDS - HEALTHEAST (OUTPATIENT)
Dept: ADMINISTRATIVE | Facility: OTHER | Age: 61
End: 2021-05-26

## 2021-05-27 VITALS
HEART RATE: 88 BPM | BODY MASS INDEX: 26.37 KG/M2 | OXYGEN SATURATION: 97 % | WEIGHT: 184.2 LBS | HEIGHT: 70 IN | DIASTOLIC BLOOD PRESSURE: 84 MMHG | SYSTOLIC BLOOD PRESSURE: 120 MMHG | TEMPERATURE: 98.8 F

## 2021-05-28 NOTE — PROGRESS NOTES
HPI - 57 yo male here for hospital f/u.       Admitted to [facility] from 5/10/19 - 5/14/19 for shortness of breath/PVD  Per chart review:   Peripheral vascular disease (H)    Ischemic cardiomyopathy - EF 40%    Tobacco abuse    Essential hypertension    Acute on chronic systolic congestive heart failure (H)    Paroxysmal atrial fibrillation (H)    Atrial flutter (H) - started on eliquis and advised to f/u with cardiology 2-4 weeks  pulm HTN  COPD  abnl chest CT : 5mm lung nodule and left periaortic LN indeterminate and needs repeat in 3 months    Medication nonadherence and not able to refill: Get our  see the patient and work with him to see how we can help him with that. - not on meds for months prior to admission  Needs insurance to get refills on new meds    Insurance problems - financial resource guide in hospital took information to help apply for insurance but he did not get more info about that -    Polypharmacy -   Only has 4  meds that were started in hospital and given 1 month supply and none of the chronic meds including inhaler or other meds    He quitting alcohol 3 months ago but still smoking    Current Outpatient Medications   Medication Sig Note     albuterol (PROAIR HFA;PROVENTIL HFA;VENTOLIN HFA) 90 mcg/actuation inhaler Inhale 2 puffs every 6 (six) hours as needed for wheezing. 5/10/2019: Has not taken for several months d/t no insurance.     allopurinol (ZYLOPRIM) 100 MG tablet Take 100 mg by mouth daily. 5/10/2019: Has not taken for several months d/t no insurance.     apixaban (ELIQUIS) 5 mg Tab tablet Take 1 tablet (5 mg total) by mouth 2 (two) times a day.      aspirin 81 MG EC tablet Take 1 tablet (81 mg total) by mouth daily.      atorvastatin (LIPITOR) 80 MG tablet Take 1 tablet (80 mg total) by mouth at bedtime. 5/10/2019: Has not taken for several months d/t no insurance.     furosemide (LASIX) 40 MG tablet Take 1 tablet (40 mg total) by mouth 2 (two) times a day at 9am and  "6pm.      inhalational spacing device Spcr Use with inhaler every 6 hours as needed for wheezing, coughing or shortness of breath.      losartan (COZAAR) 100 MG tablet Take 1 tablet (100 mg total) by mouth daily. 5/10/2019: Has not taken for several months d/t no insurance.     metoprolol tartrate (LOPRESSOR) 50 MG tablet Take 1 tablet (50 mg total) by mouth 3 (three) times a day.      spironolactone (ALDACTONE) 25 MG tablet Take 0.5 tablets (12.5 mg total) by mouth daily.      Vitals:    05/16/19 1452   BP: 126/84   Pulse: 78   Resp: 18   Temp: 98.6  F (37  C)   TempSrc: Oral   SpO2: 97%   Weight: 171 lb (77.6 kg)   Height: 5' 10.59\" (1.793 m)     OBJECTIVE:  Vitals listed above within normal limits  General appearance: well groomed, pleasant, well hydrated, nontoxic appearing  ENT: PERRL, throat clear  Neck: neck supple, no lymphadenopathy, no thyromegaly  Lungs: lungs clear to auscultation bilaterally, no wheezes or rhonchi  Heart: regular rate and rhythm, no murmurs, rubs or gallops  Abdomen: soft, nontender  Neuro: no focal deficits, CN II-XII grossly intact, alert and oriented  Psych:  mood stable, appears to have good insight and judgment    A/P   Hospital discharge follow-up -   Per D/c summary - advised to f/u with cardiology 2-4 weeks  Ischemic cardiomyopathy - EF 40%  Acute on chronic systolic congestive heart failure (H)  Typical atrial flutter (H) - started on eliquis and   Paroxysmal atrial fibrillation (H)  Essential hypertension  - Ambulatory referral to Care Management (Primary Care)  - Ambulatory referral to Cardiology - needs cardiology f/u in 4 weeks    Insurance coverage problems  Financial difficulties  Noncompliance with medication regimen  - Ambulatory referral to Care Management (Primary Care)  He needs insurance in order to get his medications.     Lung nodule  abnl chest CT : 5mm lung nodule and left periaortic LN indeterminate and needs repeat in 3 months    Polypharmacy -   He is out of " all meds except the few that he was discharged with.   Referral for assistance with insurance, medications, and care navigation    H/o alcohol abuse   Reports quitting 3 months ago    Tobacco abuse  Not ready to discuss quitting     Screen for colon cancer  - Occult Blood(ICT)

## 2021-05-28 NOTE — TELEPHONE ENCOUNTER
Who is calling:  Patient  Reason for Call:  Patient is wondering if they have ever been tested for blood typing. Patient is wondering what blood type they are if they have ever had this done before. Please call the patient back regarding this question, thank you.  Date of last appointment with primary care: 7/19/18  Okay to leave a detailed message: Yes

## 2021-05-28 NOTE — TELEPHONE ENCOUNTER
Let him know that if wife is getting a work up for possible transplant, the transplant center can test any potential donors to see if they are a match.       Dr. Elisabeth Nava  5/6/2019

## 2021-05-28 NOTE — PROGRESS NOTES
Hospital discharge follow up call to pt, no answer.  Left VM message reminding patient of their upcoming appt 5/15/19 w/Dr. Nava at 2:20pm.  RN will attempt call back at another time.    Pecae Russell RN Care Manager, [facility]

## 2021-05-28 NOTE — TELEPHONE ENCOUNTER
Please let him know that the only time we check blood type is if someone might need a blood transfusion. We have never checked it in clinic or a [facility] hospital.     Is there a reason he is asking?       Dr. Elisabeth Nava  5/2/2019

## 2021-05-28 NOTE — PROGRESS NOTES
Second attempt to reach patient for hospital discharge follow up, no answer.  Left VM message reminding pt of their appt today w/Dr. Nava at 2:20pm.  RN has made two unsuccessful attempts to reach patient.  Encounter closed.    Peace Russell RN Care Manager, Population Health

## 2021-05-28 NOTE — TELEPHONE ENCOUNTER
"Caller notified per provider note below. The patient states that writer must forward a message to the provider stating, \"I am completely out of medications and my insurance has collapsed.\" The patient assures writer that the provider will understand the message.     Forwarding to PCP per patient request and will follow up if directed by provider.   "

## 2021-05-28 NOTE — PROGRESS NOTES
Care Guide discussed enrolling in [facility] with Eber and they report they are interested in scheduling a CCC RN Assessment.      CCC RN Assessment scheduled for Wednesday 5/22 at 11am.      Eber completed insurance paperwork while in the hospital. He was previously dismissed from [facility] due to not engaging with CCC staff including the HALIE and SW.        Next Outreach: Care Guide will call patient within 2 weeks of receiving the completed CCC RN Assessment from the CCC RN

## 2021-05-28 NOTE — TELEPHONE ENCOUNTER
New Appointment Needed  What is the reason for the visit:  INF - [facility] - 5/10 thru 5/14 - shortness of breath - f/u within 3 to 5 days  Inpatient/ED Follow Up Appt Request  At what hospital or facility were you seen?: [facility]  What is the reason you were seen?: shortness of breath with CHF  What date were you admitted?: date: 5/10  What date were you discharged?: date: 5/14  What was the recommended timeframe for your follow up appointment?: 3 to 5 days  Provider Preference: PCP only  How soon do you need to be seen?: 3 to 5 days  Waitlist offered?: No  Okay to leave a detailed message:  Yes

## 2021-05-28 NOTE — TELEPHONE ENCOUNTER
CMT left message x 2. Please review message thread below and advise the patient as indicated. Please schedule if necessary or indicated in message thread.

## 2021-05-28 NOTE — TELEPHONE ENCOUNTER
Received MTM referral from transition of care     Patient was not reachable after several attempts, will route to MTM Pharmacist/Provider as an FYI. Left MTM scheduling information on patients voicemail.    Thank you for the referral,    Shyann Landon, MTM Coordinator

## 2021-05-28 NOTE — TELEPHONE ENCOUNTER
"Patient Returning Call  Reason for call:  The patient is returning the call.   Information relayed to patient:  Below message relayed to the patient. The patient was asking because his wife \"needs a kidney donation\".   Patient has additional questions:  No  If YES, what are your questions/concerns:  NA  Okay to leave a detailed message?: Yes  "

## 2021-05-29 NOTE — TELEPHONE ENCOUNTER
Upcoming Appointment Question  When is the appointment: Tomorrow  What is your appointment for?: RN Assessment   Who is your appointment scheduled with?: [facility] CCC RN   What is your question/concern?: Patient called to cancel currently scheduled appointment and would like to reschedule for next week.  Patient was looking at mornings on Monday or Tuesday   Okay to leave a detailed message?: Yes

## 2021-05-29 NOTE — PROGRESS NOTES
Two Attempts were made to reach the patient to reschedule his [facility] RN Assessment on 5/21 and 5/22; See Telephone Encounter for these two attempts    Attempt 3: CHW called patient and left a detailed message requesting a call back to reschedule his RN Assessment for enrollment into [facility].  If this patient is returning my call, please transfer to Amanda Doyle at ext [phone].      Patient will be removed from the [facility] WQ at this time with no further follow up from [facility] to reschedule his [facility] RN Assessment. Patient has CHW phone number on  and can call anytime he would like to re-schedule this assessment.

## 2021-05-29 NOTE — TELEPHONE ENCOUNTER
CHW left a detailed message for Eber regarding him calling [facility] [employer]. CHW left the phone number for [facility] [employer] on the  ([phone]) in case he still needs to contact them with his insurance info and discuss his outstanding balance. CHW requested Eber call the CHW back if he decides he would like to have an appointment with the [facility] SW about any concerns he has.    At this time CHW will cancel Eber's 10am CCC SW appointment at [facility] today and remove his CCC referral from the W. Eber will need to reach out to the clinic or the CHW if he would like support in the future.

## 2021-05-29 NOTE — PROGRESS NOTES
Eber transferred to [facility] by [facility] CHW.      Eber reports he wants to meet with the [facility] SW about his insurance concerns. CHW assisted Eber with scheduling the SW Assessment.    [facility] SW Assessment Wednesday 6/5 at 10am    Eber may need a CCC RN appointment as well in the future due to medication concerns but at this time the [facility] SW was available first and patient needs an assessment before he can work with the FRG around his health insurance concerns.    Next Outreach: TBD once assessment has been completed

## 2021-05-29 NOTE — TELEPHONE ENCOUNTER
3 attempts made to reach Eber to reschedule his RN Assessment.    Patient removed from [facility] WQ.

## 2021-05-29 NOTE — PROGRESS NOTES
CHW left a detailed message for Eber reminding him about his CCC SW appointment that is scheduled for tomorrow at 10am. CHW provided a call back number for Eber to reach the CHW if he needs to reschedule.      Next Outreach: TBD once assessment has been completed

## 2021-05-29 NOTE — TELEPHONE ENCOUNTER
I received a call from patient that he has received his medicare card and no longer needs assistance with applying for insurance or billing issues.     Dr. Elisabeth Nava  6/4/2019

## 2021-05-29 NOTE — TELEPHONE ENCOUNTER
Attempt 2, CHW left message for Eber and requested a call back at the provided phone number. CHW will cancel today's appointment with the CCC RN.

## 2021-05-30 VITALS — HEIGHT: 71 IN | WEIGHT: 173.25 LBS | BODY MASS INDEX: 24.25 KG/M2

## 2021-05-30 NOTE — PROGRESS NOTES
Programs Applying For: Insurance concerns  County:  Case #:  [facility] Worker:   Aaron #:   PMI #:   Tracking:   Date Applied:     Outreach:  7/17/2019: FRG called patient to let him know he will need to reapply for health insurance to get on MA while he is waiting for his Medicare. Patient wants to wait the 2 weeks for his Medicare instead. No further FRG needs, please send new referral if patient would like to pursue MA.   7/16/2019: FRG called [facility] and spoke with Rodriguez. Patient has not had health insurance since 2017 and will need to reapply. FRG called patient and left VM. FRG will call patient in 1 week.  7/16/2019: FRG called patient to discuss health insurance. Patient does not have active health insurance but states he will in August. FRG received his MA ID. FRG called Aaron but MNsure was closed. FRG will make outreach call later today..    Health Insurance Information:       Referral:   --------- FRG ONLY REFERRAL ----------     Hi,     This patient was previously disenrolled from [facility] for not answering his phone and no showing many appointments. It looks like the doctor would like us to try again and connect with him about his health insurance concerns.       Thank you,     Amanda

## 2021-05-30 NOTE — TELEPHONE ENCOUNTER
Medicare benefit office gave the patient an incorrect date to pay his co-pay. He paid on the wrong date which was 06/30/2019. Should have paid on 06/28/2019. Due to this, the patient will have to wait until 08/01/2019 for his insurance to go into effect.     Insurance told the patient that he could appeal this, he called Appeals. Patient informed that appeal could take up to 90 days. Patient declined appeal due to the fact that it would take 3 months.     Patient states that he has medications he needs. Patient usually fills at [facility]. Patient's Eliquis costs $563. Patient cannot afford to pay out of pocket for any medications/treatment.     [facility] notified patient that if insurance is not active until August 1, 2019 and he cannot pay out of pocket, there may not be much recourse in this case. [facility] asked patient if he would be okay with covering provider reviewing-patient agrees.     Can MD recommend any actions for patient without insurance/abilit to pay out-of-pocket? [facility] can give patient number for [employer] Prescription Assistance Program ([phone]) if MD would like. Thank you.

## 2021-05-30 NOTE — TELEPHONE ENCOUNTER
He had been referred to the [place] Assistance Program to help with his meds b/c that is likely the person who gave him the form for the Eliquis and may have more info about whatever paperwork is needed.  He can contact the [place] [facility] to get help with what he needs.    Dr. Elisabeth Nava  7/18/2019

## 2021-05-30 NOTE — TELEPHONE ENCOUNTER
Please scheduled with financial counselor about affording medications.  See below messages. Thanks.

## 2021-05-30 NOTE — TELEPHONE ENCOUNTER
KASIA left detailed message for Eber with [place] Pres[facility] Assistance phone number, asked patient to call and leave message if necessary.     Will forward to PCP for review upon return.

## 2021-05-30 NOTE — TELEPHONE ENCOUNTER
Patient notified per MD note below. The patient verbalizes understanding of provider/CSS instructions for follow-up and continued care per provider message.

## 2021-05-30 NOTE — TELEPHONE ENCOUNTER
Please give patient prescription assistance program number.  I think this can wait for PCP. Will return Monday.    Dr. Jabier Bass  7/12/2019 2:23 PM

## 2021-05-30 NOTE — TELEPHONE ENCOUNTER
Patient Returning Call  Reason for call:  Patient calling back   Information relayed to patient:  there are several open encounters , I did advise him to make appointment- he wants to speak to clinic care coordinator.    Please call patient and advise exactly what is needed,   (he has no insurance)   Patient has additional questions:  Yes  If YES, what are your questions/concerns:  Please call   Okay to leave a detailed message?:  Yes

## 2021-05-30 NOTE — TELEPHONE ENCOUNTER
Who is calling:  Patient   Reason for Call:  He states the county messed up on his insurance paperwork and he has now been out of all his medications for over a week. He is asking if the clinic could assist him with this.  Date of last appointment with primary care: 5/16/2019  Okay to leave a detailed message: Yes

## 2021-05-30 NOTE — PROGRESS NOTES
Clinic Care Coordination Contact    Situation: Patient chart reviewed by care coordinator.    Background: History of no shows/unable to reach; unable to afford medications; financial problems    Assessment: N/A    Plan/Recommendations:     1.) Okay for FRG only referral

## 2021-05-30 NOTE — TELEPHONE ENCOUNTER
Please schedule with financial counselor about affording medications.  See below messages.  Thanks.

## 2021-05-30 NOTE — TELEPHONE ENCOUNTER
Who is calling:  Patient   Reason for Call:  Patient was given a form to send in for a free 3 month supply of the Eliquis.  Patient contacted number on form and was told that patient should have also been given a brochure when given the form.  Please contact patient and advise where to get the brochure.   Date of last appointment with primary care: 5/16/19  Okay to leave a detailed message: Yes

## 2021-05-30 NOTE — TELEPHONE ENCOUNTER
There is a referral in [facility] Pool and one of us will contact patient to see financial person.

## 2021-05-30 NOTE — TELEPHONE ENCOUNTER
The [place] Assistance Program is his best option.     We have no samples or ways to expedite that refill.   Does he wan to talk with a financial counselor to see if there is a way to expedite the appeal?     Dr. Elisabeth Nava  7/15/2019

## 2021-05-31 VITALS — WEIGHT: 173.2 LBS | BODY MASS INDEX: 24.25 KG/M2 | HEIGHT: 71 IN

## 2021-05-31 VITALS — WEIGHT: 171 LBS | BODY MASS INDEX: 23.94 KG/M2 | HEIGHT: 71 IN

## 2021-05-31 VITALS — BODY MASS INDEX: 23.52 KG/M2 | WEIGHT: 168 LBS | HEIGHT: 71 IN

## 2021-05-31 VITALS — BODY MASS INDEX: 24.09 KG/M2 | WEIGHT: 172.1 LBS | HEIGHT: 71 IN

## 2021-05-31 VITALS — WEIGHT: 174 LBS | HEIGHT: 71 IN | BODY MASS INDEX: 24.36 KG/M2

## 2021-05-31 NOTE — TELEPHONE ENCOUNTER
RN cannot approve Refill Request    RN can NOT refill this medication med is not covered by policy/route to provider.       Joslyn Castellanos, Care Connection Triage/Med Refill 8/1/2019    Requested Prescriptions   Pending Prescriptions Disp Refills     apixaban (ELIQUIS) 5 mg Tab tablet 60 tablet 0     Sig: Take 1 tablet (5 mg total) by mouth 2 (two) times a day.       Apixaban/Rivaroxaban/Dabigatran Refill Protocol Failed - 8/1/2019  3:55 PM        Failed - Route to appropriate pool/provider     Last Anticoagulation Summary:           Passed - Renal function test in last year     Creatinine   Date Value Ref Range Status   05/14/2019 0.99 0.70 - 1.30 mg/dL Final             Passed - PCP or prescribing provider visit in past 12 months       Last office visit with prescriber/PCP: 5/16/2019 Elisabeth Nava MD OR same dept: 5/16/2019 Elisabeth Nava MD OR same specialty: 5/16/2019 Elisabeth Nava MD  Last physical: Visit date not found Last MTM visit: Visit date not found   Next visit within 3 mo: Visit date not found  Next physical within 3 mo: Visit date not found  Prescriber OR PCP: Elisabeth Nava MD  Last diagnosis associated with med order: 1. Acute on chronic congestive heart failure, unspecified heart failure type (H)  - apixaban (ELIQUIS) 5 mg Tab tablet; Take 1 tablet (5 mg total) by mouth 2 (two) times a day.  Dispense: 60 tablet; Refill: 0  - furosemide (LASIX) 40 MG tablet; Take 1 tablet (40 mg total) by mouth 2 (two) times a day at 9am and 6pm.  Dispense: 180 tablet; Refill: 2  - metoprolol tartrate (LOPRESSOR) 50 MG tablet; Take 1 tablet (50 mg total) by mouth 3 (three) times a day.  Dispense: 270 tablet; Refill: 2  - spironolactone (ALDACTONE) 25 MG tablet; Take 0.5 tablets (12.5 mg total) by mouth daily.  Dispense: 45 tablet; Refill: 2    If protocol passes may refill for 12 months if within 3 months of last provider visit (or a total of 15 months).           Signed Prescriptions Disp  Refills    furosemide (LASIX) 40 MG tablet 180 tablet 2     Sig: Take 1 tablet (40 mg total) by mouth 2 (two) times a day at 9am and 6pm.       Diuretics/Combination Diuretics Refill Protocol  Passed - 8/1/2019  3:55 PM        Passed - Visit with PCP or prescribing provider visit in past 12 months     Last office visit with prescriber/PCP: 5/16/2019 Elisabeth Nava MD OR same dept: 5/16/2019 Elisabeth Nava MD OR same specialty: 5/16/2019 Elisabeth Nava MD  Last physical: Visit date not found Last MTM visit: Visit date not found   Next visit within 3 mo: Visit date not found  Next physical within 3 mo: Visit date not found  Prescriber OR PCP: Elisabeth Nava MD  Last diagnosis associated with med order: 1. Acute on chronic congestive heart failure, unspecified heart failure type (H)  - apixaban (ELIQUIS) 5 mg Tab tablet; Take 1 tablet (5 mg total) by mouth 2 (two) times a day.  Dispense: 60 tablet; Refill: 0  - furosemide (LASIX) 40 MG tablet; Take 1 tablet (40 mg total) by mouth 2 (two) times a day at 9am and 6pm.  Dispense: 180 tablet; Refill: 2  - metoprolol tartrate (LOPRESSOR) 50 MG tablet; Take 1 tablet (50 mg total) by mouth 3 (three) times a day.  Dispense: 270 tablet; Refill: 2  - spironolactone (ALDACTONE) 25 MG tablet; Take 0.5 tablets (12.5 mg total) by mouth daily.  Dispense: 45 tablet; Refill: 2    If protocol passes may refill for 12 months if within 3 months of last provider visit (or a total of 15 months).             Passed - Serum Potassium in past 12 months      Lab Results   Component Value Date    Potassium 3.9 05/14/2019             Passed - Serum Sodium in past 12 months      Lab Results   Component Value Date    Sodium 136 05/14/2019             Passed - Blood pressure on file in past 12 months     BP Readings from Last 1 Encounters:   05/16/19 126/84             Passed - Serum Creatinine in past 12 months      Creatinine   Date Value Ref Range Status   05/14/2019 0.99  0.70 - 1.30 mg/dL Final            metoprolol tartrate (LOPRESSOR) 50 MG tablet 270 tablet 2     Sig: Take 1 tablet (50 mg total) by mouth 3 (three) times a day.       Beta-Blockers Refill Protocol Passed - 8/1/2019  3:55 PM        Passed - PCP or prescribing provider visit in past 12 months or next 3 months     Last office visit with prescriber/PCP: 5/16/2019 Elisabeth Nava MD OR same dept: 5/16/2019 Elisabeth Nava MD OR same specialty: 5/16/2019 Elisabeth Nava MD  Last physical: Visit date not found Last MTM visit: Visit date not found   Next visit within 3 mo: Visit date not found  Next physical within 3 mo: Visit date not found  Prescriber OR PCP: Elisabeth Nava MD  Last diagnosis associated with med order: 1. Acute on chronic congestive heart failure, unspecified heart failure type (H)  - apixaban (ELIQUIS) 5 mg Tab tablet; Take 1 tablet (5 mg total) by mouth 2 (two) times a day.  Dispense: 60 tablet; Refill: 0  - furosemide (LASIX) 40 MG tablet; Take 1 tablet (40 mg total) by mouth 2 (two) times a day at 9am and 6pm.  Dispense: 180 tablet; Refill: 2  - metoprolol tartrate (LOPRESSOR) 50 MG tablet; Take 1 tablet (50 mg total) by mouth 3 (three) times a day.  Dispense: 270 tablet; Refill: 2  - spironolactone (ALDACTONE) 25 MG tablet; Take 0.5 tablets (12.5 mg total) by mouth daily.  Dispense: 45 tablet; Refill: 2    If protocol passes may refill for 12 months if within 3 months of last provider visit (or a total of 15 months).             Passed - Blood pressure filed in past 12 months     BP Readings from Last 1 Encounters:   05/16/19 126/84            spironolactone (ALDACTONE) 25 MG tablet 45 tablet 2     Sig: Take 0.5 tablets (12.5 mg total) by mouth daily.       Diuretics/Combination Diuretics Refill Protocol  Passed - 8/1/2019  3:55 PM        Passed - Visit with PCP or prescribing provider visit in past 12 months     Last office visit with prescriber/PCP: 5/16/2019 Elisabeth Nava  MD Naomi OR same dept: 5/16/2019 Elisabeth Nava MD OR same specialty: 5/16/2019 Elisabeth Nava MD  Last physical: Visit date not found Last MTM visit: Visit date not found   Next visit within 3 mo: Visit date not found  Next physical within 3 mo: Visit date not found  Prescriber OR PCP: Elisabeth Nava MD  Last diagnosis associated with med order: 1. Acute on chronic congestive heart failure, unspecified heart failure type (H)  - apixaban (ELIQUIS) 5 mg Tab tablet; Take 1 tablet (5 mg total) by mouth 2 (two) times a day.  Dispense: 60 tablet; Refill: 0  - furosemide (LASIX) 40 MG tablet; Take 1 tablet (40 mg total) by mouth 2 (two) times a day at 9am and 6pm.  Dispense: 180 tablet; Refill: 2  - metoprolol tartrate (LOPRESSOR) 50 MG tablet; Take 1 tablet (50 mg total) by mouth 3 (three) times a day.  Dispense: 270 tablet; Refill: 2  - spironolactone (ALDACTONE) 25 MG tablet; Take 0.5 tablets (12.5 mg total) by mouth daily.  Dispense: 45 tablet; Refill: 2    If protocol passes may refill for 12 months if within 3 months of last provider visit (or a total of 15 months).             Passed - Serum Potassium in past 12 months      Lab Results   Component Value Date    Potassium 3.9 05/14/2019             Passed - Serum Sodium in past 12 months      Lab Results   Component Value Date    Sodium 136 05/14/2019             Passed - Blood pressure on file in past 12 months     BP Readings from Last 1 Encounters:   05/16/19 126/84             Passed - Serum Creatinine in past 12 months      Creatinine   Date Value Ref Range Status   05/14/2019 0.99 0.70 - 1.30 mg/dL Final

## 2021-05-31 NOTE — TELEPHONE ENCOUNTER
Refill Request  Did you contact pharmacy: No  Medication name:   Requested Prescriptions     Pending Prescriptions Disp Refills     apixaban (ELIQUIS) 5 mg Tab tablet 60 tablet 0     Sig: Take 1 tablet (5 mg total) by mouth 2 (two) times a day.     furosemide (LASIX) 40 MG tablet 60 tablet 0     Sig: Take 1 tablet (40 mg total) by mouth 2 (two) times a day at 9am and 6pm.     metoprolol tartrate (LOPRESSOR) 50 MG tablet 90 tablet 0     Sig: Take 1 tablet (50 mg total) by mouth 3 (three) times a day.     spironolactone (ALDACTONE) 25 MG tablet 30 tablet 0     Sig: Take 0.5 tablets (12.5 mg total) by mouth daily.     Who prescribed the medication: Discharged with medication on 5/14/19  Pharmacy [facility] and Location: [id number]  Is patient out of medication: Yes  Patient notified refills processed in 72 hours:  yes  Okay to leave a detailed message: yes    Patistates had no insurance until 8/1/19.ent has not had medication for 1 month,

## 2021-05-31 NOTE — TELEPHONE ENCOUNTER
Cardiologist responded with routing comment as below to recommend Xarelto 20 mg daily. CMT reviewed chart and did not see that the specialist prescribed.

## 2021-05-31 NOTE — TELEPHONE ENCOUNTER
xarelto ordered  Pt still needs to see cardiologist and take his meds    Dr. Elisabeth Nava  8/6/2019

## 2021-05-31 NOTE — TELEPHONE ENCOUNTER
Prior Authorization Request  Who s requesting:  Patient  Pharmacy Name and Location: [facility]   Medication Name:  Apixaban (ELIQUIS) 5 mg Tab tablet  Insurance Plan:    Insurance Member ID Number:    Informed patient that prior authorizations can take up to 10 business days for response:   Yes  Okay to leave a detailed message: Yes

## 2021-05-31 NOTE — TELEPHONE ENCOUNTER
Central PA team  [phone]  Pool: HE PA MED ([id number])          PA has been initiated.       PA form completed and faxed insurance via Cover My Meds     Key: [id number]     Medication:  apixaban (ELIQUIS) 5 mg Tab tablet    Insurance:  [employer]        Response will be received via fax and may take up to 5-10 business days depending on plan

## 2021-05-31 NOTE — TELEPHONE ENCOUNTER
Refill Approved    Rx renewed per Medication Renewal Policy. Medication was last renewed on 5/14/19.    Joslyn Castellanos, [facility] Triage/Med Refill 8/1/2019     Requested Prescriptions   Pending Prescriptions Disp Refills     apixaban (ELIQUIS) 5 mg Tab tablet 60 tablet 0     Sig: Take 1 tablet (5 mg total) by mouth 2 (two) times a day.       Apixaban/Rivaroxaban/Dabigatran Refill Protocol Failed - 8/1/2019  3:55 PM        Failed - Route to appropriate pool/provider     Last Anticoagulation Summary:           Passed - Renal function test in last year     Creatinine   Date Value Ref Range Status   05/14/2019 0.99 0.70 - 1.30 mg/dL Final             Passed - PCP or prescribing provider visit in past 12 months       Last office visit with prescriber/PCP: 5/16/2019 Elisabeth Nava MD OR same dept: 5/16/2019 Elisabeth Nava MD OR same specialty: 5/16/2019 Elisabeth Nava MD  Last physical: Visit date not found Last MTM visit: Visit date not found   Next visit within 3 mo: Visit date not found  Next physical within 3 mo: Visit date not found  Prescriber OR PCP: Elisabeth Nava MD  Last diagnosis associated with med order: 1. Acute on chronic congestive heart failure, unspecified heart failure type (H)  - apixaban (ELIQUIS) 5 mg Tab tablet; Take 1 tablet (5 mg total) by mouth 2 (two) times a day.  Dispense: 60 tablet; Refill: 0  - furosemide (LASIX) 40 MG tablet; Take 1 tablet (40 mg total) by mouth 2 (two) times a day at 9am and 6pm.  Dispense: 60 tablet; Refill: 0  - metoprolol tartrate (LOPRESSOR) 50 MG tablet; Take 1 tablet (50 mg total) by mouth 3 (three) times a day.  Dispense: 90 tablet; Refill: 0  - spironolactone (ALDACTONE) 25 MG tablet; Take 0.5 tablets (12.5 mg total) by mouth daily.  Dispense: 30 tablet; Refill: 0    If protocol passes may refill for 12 months if within 3 months of last provider visit (or a total of 15 months).             furosemide (LASIX) 40 MG tablet 60 tablet 0      Sig: Take 1 tablet (40 mg total) by mouth 2 (two) times a day at 9am and 6pm.       Diuretics/Combination Diuretics Refill Protocol  Passed - 8/1/2019  3:55 PM        Passed - Visit with PCP or prescribing provider visit in past 12 months     Last office visit with prescriber/PCP: 5/16/2019 Elisabeth Nava MD OR same dept: 5/16/2019 Elisabeth Nava MD OR same specialty: 5/16/2019 Elisabeth Nava MD  Last physical: Visit date not found Last MTM visit: Visit date not found   Next visit within 3 mo: Visit date not found  Next physical within 3 mo: Visit date not found  Prescriber OR PCP: Elisabeth Nava MD  Last diagnosis associated with med order: 1. Acute on chronic congestive heart failure, unspecified heart failure type (H)  - apixaban (ELIQUIS) 5 mg Tab tablet; Take 1 tablet (5 mg total) by mouth 2 (two) times a day.  Dispense: 60 tablet; Refill: 0  - furosemide (LASIX) 40 MG tablet; Take 1 tablet (40 mg total) by mouth 2 (two) times a day at 9am and 6pm.  Dispense: 60 tablet; Refill: 0  - metoprolol tartrate (LOPRESSOR) 50 MG tablet; Take 1 tablet (50 mg total) by mouth 3 (three) times a day.  Dispense: 90 tablet; Refill: 0  - spironolactone (ALDACTONE) 25 MG tablet; Take 0.5 tablets (12.5 mg total) by mouth daily.  Dispense: 30 tablet; Refill: 0    If protocol passes may refill for 12 months if within 3 months of last provider visit (or a total of 15 months).             Passed - Serum Potassium in past 12 months      Lab Results   Component Value Date    Potassium 3.9 05/14/2019             Passed - Serum Sodium in past 12 months      Lab Results   Component Value Date    Sodium 136 05/14/2019             Passed - Blood pressure on file in past 12 months     BP Readings from Last 1 Encounters:   05/16/19 126/84             Passed - Serum Creatinine in past 12 months      Creatinine   Date Value Ref Range Status   05/14/2019 0.99 0.70 - 1.30 mg/dL Final             metoprolol tartrate  (LOPRESSOR) 50 MG tablet 90 tablet 0     Sig: Take 1 tablet (50 mg total) by mouth 3 (three) times a day.       Beta-Blockers Refill Protocol Passed - 8/1/2019  3:55 PM        Passed - PCP or prescribing provider visit in past 12 months or next 3 months     Last office visit with prescriber/PCP: 5/16/2019 Elisabeth Nava MD OR same dept: 5/16/2019 Elisabeth Nava MD OR same specialty: 5/16/2019 Elisabeth Nava MD  Last physical: Visit date not found Last MTM visit: Visit date not found   Next visit within 3 mo: Visit date not found  Next physical within 3 mo: Visit date not found  Prescriber OR PCP: Elisabeth Nava MD  Last diagnosis associated with med order: 1. Acute on chronic congestive heart failure, unspecified heart failure type (H)  - apixaban (ELIQUIS) 5 mg Tab tablet; Take 1 tablet (5 mg total) by mouth 2 (two) times a day.  Dispense: 60 tablet; Refill: 0  - furosemide (LASIX) 40 MG tablet; Take 1 tablet (40 mg total) by mouth 2 (two) times a day at 9am and 6pm.  Dispense: 60 tablet; Refill: 0  - metoprolol tartrate (LOPRESSOR) 50 MG tablet; Take 1 tablet (50 mg total) by mouth 3 (three) times a day.  Dispense: 90 tablet; Refill: 0  - spironolactone (ALDACTONE) 25 MG tablet; Take 0.5 tablets (12.5 mg total) by mouth daily.  Dispense: 30 tablet; Refill: 0    If protocol passes may refill for 12 months if within 3 months of last provider visit (or a total of 15 months).             Passed - Blood pressure filed in past 12 months     BP Readings from Last 1 Encounters:   05/16/19 126/84             spironolactone (ALDACTONE) 25 MG tablet 30 tablet 0     Sig: Take 0.5 tablets (12.5 mg total) by mouth daily.       Diuretics/Combination Diuretics Refill Protocol  Passed - 8/1/2019  3:55 PM        Passed - Visit with PCP or prescribing provider visit in past 12 months     Last office visit with prescriber/PCP: 5/16/2019 Elisabeth Nava MD OR same dept: 5/16/2019 Elisabeth Nava MD OR  same specialty: 5/16/2019 Elisabeth Nava MD  Last physical: Visit date not found Last MTM visit: Visit date not found   Next visit within 3 mo: Visit date not found  Next physical within 3 mo: Visit date not found  Prescriber OR PCP: Elisabeth Nava MD  Last diagnosis associated with med order: 1. Acute on chronic congestive heart failure, unspecified heart failure type (H)  - apixaban (ELIQUIS) 5 mg Tab tablet; Take 1 tablet (5 mg total) by mouth 2 (two) times a day.  Dispense: 60 tablet; Refill: 0  - furosemide (LASIX) 40 MG tablet; Take 1 tablet (40 mg total) by mouth 2 (two) times a day at 9am and 6pm.  Dispense: 60 tablet; Refill: 0  - metoprolol tartrate (LOPRESSOR) 50 MG tablet; Take 1 tablet (50 mg total) by mouth 3 (three) times a day.  Dispense: 90 tablet; Refill: 0  - spironolactone (ALDACTONE) 25 MG tablet; Take 0.5 tablets (12.5 mg total) by mouth daily.  Dispense: 30 tablet; Refill: 0    If protocol passes may refill for 12 months if within 3 months of last provider visit (or a total of 15 months).             Passed - Serum Potassium in past 12 months      Lab Results   Component Value Date    Potassium 3.9 05/14/2019             Passed - Serum Sodium in past 12 months      Lab Results   Component Value Date    Sodium 136 05/14/2019             Passed - Blood pressure on file in past 12 months     BP Readings from Last 1 Encounters:   05/16/19 126/84             Passed - Serum Creatinine in past 12 months      Creatinine   Date Value Ref Range Status   05/14/2019 0.99 0.70 - 1.30 mg/dL Final

## 2021-05-31 NOTE — TELEPHONE ENCOUNTER
Patient needs to discuss this with his cardiologist.   Please have him call cardiology for best tx plan for anticoagulation for medical issue and insurance.       Dr. Elisabeth Nava  8/6/2019

## 2021-06-01 VITALS — HEIGHT: 71 IN | BODY MASS INDEX: 22.7 KG/M2 | WEIGHT: 162.13 LBS

## 2021-06-02 ENCOUNTER — RECORDS - HEALTHEAST (OUTPATIENT)
Dept: ADMINISTRATIVE | Facility: CLINIC | Age: 61
End: 2021-06-02

## 2021-06-02 VITALS — WEIGHT: 171 LBS | HEIGHT: 71 IN | BODY MASS INDEX: 23.94 KG/M2

## 2021-06-02 NOTE — TELEPHONE ENCOUNTER
Medication Request  Medication name: Pain medication.  Pharmacy Name and Location: [facility]  Reason for request: States he has right hip pain that he has seen the provider for on 10/24/2019.  States he is on his feet a lot at work and it is causing a lot more pain.    Okay to leave a detailed message: yes

## 2021-06-02 NOTE — TELEPHONE ENCOUNTER
Patient notified per covering provider. The patient states that he cannot afford to go to [facility] and will collect an OTC medication at his pharmacy.

## 2021-06-02 NOTE — PROGRESS NOTES
"HPI - 57yo male here with right hip pain.     After working a few hours at [employer] on feet, the hip hurts.   Sitting helps with the pain  No injury or falls  Pain started a week ago  Started 3 meds last week but not sure which    Afib/Aflutter -   Unable to take Eliquis b/c  too expensive  Changed to xarelto on 8/6/19 - not taking b/c insurance issues   Cardiology appt on 9/9/19 b/c insurance problems and now r/s Cardiology apt 11/4/19 with Dr. Elizondo    HTN - BP wnl     Current Outpatient Medications   Medication Sig Note     albuterol (PROAIR HFA;PROVENTIL HFA;VENTOLIN HFA) 90 mcg/actuation inhaler Inhale 2 puffs every 6 (six) hours as needed for wheezing. 5/10/2019: Has not taken for several months d/t no insurance.     allopurinol (ZYLOPRIM) 100 MG tablet Take 100 mg by mouth daily. 5/10/2019: Has not taken for several months d/t no insurance.     aspirin 81 MG EC tablet Take 1 tablet (81 mg total) by mouth daily.      atorvastatin (LIPITOR) 80 MG tablet Take 1 tablet (80 mg total) by mouth at bedtime. 5/10/2019: Has not taken for several months d/t no insurance.     furosemide (LASIX) 40 MG tablet Take 1 tablet (40 mg total) by mouth 2 (two) times a day at 9am and 6pm.      inhalational spacing device Spcr Use with inhaler every 6 hours as needed for wheezing, coughing or shortness of breath.      losartan (COZAAR) 100 MG tablet Take 1 tablet (100 mg total) by mouth daily. 5/10/2019: Has not taken for several months d/t no insurance.     metoprolol tartrate (LOPRESSOR) 50 MG tablet Take 1 tablet (50 mg total) by mouth 3 (three) times a day.      rivaroxaban (XARELTO) 20 mg tablet Take 1 tablet (20 mg total) by mouth daily.      spironolactone (ALDACTONE) 25 MG tablet Take 0.5 tablets (12.5 mg total) by mouth daily.      Vitals:    10/24/19 1458   BP: 100/78   Pulse: 89   Resp: 14   Temp: 98  F (36.7  C)   TempSrc: Oral   SpO2: 96%   Weight: 178 lb (80.7 kg)   Height: 5' 10.59\" (1.793 m)     PHYSICAL EXAM "   General Appearance: Awake and alert, in no acute distress  HEENT: neck is supple  CV: regular rate  Resp: No respiratory distress. Breathing comfortably  Musculoskeletal: moving limbs comfortably with not deficits or deformities  Skin: no rashes noted    A/P  1. Hip pain, right  - XR Pelvis W 2 Vw Hip Right; Future  - Ambulatory referral to Orthopedic Surgery  - Ambulatory referral to PT/OT    2. AFib/AFlutter  Encouraged to start anticoagulation med  Encouraged to keep cardiology appt on Nov 4    3. HTN - current well controlled

## 2021-06-02 NOTE — TELEPHONE ENCOUNTER
PCP did not Rx pain med. If pain is worsening needs to be seen.   [facility] if [facility].     Dr. Jabier Bass  10/31/2019 3:51 PM

## 2021-06-02 NOTE — TELEPHONE ENCOUNTER
Chart reviewed. Please see PCP notes below.   A/P  1. Hip pain, right  - XR Pelvis W 2 Vw Hip Right; Future  - Ambulatory referral to Orthopedic Surgery  - Ambulatory referral to PT/OT  2. AFib/AFlutter  Encouraged to start anticoagulation med  Encouraged to keep cardiology appt on Nov 4  3. HTN - current well controlled  Study Result   EXAM: XR PELVIS W 2 VW HIP RIGHT  LOCATION: [facility]  DATE/TIME: 10/24/2019 3:45 PM     INDICATION: Pain in right hip  COMPARISON: None.     IMPRESSION:   Mild right hip joint space loss, similar to the left hip. No evidence for dislocation or fracture. Bones of the pelvis intact. Vascular calcifications.

## 2021-06-03 VITALS
SYSTOLIC BLOOD PRESSURE: 100 MMHG | WEIGHT: 178 LBS | TEMPERATURE: 98 F | HEIGHT: 71 IN | BODY MASS INDEX: 24.92 KG/M2 | HEART RATE: 89 BPM | DIASTOLIC BLOOD PRESSURE: 78 MMHG | OXYGEN SATURATION: 96 % | RESPIRATION RATE: 14 BRPM

## 2021-06-03 VITALS
SYSTOLIC BLOOD PRESSURE: 120 MMHG | OXYGEN SATURATION: 92 % | HEIGHT: 71 IN | TEMPERATURE: 102.4 F | RESPIRATION RATE: 14 BRPM | WEIGHT: 177 LBS | BODY MASS INDEX: 24.78 KG/M2 | DIASTOLIC BLOOD PRESSURE: 80 MMHG | HEART RATE: 102 BPM

## 2021-06-03 VITALS
RESPIRATION RATE: 16 BRPM | TEMPERATURE: 97.6 F | WEIGHT: 178.44 LBS | BODY MASS INDEX: 25.55 KG/M2 | HEIGHT: 70 IN | DIASTOLIC BLOOD PRESSURE: 74 MMHG | SYSTOLIC BLOOD PRESSURE: 126 MMHG | OXYGEN SATURATION: 95 % | HEART RATE: 67 BPM

## 2021-06-03 NOTE — PROGRESS NOTES
HPI - 59 yo male with complex medical issues here for ER followup for pneumonia      ER note from 11/29/19 (3 days ago) reviewed  Dx'd with Community acquired pneumonia of left upper lobe of lung and tx'd with doxycycline  PCN allergy  Labs, cxr, ekg reviewed    Today, no change in sx with cough, fever, sweats  Chest pain improved but fever and chills persist   current smoker  No influenza or pneumonia shots      Polypharmacy -   Out of meds  for a couple of month b/c of insurance  Thus not taking anticoagulation med (xarelto) or other cardiac meds.       Current Outpatient Medications   Medication Sig Note     aspirin 81 MG EC tablet Take 1 tablet (81 mg total) by mouth daily.      doxycycline (VIBRAMYCIN) 100 MG capsule Take 1 capsule (100 mg total) by mouth 2 (two) times a day for 10 days.      furosemide (LASIX) 40 MG tablet Take 1 tablet (40 mg total) by mouth 2 (two) times a day at 9am and 6pm.      inhalational spacing device Spcr Use with inhaler every 6 hours as needed for wheezing, coughing or shortness of breath.      albuterol (PROAIR HFA;PROVENTIL HFA;VENTOLIN HFA) 90 mcg/actuation inhaler Inhale 2 puffs every 6 (six) hours as needed for wheezing. 5/10/2019: Has not taken for several months d/t no insurance.     allopurinol (ZYLOPRIM) 100 MG tablet Take 100 mg by mouth daily. 5/10/2019: Has not taken for several months d/t no insurance.     atorvastatin (LIPITOR) 80 MG tablet Take 1 tablet (80 mg total) by mouth at bedtime. 5/10/2019: Has not taken for several months d/t no insurance.     losartan (COZAAR) 100 MG tablet Take 1 tablet (100 mg total) by mouth daily. 5/10/2019: Has not taken for several months d/t no insurance.     metoprolol tartrate (LOPRESSOR) 50 MG tablet Take 1 tablet (50 mg total) by mouth 3 (three) times a day.      rivaroxaban (XARELTO) 20 mg tablet Take 1 tablet (20 mg total) by mouth daily.      spironolactone (ALDACTONE) 25 MG tablet Take 0.5 tablets (12.5 mg total) by mouth  "daily.      Vitals:    12/02/19 1510   BP: 120/80   Pulse: (!) 102   Resp: 14   Temp: (!) 102.4  F (39.1  C)   TempSrc: Oral   SpO2: 92%   Weight: 177 lb (80.3 kg)   Height: 5' 10.59\" (1.793 m)     OBJECTIVE:  Vitals listed above within normal limits  General appearance: well groomed, pleasant, well hydrated, nontoxic appearing  ENT: PERRL, throat clear  Neck: neck supple, no lymphadenopathy, no thyromegaly  Lungs: lungs clear to auscultation bilaterally, no wheezes or rhonchi  Heart: intermittently irregular, no murmurs, rubs or gallops  Abdomen: soft, nontender  Neuro: no focal deficits, CN II-XII grossly intact, alert and oriented  Psych:  mood stable, appears to have good insight and judgment       Ref. Range 12/2/2019 15:44   Influenza  A, Rapid Antigen Latest Ref Range: No Influenza A antigen detected  No Influenza A antigen detected   Influenza B, Rapid Antigen Latest Ref Range: No Influenza B antigen detected  No Influenza B antigen detected     CXR - Left lobe infiltrate significantly larger than prior exam in ER  Increased consolidation in the lingula most consistent with pneumonia. No pneumothorax or pleural effusion. The right lung is clear. Heart size and pulmonary vascularity are normal.    EKG - Normal sinus rhythm   Normal ECG. When compared with ECG of 29-NOV-2019 18:25,   T wave inversion less evident in Lateral leads     A/P  Community acquired pneumonia of left lower lobe of lung (H)  Persistent Fever   Leukocytosis, unspecified type  Failed outpatient antibiotics (doxycycline)  I am very concerned about him. Rechecked CXR, EKG and labs to determine if he may need admission   - HM1 (CBC with Diff)  - Influenza A/B Rapid Test- Nasal Swab  - XR Chest 2 Views; Future  -lactic acid - stat/pending  -procalcitonin -stat/pending  -CRP -stat/pending    --->> given worsen CXR and persistent fever and failed outpatient treatment, advised to return to ER as likely needs IV antibiotics.     Hospital " discharge follow-up  Labs, imaging, EKG, notes reviewed    Hyponatremia  - Basic Metabolic Panel  - HM1(CBC and Differential)  - Influenza A/B Rapid Test- Nasal Swab  - XR Chest 2 Views; Future    Polypharmacy -  Poor med compliance   Insurance problems  -refilled all meds    Paroxysmal atrial fibrillation (H)  Atypical atrial flutter (H)  - Electrocardiogram Perform - Clinic  Restart meds:  - rivaroxaban (XARELTO) 20 mg tablet; Take 1 tablet (20 mg total) by mouth daily.  Dispense: 30 tablet; Refill: 11     CAD (coronary artery disease)  PAD (peripheral artery disease) (H)  Restart meds:  - aspirin 81 MG EC tablet; Take 1 tablet (81 mg total) by mouth daily.; Refill: 0  - atorvastatin (LIPITOR) 80 MG tablet; Take 1 tablet (80 mg total) by mouth at bedtime.  Dispense: 10 tablet; Refill: 0    Acute on chronic congestive heart failure, unspecified heart failure type (H)   Ischemic cardiomyopathy - EF 40%   Cardiomyopathy (H)  Restart meds:  - metoprolol tartrate (LOPRESSOR) 50 MG tablet; Take 1 tablet (50 mg total) by mouth 3 (three) times a day.  Dispense: 270 tablet; Refill: 2  - spironolactone (ALDACTONE) 25 MG tablet; Take 0.5 tablets (12.5 mg total) by mouth daily.  Dispense: 45 tablet; Refill: 2  - losartan (COZAAR) 100 MG tablet; Take 1 tablet (100 mg total) by mouth daily.  Dispense: 90 tablet; Refill: 0

## 2021-06-04 NOTE — PROGRESS NOTES
"Hospital Follow-up Visit:    Assessment/Plan:     Hospital discharge follow-up  Pneumonia of left lower lobe due to infectious organism (H)  Levofloxacin course completed. VSS, no concerns.   Patient refused influenza vaccination, pneumonia vaccination.         Subjective:     Eber Jin is a 58 y.o. male who presents for a hospital discharge follow up.    58 year-old male with HFrEF, paroxysmal atrial flutter, hypertension, COPD presents with left-sided pneumonia    Hospital/Nursing Home/IP Rehab Facility: [facility]  Date of Admission: 12/2/2019  Date of Discharge:12/2/2019  Reason(s) for Admission: Left Lingular Pneumonia            Do you have any problems taking your medication regularly?  None       Have you had any changes in your medication since discharge? None       Have you had any difficulty following your discharge or treatment plan?  No    Summary of hospitalization:  Hospital discharge summary reviewed  Diagnostic Tests/Treatments reviewed.  Follow up needed: None  Other Healthcare Providers Involved in Patient's Care: Patient Care Team:  Elisabeth Nava MD as PCP - General (Family Medicine)  Elisabeth Nava MD as Assigned PCP      Update since discharge: {improved   Information from family, SNF, care coordination: no concerns     Post Discharge Medication Reconciliation: discharge medications reconciled, continue medications without change  Plan of care communicated with: patient and family    Objective:     Vitals:    12/06/19 1145   BP: 126/74   Pulse: 67   Resp: 16   Temp: 97.6  F (36.4  C)   TempSrc: Oral   SpO2: 95%   Weight: 178 lb 7 oz (80.9 kg)   Height: 5' 10\" (1.778 m)         Physical Exam:  General: Alert and oriented, in NAD.  Eyes: PERRL, EOMI, sclera normal.  HENT: Normocephalic, no pharyngeal erythema, MMM.  Neck: Supple, no adenopathy.  Heart: Normal S1 and S2, regular rhythm. No murmurs, gallops, rubs.  Lungs: CTA bilaterally, no wheezes, no crackles, no " rhonchi.  Abdomen: Soft, non-tender, non-distended, BS+.   MSK: Normal ROM of upper and lower extremities.  Neuro: Alert and oriented x 3. Moves all extremities. No focal deficits noted.  Extremities: Peripheral pulses intact, no peripheral edema.  Skin: Warm and well perfused, no rashes noted.  Psych: Normal mood and affect.        Electronically signed by Elliott Baldwin MD 12/06/19 11:44 AM

## 2021-06-04 NOTE — TELEPHONE ENCOUNTER
No openings all next week, should we have pt see someone else, wait for next opening, or are we squeezing them in?

## 2021-06-04 NOTE — PROGRESS NOTES
MTM Transition of Care Encounter  Assessment & Plan                                                     Post Discharge Medication Reconciliation Status: discharge medications reconciled, continue medications without change    1. Pneumonia  Antibiotics completed, symptoms improved.  Intermittent shortness of breath continues with activity, provided education on how to properly administer albuterol inhaler.  This may or may not be significantly beneficial due to documentation of COPD.  As discussed below we talked about smoking cessation potential methods of helping decrease use.  -Education on proper administration of albuterol  -Refill all chronic medications    2. Chronic systolic heart failure (H)  Reviewed current regimen, which is a stable, and he is taking appropriately however operationally is somewhat difficult for him from an adherence standpoint.  Spironolactone tablets are very small and he has a hard time splitting these, will send a prescription for a pill splitter to the pharmacy to see if this is covered.  Additionally he is stable on metoprolol, however has difficulties with taking this 3 times daily, may consider switching to metoprolol succinate formulation for once daily dosing, as he does remember to take his medications every morning.  -Sent prescription for pill splitter  -Consider switching metoprolol tartrate to succinate formulation    3. Paroxysmal atrial flutter (H)  Stable on beta-blocker and chronic anticoagulation with Xarelto.  Identified that sometimes he is not taking Xarelto with food, provided additional education on this.  -Educate to take Xarelto with food for improved absorption    4. Tobacco abuse  Continues to smoke however interested in quitting.  Reviewed potential options for nicotine replacement products.  Consider nicotine lozenges, as these may be better tolerated than nicotine gum, and previous poor effects with the nicotine patch.  Recommend nicotine lozenge 2 mg, as  directed.  -initiate 2-mg lozenge every 1 to 2 hours for weeks 1 to 6; at least 9 lozenges/day for the first 6 weeks should be used; then one 2-mg lozenge every 2 to 4 hours for weeks 7 to 9, then one 2-mg lozenge every 4 to 8 hours for weeks 10 to 12. Do not use more than 5 lozenges in 6 hours or 20 lozenges/day    Follow Up  Return in about 1 week (around 12/23/2019) for Medication Management Pharmacist, By phone.    Subjective & Objective                                                       Eber Jin is a 59 y.o. male called for a transitions of care visit. he was discharged from [facility] on December 4, 2019 for pneumonia.  He was already seen in post hospitalization by another provider, and was found to be improved and stable after completing his antibiotic course.    Chief Complaint: Hospital follow-up    Medication Adherence/Access: Some nonadherence to metoprolol dose to 3 times daily, this happens a few times weekly.  All other medications are taken at 9 AM with or without food.    Pneumonia: Presented with left sided pneumonia, despite being on doxycycline his symptoms worsened it was treated with IV antibiotics.  He was transitioned to oral levofloxacin which he completed a 7-day course.  Notes that he is feeling much better but sometimes he does continue to get shortness of breath.  This is generally with activity and improves with rest.  He does have an albuterol inhaler however when asked how he takes this medication, he does not hold his breath, and he also does not have a spacer.     Heart failure with reduced ejection fraction: Hospitalization earlier this year for heart failure exacerbation, notes that he is not having any problems with fluid at this time.  Continues on metoprolol, losartan, Spironolactone, aspirin.  Notes that it is a pain to cut spironolactone in half, and he would like to see if the pill cutter would be covered by insurance.      Paroxysmal atrial flutter:  "Per hospitalization he was rate controlled, continues on metoprolol 2-3 times daily based on how he remembers.  Continues on chronic anticoagulation with Xarelto, which she takes in the morning with or without food.  Commonly he will eat after he takes his pills.    Tobacco dependence: has \"never gotten around to quitting\", but always interested.  Notes that in the past he did try nicotine patches, however he sweats a lot, and found the patches would fall off.  He has never tried gum, lozenges, etc.    PMH: reviewed in EPIC   Allergies/ADRs: reviewed in EPIC   Alcohol: Not currently   Tobacco:   Social History     Tobacco Use   Smoking Status Current Every Day Smoker     Packs/day: 0.50     Years: 30.00     Pack years: 15.00     Types: Cigarettes   Smokeless Tobacco Never Used   Tobacco Comment    7 cig per day      Recent Vitals:   BP Readings from Last 3 Encounters:   12/06/19 126/74   12/04/19 138/69   12/02/19 120/80      Wt Readings from Last 3 Encounters:   12/06/19 178 lb 7 oz (80.9 kg)   12/03/19 174 lb 14.4 oz (79.3 kg)   12/02/19 177 lb (80.3 kg)     ----------------    The patient declined an after visit summary    I spent 30 minutes with this patient today;  . All changes were made via collaborative practice agreement with Elisabeth Nava MD. A copy of the visit note was provided to the patient's provider.     Mayito River, PharmD, BCACP  Medication Management (MTM) Pharmacist  [facility]    Current Outpatient Medications   Medication Sig Dispense Refill     albuterol (PROAIR HFA;PROVENTIL HFA;VENTOLIN HFA) 90 mcg/actuation inhaler Inhale 2 puffs every 6 (six) hours as needed for wheezing. 1 each 0     aspirin 81 MG EC tablet Take 1 tablet (81 mg total) by mouth daily. 30 tablet 5     atorvastatin (LIPITOR) 80 MG tablet Take 1 tablet (80 mg total) by mouth at bedtime. 30 tablet 5     losartan (COZAAR) 100 MG tablet Take 1 tablet (100 mg total) by mouth daily. 30 " tablet 5     metoprolol tartrate (LOPRESSOR) 50 MG tablet Take 1 tablet (50 mg total) by mouth 3 (three) times a day. 90 tablet 0     spironolactone (ALDACTONE) 25 MG tablet Take 0.5 tablets (12.5 mg total) by mouth daily. 15 tablet 5     medical supply, miscellaneous (TABLET CUTTER) Misc Use daily to cut spironolactone in half 1 each 1     nicotine polacrilex (COMMIT) 2 MG lozenge Apply 1 lozenge (2 mg total) to the mouth or throat as needed for smoking cessation (every 1-2 hours, max 20 lozenges per day). 168 each 1     rivaroxaban (XARELTO) 20 mg tablet Take 1 tablet (20 mg total) by mouth daily. 30 tablet 5     No current facility-administered medications for this visit.

## 2021-06-04 NOTE — TELEPHONE ENCOUNTER
New Appointment Needed  What is the reason for the visit:    Inpatient/ED Follow Up Appt Request  At what hospital or facility were you seen?: [facility]  What is the reason you were seen?: pneumonia  What date were you admitted?: date: 12.2.2019  What date were you discharged?: date: 12.4.19  What was the recommended timeframe for your follow up appointment?: 3-5 days  Provider Preference: PCP only  How soon do you need to be seen?: 3-5 days  Waitlist offered?: No  Okay to leave a detailed message:  Yes

## 2021-06-04 NOTE — TELEPHONE ENCOUNTER
MTM referral from: FRANCISCO    MTM referral outreach attempt #3 on Thursday, 12/12/2019 at 1:18 PM      Outcome: Initially patient was scheduled for a telephone MTM appointment on Friday, 12/06/2019 with Donovan Edmond, but appointment was a no show. We attempted to reschedule this appointment yesterday, Wednesday, 12/11/2019 and today, Thursday, 12/12/2019, but phone was not answered. Left a detailed voice message for patient to contact us back. Will route message to [facility] Pharmacist/Provider as a FYI. Thank you,    See Benjamín [facility] Pharmacy Coordinator

## 2021-06-04 NOTE — TELEPHONE ENCOUNTER
New Appointment Needed  What is the reason for the visit:    Inpatient/ED Follow Up Appt Request  At what hospital or facility were you seen?: [facility]  What is the reason you were seen?: Pneumonia  What date were you admitted?: 12/02/19  What date were you discharged?: 12/04/19  What was the recommended timeframe for your follow up appointment?: 3-5 days.  Provider Preference: PCP only  How soon do you need to be seen?: 3-5 days  Waitlist offered?: No  Okay to leave a detailed message:  Yes.  Please call the patient to schedule.

## 2021-06-04 NOTE — TELEPHONE ENCOUNTER
reports indicate that the patient needs colon cancer screening. If non-English speaking, CMT will schedule patient to discuss colon cancer screening options with PCP. Writer intends to contact the patient to assist in scheduling and appointing for this purpose. Per clinic policy, writer will three times prior to closing encounter.

## 2021-06-04 NOTE — PROGRESS NOTES
.Hospital discharge follow up call to pt, no answer.  Left VM message reminding pt of appt on 12/6. RN will attempt call back at another time.

## 2021-06-04 NOTE — TELEPHONE ENCOUNTER
Chart reviewed. Occult blood (ICT) ordered 05/16/2019 listed in office note but no order in lab. Patient is overdue.

## 2021-06-04 NOTE — TELEPHONE ENCOUNTER
Chart reviewed. Patient was seen in  ED 11/29/19, diagnosed with pneumonia and ED doc started Doxycycline. He was discharged to follow up with PCP on 12/2/19. PCP noted treated with doxycycline. PCP noted that patient failed outpatient Doxycycline. Due to worsening chest radiographs and fever, PCP recommended return to ED for IV antibiotics. Patient saw ED doc on 12/2/19 as recommended by PCP. ER doc started Levofloxacin 750 mg PO bedtime x 7 days. Patient completed Levofloxacin. Doxycycline and Levofloxacin not on medication list because treatment completed. Patient was also given IV Ceftriaxone and Azithryomycine in ED on 12/2/19.     [facility] called Nicole at  and explained the course of the patient's ED and in-office treatment. Nicole states that this clarification is all that she needs at this time and has resolved the issue.     Task complete. Will forward to PCP as FYI.

## 2021-06-04 NOTE — PROGRESS NOTES
Second attempt to reach patient for hospital discharge follow up, no answer.  Left VM message reminding pt of their appt today at 11:20am.  RN has made two unsuccessful attempts to reach patient.  Encounter closed.    Peace Russell RN Care Manager, Population Health

## 2021-06-04 NOTE — TELEPHONE ENCOUNTER
Who is calling:  Nicole núñez/ [employer]   Reason for Call:       calling to state she spoke w/ Patient after DC from Hospital. Upon reviewing medication . His DC states he was taking 100 mg of Doxycycline that was filled on 10 / 29/19 for a 10 day supply and the Levofloxacin on 12/04/19 for a 5 day supply . But neither one are on Med list   FYI     Date of last appointment with primary care: 12/06/19    Okay to leave a detailed message: Yes

## 2021-06-05 VITALS
BODY MASS INDEX: 25.71 KG/M2 | SYSTOLIC BLOOD PRESSURE: 122 MMHG | OXYGEN SATURATION: 95 % | TEMPERATURE: 98.2 F | DIASTOLIC BLOOD PRESSURE: 80 MMHG | HEIGHT: 70 IN | WEIGHT: 179.6 LBS | HEART RATE: 67 BPM | RESPIRATION RATE: 14 BRPM

## 2021-06-05 NOTE — TELEPHONE ENCOUNTER
Medication Request    Cheaper alternative    Medication name:   rivaroxaban (XARELTO) 20 mg tablet 30 tablet 5 12/20/2019  --   Sig - Route: Take 1 tablet (20 mg total) by mouth daily       Requested Pharmacy:   [id number] - [place], [place] - [address]     Reason for request:   Patient states his co pay went up to $25.00 per month for this medication and he can't afford this, question if there is a cheaper alternative?    When did you use medication last?:  02/07/2020    Patient offered appointment:    patient declined     Okay to leave a detailed message:   Yes    Please send alternative medications to patient pharmacy and notify him of the outcome.

## 2021-06-05 NOTE — TELEPHONE ENCOUNTER
Per previous notes.  Xarelto is the best option for this patient per cardiologist and PCP.  Does the patient have different insurance for this year or Xarelto is already approved by insurance ( and co pay is $ 25 )?  Is Xarelto under any of the special program?  Can you try single care or [facility]?    Dr. Jabier Bass  2/7/2020 2:55 PM  ( I am leaving early today)

## 2021-06-05 NOTE — TELEPHONE ENCOUNTER
Please clarify the request . To prescribe Xarelto or alternative to Xarelto?  Is he okay to go back to warfarin?    Dr. Jabier Bass  2/7/2020 1:04 PM

## 2021-06-05 NOTE — TELEPHONE ENCOUNTER
On 8/2/19 PA was started and these three meds were able to be prescribed per pt insurance plan.:  Xarelto and stated Pradexa and Warfarin.  Pt is on Xarelto due to EJ fraction of 40% and has AFib.      Can covering provider please perscribe cheaper med? Thanks.

## 2021-06-05 NOTE — TELEPHONE ENCOUNTER
Who is calling:  Nicole, , [facility]  Reason for Call:  She has been following this patient's care as part of his insurance benefits.  She has tried three times to reach this patient and he is not responding to her calls.  She is closing this service.   Date of last appointment with primary care: 12/6/19  Okay to leave a detailed message: No need to call back.

## 2021-06-05 NOTE — TELEPHONE ENCOUNTER
----- Message from Michelle Sibley PharmD sent at 1/9/2020  4:00 PM CST -----  Regarding: FW: FRANCISCO follow up  Any chance we can schedule a f/u with me for this patient?    Thanks!  [place]  ----- Message -----  From: Mayito River, PharmD  Sent: 1/3/2020   8:47 AM CST  To: Michelle Sibley PharmD  Subject: FRACNISCO follow up                                    Hello!   I spoke with this mike and I think he may be a great one for you to follow up with. He is interested in quitting smoking and hopefully keeping momentum with this. Also, I was hoping to simplify his metoprolol, if I did it over the phone I am not confident that he would take it correctly.  Please see note for details, and call to schedule if interested.  Happy new year!  Tea   ----- Message -----  From: Mayito River, PharmD  Sent: 12/23/2019  To: Mayito River, Donovan  Subject: FU metoprolol?

## 2021-06-06 NOTE — TELEPHONE ENCOUNTER
Who is calling:  Patient     Reason for Call:  Patient would like a mental Health referral in regards to excessive drinking and personal problems. Please advise on next best course of action     Date of last appointment with primary care:   12/02/19    Okay to leave a detailed message: Yes    [phone]  Please leave detailed msg if patient does not answer

## 2021-06-06 NOTE — TELEPHONE ENCOUNTER
Please provide information for fast-tracker.     If patient would like appointment, can schedule that as well.       Elliott Baldwin MD

## 2021-06-06 NOTE — TELEPHONE ENCOUNTER
CMT can schedule appointment for patient or can provider information to [employer] [email or web address] which has links to mental health AND substance use disorder resources. How would covering provider like CMT to proceed? Thank you.

## 2021-06-06 NOTE — TELEPHONE ENCOUNTER
Called pt and relayed the fast-tracker website for mental health and substance abuse resourses.  [email or web address] is website.  Offered pt to call for apt as well.  If pt calls back for apt, please help him to schedule. Thanks.

## 2021-06-06 NOTE — TELEPHONE ENCOUNTER
CMT left message x 1. Please review message thread below and advise the patient as indicated. Please schedule if necessary or indicated in message thread.    CMT reviewed chart. No current/historical alcohol or other drug abuse documented in medical record (only diagnosis of alcohol abuse). Patient needs appointment. Please schedule when he calls back.

## 2021-06-07 NOTE — TELEPHONE ENCOUNTER
Who is calling:  Patient  Reason for Call:  Pt initially called for medication refill, while going through the medication he needs writer can hear Pt sounded shortness of breath, writer asked if Pt has albuterol and Pt stated that does nothing for him.  Writer advised pt to make appointment regarding shortness of breath, and made sure Pt does not have a fever or cough.  Pt agreed, writer transferred Pt to schedulers.  Date of last appointment with primary care: N/A  Okay to leave a detailed message: No

## 2021-06-07 NOTE — TELEPHONE ENCOUNTER
Refill Request  Did you contact pharmacy: No  Medication name:   Requested Prescriptions     Pending Prescriptions Disp Refills     metoprolol tartrate (LOPRESSOR) 50 MG tablet 90 tablet 0     Sig: Take 1 tablet (50 mg total) by mouth 3 (three) times a day.     Who prescribed the medication: Elisabeth Nava MD  Requested Pharmacy: [facility]  Is patient out of medication: Yes  Patient notified refills processed in 3 business days:  yes  Okay to leave a detailed message: no

## 2021-06-07 NOTE — TELEPHONE ENCOUNTER
Pt was triaged.  PCP is gone from clinic until May 11.  Pt scheduled for telephone visit with AK today at 2 pm thanks.

## 2021-06-07 NOTE — TELEPHONE ENCOUNTER
RN triage  Call from pt   Pt transferred from scheduling   Pt states he is calling for a routine f/u visit   Pt states he had pneumonia 4 months ago and wants to see PCP for on going shortness of breath   Pt states he has hx of long standing shortness of breath for several years -- and had shortness of breath after pneumonia  Pt states no worse shortness of breath -- no new symptoms -- and otherwise feeling fine   Pt calling for routine f/u visit for shortness of breath and treatment  Instructed pt to call back w/ any worsening symptoms or any new symptoms   Please advise   Edilia Maddox RN [facility] Connection RN triage

## 2021-06-07 NOTE — PROGRESS NOTES
"Eber Jin is a 59 y.o. male who is being evaluated via a billable telephone visit.      The patient has been notified of following:     \"This telephone visit will be conducted via a call between you and your physician/provider. We have found that certain health care needs can be provided without the need for a physical exam.  This service lets us provide the care you need with a short phone conversation.  If a prescription is necessary we can send it directly to your pharmacy.  If lab work is needed we can place an order for that and you can then stop by our lab to have the test done at a later time.    If during the course of the call the physician/provider feels a telephone visit is not appropriate, you will not be charged for this service.\"         Eber Jin complains of shortness of breath that occurred last week and improved after he took his wife's blood pressure medication.      Patient states that he is been taking his medications faithfully except for the metoprolol which he is having refilled today last week he noticed that it was difficult for him to walk up stairs and he had difficulty breathing at night and felt tired\" and \"pooped out\" he felt better after he took 1 of his wife's blood pressure medications that cause some cramping so he stopped taking it.    Patient denies any current shortness of breath, chest pain, dizziness because of muscle cramps or headache.    I have reviewed and updated the patient's Past Medical History, Social History, Family History and Medication List.    ALLERGIES  Penicillins    Additional provider notes:   Patient is a history of heart failure.  Patient does mention that he is been eating more since he stopped drinking alcohol.    Assessment/Plan:  1. Noncompliance with medication regimen  Patient was asked to reduce sodium in diet immediately.  He needs to check his weight.  We will check daily.  He will start hydrochlorothiazide every morning.  - " hydroCHLOROthiazide (HYDRODIURIL) 25 MG tablet; Take 1 tablet (25 mg total) by mouth daily.  Dispense: 30 tablet; Refill: 1    2. Shortness of breath  Patient was informed to watch his weight if he develops acute shortness of breath he is to proceed to the emergency room immediately.  - hydroCHLOROthiazide (HYDRODIURIL) 25 MG tablet; Take 1 tablet (25 mg total) by mouth daily.  Dispense: 30 tablet; Refill: 1    3. Chronic systolic heart failure (H)    - hydroCHLOROthiazide (HYDRODIURIL) 25 MG tablet; Take 1 tablet (25 mg total) by mouth daily.  Dispense: 30 tablet; Refill: 1        Phone call duration:  11 minutes    Hima Boyle MD

## 2021-06-07 NOTE — TELEPHONE ENCOUNTER
Can covering provider please address as PCP is out until May 11?  See below.  Does pt need a telephone visit?  Thanks.

## 2021-06-09 NOTE — PROGRESS NOTES
"Assessment: /    Plan:    1. Essential hypertension with goal blood pressure less than 140/90  hydroCHLOROthiazide (HYDRODIURIL) 25 MG tablet    losartan (COZAAR) 100 MG tablet    spironolactone (ALDACTONE) 25 MG tablet   2. Mixed hyperlipidemia  atorvastatin (LIPITOR) 80 MG tablet   3. Gout  allopurinol (ZYLOPRIM) 100 MG tablet       Resume medications.  Recheck in 2-3 weeks with Dr. Nava, or sooner if any problems.      Subjective:    HPI:  Eber Jin is a 56-year-old male presenting for medication check.  He was off insurance for 6 months, and has not had his medications.    Social Hx:  He is accompanied today by his wife.    Review of Systems:  He occasionally feels short of breath when lying flat.  No dizziness, chest pain or melena.      Current Outpatient Prescriptions   Medication Sig Dispense Refill     allopurinol (ZYLOPRIM) 100 MG tablet Take 1 tablet (100 mg total) by mouth daily. 30 tablet 1     aspirin 81 MG EC tablet Take 1 tablet (81 mg total) by mouth daily.  0     atorvastatin (LIPITOR) 80 MG tablet Take 1 tablet (80 mg total) by mouth at bedtime. 30 tablet 1     hydroCHLOROthiazide (HYDRODIURIL) 25 MG tablet Take one tablet by mouth one time daily 30 tablet 1     losartan (COZAAR) 100 MG tablet Take one tablet by mouth one time daily 30 tablet 1     miscellaneous medical supply Misc Use 1 Device As Directed daily. 1 each 0     spironolactone (ALDACTONE) 25 MG tablet Take 1 tablet (25 mg total) by mouth daily. 30 tablet 1     No current facility-administered medications for this visit.          Objective:      Vitals:    03/24/17 1402 03/24/17 1409   BP: (!) 198/110 (!) 198/120   Patient Site: Left Arm Left Arm   Patient Position: Sitting Sitting   Cuff Size: Adult Regular Adult Regular   Pulse: (!) 101    Resp: 22    Temp: 98  F (36.7  C)    TempSrc: Oral    SpO2: 94%    Weight: 173 lb 4 oz (78.6 kg)    Height: 5' 10.63\" (1.794 m)        Gen:  NAD, VSS  Lungs:  normal  Heart:  " normal          ADDITIONAL HISTORY SUMMARIZED (2): None.  DECISION TO OBTAIN EXTRA INFORMATION (1): None.   RADIOLOGY TESTS (1): None.  LABS (1): None.  MEDICINE TESTS (1): None.  INDEPENDENT REVIEW (2 each): None.     Total Data Points: 0

## 2021-06-12 NOTE — PROGRESS NOTES
"HPI - 55 yo male here for hospital f/u and BP.     Admitted from 8/24/17 - 8/26/17 for SOB from Heart failure  Principal Problem:    Heart failure  Active Problems:    Acute on chronic systolic heart failure, NYHA class 3    Hypokalemia - replaced    Prolonged Q-T interval on ECG - resolved after lytes improved    Accelerated hypertension - on losartan   Hypomagnesemia - replaced  Plan:  low 2gm Na diet  Consider change to carvedilol   D/c'd with lasix and spironolactone (stop HCTZ)  F/u with heart failure clinic - he did not do this  Advised quit smoking and decrease alcohol intake    Per chart:  History:  Hyperlipidemia  Anemia  Gout  Smoking  Etoh use    Out of all meds since d/c and had been out of med preadmission  Current Outpatient Prescriptions   Medication Sig Note     spironolactone (ALDACTONE) 50 MG tablet Take 1 tablet (50 mg total) by mouth daily.      allopurinol (ZYLOPRIM) 100 MG tablet Take 100 mg by mouth daily. 8/24/2017:  6/5 30 day supply     aspirin 81 MG EC tablet Take 1 tablet (81 mg total) by mouth daily. 8/24/2017: Takes \"here and there\"     atorvastatin (LIPITOR) 80 MG tablet Take 1 tablet (80 mg total) by mouth at bedtime. 8/24/2017:  6/5 30 day supply (pharmacy shows fill 8/1 but he did not )     furosemide (LASIX) 40 MG tablet Take 1 tablet (40 mg total) by mouth daily.      losartan (COZAAR) 100 MG tablet Take one tablet by mouth one time daily 8/24/2017:  6/5 30 day supply     Vitals:    09/11/17 0821   BP: 132/80   Pulse: 82   Resp: 16   Temp: 97.8  F (36.6  C)   TempSrc: Oral   SpO2: 96%   Weight: 173 lb 3.2 oz (78.6 kg)   Height: 5' 11\" (1.803 m)     PHYSICAL EXAM   General Appearance: Awake and alert, in no acute distress  HEENT: neck is supple  CV: regular rate  Resp: No respiratory distress. Breathing comfortably  Musculoskeletal: moving limbs comfortably with not deficits or deformities  Skin: no rashes noted    A/P  Hospital f/u -   Doing better,BP and breathing " better  Not taking meds    HTN-   Teaching with pictures  Start carvedilol   Recheck in 4 weeks    Heart Failure class 3   Started on carvedilol  Teaching with pictures  Advised to my f/u apt with heart failure clinic and given #  Check BNP    Hyperlipidemia  Check lipids  Start lipitor    Anemia  Check CBC    Gout  Check uric acid    Smoking - discussed quitting    etoh use  Discussed cutting back  Check GGT    Spent 40 min face to face with patient with more the 50% spent in counseling, reviewing chart, and coordination of care and discussing problems listed above.

## 2021-06-13 NOTE — PROGRESS NOTES
Thank you for asking the [facility] team to see Eber Jin     Assessment/Plan:     Cardiomyopathy - The patient appears euvolemic.  His blood pressure is not controlled and his lipids are too high but he has just resumed atorvastatin 80 mg.  He says he is pretty compliant with his medications.  Resume losartan 100mg daily.  Continue coreg 12.5mg BID and spironolactone. Stress test to rule out new ischemia.     RTC 3 months.           Current History:   Eber Jin is a 56 y.o. with ischemic cardiomyopathy with an ejection fraction of 40%.  He has coronary disease with recurrent stress testing showing inferior lateral infarct but no inducible ischemia.  The patient also has severe peripheral arterial disease and follows with Dr. Grady.  He has difficult to control hypertension and hyperlipidemia.  He continues to drink and smoke heavily with no plans to cut back on either. Holter has showed normal sinus rhythm without bradycardia or tachycardia and a stress test showed no inducible ischemia but persistent inferolateral infarction.      Since his last visit 2 years ago he was admitted with decompensated heart failure due to running out of his medications in August 2017. Echo at that time showed EF 50% with inferior and posterior hypokinesis and mild AI and MR. It was read as stable compared to 2013.  He denies chest pain or but does not shortness of breath on exertion after 30 minutes of walking.  He walks slowly and so he does not have to stop due to leg pain.  He denies PND or orthopnea.  He notes he has lost some weight lately.          Past Medical History:     Past Medical History:   Diagnosis Date     CAD (coronary artery disease)      CHF (congestive heart failure)      Heart failure      HLD (hyperlipidemia)      HTN (hypertension)      Myocardial infarction      Peripheral vascular disease with claudication        Past Surgical History:     Past Surgical History:   Procedure  "Laterality Date     CARDIAC CATHETERIZATION         Family History:     Family History   Problem Relation Age of Onset     Heart failure Mother        Social History:    reports that he has been smoking.  He has a 15.00 pack-year smoking history. He has never used smokeless tobacco. He reports that he drinks about 0.6 oz of alcohol per week  He reports that he does not use illicit drugs.    Meds:     Current Outpatient Prescriptions   Medication Sig     allopurinol (ZYLOPRIM) 100 MG tablet Take 100 mg by mouth daily.     aspirin 81 MG EC tablet Take 1 tablet (81 mg total) by mouth daily.     atorvastatin (LIPITOR) 80 MG tablet Take 1 tablet (80 mg total) by mouth at bedtime.     carvedilol (COREG) 12.5 MG tablet Take 1 tablet (12.5 mg total) by mouth 2 (two) times a day.     furosemide (LASIX) 40 MG tablet Take 40 mg by mouth daily.     spironolactone (ALDACTONE) 50 MG tablet Take 50 mg by mouth daily.       Allergies:   Penicillins    Review of Systems:   Review of Systems:   General: WNL  Eyes: WNL  Ears/Nose/Throat: WNL  Lungs: Shortness of Breath  Heart: Shortness of Breath with activity  Stomach: WNL  Bladder: WNL  Muscle/Joints: WNL  Skin: WNL  Nervous System: Dizziness, Loss of Balance  Mental Health: WNL     Blood: WNL       Objective:      Physical Exam  @LASTENCWT:3@  5' 11\" (1.803 m)  @BMI:3@  BP (!) 158/99 (Patient Site: Left Arm, Patient Position: Sitting, Cuff Size: Adult Regular)  Pulse 68  Resp 16  Ht 5' 11\" (1.803 m)  Wt 171 lb (77.6 kg)  BMI 23.85 kg/m2    General Appearance:   Alert, cooperative and in no acute distress.   HEENT:  No scleral icterus; the mucous membranes were pink and moist.   Neck: JVP flat. No thyromegaly. No HJR   Chest: The spine was straight. The chest was symmetric.   Lungs:   Respirations unlabored; the lungs are clear to auscultation.   Cardiovascular:   S1 and S2 normal and with 1/6 systolic murmur. No clicks or rubs. No carotid bruits noted. Right DP, PT, and radial " pulses 2+. Left DP, PT, and radial pulses 1+.   Abdomen:  No organomegaly, masses, bruits, or tenderness. Bowels sounds are present   Extremities: No cyanosis, clubbing, or edema.   Skin: No xanthelasma.   Neurologic: Mood and affect are appropriate.         Lab Review   Lab Results   Component Value Date     09/11/2017     08/26/2017     08/24/2017    K 3.9 09/11/2017    K 4.3 08/26/2017    K 3.9 08/25/2017     09/11/2017     08/26/2017     (H) 08/24/2017    CO2 26 09/11/2017    CO2 28 08/26/2017    CO2 23 08/24/2017    BUN 9 09/11/2017    BUN 20 08/26/2017    BUN 18 08/24/2017    CREATININE 0.88 09/11/2017    CREATININE 1.10 08/26/2017    CREATININE 0.99 08/24/2017    CALCIUM 9.4 09/11/2017    CALCIUM 9.8 08/26/2017    CALCIUM 8.9 08/24/2017     Lab Results   Component Value Date    WBC 5.8 09/11/2017    WBC 7.4 08/24/2017    WBC 6.1 07/02/2016    HGB 13.6 (L) 09/11/2017    HGB 11.6 (L) 08/24/2017    HGB 14.2 07/02/2016    HCT 41.0 09/11/2017    HCT 35.2 (L) 08/24/2017    HCT 43.4 07/02/2016    MCV 89 09/11/2017    MCV 90 08/24/2017    MCV 86 07/02/2016     09/11/2017     08/24/2017     07/02/2016     Lab Results   Component Value Date    CHOL 218 (H) 09/11/2017    CHOL 263 (H) 11/21/2013    CHOL 226 (H) 09/10/2012    TRIG 148 09/11/2017    TRIG 150 (H) 11/21/2013    TRIG 226 (H) 09/10/2012    HDL 37 (L) 09/11/2017    HDL 54 11/21/2013    HDL 42 09/10/2012    LDLDIRECT 139 (H) 02/16/2015    LDLDIRECT 135 (H) 06/17/2014    LDLDIRECT 136 (H) 07/23/2013     Lab Results   Component Value Date     (H) 09/11/2017    BNP 1017 (H) 08/24/2017    BNP 31 11/21/2013         Sia Elizondo M.D.

## 2021-06-13 NOTE — PROGRESS NOTES
HPI - 55 yo male here to f/u on labs and BP.     He was seen by cardiology 10/6/17 per consult note:  Cardiomyopathy - The patient appears euvolemic.  His blood pressure is not controlled and his lipids are too high but he has just resumed atorvastatin 80 mg.  He says he is pretty compliant with his medications.  Resume losartan 100mg daily.  Continue coreg 12.5mg BID and spironolactone. Stress test to rule out new ischemia - scheduled 10/12/17  RTC 3 months.      Social:  Works at [employer]     Labs Reviewed.  Recent Results (from the past 1008 hour(s))   Comprehensive Metabolic Panel    Collection Time: 09/11/17  9:06 AM   Result Value Ref Range    Sodium 140 136 - 145 mmol/L    Potassium 3.9 3.5 - 5.0 mmol/L    Chloride 103 98 - 107 mmol/L    CO2 26 22 - 31 mmol/L    Anion Gap, Calculation 11 5 - 18 mmol/L    Glucose 78 70 - 125 mg/dL    BUN 9 8 - 22 mg/dL    Creatinine 0.88 0.70 - 1.30 mg/dL    GFR MDRD Af Amer >60 >60 mL/min/1.73m2    GFR MDRD Non Af Amer >60 >60 mL/min/1.73m2    Bilirubin, Total 0.4 0.0 - 1.0 mg/dL    Calcium 9.4 8.5 - 10.5 mg/dL    Protein, Total 6.7 6.0 - 8.0 g/dL    Albumin 3.4 (L) 3.5 - 5.0 g/dL    Alkaline Phosphatase 59 45 - 120 U/L    AST 13 0 - 40 U/L    ALT 11 0 - 45 U/L   Lipid Cascade    Collection Time: 09/11/17  9:06 AM   Result Value Ref Range    Cholesterol 218 (H) <=199 mg/dL    Triglycerides 148 <=149 mg/dL    HDL Cholesterol 37 (L) >=40 mg/dL    LDL Calculated 151 (H) <=129 mg/dL    Patient Fasting > 8hrs? No    BNP(B-type Natriuretic Peptide)    Collection Time: 09/11/17  9:06 AM   Result Value Ref Range     (H) 0 - 49 pg/mL   HM2(CBC w/o Differential)    Collection Time: 09/11/17  9:06 AM   Result Value Ref Range    WBC 5.8 4.0 - 11.0 thou/uL    RBC 4.59 4.40 - 6.20 mill/uL    Hemoglobin 13.6 (L) 14.0 - 18.0 g/dL    Hematocrit 41.0 40.0 - 54.0 %    MCV 89 80 - 100 fL    MCH 29.5 27.0 - 34.0 pg    MCHC 33.0 32.0 - 36.0 g/dL    RDW 12.2 11.0 - 14.5 %    Platelets 181 140  "- 440 thou/uL    MPV 8.2 7.0 - 10.0 fL   GGT (Gamma GT)    Collection Time: 09/11/17  9:06 AM   Result Value Ref Range    GGT (Gamma GT) 33 0 - 50 U/L   Uric Acid    Collection Time: 09/11/17 10:43 AM   Result Value Ref Range    Uric Acid 6.7 3.0 - 8.0 mg/dL           Current Outpatient Prescriptions   Medication Sig     allopurinol (ZYLOPRIM) 100 MG tablet Take 100 mg by mouth daily.     aspirin 81 MG EC tablet Take 1 tablet (81 mg total) by mouth daily.     atorvastatin (LIPITOR) 80 MG tablet Take 1 tablet (80 mg total) by mouth at bedtime.     carvedilol (COREG) 12.5 MG tablet Take 1 tablet (12.5 mg total) by mouth 2 (two) times a day.     losartan (COZAAR) 100 MG tablet Take 1 tablet (100 mg total) by mouth daily.     furosemide (LASIX) 40 MG tablet Take 40 mg by mouth daily.     spironolactone (ALDACTONE) 50 MG tablet Take 50 mg by mouth daily.     Vitals:    10/09/17 0853 10/09/17 0857   BP: (!) 182/102 (!) 180/100   Pulse: 68    Resp: 18    Temp: 97.5  F (36.4  C)    TempSrc: Oral    SpO2: 93%    Weight: 172 lb 1.6 oz (78.1 kg)    Height: 5' 11.25\" (1.81 m)      PHYSICAL EXAM   General Appearance: Awake and alert, in no acute distress  HEENT: neck is supple  CV: regular rate  Resp: No respiratory distress. Breathing comfortably  Musculoskeletal: moving limbs comfortably with not deficits or deformities  Skin: no rashes noted    A/P  HTN - Very elevated - salty food and etoh last night  Took BP meds about 30min  Continue current regimen that he recently started taking consistently  RTC BP check with MTM    CAD and cardiomyopathy -   start lipitor started by cardiology   Stress test 10/12/17    Anemia -   Check FOBT (neg 2012)      "

## 2021-06-14 NOTE — TELEPHONE ENCOUNTER
Received MTM referral from Hospital Discharge Transitions of Care     Patient was not reachable after several attempts, will route to MTM Pharmacist/Provider as an FYI. Left MTM scheduling information on patients voicemail.    Thank you for the referral,    Erwin Rachel, MTM coordinator

## 2021-06-14 NOTE — TELEPHONE ENCOUNTER
Caller state  he passed out from drinking in the snow for <  1 hr and now his fingers are blistered and the tips are black.   Caller is inside now   Speech is slurred   Triage protocol reviewed    Advised ED immediately; Call EMS if unable to obtain ride   Nora Torrez RN  FNA   .   Reason for Disposition    Slurred speech    Additional Information    Negative: Unconscious    Protocols used: FROSTBITE-A-AH

## 2021-06-15 NOTE — PROGRESS NOTES
Thank you for asking the [facility] Heart Care team to see Eber Jin.      Assessment/Plan:     Cardiomyopathy - The patient appears fluid up, but no severely.  We discussed that he needs to take his medications to control his blood pressure so as to avoid another heart failure admission. He has insurance again and will stop by his pharmacy. If there are medications he cannot afford he is to call us so we can see if there is a cheaper alternative.      RTC 3 months, Dr. Nava in 1-2 weeks           Current History:   Eber Jin is a 57 y.o. with ischemic cardiomyopathy with an ejection fraction of 40%.  He has coronary disease with recurrent stress testing showing inferior lateral infarct but no inducible ischemia.  The patient also has severe peripheral arterial disease and follows with Dr. Grady.  He has difficult to control hypertension and hyperlipidemia.  He continues to drink and smoke heavily with no plans to cut back on either. Holter has showed normal sinus rhythm without bradycardia or tachycardia and a stress test showed no inducible ischemia but persistent inferolateral infarction.       Since his last visit 3 months ago he lost his insurance and has not taken his medications. Eber notes dyspnea when he lays down or walks too fast. His chest pain is rare.  He walks slowly and so he does not have to stop due to leg pain.  He denies PND or orthopnea.  He continues to lose weight and denies loss of appetite.      Past Medical History:     Past Medical History:   Diagnosis Date     CAD (coronary artery disease)      CHF (congestive heart failure)      Heart failure      HLD (hyperlipidemia)      HTN (hypertension)      Myocardial infarction      Peripheral vascular disease with claudication        Past Surgical History:     Past Surgical History:   Procedure Laterality Date     CARDIAC CATHETERIZATION         Family History:     Family History   Problem Relation Age of Onset     Heart  "failure Mother        Social History:    reports that he has been smoking Cigarettes.  He has a 15.00 pack-year smoking history. He has never used smokeless tobacco. He reports that he drinks about 0.6 oz of alcohol per week  He reports that he does not use illicit drugs.    Meds:     Current Outpatient Prescriptions   Medication Sig     allopurinol (ZYLOPRIM) 100 MG tablet Take 100 mg by mouth daily.     aspirin 81 MG EC tablet Take 1 tablet (81 mg total) by mouth daily.     atorvastatin (LIPITOR) 80 MG tablet Take 1 tablet (80 mg total) by mouth at bedtime.     carvedilol (COREG) 12.5 MG tablet Take 1 tablet (12.5 mg total) by mouth 2 (two) times a day.     furosemide (LASIX) 40 MG tablet Take 40 mg by mouth daily.     losartan (COZAAR) 100 MG tablet Take 1 tablet (100 mg total) by mouth daily.     spironolactone (ALDACTONE) 50 MG tablet Take 50 mg by mouth daily.       Allergies:   Penicillins    Review of Systems:   Review of Systems:   General: Night Sweats  Eyes: WNL  Ears/Nose/Throat: WNL  Lungs: Cough, Shortness of Breath  Heart: Chest Pain, Shortness of Breath with activity  Stomach: WNL  Bladder: WNL  Muscle/Joints: WNL  Skin: WNL  Nervous System: Dizziness  Mental Health: WNL     Blood: WNL       Objective:      Physical Exam  @LASTENCWT:3@  5' 11.25\" (1.81 m)  @BMI:3@  BP (!) 176/104 (Patient Site: Right Arm, Patient Position: Sitting, Cuff Size: Adult Large)  Pulse (!) 104  Resp 20  Ht 5' 11.25\" (1.81 m)  Wt 174 lb (78.9 kg)  BMI 24.1 kg/m2    General Appearance:   Alert, cooperative and in no acute distress.   HEENT:  No scleral icterus; the mucous membranes were pink and moist.   Neck: JVP flat. No thyromegaly. No HJR   Chest: The spine was straight. The chest was symmetric.   Lungs:   Respirations unlabored; the lungs are clear to auscultation.   Cardiovascular:   S1 and S2 normal and without murmur. No clicks or rubs. No carotid bruits noted. Right DP, PT, and radial pulses 2+. Left DP, PT, and " radial pulses 2+.   Abdomen:  No organomegaly, masses, bruits, or tenderness. Bowels sounds are present   Extremities: No cyanosis, clubbing, or edema.   Skin: No xanthelasma.   Neurologic: Mood and affect are appropriate.         Lab Review   Lab Results   Component Value Date     09/11/2017     08/26/2017     08/24/2017    K 3.9 09/11/2017    K 4.3 08/26/2017    K 3.9 08/25/2017     09/11/2017     08/26/2017     (H) 08/24/2017    CO2 26 09/11/2017    CO2 28 08/26/2017    CO2 23 08/24/2017    BUN 9 09/11/2017    BUN 20 08/26/2017    BUN 18 08/24/2017    CREATININE 0.88 09/11/2017    CREATININE 1.10 08/26/2017    CREATININE 0.99 08/24/2017    CALCIUM 9.4 09/11/2017    CALCIUM 9.8 08/26/2017    CALCIUM 8.9 08/24/2017     Lab Results   Component Value Date    WBC 5.8 09/11/2017    WBC 7.4 08/24/2017    WBC 6.1 07/02/2016    HGB 13.6 (L) 09/11/2017    HGB 11.6 (L) 08/24/2017    HGB 14.2 07/02/2016    HCT 41.0 09/11/2017    HCT 35.2 (L) 08/24/2017    HCT 43.4 07/02/2016    MCV 89 09/11/2017    MCV 90 08/24/2017    MCV 86 07/02/2016     09/11/2017     08/24/2017     07/02/2016     Lab Results   Component Value Date    CHOL 218 (H) 09/11/2017    CHOL 263 (H) 11/21/2013    CHOL 226 (H) 09/10/2012    TRIG 148 09/11/2017    TRIG 150 (H) 11/21/2013    TRIG 226 (H) 09/10/2012    HDL 37 (L) 09/11/2017    HDL 54 11/21/2013    HDL 42 09/10/2012    LDLDIRECT 139 (H) 02/16/2015    LDLDIRECT 135 (H) 06/17/2014    LDLDIRECT 136 (H) 07/23/2013     Lab Results   Component Value Date     (H) 09/11/2017    BNP 1017 (H) 08/24/2017    BNP 31 11/21/2013         Sia Elizondo M.D.

## 2021-06-15 NOTE — TELEPHONE ENCOUNTER
Last office visit: 12/02/2019  Last ordered: 01/03/2021  Last lab check: 01/02/2021  Next appointment: None     Chart reviewed. Please review findings below.      CAD (coronary artery disease)  PAD (peripheral artery disease) (H)  Restart meds:  - aspirin 81 MG EC tablet; Take 1 tablet (81 mg total) by mouth daily.; Refill: 0  - atorvastatin (LIPITOR) 80 MG tablet; Take 1 tablet (80 mg total) by mouth at bedtime.  Dispense: 10 tablet; Refill: 0

## 2021-06-15 NOTE — TELEPHONE ENCOUNTER
Refill Request  Did you contact pharmacy: No  Medication name:   Requested Prescriptions     Pending Prescriptions Disp Refills     aspirin 81 MG EC tablet 30 tablet 0     Sig: Take 1 tablet (81 mg total) by mouth daily.     Who prescribed the medication: Dr. Nava   [facility]ed [facility]  Is patient out of medication: UNKNOWN  Patient notified refills processed in 3 business days:  yes  Okay to leave a detailed message: no

## 2021-06-15 NOTE — PROGRESS NOTES
MIHAI Jin is a 57 y.o. male here for blood pressure check and shortness of breath.  He had been out of his medications for about 3 months while he did not have insurance.  He saw cardiology yesterday (please see their note for details).  He picked up his medications again yesterday, and took all 7 of them this morning.  He does not know the names, and did not bring them along today.    Gets short of breath when lying down in middle of the night. Easier to breathe when sitting up. Wheezes only very occasionally. No snoring. Feels congested in chest, like there's something he cant' cough up.  He does not have chest pain, and the symptoms do not come on while he is walking around or going up the stairs.  Past Medical History:   Diagnosis Date     CAD (coronary artery disease)      CHF (congestive heart failure)      Heart failure      HLD (hyperlipidemia)      HTN (hypertension)      Myocardial infarction      Peripheral vascular disease with claudication      Current Outpatient Prescriptions on File Prior to Visit   Medication Sig Dispense Refill     allopurinol (ZYLOPRIM) 100 MG tablet Take 100 mg by mouth daily.       aspirin 81 MG EC tablet Take 1 tablet (81 mg total) by mouth daily.  0     atorvastatin (LIPITOR) 80 MG tablet Take 1 tablet (80 mg total) by mouth at bedtime. 90 tablet 0     carvedilol (COREG) 12.5 MG tablet Take 1 tablet (12.5 mg total) by mouth 2 (two) times a day. 60 tablet 11     furosemide (LASIX) 40 MG tablet Take 40 mg by mouth daily.       losartan (COZAAR) 100 MG tablet Take 1 tablet (100 mg total) by mouth daily. 30 tablet 11     spironolactone (ALDACTONE) 50 MG tablet Take 50 mg by mouth daily.       No current facility-administered medications on file prior to visit.      Social Hx:  Smokes about 1/2 ppd, has done so for about 30 years.   ?  ROS:   General: No fevers, chills  Nose: no nasal congestion    CV: No chest pain or palpitations.  Resp: See HPI.   MS: No swelling in  "ankles or feet  ?  O  BP (!) 168/112  Pulse 84  Temp 97.6  F (36.4  C) (Oral)   Resp 20  Ht 5' 11.25\" (1.81 m)  Wt 168 lb (76.2 kg)  BMI 23.27 kg/m2   Vitals reviewed. Nursing note reviewed.  General Appearance: no acute distress  HEENT: TMs clear, oropharynx without edema or exudate, mucous membranes moist, neck supple, no lymphadenopathy  CV: RRR, no murmur, rubs, gallops  Resp: No respiratory distress. Clear to auscultation bilaterally. No wheezes, rales, rhonchi  Ext: No peripheral edema, good distal perfusion  Skin: warm, dry, intact, no rash noted  Neuro: no focal deficits, CNs II-XII normal.   VERONICA/SEGUN Velázquez was seen today for blood pressure check.    Diagnoses and all orders for this visit:    Shortness of breath: Performed spirometry to see if he meets criteria for COPD.  However, his FEV1/FVC is normal, with one value being .78 and the next .81. Even though he does not meet criteria for COPD today, he still may have some bronchoconstriction from his smoking. Will try albuterol inhaler to see if this provides improvement in shortness of breath.   -     Spirometry without bronchodilator  -     albuterol (PROAIR HFA;PROVENTIL HFA;VENTOLIN HFA) 90 mcg/actuation inhaler; Inhale 2 puffs every 6 (six) hours as needed for wheezing.  -     inhalational spacing device Spcr; Use with inhaler every 6 hours as needed for wheezing, coughing or shortness of breath.    Smoking: Encouraged smoking cessation, for the benefit of his lung and heart health.  He did not want to discuss this further today so we did not discuss any smoking cessation aids.    Orthopnea: His symptoms of shortness of breath at nighttime while lying down sound suggestive of orthopnea.  He had been off of his medications for heart failure for the past 3 months, and may have had some mild pulmonary edema causing this.  Today, I do not hear any crackles in his lungs, and he does not have lower extremity edema.  Advised that we should give it at least " "a week of being back on his medications, to see if this improves his symptoms.  Of note, he does not have exertional symptoms suggestive of angina.  Of course, if he develops chest pain or severe shortness of breath, he should go to the ER.  Note from Dr. Elizondo of Cardiology states that he has had \"Recurrent stress testing... Showing no inducible ischemia\" but I am unable to view these results in the medical record.     Ischemic cardiomyopathy - EF 40%: per Dr. Elizondo yesterday, no new recommendations beyond resuming medications.    Essential hypertension with goal blood pressure less than 140/90: His blood pressure is still very poorly controlled today. Readings were 170/110, 168/112, 178/108.  Encouraged him to continue taking his medications and blood pressure will be rechecked in 1 week during visit with PCP Dr. Nava.      Options for treatment and follow-up care were reviewed with the patient and/or guardian. Eber Jin and/or guardian engaged in the decision making process and verbalized understanding of the options discussed and agreed with the final plan.    Graciela Simms MD      "

## 2021-06-16 NOTE — PROGRESS NOTES
Clinic Care Coordination Contact  [facility]/Voicemail    Clinical Data: Care Coordinator Outreach  Outreach attempted x 1.  Left message on patient's voicemail with call back information and requested return call. Plan:will try to reach patient again in 1-2 business days.

## 2021-06-16 NOTE — TELEPHONE ENCOUNTER
Telephone Encounter by Juan Gonzalez CMA at 8/6/2019  3:48 PM     Author: Juan Gonzalez CMA Service: -- Author Type: Certified Medical Assistant    Filed: 8/6/2019  3:49 PM Encounter Date: 8/2/2019 Status: Signed    : Juan Gonzalez CMA (Certified Medical Assistant)       Sia Elizondo MD  You; Elisabeth Nava MD 55 minutes ago (2:52 PM)      Xarelto 20mg daily would be the best option.     EG    Routing comment

## 2021-06-16 NOTE — PROGRESS NOTES
Assessment & Plan     Hospital discharge follow-up  Reviewed discharge summary from hospital and from the TCU  Reviewed imaging, labs, med list  - Ambulatory referral to Care Management (Primary Care)    Right pontine stroke (H)  Left-sided weakness  Currently getting home health and PT and OT.  - Ambulatory referral to Care Management (Primary Care)  Encouraged he attend the follow-up appointments with neurology and physical and rehab medicine as scheduled    Atrial fibrillation with RVR (H)  Paroxysmal atrial flutter (H)  Continue current regimen and discussed the importance of taking meds consistently  - aspirin 81 MG EC tablet  Dispense: 90 tablet; Refill: 3  - metoprolol tartrate (LOPRESSOR) 50 MG tablet  Dispense: 180 tablet; Refill: 3  - rivaroxaban ANTICOAGULANT (XARELTO) 20 mg tablet  Dispense: 90 tablet; Refill: 3      Ischemic cardiomyopathy - EF 40%  Chronic systolic heart failure (H)  10 you current medication  - aspirin 81 MG EC tablet  Dispense: 90 tablet; Refill: 3  - atorvastatin (LIPITOR) 80 MG tablet  Dispense: 90 tablet; Refill: 3  - losartan (COZAAR) 100 MG tablet  Dispense: 90 tablet; Refill: 3  - spironolactone (ALDACTONE) 25 MG tablet  Dispense: 90 tablet; Refill: 3      PAD (peripheral artery disease) (H)  Discussed taking pain meds and quitting smoking    Chronic obstructive pulmonary disease, unspecified COPD type (H)  Currently well managed  - albuterol (PROAIR HFA;PROVENTIL HFA;VENTOLIN HFA) 90 mcg/actuation inhaler  Dispense: 1 each; Refill: 1    Benign essential hypertension  - furosemide (LASIX) 40 MG tablet  Dispense: 90 tablet; Refill: 3    Transient blindness of right eye  - Ambulatory referral to Ophthalmology    Neuropathic pain  - gabapentin (NEURONTIN) 300 MG capsule  Dispense: 90 capsule; Refill: 11    Low magnesium level  - magnesium oxide (MAG-OX) 400 mg (241.3 mg magnesium) tablet  Dispense: 270 tablet; Refill: 3    Polypharmacy  Noncompliance with medication  regimen  Medications reconciled between discharge summary in [facility] med list.  Refills ordered for 3-month supply to try to increase compliance.  He does have home health that just started this week.  - medical supply, miscellaneous (TABLET CUTTER) Misc  Dispense: 1 each; Refill: 1    Financial problems  Since he has had a stroke and has more physical difficulties.  He may benefit from community resources as well as help with navigating his multiple appointments.  - Ambulatory referral to Care Management (Primary Care)    Encounter for smoking cessation counseling  Discussed smoking cessation and he is willing to try the nicotine patch  - nicotine (NICODERM CQ) 21 mg/24 hr  Dispense: 45 patch; Refill: 0      Review of external notes as documented in note  Diagnosis or treatment significantly limited by social determinants of health - Low health literacy and poverty  47 minutes spent on the date of the encounter doing chart review, history and exam, documentation and further activities per the note  [id number]}     Tobacco Cessation:   reports that he has been smoking cigarettes. He has a 15.00 pack-year smoking history. He has never used smokeless tobacco.  I have counseled the patient for tobacco cessation and prescribed the patient a tobacco cessation medication and the follow up will occur  at the next visit.      Return in about 4 weeks (around 4/29/2021) for Annual physical.    Elisabeth Nava MD  [facility]   Eber WIGGINS Davin is 60 y.o. and presents today for the following health issues   HPI   Here for f/u on hospital and recent stroke  He is also c/o Right eye blindness for 10min about 4 days ago and self resolved    Left thigh stiffness and weakness as sequela of CVA  H/o excessive alcohol use prior to stroke - per wife  Social - appointment for social security next week    Hospital Follow-up Visit:    Hospital/Nursing Home/IP Rehab Facility: [facility]  Date of Admission: 3/5/21-  3/9/21 for acute stroke -> hospital to TCU  Date of Discharge: . then 3/9/21 - 3/23/21 at TCU  Reason(s) for Admission:   Acute cerebrovascular accident (H)    Other Dx:  AFib/Flutter  HTN  CAD s/p MI  Ischemic cardiomyopathy - EF 45%   Low Mag  COPD  Neuropathic pain  PVD  smoking        Was your hospitalization related to COVID-19? No  Problems taking medications regularly:  None  Medication changes since discharge: None  Problems adhering to non-medication therapy:  None    Summary of hospitalization:  [facility] discharge summary reviewed  Diagnostic Tests/Treatments reviewed.  Follow up needed: 04/07/2021Office VisitNeurologParviz Madrid MD    Other Healthcare Providers Involved in Patient s Care:         Homecare, Specialist appointment - neurology, physical medicine, Physical Therapy and MTM  Update since discharge: improved.      Post Discharge Medication Reconciliation: unable to reconcile discharge medications due to did not bring bottles to clinic. Home health RN referral in place. Reconciled discharge summary med list with epic clinic chart med list.   Plan of care communicated with patient and family         Review of Systems   Please see above.  The rest of the review of systems are negative for all systems.     Current Outpatient Medications   Medication Sig     aspirin 81 MG EC tablet Take 1 tablet (81 mg total) by mouth daily.     atorvastatin (LIPITOR) 80 MG tablet Take 1 tablet (80 mg total) by mouth at bedtime.     furosemide (LASIX) 40 MG tablet Take 1 tablet (40 mg total) by mouth daily.     losartan (COZAAR) 100 MG tablet Take 1 tablet (100 mg total) by mouth daily.     magnesium oxide (MAG-OX) 400 mg (241.3 mg magnesium) tablet Take 400 mg by mouth 3 (three) times a day.     medical supply, miscellaneous (TABLET CUTTER) Misc Use daily to cut spironolactone in half     metoprolol tartrate (LOPRESSOR) 50  "MG tablet Take 50 mg by mouth 2 (two) times a day.     rivaroxaban ANTICOAGULANT (XARELTO) 20 mg tablet Take 1 tablet (20 mg total) by mouth daily.     spironolactone (ALDACTONE) 25 MG tablet Take 25 mg by mouth daily.     albuterol (PROAIR HFA;PROVENTIL HFA;VENTOLIN HFA) 90 mcg/actuation inhaler Inhale 2 puffs every 6 (six) hours as needed for wheezing or shortness of breath.     gabapentin (NEURONTIN) 300 MG capsule Take 300 mg by mouth daily.           Objective    /80   Pulse 67   Temp 98.2  F (36.8  C) (Oral)   Resp 14   Ht 5' 10.39\" (1.788 m)   Wt 179 lb 9.6 oz (81.5 kg)   SpO2 95%   BMI 25.48 kg/m    Body mass index is 25.48 kg/m .  Physical Exam    Vitals:    04/01/21 1416   BP: 122/80   Pulse: 67   Resp: 14   Temp: 98.2  F (36.8  C)   TempSrc: Oral   SpO2: 95%   Weight: 179 lb 9.6 oz (81.5 kg)   Height: 5' 10.39\" (1.788 m)     OBJECTIVE:  Vitals listed above within normal limits  General appearance: well groomed, pleasant, well hydrated, nontoxic appearing  ENT: PERRL, throat clear  Neck: neck supple, no lymphadenopathy, no thyromegaly  Lungs: lungs clear to auscultation bilaterally, no wheezes or rhonchi  Heart: regular rate and rhythm, no murmurs, rubs or gallops  Abdomen: soft, nontender  Neuro: no focal deficits, CN II-XII grossly intact, alert and oriented  Psych:  mood stable, appears to have good insight and judgment      "

## 2021-06-16 NOTE — TELEPHONE ENCOUNTER
PT HE is asking for verbal ok for PT 2w3 to work on strengthening, gait/transfer training, and balance training.  You can respond to this inbasket with the ok or call and leave a message at [phone].  Thank you

## 2021-06-16 NOTE — TELEPHONE ENCOUNTER
Telephone Encounter by Lakesha Carrasco at 8/6/2019 11:21 AM     Author: Lakesha Carrasco Service: -- Author Type: --    Filed: 8/6/2019 11:22 AM Encounter Date: 8/2/2019 Status: Signed    : Lakesha Carrasco       Please note below, preferred medications through this plan are Pradaxa, Xarelto and warfarin.  Would the provider like to switch to a preferred product?    If not please provide documented failure or why preferred drugs would not be appropriate.

## 2021-06-16 NOTE — PROGRESS NOTES
Clinic Care Coordination Contact  The clinic Community Health Worker talked with the patient today at the request of the PCP to discuss possible Clinic Care Coordination enrollment.  The service was described to the patient and immediate needs were discussed.  The patient declined enrollement at this time.  The PCP is encouraged to refer in the future if the patient's needs change.    Patient is receiving PT/OT at home.

## 2021-06-16 NOTE — TELEPHONE ENCOUNTER
Reason for Call: Request for an order or referral:    Order or referral being requested: BP cuff and machine    Date needed: as soon as possible    Has the patient been seen by the PCP for this problem? YES    Additional comments: Pt states he had a stroke and states his home care RN suggest he get a BP machine. Can Dr. Nava put in an order for this? Ok to wait for PCP on Monday 4/5.    Phone number Patient can be reached at:    Cell number on file:    Telephone Information:   Mobile [phone]       Best Time:  anytime    Can we leave a detailed message on this number?  Yes    Call taken on 4/2/2021 at 12:12 PM by Stefan Garza

## 2021-06-16 NOTE — TELEPHONE ENCOUNTER
Request for Orders    Who s Requesting: Home Care Registered Nurse    Orders being requested:   Admission to home care done 3/28/21.  Requesting  SNv 2x2wks then 1x2wks for BP assess, care plan assess/teach, assess symptoms after recent stroke    PT comprehensive eval for strengthening and exercises    Thank you!    Where to send Orders: Epic

## 2021-06-16 NOTE — TELEPHONE ENCOUNTER
Reason for Call:  Home Health Care    Stalin with [facility] called regarding (reason for call): Verbal Orders    Orders are needed for this patient. OT    PT: N/A    OT: family had originally declined services but changed their minds. Eval was completed to day and RN will need verbal orders to continue for 2x a wk for 2 wks starting next week    Skilled Nursing: N/A    Pt Provider: Dr. Nava    Phone Number Homecare Nurse can be reached at: [phone]    Can we leave a detailed message on this number? Yes     Phone number patient can be reached at: Cell number on file:    Telephone Information:   Mobile [phone]       Best Time: anytime    Call taken on 3/30/2021 at 11:03 AM by Ace Butts

## 2021-06-17 NOTE — TELEPHONE ENCOUNTER
Pt just here for physical with PCP on 5/13/21.  Last time had Allopurinol 100 mg was in the hospital back in 2019.  Can covering provider please order med for pt?  Thanks.

## 2021-06-17 NOTE — TELEPHONE ENCOUNTER
Reason for call:  Patient reporting a symptom    Symptom or request: left foot gout    Duration (how long have symptoms been present): since yesterday, unable to walk    Have you been treated for this before? Yes    Additional comments: Pt is wondering if covering provider can send in a medication to [id number] - [place], [place] - [address] AT [address] OF [address]. Please advise.    Phone Number patient can be reached at:    Cell number on file:    Telephone Information:   Mobile [phone]       Best Time:  anytime    Can we leave a detailed message on this number: Yes    Call taken on 5/21/2021 at 8:04 AM by Stefan Garza

## 2021-06-17 NOTE — TELEPHONE ENCOUNTER
Please deliver message below should pt call back/. Thanks.       ----- Message from Elisabeth Nava MD sent at 5/20/2021  1:41 PM CDT -----  Left VM for patient to call back to him inform him:  1. PSA is a little high and I ordered a urology consult  2. Magnesium is a low - I sent rx for magnesium tabs to take one daily  3. HIV and Hep C were negative.       Thanks,   Dr. Elisabeth Nava  5/20/2021

## 2021-06-17 NOTE — TELEPHONE ENCOUNTER
Sent prescription for indomethacin to take 1 tablet twice a day with meals.  He should be seen next week if no improvement.  Okay to schedule appointment with any available provider.

## 2021-06-18 ENCOUNTER — RECORDS - HEALTHEAST (OUTPATIENT)
Dept: ADMINISTRATIVE | Facility: OTHER | Age: 61
End: 2021-06-18

## 2021-06-18 NOTE — PATIENT INSTRUCTIONS - HE
Patient Instructions by Elisabeth Nava MD at 4/1/2021  2:00 PM     Author: Elisabeth Nava MD Service: -- Author Type: Physician    Filed: 4/1/2021  3:02 PM Encounter Date: 4/1/2021 Status: Addendum    : Elisabeth Nava MD (Physician)    Related Notes: Original Note by Elisabeth Nava MD (Physician) filed at 4/1/2021  2:59 PM       04/07/2021 Office Visit Neurology Parviz Russ MD    [facility]    [address]    [place], [place] [id number] [phone] (Fax)       04/26/2021 Office Visit Physical Medicine and Rehab Rupa Riddle MD    [address], [place] [id number] [phone] (Fax)           Quit Smoking  Nicoderm Patches: Patients smoking >10 cigarettes/day: Begin with step 1 (21 mg/day) for 6 weeks, followed by step 2 (14 mg/day) for 2 weeks; finish with step 3 (7 mg/day) for 2 weeks.  Patient Education   Call [phone] ([phone])     Kicking the Smoking Habit  If you smoke, quitting is one of the best changes you can make for your heart and your overall health. Your risk of heart attack goes down within one day of putting out that last cigarette. As you go longer without smoking, your risk goes down even more. Quitting isnt easy, but millions of people have done it. You can, too. Its never too late to quit.  Getting started  Boost your chances of success by deciding on your quit plan. Your health care provider and cardiac rehab team can help you develop this plan. Even if youve already quit, its easy to slip back into smoking.  Your plan can help you avoid and recover from relapse.  In any case, start by setting a date to quit within a month, and do it.    Keys to your quit plan    Talk to your healthcare provider about prescription medicines and nicotine replacement products that help stop the urge to smoke.     Join a support group or quit smoking program. Talking with others about the challenges of quitting  can help you get through them.    Ask other smokers in your household to quit with you.    Look for the cues in your life that you associate with smoking and avoid them.  Track your triggers  What gives you that X-pubo-r-cigarette feeling? List all the situations that make you want a cigarette. Then think of other ways to deal with these situations. Here are some examples:  Situation How I'll handle it   Finishing a meal Get up from the table and take a walk   Having an argument Find a quiet place and breathe deeply   Feeling lonely or bored Call a friend to talk         Tips for quitting successfully    List the benefits of quitting such as reducing heart risks and saving money. Keep this list and review it whenever you feel like smoking.    Get support. Let your friends know you may call them to chat when you have an urge to smoke.    If youve tried to quit before without success, this time avoid the triggers that may cause the relapse.    Make the most of slip-ups. Try to learn from them, and then get back on track.    Be accountable to your friends and your calendar so that you stay on track.  For family and friends    Be supportive and patient. Quitting smoking can be difficult and stressful.    If you smoke, nows a great time to quit. Even if you dont quit, never smoke around your loved one. Secondhand smoke is dangerous to his or her heart.    The best goals are accomplished in teams. Remember that when your loved one states he or she wants to stop smoking.  Date Last Reviewed: 7/1/2016 2000-2019 [employer]. [address] [place] [address], [place] [address]. All rights reserved. This information is not intended as a substitute for professional medical care. Always follow your healthcare professional's instructions.

## 2021-06-19 NOTE — PROGRESS NOTES
Patient No Show for RN Assessment as scheduled for today.    Care Guide Delegation:  Due Date: Within 2 weeks from today's date 8/6/18  Delegation: No Show  (1) for RN Assessment. Please Follow unsuccessful outreach, no show standard work.

## 2021-06-19 NOTE — PROGRESS NOTES
Attempt 2: CG placed a call to the patient and left a message regarding enrolling in the CCC program and requested a call back at the provided phone number.

## 2021-06-19 NOTE — LETTER
Letter by Mariama Nielsen DO at      Author: Mariama Nielsen DO Service: -- Author Type: --    Filed:  Encounter Date: 12/4/2019 Status: Signed         December 4, 2019     Patient: Eber Jin   YOB: 1960   Date of Visit: 12/4/2019       To Whom It May Concern:    It is my medical opinion that Eber Jin may return to work on 12/6.  Pt absence was required from work from 11/28/19 through 12/4/19 for an ongoing medical issue.  Pt may return to work on 12/16.    If you have any questions or concerns, please don't hesitate to call.    Sincerely,        Electronically signed by Mariama Nielsen DO

## 2021-06-19 NOTE — LETTER
Letter by Mariama Nielsen DO at      Author: Mariama Nielsen DO Service: -- Author Type: --    Filed:  Encounter Date: 12/4/2019 Status: Signed         December 4, 2019     Patient: Eber Jin   YOB: 1960   Date of Visit: 12/4/2019       To Whom It May Concern:    It is my medical opinion that Eber Jin may return to work on 12/6/19.  Mr Jin's absense from work was required 11/28-12/4 secondary to ongoing medical treatment at [facility] under my care.  Please allow for him to return to work on 12/16.    If you have any questions or concerns, please don't hesitate to call.    Sincerely,        Electronically signed by Mariama Nielsen DO

## 2021-06-19 NOTE — PROGRESS NOTES
Programs applying for: Health Insurance/[facility]  Financial Worker:  Case #      9/04/2018: Left message for patient, 3x. Referring back to Care Guide. Closing encounter.    8/28/2018:Left message for patient to reschedule FRG appointment, I will reach out next week.   Attempt X2    8/16/2018: Left message for patient to reschedule FRG appointment, I will reach out next week.   Attempt X1    08/15/2018: Patient NS his appointment to apply for Health Insurance/[facility] Benefits. I will reach out to patient on Thursday, 8/16.    08/09/2018: Spoke with patient, patient works for [employer] part time and they do not offer health insurance benefits. Patient would like to meet with FRG to apply for health insurance through [facility]. Patient reported he hasn't had health insurance for 2 years and does not know why his SSI was termed. Patient is  but going through divorce process. Wife receives SSDI, patient will bring proof of disability. Patient does not have bank account, wife has savings account that he will bring bank statements. Patient is paying $1250 in rent per month and will bring receipt. He will bring utility bills/vehicale bills/medical bills (states there are many medical bills) Scheduled patient to meet with me on 9/15 @ [facility], 9 am. Plan is to apply for insurance through [employer] first to see what patient is eligible for and then complete combined application.    08/09/2018: Spoke with patient, he requested I call him on 8/10 at 8:30 am to discuss [place] insurance and [facility] benefits, I will call him on 8/10.    Referral:  Eber Yee had his PCAM yesterday and would like to have a call from you to schedule a time to complete a CAF and to also apply for [place] or [facility] health insurance. He has a HE debt of around $9,000 at this time because he has been going to the ER a lot and because he has not had health insurance for some time. He is employed by [employer] I believe, and he reports he  owes [facility] $15,000. So his finances are his main concern at this time.          Thanks so much!

## 2021-06-19 NOTE — PROGRESS NOTES
Attempt 1: CG placed a call to the patient and left a message regarding enrolling in the CCC program and requested a call back at the provided phone number.

## 2021-06-19 NOTE — PROGRESS NOTES
My Clinic Care Coordination Care Plan    [facility]  [address] [place]  [address]  [phone]      My Preferred Method of Contact:  Phone: [phone]    My Primary/Preferred Language:  English    Preferred Learning Style:  Reading information, Face to face discussion, Pictures/Diagrams and Hands on teaching    Emergency Contact: Extended Emergency Contact Information  Primary Emergency Contact: Dinorah Baeza   [facility]  Home Phone: [phone]  Mobile Phone: [phone]  Relation: Spouse     PCP:  Elisabeth Nava MD  Specialists:    Care Team            Elisabeth Nava MD PCP - General, Family Medicine    [phone]     he olaf Lacey, RN Registered Nurse    [place]. MEV. [employer] $4 list.     Amanda MARTIN Vivek [facility] Care Coordination Care Guide, Clinic Care Coordination    PH: [phone] Fax: [phone]          Hospitalizations and/or ED Visits  Most Recent: 7/13/18     Previous:  6/12/18  Reason:  Chest Pain and Flank Pain    Concerns regarding my health I am most concerned about my hear condition, high blood pressure and PVD    Advanced Directive/Living Will: I will receive assistance with a Healthcare Directive from [facility] elected to enroll in the [facility] Care Coordination.  Eber was given a copy of the Clinic Care Coordination brochure and full description of how to access their care team both during clinic hours and after hours.   My Care Guide's Name and Phone Number:  Amanda Doyle [phone]  The Care Guide and myself agreed to be in contact monthly.      Medication Update  The patient's medication list is up to date per patient, but patient needs to go to [facility] to get his medications as he has no health insurance at this time and has been unable to take his medications. [facility] has generic medications he can take that are cost effective. This information was provided at nurse assessment.    [facility]  Maintenance and Immunization Update  The patient's preventive health screenings and immunizations are up to date per patient.    My Emergency Plan  Heart Failure    You have been diagnosed with heart failure. The term heart failure sounds scary because it suggests the heart is no longer working. But it actually means the heart isn't doing its job as well as it should. Heart failure happens when your heart muscle can't keep up with your body's need for blood flow. Symptoms of heart failure can be controlled by changes in your lifestyle and by following your doctor's advice.  When to call your doctor  Call your doctor right away if you have any of these signs of worsening heart failure:    Sudden weight gain (more than 2 pounds in 1 day or 5 pounds in 1 week, or whatever weight gain you were told to report by your doctor)    Trouble breathing not related to being active    New or increased swelling of your legs or ankles    Swelling or pain in your abdomen    Breathing trouble at night (waking up short of breath, needing more pillows to breathe)    Frequent coughing that doesn't go away    Feeling much more tired than usual  When to seek emergency medical attention  Call 911 right away if you have:    Severe shortness of breath, such that you can't catch your breath even while resting    Severe shortness of breath    Severe chest pain that does not resolve with rest or nitroglycerin    Pink, foamy mucus with cough and shortness of breath    A continuous rapid or irregular heartbeat    Passing out or fainting    Stroke symptoms such as sudden numbness or weakness on one side of your face, arm, or leg or sudden confusion, trouble speaking or vision changes  Understanding the Link between High Blood Pressure and Stroke    Each day that your blood pressure is too high, your chances of having a stroke are increased. Normal blood pressure is considered to be less than 120 over less than 80 millimeters of mercury (mmHg) or  120/80 mmHg. A stroke is a loss of brain function caused by a lack of blood to the brain. Stroke can result from the damage that ongoing high blood pressure causes in your vessels. If the affected vessel stops supplying blood to the brain, a stroke results.     Know the symptoms of stroke  During a stroke, blood supply to the brain is cut off. But with prompt medical help, a better recovery is more likely. Think of a stroke as a brain attack. Don t wait. Call 911 if you have any of the symptoms below:   Sudden weakness or numbness on one side of the face or body, including a leg or an arm   Sudden trouble seeing with one or both eyes   Sudden double vision   Sudden trouble talking, such as slurred speech   Sudden severe headache   Sudden problems using or understanding words   Sudden dizziness or loss of balance   Seizures for the first time    Any of these symptoms that occur and then resolve      Alcoholism: Getting Help  Facing a problem with alcohol can be hard. Once a person decides to get help, it can be found in many places. Below you will find resources that can give you more information. They can also help you find treatment.     Primary care  Speak with your primary health care provider. Sometimes your health care provider can provide medication to help you stop drinking. If not, he or she can refer you to a specialist.  Professional care  This kind of care can be inpatient. It means you spend a period of time in a facility. Or it can be outpatient. This means you come and go. The facilities have medical support and can help a person detox. Most health insurance plans will cover at least some treatment. To find this kind of care, talk to your health care provider or a counselor. Or go to a mental health clinic and ask for information. You can also go online to: Substance Abuse and Mental Health Services Administration at www.samhsa.gov/treatment.  Alcoholics anonymous (AA)  AA helps members get sober and  stay sober. They help you build healthy patterns of living. Everyone is welcome at an AA meeting. You do not have to identify yourself. Some people find it easier to go to the first meeting with a friend. To find a meeting near you, contact AA online at www.aa.org. Or look in the phone book for the number of a local chapter.  The road to recovery  Many people with alcoholism can give up alcohol for good. But change may not be easy or quick. Treatment is only a start. Relapses can be common. A relapse is not a sign of failure. Instead, it means treatment should continue. Once a person stops drinking, support is needed for them to stay sober. After care programs and groups, such as AA, are good for this kind of support.     Helpful websites    National [place] on Alcohol Abuse and Addiction   www.niaaa.nih.gov/alcohol-health    National [place] on Alcoholism and Drug Dependence, Inc. (NCADD)   www.ncadd.org    Alcoholism Anonymous   www.aa.org    Substance Abuse and Mental Health Services Administration (SAMHSA)   www.samhsa.gov/treatment       All [facility] clinic patients have access to a Nurse 24 hours a day, 7 days a week.  If you have questions or want advice from a Nurse, please know [facility] is here for you.  You can call your clinic and they will connect you or you can call Care [facility] at [phone].  [facility] also has Walk In Care clinics in multiple locations.  Call the number listed above for more information about our Walk In Care clinics or visit the [facility] website at [email or web address].    Patient Support  I will ask my emergency contact for help in supporting me in these goals.  Relationship to patient: Wife  Cell # : [phone] , best time to call anytime

## 2021-06-19 NOTE — PROGRESS NOTES
HPI - 58 yo male here for hospital f/u.       Admitted 7/13-7/14/18 for atypical chest pain with alcohol abuse, HTN urgency, and noncompliance. H/o ischemic cardiomyopathy, PVD, MI, HTN  Per D/C summary -   CXR -neg  Low sodium low 135 on 7/14/18  Mag elevated 2.8  Glucose 135    Abnl ECG   Echo on 7/13/18:     When compared to the previous study dated 8/25/2017, the EF has decreased    Left ventricle ejection fraction is moderately decreased. The calculated left ventricular ejection fraction is 34%.    Mild concentric left ventricular hypertrophy    Normal right ventricular size.    Mild aortic insufficiency  F/u with PCP and CHF clinic on 8/3/18  Low sodium diet  Stop spironolactone    ER on 6/12/18 for rightsided flank pain    Today - he is only taking 3 meds from hospital. Did not bring bottles with him today.   He does not have insurance.   Smoking 1 ppd and drinking alcohol excessively    Current Outpatient Prescriptions   Medication Sig     aspirin 81 MG EC tablet Take 1 tablet (81 mg total) by mouth daily.     atorvastatin (LIPITOR) 80 MG tablet Take 1 tablet (80 mg total) by mouth at bedtime.     carvedilol (COREG) 12.5 MG tablet Take 1 tablet (12.5 mg total) by mouth 2 (two) times a day.     furosemide (LASIX) 40 MG tablet Take 1 tablet (40 mg total) by mouth daily.     inhalational spacing device Spcr Use with inhaler every 6 hours as needed for wheezing, coughing or shortness of breath.     losartan (COZAAR) 100 MG tablet Take 1 tablet (100 mg total) by mouth daily.     albuterol (PROAIR HFA;PROVENTIL HFA;VENTOLIN HFA) 90 mcg/actuation inhaler Inhale 2 puffs every 6 (six) hours as needed for wheezing.     allopurinol (ZYLOPRIM) 100 MG tablet Take 100 mg by mouth daily.     Vitals:    07/19/18 0926   BP: 130/86   Patient Site: Left Arm   Patient Position: Sitting   Cuff Size: Adult Regular   Pulse: 72   Resp: 20   Temp: 97.8  F (36.6  C)   TempSrc: Oral   SpO2: 98%   Weight: 162 lb 2 oz (73.5 kg)  "  Height: 5' 11\" (1.803 m)     OBJECTIVE:  Vitals listed above within normal limits  General appearance: well groomed, pleasant, well hydrated, nontoxic appearing  ENT: PERRL, throat clear  Neck: neck supple, no lymphadenopathy, no thyromegaly  Lungs: lungs clear to auscultation bilaterally, no wheezes or rhonchi  Heart: regular rate and rhythm, no murmurs, rubs or gallops  Abdomen: soft, nontender  Neuro: no focal deficits, CN II-XII grossly intact, alert and oriented  Psych:  mood stable, appears to have good insight and judgment    A/P  Hospital f/u   Review chart, ER notes, labs.   Referral to [facility] care coordination     ischemic cardiomyopathy -   Needs apt with CHF clinic      Alcohol abuse with mildly elevated LFT  Check LFT, GGT, and u/s  Discussed treatment but he is not interested at this time  Not interested in discussing depression or mood     HTN - improved today with meds from ER but unclear which one he is taking    Low sodium - recheck   High Mag - recheck   Elevated glucose - check A1c    Orders Placed This Encounter   Procedures     US Abdomen Complete     Comprehensive Metabolic Panel     GGT (Gamma GT)     Magnesium     Lipase     Glycosylated Hemoglobin A1C     Lipid Cascade     Uric Acid     Ambulatory referral to Care Management (Primary Care)         "

## 2021-06-19 NOTE — PROGRESS NOTES
RN Recommendations and Referrals  Pharm-D consult/follow up: As needed. care guide to assist with scheduling if need more assistance or resources.   Financial resource guide consult/follow-up: HE debt balance, apply for [place]are, SNAP.   CCC : SSI annd student loan.   CCC RN: MEV scheduled,   Dental exam: Care guide offer information or list fo low cost to free dental clinics.  Eye exam: Care guide to offer resources.    Action Plan    RN Will  Schedule MEV (Medication Evaluation Visit)  Schedule CCC  consult  Will add the patient to [facility] RN tracking list    Care Guide Will    Within 2 weeks from the RN Assessment visit of 8/8/18. Review PCAM and reach out to patient to create or add to action plan. Have FRG reach out to patient: He also has HE medical bills and no health insurance.     Goals  Goals        Patient Stated      I want help with finding and applying for health insurance ( [place], M[place]are, etc)  and help with my medical bills I have with  [facility].  (pt-stated)            Action steps to achieve this goal  1.  I will answer my phone and be available to talk with my [facility] team/ staff.   2.  Best phone number to reach me is [phone].   3.  I will provide information required and sign NANO form for the [facility] staff to help me with my applications.     Date goal set:  8/8/18        I want help with understanding  the SSI appeal and I need resoures for my student loan debt.  (pt-stated)            Action steps to achieve this goal  1.  I will meet with the  SW scheduled.   2.  I will talk with my care guide at outreach calls. I can be reached at [phone].  3.  I will sign NANO for the [facility] SW as needed.     Date goal set:  8/8/28         Other      Patient will have improved medication compliance and symptoms are manageable.             Action steps to achieve this goal  1.  Patient will go to [employer] to get a print out of his mediation cost.   2.  Patient will have his medications  "filled at [facility] ( $4 generic) . Patient has the $4 generic list and his medication list.   3.  Patient will met with the Financial Resource Guide to apply for health insurance.   4. Patient will go to Medication Education Visit on 8/14/18 and bring his medication bottles.     Date goal set:  8/8/18            Clinic Care Coordination RN Assessed Needs  Patient Centered Assessment Method-PCAM TOTAL SCORE: 28 (8/8/2018  3:28 PM)  Level 2:  A score of 25-36 indicates that the patient has a moderate initial need for RN or SW intervention at the discretion of the .  The RN will add this patient to her panel and follow closely in partnership with the care guide until stable.  She will reach out to the care guide for support in care coordination needs and graduate the patient to standard care guide outreach when appropriate.      PCAM (Patient Centered Assessment Method)   HEALTH AND WELL-BEING  Physical Health Concerns: History of Heart Attack  Other Physical Health Concerns:: Poor medication compliance due to lack of health insurance. Tobacco smoker 1/2 PPC menthol. Smoking at age 14 years old. Denies SOB or dyspnea. Have not  used an inhlaer \" in a while.\" Working part time at [employer]. Had SSI benefit in the past. [facility] has about 20 tablets in each medicaiton bottles at home and was able to pay for it  RN Assessment: Physical Health Needs: Mild vague physical symptoms or problems- but do not impact on daily life or are not of concern to client  RN Assessment: Physical Health Problems: Mild impact on mental well-being e.g. \"feeling fed-up\", \"reduced enjoyment\"  Mental Health Concerns: Denies concerns that require further investigation  Other Mental Health Concerns:: [facility] have good support from wife. [facility] work every other weekends.   RN Assessment:Other Mental Well-Being Concern: No identified areas of concern  Lifestyle/Habit Concerns: Tobacco use, Alcohol use  Other Lifestyle/Habit Concerns:: Been about " "30 days since he last drank alcoho. States will drink up to a pint of vodka and OJ per stting. Use to drink on weekends he was off work. Denies driving and drinking. States had DWI// DUI in the past \" a while.\" Wife drives patient.   RN Assessment: Lifestyle Behaviors: Mod to severe impact on client's well-being, preventing enjoyment of usual activities  SOCIAL ENVIRONMENT  Home Environment Concerns: Denies concerns that require further investigation  Other Home Environment Concerns:: Reanting a townhouse with wife. Rent $1250 a month. Wife is on LTD benefit and he makes about  $350/ a week.   RN Assessment: Home Environment: Consistently safe, supportive, stable, no identified problems  Daily Activities Concerns: Denies concerns that require further investigation  Other Daily Activities Concerns:: States work hours varies. This week is  the afternoon \" 1pm.\".    RN Assessment: Daily Activites: Adequate participation with social networks  Social Network Concerns: Denies concerns that require further investigation  Other Social Network Concerns:: States have cousins in [place].   RN Assessment: Social Network: Adequate participation with social networks  Financial Status and Service Concerns: Disabled, Uninsured  Other Financial Status and Service Concerns:: States his SSI ended when he got  about two years ago and he owes the SS about $15K. SSI in appeal process and poor understanding of applications & forms sent to him and what he needs to do with it. Has student loan abotu 10 K for online courses. HE debt Balance for hosp and lcinic bills. Need ot take medications for BP/ heart failure.   RN Assessment: Financial Resources: Financially insecure, very few resources, immediate challenges  HEALTH LITERACY AND COMMUNICATION  Understanding of Health and Wellbeing Concerns: Little understanding which impacts on their ability to undertake better management  Other Undertanding of Health and Wellbeing Concerns:: Agree to " ROYA. Has [facility] $4 RX list.   RN Assessment: Health Literacy: Little understanding which impacts on their ability to undertake better management  Engagement Concerns: Some difficulties in communication with or without moderate barriers  Other Engagement Concerns:: No health insurance. Financial problems and debts. Had SSI benefit of $740 in the past.   RN Assessment: Engagement: Some difficulties in communication with or without moderate barriers  Barriers to Compliance with Medical Recommendations: Literacy, Transportation, Financial  SERVICE COORDINATION  Other Services: Other care/services in place and adequate  Coordination of Services: Required care/services in place with some coordination barriers  PCAM TOTAL SCORE: 28      Emergency Plan  Heart Failure    You have been diagnosed with heart failure. The term heart failure sounds scary because it suggests the heart is no longer working. But it actually means the heart isn't doing its job as well as it should. Heart failure happens when your heart muscle can't keep up with your body's need for blood flow. Symptoms of heart failure can be controlled by changes in your lifestyle and by following your doctor's advice.  When to call your doctor  Call your doctor right away if you have any of these signs of worsening heart failure:    Sudden weight gain (more than 2 pounds in 1 day or 5 pounds in 1 week, or whatever weight gain you were told to report by your doctor)    Trouble breathing not related to being active    New or increased swelling of your legs or ankles    Swelling or pain in your abdomen    Breathing trouble at night (waking up short of breath, needing more pillows to breathe)    Frequent coughing that doesn't go away    Feeling much more tired than usual  When to seek emergency medical attention  Call 911 right away if you have:    Severe shortness of breath, such that you can't catch your breath even while resting    Severe shortness of  breath    Severe chest pain that does not resolve with rest or nitroglycerin    Pink, foamy mucus with cough and shortness of breath    A continuous rapid or irregular heartbeat    Passing out or fainting    Stroke symptoms such as sudden numbness or weakness on one side of your face, arm, or leg or sudden confusion, trouble speaking or vision changes  Understanding the Link between High Blood Pressure and Stroke    Each day that your blood pressure is too high, your chances of having a stroke are increased. Normal blood pressure is considered to be less than 120 over less than 80 millimeters of mercury (mmHg) or 120/80 mmHg. A stroke is a loss of brain function caused by a lack of blood to the brain. Stroke can result from the damage that ongoing high blood pressure causes in your vessels. If the affected vessel stops supplying blood to the brain, a stroke results.    Know the symptoms of stroke  During a stroke, blood supply to the brain is cut off. But with prompt medical help, a better recovery is more likely. Think of a stroke as a brain attack. Don t wait. Call 911 if you have any of the symptoms below:   Sudden weakness or numbness on one side of the face or body, including a leg or an arm   Sudden trouble seeing with one or both eyes   Sudden double vision   Sudden trouble talking, such as slurred speech   Sudden severe headache   Sudden problems using or understanding words   Sudden dizziness or loss of balance   Seizures for the first time    Any of these symptoms that occur and then resolve     Alcoholism: Getting Help  Facing a problem with alcohol can be hard. Once a person decides to get help, it can be found in many places. Below you will find resources that can give you more information. They can also help you find treatment.    Primary care  Speak with your primary health care provider. Sometimes your health care provider can provide medication to help you stop drinking. If not, he or she can refer  you to a specialist.  Professional care  This kind of care can be inpatient. It means you spend a period of time in a facility. Or it can be outpatient. This means you come and go. The facilities have medical support and can help a person detox. Most health insurance plans will cover at least some treatment. To find this kind of care, talk to your health care provider or a counselor. Or go to a mental health clinic and ask for information. You can also go online to: Substance Abuse and Mental Health Services Administration at www.samhsa.gov/treatment.  Alcoholics anonymous (AA)  AA helps members get sober and stay sober. They help you build healthy patterns of living. Everyone is welcome at an AA meeting. You do not have to identify yourself. Some people find it easier to go to the first meeting with a friend. To find a meeting near you, contact AA online at www.aa.org. Or look in the phone book for the number of a local chapter.  The road to recovery  Many people with alcoholism can give up alcohol for good. But change may not be easy or quick. Treatment is only a start. Relapses can be common. A relapse is not a sign of failure. Instead, it means treatment should continue. Once a person stops drinking, support is needed for them to stay sober. After care programs and groups, such as AA, are good for this kind of support.    Helpful websites  National [place] on Alcohol Abuse and Addiction   www.niaaa.nih.gov/alcohol-health  National [place] on Alcoholism and Drug Dependence, Inc. (NCADD)   www.ncadd.org  Alcoholism Anonymous   www.aa.org  Substance Abuse and Mental Health Services Administration (SAMHSA)   www.samhsa.gov/treatment

## 2021-06-19 NOTE — PROGRESS NOTES
Care Guide discussed enrolling in [facility] with Eber and he reports he is interested.    CCC RN Assessment scheduled for Monday 8/6 at 9am      Next Outreach: Care Guide will call patient within 2 weeks of receiving the completed CCC RN Assessment from the [facility] RN

## 2021-06-19 NOTE — PROGRESS NOTES
Care Guide assisted Eber with rescheduling his [facility] RN Assessment for tomorrow Wednesday 8/8 at 10am.    Next Outreach: Within 2 weeks of the [facility] RN sending the Care Guide the completed RN Assessment

## 2021-06-19 NOTE — LETTER
Letter by Mariama Nielsen DO at      Author: Mariama Nielsen DO Service: -- Author Type: --    Filed:  Encounter Date: 12/4/2019 Status: Signed         December 4, 2019     Patient: Eber Jin   YOB: 1960   Date of Visit: 12/4/2019       To Whom It May Concern:    It is my medical opinion that Eber iJn may return to work on 12/6/19.  Mr Jin was under my medical care from 12/2/19-12/4/19.    If you have any questions or concerns, please don't hesitate to call.    Sincerely,        Electronically signed by Mariama Nielsen DO

## 2021-06-19 NOTE — PROGRESS NOTES
Care Guide called and spoke with Eber regarding his CCC Care Plan and goals and he reports that at this time his concerns around his health are his heart condition, high blood pressure and his PVD. Eber reports that he would like assistance with a Healthcare Directive and reports that he understands he can list his wife as his Healthcare Agent. He reports he understands that the Financial Resource Guide will be calling him to schedule a time to meet to complete a CAF form for SNAP and health insurance benefits. Eber is scheduled to meet with the [facility] SW about his SSI appeal (owes money) and other debts; Care Guide will add HC Dir to that visit.    Next Outreach: 9/11/18

## 2021-06-19 NOTE — PROGRESS NOTES
Noted patient is scheduled for a 10:30 appointment today. Phone call to patient and he states he will not be able to make it in today for Medication Education Visit. Inform him of tomorrow's  appointment with Financial Resource Guide and that I am available to see him too.

## 2021-06-19 NOTE — LETTER
Letter by Elisabeth Nava MD at      Author: Elisabeth Nava MD Service: -- Author Type: --    Filed:  Encounter Date: 11/20/2019 Status: Signed        [facility] PATIENT ACCESS  [address] [place] [address] [address]  [place] [address]  [phone]         Eber Jin  [address]  [place] [address]        11/20/19    Dear Eber Jin,     At [facility] we care about your health and well-being. Your primary care provider is committed to ensuring you receive high quality care and has chosen a network of specialists to assist in providing that care. Recently Dr. Nava referred you to Orthopedics for specialty care.      Please call [facility] at [phone] at your earliest convenience for assistance in scheduling an appointment.  If you have already scheduled this appointment, please disregard this notice.  Thank you for choosing [facility] for your healthcare needs.       Sincerely,       [facility] Specialty Scheduling

## 2021-06-19 NOTE — LETTER
Letter by Elisabeth Nava MD at      Author: Elisabeth Nava MD Service: -- Author Type: --    Filed:  Encounter Date: 5/16/2019 Status: (Other)         May 16, 2019     Patient: Eber Jin   YOB: 1960   Date of Visit: 5/16/2019       To Whom it May Concern:    Eber Jin was seen in my clinic on 5/16/2019.    He may return to work on Mon 5/20/19.     If you have any questions or concerns, please don't hesitate to call.    Sincerely,         Electronically signed by Elisabeth Nava MD

## 2021-06-19 NOTE — PROGRESS NOTES
Care Guide was notified by the CCC RN that Eber was a no show for his scheduled CCC RN Assessment.      Attempt 1: Care Guide called patient and left a voicemail message regarding his missed CCC RN Assessment and requested he call the Care Guide back for assistance with rescheduling.  If this patient is returning my call, please transfer to Amanda Doyle at ext [phone].      Next Outreach: 8/7/18

## 2021-06-19 NOTE — LETTER
Letter by Elisabeth Nava MD at      Author: Elisabeth Nava MD Service: -- Author Type: --    Filed:  Encounter Date: 10/24/2019 Status: Signed         October 24, 2019     Patient: Eber Jin   YOB: 1960   Date of Visit: 10/24/2019       To Whom it May Concern:    Eber Jin was seen in my clinic on 10/24/2019.    Please excuse from work for Wed 10/23/19 - Fri 10/25/19.     If you have any questions or concerns, please don't hesitate to call.    Sincerely,         Electronically signed by Elisabeth Nava MD

## 2021-06-20 NOTE — PROGRESS NOTES
Programs applying for: Health Insurance   [facility] Case#:  Financial worker:  [employer] Case #:  Insurance Tracking:  PMI#:    9/26/2018: FRG called pt 9/26 to reschedule his NS on 9/20/2018/ FRG will make outreach next week.   Attempt X1    9/20/2018: Patient NS his appointment with FRG on 9/20/2018. FRG will make outreach call to patient next week to see if he would like to reschedule. Route note to CG.     9/12/2018: Called pt to discuss referral. FRG screened pt and made appointment to apply for Health Insurance through [employer]. Scheduled appointment for 9/20 @ 10:00. Routing outlook evite to .     Referral:  I was able to connect with his patient this morning. He reports he is sorry for not taking your calls and would appreciate a call. He states the best time to call him is in the mornings. He called me this morning just before 9am.    Spoke with patient, patient works for [employer] part time and they do not offer health insurance benefits. Patient would like to meet with FRG to apply for health insurance through [employer]. Patient reported he hasn't had health insurance for 2 years and does not know why his SSI was termed. Patient is  but going through divorce process. Wife receives SSDI, patient will bring proof of disability. Patient does not have bank account, wife has savings account that he will bring bank statements. Patient is paying $1250 in rent per month and will bring receipt. He will bring utility bills/vehicale bills/medical bills (states there are many medical bills) Scheduled patient to meet with me on 9/15 @ [facility], 9 am. Plan is to apply for insurance through [employer] first to see what patient is eligible for and then complete combined application.      [facility] SCREENING QUESTIONS   1. Household Income (Gross) (Employee Income, Social Security, Unemployment, workers compensation, Severance Pay, Pension) Patient is working at [employer] part time   2. How many people in the home? Wife lives at  home, not working. Wife gets SSI $1900 per month  3. Have you applied for county benefits before? If yes, what were they and have you received a letter? (Does it say you are due for renewal, need verification or denied) No  4. Do you have health insurance offered from your employee? And/or are you enrolled into it.  [employer] does not offer health Insurance   5. Have you applied through [employer] (online)? If so, do you know your username and password? No  6. Is any anyone in the immediate pregnant? No  7. FRG ONLY: Did you check the Care guide Screening Questions? If not, ask those questions not answered  8. Assets ( 2nd Home, Cabin, 2nd Car, Bank account, stocks, bonds, TRACY s, 401K) No bank account   9. Do you claim any dependents or are you claimed on someone s taxes or file jointly?  10. CASH/SNAP/ENERGY ASSISTANCE ONLY: Do you pay utilities? (Phone, energy, (Housing cost, Rent, mortgage) Medical Cost (medical bills and prescriptions) Car payment Patient is paying $1250 per month.

## 2021-06-20 NOTE — PROGRESS NOTES
Care Guide was notified that Eber missed his recent [facility] SW appointment.    2nd Attempt: Care Guide called and left Eber a message informing him that if he would like to reschedule the SW appointment he can call the Care Guide back at the provided phone number [phone]. Care Guide also informed Eber that the [facility] has made attempts to reach him as well and has stopped performing outreach calls due to him not connecting with her. Care Guide informed Eber that if he still needs help with applying for health insurance, appealing SSI bill, dealing with student loan debt and finding out how to manage his HE bills he can call the Care Guide back for assistance with rescheduling his [facility] SW appointment.    Next Outreach: 9/14/18

## 2021-06-20 NOTE — PROGRESS NOTES
Care Guide called and assisted Eber with rescheduling his [facility] SW appointment to Friday 10/26 at 10am. Torey Yost informed Eber that it is important he attend the SW appointment if he wants to remain enrolled in [facility]. He reports he understands. Torey Yost informed Eber that it is important he brings any paperwork with him to this appointment that he may have concerns about regarding his SSI appeal and student loan debt.    Next Outreach: 11/5/18

## 2021-06-20 NOTE — PROGRESS NOTES
Care Guide received a call back from Eber to reschedule his [facility] appointment and he states he prefers morning calls.    [facility] appointment Wednesday 10/3 at 9am    Torey Yost will send message to [facility] regarding Eber's request to meet with the [facility]. Eber missed many G calls and he understands that this is why he has not been able to schedule with the [facility]. He reports he will be on alert for the FRG's call      Next Outreach: 10/1/18

## 2021-06-20 NOTE — PROGRESS NOTES
Care Guide notified that patient missed today's [facility] SW appointment.    Attempt 1: Care Guide called patient and left a message requesting a call back to reschedule the [facility] SW appointment.  If this patient is returning my call, please transfer to Amanda Doyle at ext [phone].      Next Outreach: 10/5/18

## 2021-06-20 NOTE — PROGRESS NOTES
Care Guide left a voicemail message for Eber reminding him of [facility]. Care Guide provided a call back number in case Eber needs to reschedule.    Next Outreach: 10/1/18

## 2021-06-20 NOTE — PROGRESS NOTES
Care Guide was notified that Eber missed his recent [facility] SW appointment.    Care Guide called and left Eber a message informing him that if he would like to reschedule the SW appointment he can call the Care Guide back at the provided phone numbers. Care Guide also informed Eber that the Care Guide will be unavailable next Monday 9/3 and Tuesday 9/4 bu does return Wednesday 9/5.    Next Outreach: 9/11/18

## 2021-06-20 NOTE — PROGRESS NOTES
Patient missed his FRG appointment.    Care Guide left message for Eber and requested a call back at [phone]. Care Guide can offer Eber other resources where he can go to complete a [employer] application if getting to the clinic is difficult.    Next Outreach: 10/1/18

## 2021-06-20 NOTE — PROGRESS NOTES
Care Guide called and spoke with Eber about his [facility] SW appointment that is scheduled for Wednesday 10/3 at 9am this week. Eber reports that he will be attending this appointment. Care Guide informed Eber that it is important he brings and paperwork he has concerns about tot his SW appointment.    Eber reports no other concerns or questions today.    Next Outreach: 11/1/18

## 2021-06-20 NOTE — PROGRESS NOTES
Programs applying for: Health Insurance   [facility] Case#:  Financial worker:  [employer] Case #:  Insurance Tracking:  PMI#:    10/16/18: FRG called pt to see if pt would like to reschedule FRG appointment, attempt X3. I have attempted to contact the patient three times and have not been successful. The patient has my contact information and knows I am available for any assistance. I will discontinue outreach unless the patient or Care Guide notifies me of a new need. No further FRG outreach needed at this time.    10/8/18: FRG called pt 10/8 to reschedule his NS on 9/20/2018. FRG will make outreach next week.  AttemptX2    CLOSED ENCOUNTER:   9/26/18: FRG called pt 9/26 to reschedule his NS on 9/20/2018/ FRG will make outreach next week.   Attempt X1     9/20/18: Patient NS his appointment with FRG on 9/20/2018. FRG will make outreach call to patient next week to see if he would like to reschedule. Route note to CG.     9/12/18: Called pt to discuss referral. FRG screened pt and made appointment to apply for Health Insurance through [employer]. Scheduled appointment for 9/20 @ 10:00. Routing outlook evite to .     Referral:  I was able to connect with his patient this morning. He reports he is sorry for not taking your calls and would appreciate a call. He states the best time to call him is in the mornings. He called me this morning just before 9am.    Spoke with patient, patient works for [employer] part time and they do not offer health insurance benefits. Patient would like to meet with FRG to apply for health insurance through [employer]. Patient reported he hasn't had health insurance for 2 years and does not know why his SSI was termed. Patient is  but going through divorce process. Wife receives SSDI, patient will bring proof of disability. Patient does not have bank account, wife has savings account that he will bring bank statements. Patient is paying $1250 in rent per month and will bring receipt. He will bring  utility bills/vehicale bills/medical bills (states there are many medical bills) Scheduled patient to meet with me on 9/15 @ [facility], 9 am. Plan is to apply for insurance through [employer] first to see what patient is eligible for and then complete combined application.      FRG SCREENING QUESTIONS   1. Household Income (Gross) (Employee Income, Social Security, Unemployment, workers compensation, Severance Pay, Pension) Patient is working at [employer] part time   2. How many people in the home? Wife lives at home, not working. Wife gets SSI $1900 per month  3. Have you applied for county benefits before? If yes, what were they and have you received a letter? (Does it say you are due for renewal, need verification or denied) No  4. Do you have health insurance offered from your employee? And/or are you enrolled into it.  [employer] does not offer health Insurance   5. Have you applied through [employer] (online)? If so, do you know your username and password? No  6. Is any anyone in the immediate pregnant? No  7. FRG ONLY: Did you check the Care guide Screening Questions? If not, ask those questions not answered  8. Assets ( 2nd Home, Cabin, 2nd Car, Bank account, stocks, bonds, TRACY s, 401K) No bank account   9. Do you claim any dependents or are you claimed on someone s taxes or file jointly?  10. CASH/SNAP/ENERGY ASSISTANCE ONLY: Do you pay utilities? (Phone, energy, (Housing cost, Rent, mortgage) Medical Cost (medical bills and prescriptions) Car payment Patient is paying $1250 per month.

## 2021-06-21 NOTE — PROGRESS NOTES
Care Guide was notified by the [facility] SW that patient has missed 3  appointments in a row and will now be dismissed from [facility] services.    Care Guide will mail out letter to the patient regarding his dis-enrollment from [facility]. Care Guide will include resources for the patient regarding locating a [facility] support person who can assist him with applying for health insurance as well as the phone number for [facility] Billing so he can reach out to them and discuss [facility] Care.    No further Care Guide outreach will be completed.

## 2021-06-30 NOTE — PROGRESS NOTES
Progress Notes by Elisabeth Nava MD at 5/13/2021  2:00 PM     Author: Elisabeth Nava MD Service: -- Author Type: Physician    Filed: 5/13/2021  2:59 PM Encounter Date: 5/13/2021 Status: Signed    : Elisabeth Nava MD (Physician)       MALE PREVENTATIVE EXAM    Assessment and Plan:   Annual physical exam  Need for colon cancer with Cologuard  Greening for prostate cancer with PSA  Vaccines up-to-date except for shingles and given a handout for shingles vaccine for him to check insurance  He has a dental appointment tomorrow  He has not yet made the appointment for the eye referral and he was given the phone number for [place] [facility] for the referral that I ordered last month     Screen for colon cancer  - Cologuard     Screening for prostate cancer  - PSA, Annual Screen (Prostatic-Specific Antigen)    Encounter for screening for HIV  - HIV Antigen/Antibody Screening [facility]    Encounter for HCV screening test for high risk patient  - Hepatitis C Antibody (Anti-HCV)    Low magnesium level  Continue magnesium oxide and will recheck levels  - Magnesium     Ischemic cardiomyopathy - EF 40%  This is a known issue that I take into account for medical decisions but no current exacerbation or new concerns.  Continue current regimen    Essential hypertension, benign  Well-controlled with current regimen since he has the home health nurse helping with his medications.     Paroxysmal atrial fibrillation (H)  This is a known issue that I take into account for medical decisions but no current exacerbation or new concerns.  Currently in normal sinus rhythm.  Continue with the Xarelto and metoprolol    Cerebrovascular accident (CVA), unspecified mechanism (H)  Still having some residual weakness.  Encouraged to continue the home exercises.  And continue current medications  Also encouraged to quit smoking        Next follow up: Follow-up in 3 months  Immunization Review  Adult Imm Review: No  immunizations due today  working on quitting with the nicotine patch was not changing the patch every 24 hours and keeping it on for 2 to 3 days.  And so he was able to continue smoking even with the patch on.  Encouraged him to try to change the patch every 24 hours.  He is already on a pretty high dose of the nicotine so do not want to increase that at this point.    I discussed the following with the patient:   Adult Healthy Living: Importance of regular exercise  home exercises from PT    I have had an Advance Directives discussion with the patient.  He is full code.  Given copy of honoring wishes form for him to fill out and return to me  Subjective:   Chief Complaint: Eber Jin is an 60 y.o. male here for a preventative health visit.    Patient has been advised of split billing requirements and indicates understanding: Yes  HPI:    Annual exam  Colon cancer screening - prior screening >10 years ago at [facility], no records in chart  Vaccines - covid and tdap up to date  Due for shingles vaccine - will check insurance    Screening HIV and HCV    Low  Magnesium   On Mag-Ox  1.6 on 3/9/21    Recent stroke and completed PT/OT  Walking with cane  Stronger than last visit but still having trouble walking  RN coming for med set up      Afib with RVR and Aflutter  On ASA, Xarelto, metorplol     HTN -   BP wnl today  Metoprolol, losartant, spironolactone, lasix    Ischemic cardiomyopathy - EF 40%  Chronic systolic heart failure (H)  ASA, lipitor, losartan, spironolactone    Smoking cessation   Decreasing with the patch but wearing for 2 days      Transient blindness of right eye  - Ambulatory referral to Ophthalmology ordered 4/1/21  Transient right eye blindness, recent stroke and multiple co-morbidities  appt not yet scheduled    Healthy Habits  Are you taking a daily aspirin? Yes  Do you typically exercising at least 40 min, 3-4 times per week?  Yes  Do you usually eat at least 4 servings of fruit and  "vegetables a day, include whole grains and fiber and avoid regularly eating high fat foods? NO  Have you had an eye exam in the past two years? NO  Do you see a dentist twice per year? NO  Do you have any concerns regarding sleep? No    Safety Screen  If you own firearms, are they secured in a locked gun cabinet or with trigger locks? The patient does not own any firearms  Do you feel you are safe where you are living?: Yes (5/13/2021  1:05 PM)  Do you feel you are safe in your relationship(s)?: Yes (5/13/2021  1:05 PM)      Review of Systems:  Please see above.  The rest of the review of systems are negative for all systems.     Cancer Screening     Patient has no health maintenance due at this time          Patient Care Team:  Elisabeth Nava MD as PCP - General (Family Medicine)  Elisabeth Nava MD as Assigned PCP  Mayito River, PharmD as Pharmacist (Pharmacist)        History     Reviewed By Date/Time Sections Reviewed    Marysol Thomas CMA 5/13/2021  1:09 PM Tobacco            Objective:   Vital Signs:   Visit Vitals  /84   Pulse 88   Temp 98.8  F (37.1  C) (Oral)   Ht 5' 10.39\" (1.788 m)   Wt 184 lb 3.2 oz (83.6 kg)   SpO2 97%   BMI 26.13 kg/m           PHYSICAL EXAM  OBJECTIVE:  Vitals listed above within normal limits  General appearance: well groomed, pleasant, well hydrated, nontoxic appearing  ENT: PERRL, throat clear  Neck: neck supple, no lymphadenopathy, no thyromegaly  Lungs: lungs clear to auscultation bilaterally, no wheezes or rhonchi  Heart: regular rate and rhythm, no murmurs, rubs or gallops  Abdomen: soft, nontender  Neuro: no focal deficits, CN II-XII grossly intact, alert and oriented  Psych:  mood stable, appears to have good insight and judgment        The ASCVD Risk score (Willow KARY Morejon., et al., 2013) failed to calculate for the following reasons:    The patient has a prior MI or stroke diagnosis         Medication List          Accurate as of May 13, 2021  2:08 PM. " If you have any questions, ask your nurse or doctor.            CONTINUE taking these medications    albuterol 90 mcg/actuation inhaler  Also known as: PROAIR HFA;PROVENTIL HFA;VENTOLIN HFA  INSTRUCTIONS: Inhale 2 puffs every 6 (six) hours as needed for wheezing or shortness of breath.  Doctor's comments: May substitute the equivalent medication per insurance preference.        aspirin 81 MG EC tablet  INSTRUCTIONS: Take 1 tablet (81 mg total) by mouth daily.        atorvastatin 80 MG tablet  Also known as: LIPITOR  INSTRUCTIONS: Take 1 tablet (80 mg total) by mouth at bedtime.        furosemide 40 MG tablet  Also known as: LASIX  INSTRUCTIONS: Take 1 tablet (40 mg total) by mouth daily.        gabapentin 300 MG capsule  Also known as: NEURONTIN  INSTRUCTIONS: Take 1 capsule (300 mg total) by mouth daily.        losartan 100 MG tablet  Also known as: COZAAR  INSTRUCTIONS: Take 1 tablet (100 mg total) by mouth daily.        metoprolol tartrate 50 MG tablet  Also known as: LOPRESSOR  INSTRUCTIONS: Take 1 tablet (50 mg total) by mouth 2 (two) times a day.        nicotine 21 mg/24 hr  Also known as: NICODERM CQ  INSTRUCTIONS: Place 1 patch on the skin daily.        rivaroxaban ANTICOAGULANT 20 mg tablet  Also known as: Xarelto  INSTRUCTIONS: Take 1 tablet (20 mg total) by mouth daily.        spironolactone 25 MG tablet  Also known as: ALDACTONE  INSTRUCTIONS: Take 1 tablet (25 mg total) by mouth daily.        Tablet Cutter Misc  INSTRUCTIONS: Use daily to cut spironolactone in half               Additional Screenings Completed Today:

## 2021-07-03 NOTE — ADDENDUM NOTE
Addendum Note by Ramirez Ortega MD at 3/19/2020  2:00 PM     Author: Ramirez Ortega MD Service: -- Author Type: Physician    Filed: 3/19/2020  3:56 PM Encounter Date: 3/19/2020 Status: Signed    : Ramirez Ortega MD (Physician)    Addended by: RAMRIEZ ORTEGA on: 3/19/2020 03:56 PM        Modules accepted: Level of Service

## 2021-07-03 NOTE — ADDENDUM NOTE
Addendum Note by Ruthann Link at 5/16/2019  2:20 PM     Author: Ruthann Link Service: -- Author Type:     Filed: 5/20/2019  6:25 PM Encounter Date: 5/16/2019 Status: Signed    : Ruthann Link ()    Addended by: RUTHANN LINK on: 5/20/2019 06:25 PM        Modules accepted: Orders

## 2021-07-14 PROBLEM — I50.9 HEART FAILURE (H): Status: RESOLVED | Noted: 2017-08-24 | Resolved: 2018-01-23

## 2021-12-09 ENCOUNTER — TELEPHONE (OUTPATIENT)
Dept: FAMILY MEDICINE | Facility: CLINIC | Age: 61
End: 2021-12-09
Payer: COMMERCIAL

## 2021-12-09 NOTE — TELEPHONE ENCOUNTER
Reason for Call:  Other, Med reconcile    Detailed comments: Kristina Daugherty, RN with [facility] called for med reconciliation. She was informed by patient that he wasn't taking 3 medications that's currently on his med list.    Phone Number Kristina can be reached at: Other phone number:  [phone]    Best Time: anytime    Can we leave a detailed message on this number? YES    Call taken on 12/9/2021 at 9:23 AM by Ace Butts

## 2021-12-17 ENCOUNTER — TELEPHONE (OUTPATIENT)
Dept: PHARMACY | Facility: CLINIC | Age: 61
End: 2021-12-17

## 2021-12-17 NOTE — TELEPHONE ENCOUNTER
Per request below, RN left VM for Pat to inquire if care for patient is being taken over by [facility] prior to scheduling MTM appt. Previous call with Pat left impression with RN that [facility] was now providing patient care.     Pat is out of the office 12/17/21, returning 12/20/21. If call not returned prior, RN will call back 12/21/21    Routing to [facility] and [facility] pool for review/follow-up      Message  Received: Yesterday  Michelle Huertas, PharmD  Shana Rubio, RN  I see your recent note about this patient. He would also benefit from a [facility] visit - this can be virtual (ideally before end of year). How did your discussion go with him? Let me know if you think he would be open to this. If so I can call to schedule or if you are willing since recently calling him do you mind calling to schedule visit with me?     [place]       Reason for Call:  Other, Med reconcile     Detailed comments: Kristina Daugherty, RN with [facility] called for med reconciliation. She was informed by patient that he wasn't taking 3 medications that's currently on his med list.     Phone Number Kristina can be reached at: Other phone number:  [phone]     Best Time: anytime     Can we leave a detailed message on this number? YES     Call taken on 12/9/2021 at 9:23 AM by Ace Butts

## 2021-12-21 NOTE — TELEPHONE ENCOUNTER
RN called Pat at  to follow up on patient's future care team. Left VM for Pat,  RN, to call clinic back.     If she does call back, can we find out if patient is pursuing future care through [facility] or ?    Thank you,    Sharita DREW RN

## 2021-12-21 NOTE — TELEPHONE ENCOUNTER
RN spoke to Pat at , they do general education and recommend PharmD follow-up here at [facility].     Routing to PharmD for follow-up.     Shana Rubio RN on 12/21/2021 at 10:58 AM

## 2021-12-22 ENCOUNTER — TELEPHONE (OUTPATIENT)
Dept: FAMILY MEDICINE | Facility: CLINIC | Age: 61
End: 2021-12-22
Payer: COMMERCIAL

## 2021-12-22 NOTE — TELEPHONE ENCOUNTER
"Rn left  for patient to offer MTM appointment per PharmD message from 12/17 (see below)    \"He would also benefit from a MTM visit - this can be virtual (ideally before end of year). How did your discussion go with him? Let me know if you think he would be open to this. If so I can call to schedule or if you are willing since recently calling him do you mind calling to schedule visit with me?\"     Provided clinic number to call for appointment if he choses    Shana Rubio RN on 12/22/2021 at 5:32 PM    "

## 2021-12-22 NOTE — TELEPHONE ENCOUNTER
Please seen phone encounter 12/22/21 for Rn follow-up    Shana Rubio RN on 12/22/2021 at 5:33 PM

## 2021-12-28 ENCOUNTER — VIRTUAL VISIT (OUTPATIENT)
Dept: FAMILY MEDICINE | Facility: CLINIC | Age: 61
End: 2021-12-28
Payer: COMMERCIAL

## 2021-12-28 DIAGNOSIS — Z20.822 EXPOSURE TO 2019 NOVEL CORONAVIRUS: Primary | ICD-10-CM

## 2021-12-28 PROCEDURE — 99212 OFFICE O/P EST SF 10 MIN: CPT | Mod: TEL | Performed by: NURSE PRACTITIONER

## 2021-12-28 NOTE — PATIENT INSTRUCTIONS
"Discharge Instructions for COVID-19 Patients  You have--or may have--COVID-19. Please follow the instructions listed below.   If you have a weakened immune system, discuss with your doctor any other actions you need to take.  How can I protect others?  If you have symptoms (fever, cough, body aches or trouble breathing):    Stay home and away from others (self-isolate) until:  ? Your other symptoms have resolved (gotten better). And   ? You've had no fever--and no medicine that reduces fever--for 1 full day (24 hours). And   ? At least 10 days have passed since your symptoms started. (You may need to wait 20 days. Follow the advice of your care team.)  If you don't show symptoms, but testing showed that you have COVID-19:    Stay home and away from others (self-isolate) until at least 10 days have passed since the date of your first positive COVID-19 test.  During this time    Stay in your own room, even for meals. Use your own bathroom if you can.    Stay away from others in your home. No hugging, kissing or shaking hands. No visitors.    Don't go to work, school or anywhere else.    Clean \"high touch\" surfaces often (doorknobs, counters, handles). Use household cleaning spray or wipes.    You'll find a full list of  on the EPA website: www.epa.gov/pesticide-registration/list-n-disinfectants-use-against-sars-cov-2.    Cover your mouth and nose with a mask or other face covering to avoid spreading germs.    Wash your hands and face often. Use soap and water.    Caregivers in these groups are at risk for severe illness due to COVID-19:  ? People 65 years and older  ? People who live in a nursing home or long-term care facility  ? People with chronic disease (lung, heart, cancer, diabetes, kidney, liver, immunologic)  ? People who have a weakened immune system, including those who:    Are in cancer treatment    Take medicine that weakens the immune system, such as corticosteroids    Had a bone marrow or organ " transplant    Have an immune deficiency    Have poorly controlled HIV or AIDS    Are obese (body mass index of 40 or higher)    Smoke regularly    Caregivers should wear gloves while washing dishes, handling laundry and cleaning bedrooms and bathrooms.    Use caution when washing and drying laundry: Don't shake dirty laundry and use the warmest water setting that you can.    For more tips on managing your health at home, go to www.cdc.gov/coronavirus/2019-ncov/downloads/10Things.pdf.  How can I take care of myself at home?  1. Get lots of rest. Drink extra fluids (unless a doctor has told you not to).  2. Take Tylenol (acetaminophen) for fever or pain. If you have liver or kidney problems, ask your family doctor if it's okay to take Tylenol.   Adults can take either:   ? 650 mg (two 325 mg pills) every 4 to 6 hours, or   ? 1,000 mg (two 500 mg pills) every 8 hours as needed.  ? Note: Don't take more than 3,000 mg in one day. Acetaminophen is found in many medicines (both prescribed and over-the-counter medicines). Read all labels to be sure you don't take too much.   For children, check the Tylenol bottle for the right dose. The dose is based on the child's age or weight.  3. If you have other health problems (like cancer, heart failure, an organ transplant or severe kidney disease): Call your specialty clinic if you don't feel better in the next 2 days.  4. Know when to call 911. Emergency warning signs include:  ? Trouble breathing or shortness of breath  ? Pain or pressure in the chest that doesn't go away  ? Feeling confused like you haven't felt before, or not being able to wake up  ? Bluish-colored lips or face  5. Your doctor may have prescribed a blood thinner medicine. Follow their instructions.  Where can I get more information?     [facility] - About COVID-19:   [email or web address]    CDC - What to Do If You're Sick:  www.cdc.gov/coronavirus/2019-ncov/about/steps-when-sick.html    CDC - Ending Home Isolation: www.cdc.gov/coronavirus/2019-ncov/hcp/disposition-in-home-patients.html    CDC - Caring for Someone: www.cdc.gov/coronavirus/2019-ncov/if-you-are-sick/care-for-someone.html    [facility] - Interim Guidance for Hospital Discharge to Home: www.health.[facility].[place]./diseases/coronavirus/hcp/hospdischarge.pdf    Below are the COVID-19 hotlines at the [place] Department of Health ([facility]). Interpreters are available.  ? For health questions: Call 901-520-0531 or 1-151.894.4995 (7 a.m. to 7 p.m.)  ? For questions about schools and childcare: Call 570-939-9688 or 1-211.128.3706 (7 a.m. to 7 p.m.)    For informational purposes only. Not to replace the advice of your health care provider. Clinically reviewed by Dr. Rashi Arias.   Copyright   2020 [facility]. All rights reserved. [employer] [id number] - REV 01/05/21.

## 2021-12-28 NOTE — PROGRESS NOTES
"Eber is a 61 year old who is being evaluated via a billable telephone visit.      What phone number would you like to be contacted at? [phone]  How would you like to obtain your AVS? Mail a copy    4:18 PM  - 4:23 PM     Assessment & Plan     Exposure to 2019 novel coronavirus  Patient exposed to a positive COVID patient approximately 7 days ago, patient denies any symptoms. Wife has renal failure and he wants to make sure he does not have COVID. Testing ordered.   - Asymptomatic COVID-19 Virus (Coronavirus) by PCR; Future             Tobacco Cessation:   reports that he has been smoking cigarettes. He has a 15.00 pack-year smoking history. He has never used smokeless tobacco.  Tobacco Cessation Action Plan: Deferred to primary care provider    BMI:   Estimated body mass index is 26.13 kg/m  as calculated from the following:    Height as of 5/13/21: 1.788 m (5' 10.39\").    Weight as of 5/13/21: 83.6 kg (184 lb 3.2 oz).       See Patient Instructions    Return in about 1 week (around 1/4/2022), or if symptoms worsen or fail to improve.    Maritza Thomas, NII, APRN-CNP   [facility]    Subjective   Eber is a 61 year old who presents for the following health issues     HPI       Concern for COVID-19  About how many days ago did these symptoms start? Exposure 7 days ago. No symptoms   Is this your first visit for this illness? Yes  In the 14 days before your symptoms started, have you had close contact with someone with COVID-19 (Coronavirus)? Yes, I have been in contact with someone who has COVID-19/Coronavirus (confirmed by lab test). COVID positive in the household.   Do you have a fever or chills? No  Are you having new or worsening difficulty breathing? No  Do you have new or worsening cough? No  Have you had any new or unexplained body aches? No    Have you experienced any of the following NEW symptoms?    Headache: No    Sore throat: No    Loss of taste or smell: No    Chest " pain: No    Diarrhea: No    Rash: No  What treatments have you tried?   Who do you live with? Wife and step-son  Are you, or a household member, a healthcare worker or a ? No  Do you live in a nursing home, group home, or shelter? No  Do you have a way to get food/medications if quarantined? Yes, I have a friend or family member who can help me.    Wife is on dialysis             Review of Systems   Constitutional, HEENT, cardiovascular, pulmonary, gi and gu systems are negative, except as otherwise noted.      Objective           Vitals:  No vitals were obtained today due to virtual visit.    Physical Exam   healthy, alert and no distress  PSYCH: Alert and oriented times 3; coherent speech, normal   rate and volume, able to articulate logical thoughts, able   to abstract reason, no tangential thoughts, no hallucinations   or delusions  His affect is normal  RESP: No cough, no audible wheezing, able to talk in full sentences  Remainder of exam unable to be completed due to telephone visits    Diagnostic Test Results:  Pending COVID           Phone call duration: 5 minutes

## 2022-02-03 ENCOUNTER — TELEPHONE (OUTPATIENT)
Dept: FAMILY MEDICINE | Facility: CLINIC | Age: 62
End: 2022-02-03
Payer: COMMERCIAL

## 2022-02-03 NOTE — TELEPHONE ENCOUNTER
Reason for call:  Care coordinator reporting a symptom - patient not present on call    Symptom or request:   Increase shortness of breath, on and off through out the day  Patient still smokes  Has a non productive cough  He was on Breo at one time  Currently on albuterol but does not work all the time    Duration (how long have symptoms been present): Caller spoke to patient earlier    Have you been treated for this before? No    Additional comments: Pat states she spoke to patient earlier and he reports the symptoms above. Does provider want to start a care plan for breathing? Please call patient for further evaluation if needed. We can call Pat if questions [phone]. Ok to leave detailed message.    Phone Number patient can be reached at:  Cell number on file:    Telephone Information:   Mobile [phone]       Best Time:  any    Can we leave a detailed message on this number:  YES    Call taken on 2/3/2022 at 9:49 AM by Stefan Garza

## 2022-02-03 NOTE — TELEPHONE ENCOUNTER
Author called patient to get more information from him. If patient calls back, please transfer to RN if available.     Left VM for patient to call clinic back at earliest convenience.    If no RN available, can you ask patient if he's been around anyone with covid or any other illness recently?   Can he describe what his breathing is like?   Is he requesting to be on budesonide again because it helped him last time?     Thank you!

## 2022-02-07 NOTE — TELEPHONE ENCOUNTER
Author made another phone call attempt to reach patient. If patient calls back, please see message below and get more information or route to clinic RN if available.    Left VM for patient to call clinic back at earliest convenience.     If no RN available, can you ask patient if he's been around anyone with covid or any other illness recently?   Can he describe what his breathing is like?   Is he requesting to be on budesonide again because it helped him last time?     Thank you!

## 2022-02-08 NOTE — TELEPHONE ENCOUNTER
Third attempt made to reach patient. Will close encounter. If patient calls back, please see message thread and ask questions listed below about his breathing symptoms or transfer to a RN if available.    Author also spoke to CC RN who advised that she hasn't spoken to patient since last Thursday the 3rd. She verified that author was calling the correct phone number where she's had no issue reaching the patient.    Thank you!    Sharita DREW, RN

## 2022-02-23 NOTE — TELEPHONE ENCOUNTER
Patient returning call    -at time of original call p states he was having a hard time breathing, had to rest    -reports he had a 'cold' then    -patient is back to baseline with cold resolution and does not feel he needs to be seen    RN advised if patient is having difficulty breathing or develops chest pain he should be seen in ED  -advised if symptoms return to call clinic and set up appointment to be seen with provider     Shana Rubio, RN on 2/23/2022 at 12:27 PM          Breathing not today had 2 days ago    Hard time breathing    Has a cold -  Stuffy nose    -cold is improving   -breathing improves as cold improves  -no known covid exposure     Improved     No chest pain  Breathing has improved    -

## 2022-03-25 ENCOUNTER — OFFICE VISIT (OUTPATIENT)
Dept: FAMILY MEDICINE | Facility: CLINIC | Age: 62
End: 2022-03-25
Payer: COMMERCIAL

## 2022-03-25 VITALS
DIASTOLIC BLOOD PRESSURE: 84 MMHG | OXYGEN SATURATION: 91 % | BODY MASS INDEX: 26.33 KG/M2 | WEIGHT: 185.56 LBS | HEART RATE: 82 BPM | TEMPERATURE: 99.2 F | SYSTOLIC BLOOD PRESSURE: 132 MMHG

## 2022-03-25 DIAGNOSIS — M79.2 NEUROPATHIC PAIN: ICD-10-CM

## 2022-03-25 DIAGNOSIS — I10 ESSENTIAL HYPERTENSION, BENIGN: ICD-10-CM

## 2022-03-25 DIAGNOSIS — I48.0 PAROXYSMAL ATRIAL FIBRILLATION (H): ICD-10-CM

## 2022-03-25 DIAGNOSIS — I50.22 CHRONIC SYSTOLIC HEART FAILURE (H): ICD-10-CM

## 2022-03-25 DIAGNOSIS — M10.9 ACUTE GOUT, UNSPECIFIED CAUSE, UNSPECIFIED SITE: Primary | ICD-10-CM

## 2022-03-25 DIAGNOSIS — I63.50 RIGHT PONTINE STROKE (H): ICD-10-CM

## 2022-03-25 DIAGNOSIS — J44.9 CHRONIC OBSTRUCTIVE PULMONARY DISEASE, UNSPECIFIED COPD TYPE (H): ICD-10-CM

## 2022-03-25 DIAGNOSIS — E78.2 MIXED HYPERLIPIDEMIA: ICD-10-CM

## 2022-03-25 DIAGNOSIS — Z23 HIGH PRIORITY FOR 2019 NOVEL CORONAVIRUS VACCINATION: ICD-10-CM

## 2022-03-25 PROCEDURE — 0054A COVID-19,PF,PFIZER (12+ YRS): CPT | Performed by: FAMILY MEDICINE

## 2022-03-25 PROCEDURE — 99214 OFFICE O/P EST MOD 30 MIN: CPT | Mod: 25 | Performed by: FAMILY MEDICINE

## 2022-03-25 PROCEDURE — 91305 COVID-19,PF,PFIZER (12+ YRS): CPT | Performed by: FAMILY MEDICINE

## 2022-03-25 RX ORDER — LOSARTAN POTASSIUM 100 MG/1
100 TABLET ORAL DAILY
Qty: 30 TABLET | Refills: 0 | Status: ON HOLD | OUTPATIENT
Start: 2022-03-25 | End: 2022-05-04

## 2022-03-25 RX ORDER — ATORVASTATIN CALCIUM 80 MG/1
80 TABLET, FILM COATED ORAL EVERY EVENING
Qty: 30 TABLET | Refills: 0 | Status: ON HOLD | OUTPATIENT
Start: 2022-03-25 | End: 2022-05-04

## 2022-03-25 RX ORDER — METOPROLOL TARTRATE 50 MG
50 TABLET ORAL 2 TIMES DAILY
Qty: 60 TABLET | Refills: 0 | Status: ON HOLD | OUTPATIENT
Start: 2022-03-25 | End: 2022-05-04

## 2022-03-25 RX ORDER — GABAPENTIN 300 MG/1
300 CAPSULE ORAL DAILY
Qty: 30 CAPSULE | Refills: 11 | Status: SHIPPED | OUTPATIENT
Start: 2022-03-25 | End: 2022-11-25 | Stop reason: ALTCHOICE

## 2022-03-25 RX ORDER — ALBUTEROL SULFATE 90 UG/1
2 AEROSOL, METERED RESPIRATORY (INHALATION) EVERY 6 HOURS PRN
Qty: 18 G | Refills: 11 | Status: SHIPPED | OUTPATIENT
Start: 2022-03-25 | End: 2022-08-31

## 2022-03-25 RX ORDER — SPIRONOLACTONE 25 MG/1
25 TABLET ORAL DAILY
Qty: 30 TABLET | Refills: 0 | Status: ON HOLD | OUTPATIENT
Start: 2022-03-25 | End: 2022-05-04

## 2022-03-25 RX ORDER — ASPIRIN 81 MG/1
81 TABLET, COATED ORAL DAILY
COMMUNITY
Start: 2022-01-19 | End: 2022-06-06

## 2022-03-25 RX ORDER — FUROSEMIDE 40 MG
40 TABLET ORAL DAILY
Qty: 30 TABLET | Refills: 0 | Status: ON HOLD | OUTPATIENT
Start: 2022-03-25 | End: 2022-05-04

## 2022-04-02 NOTE — PROGRESS NOTES
Assessment & Plan     Acute gout, unspecified cause, unspecified site    No need for steroid or other medication at this point because he has had significant improvement already.    I gave him a low purine diet handout, and we had a lengthy discussion about how a combination of a little bit of several high-purine foods could add up to an increased uric acid level that precipitates a gout flare-up.    Neuropathic pain    - gabapentin (NEURONTIN) 300 MG capsule  Dispense: 30 capsule; Refill: 11    Right pontine stroke (H)      High priority for 2019 novel coronavirus vaccination    - COVID-19,PF,PFIZER (12+ Yrs GRAY LABEL)    Essential hypertension, benign    Stable.    - losartan (COZAAR) 100 MG tablet  Dispense: 30 tablet; Refill: 0  - metoprolol tartrate (LOPRESSOR) 50 MG tablet  Dispense: 60 tablet; Refill: 0    Paroxysmal atrial fibrillation (H)    - rivaroxaban ANTICOAGULANT (XARELTO) 20 MG TABS tablet  Dispense: 30 tablet; Refill: 11    Chronic obstructive pulmonary disease, unspecified COPD type (H)    - albuterol (PROAIR HFA/PROVENTIL HFA/VENTOLIN HFA) 108 (90 Base) MCG/ACT inhaler  Dispense: 18 g; Refill: 11    Chronic systolic heart failure (H)    Stable.    - furosemide (LASIX) 40 MG tablet  Dispense: 30 tablet; Refill: 0  - spironolactone (ALDACTONE) 25 MG tablet  Dispense: 30 tablet; Refill: 0    Mixed hyperlipidemia    - atorvastatin (LIPITOR) 80 MG tablet  Dispense: 30 tablet; Refill: 0      Continue current medications.  Refills sent.      35 minutes spent on the date of the encounter doing chart review, review of test results and patient visit regarding gout, neuropathic pain, hypertension, CHF.           Return in about 1 year (around 3/25/2023) for Routine preventive.    Mook Tavarez MD, MD  [facility] NAYELI Velázquez is a 61 year old who presents for the following health issues     History of Present Illness       Reason for visit:  Bilateral foot edema  Symptom  onset:  3-7 days ago  Symptoms include:  Pain, swelling  Symptom intensity:  Severe  Symptom progression:  Improving  Had these symptoms before:  Yes  Has tried/received treatment for these symptoms:  No  What makes it worse:  Standing  What makes it better:  Ice bag    He eats 0-1 servings of fruits and vegetables daily.He consumes 1 sweetened beverage(s) daily.He exercises with enough effort to increase his heart rate 9 or less minutes per day.  He exercises with enough effort to increase his heart rate 3 or less days per week. He is missing 2 dose(s) of medications per week.  He is not taking prescribed medications regularly due to remembering to take.       He has been having left foot pain for the past week.  Improving. Pain at the 5th metatarsal head and near the lateral malleolus.  Did not injure foot or ankle.    Uric acid was 7.1 in 2018.  Taking furosemide and spironolactone, which could elevate this.    Needing refills of medications.    Current Outpatient Medications   Medication Sig Dispense Refill     albuterol (PROAIR HFA/PROVENTIL HFA/VENTOLIN HFA) 108 (90 Base) MCG/ACT inhaler Inhale 2 puffs into the lungs every 6 hours as needed for shortness of breath / dyspnea or wheezing 18 g 11     ASPIRIN LOW DOSE 81 MG EC tablet        atorvastatin (LIPITOR) 80 MG tablet Take 1 tablet (80 mg) by mouth every evening 30 tablet 0     furosemide (LASIX) 40 MG tablet Take 1 tablet (40 mg) by mouth daily 30 tablet 0     gabapentin (NEURONTIN) 300 MG capsule Take 1 capsule (300 mg) by mouth daily 30 capsule 11     losartan (COZAAR) 100 MG tablet Take 1 tablet (100 mg) by mouth daily 30 tablet 0     metoprolol tartrate (LOPRESSOR) 50 MG tablet Take 1 tablet (50 mg) by mouth 2 times daily 60 tablet 0     rivaroxaban ANTICOAGULANT (XARELTO) 20 MG TABS tablet Take 1 tablet (20 mg) by mouth daily (with dinner) 30 tablet 11     spironolactone (ALDACTONE) 25 MG tablet Take 1 tablet (25 mg) by mouth daily 30 tablet 0          Review of Systems   No fever or cough.      Objective    /84   Pulse 82   Temp 99.2  F (37.3  C)   Wt 84.2 kg (185 lb 9 oz)   SpO2 91%   BMI 26.33 kg/m    Body mass index is 26.33 kg/m .  Physical Exam   Heart normal  Lungs normal  Left foot: no erythema, edema, increased warmth.  No bony tenderness.  Tender at 5th metatarsal head, and near lateral malleolus.

## 2022-04-14 ENCOUNTER — NURSE TRIAGE (OUTPATIENT)
Dept: NURSING | Facility: CLINIC | Age: 62
End: 2022-04-14
Payer: COMMERCIAL

## 2022-04-15 ENCOUNTER — OFFICE VISIT (OUTPATIENT)
Dept: FAMILY MEDICINE | Facility: CLINIC | Age: 62
End: 2022-04-15
Payer: COMMERCIAL

## 2022-04-15 VITALS
TEMPERATURE: 98.2 F | BODY MASS INDEX: 24.82 KG/M2 | WEIGHT: 173.38 LBS | HEART RATE: 82 BPM | SYSTOLIC BLOOD PRESSURE: 162 MMHG | HEIGHT: 70 IN | DIASTOLIC BLOOD PRESSURE: 88 MMHG | OXYGEN SATURATION: 97 %

## 2022-04-15 DIAGNOSIS — J44.1 COPD EXACERBATION (H): Primary | ICD-10-CM

## 2022-04-15 DIAGNOSIS — I10 HYPERTENSION, UNSPECIFIED TYPE: ICD-10-CM

## 2022-04-15 PROCEDURE — 99213 OFFICE O/P EST LOW 20 MIN: CPT | Performed by: FAMILY MEDICINE

## 2022-04-15 RX ORDER — PREDNISONE 20 MG/1
20 TABLET ORAL 2 TIMES DAILY
Qty: 10 TABLET | Refills: 0 | Status: SHIPPED | OUTPATIENT
Start: 2022-04-15 | End: 2022-04-20

## 2022-04-15 RX ORDER — LEVOFLOXACIN 500 MG/1
500 TABLET, FILM COATED ORAL DAILY
Qty: 7 TABLET | Refills: 0 | Status: ON HOLD | OUTPATIENT
Start: 2022-04-15 | End: 2022-04-30

## 2022-04-15 NOTE — PROGRESS NOTES
ASSESMENT AND PLAN:  COPD exacerbation (H)  F/U next week, appointment scheduled.  - levofloxacin (LEVAQUIN) 500 MG tablet; Take 1 tablet (500 mg) by mouth daily  - predniSONE (DELTASONE) 20 MG tablet; Take 1 tablet (20 mg) by mouth 2 times daily for 5 days    Hypertension, unspecified type  Uncontrolled.   Took only Lopressor this morning per patient. States the other blood pressure medications are at the pharmacy.  Instructed to  today and start all blood pressure medications.     This transcription uses voice recognition software, which may contain typographical errors.    SUBJECTIVE: Eber Jin is a 61-year-old male with history of COPD, hypertension, history of stroke, coronary artery disease, heart failure and others here with complaint of cough for the past 2 weeks.  He spoke to our triage nurse yesterday, note reviewed.  He reports cough and shortness of breath for the past 2 weeks.  No fever or chills.  Cough is productive at times.  He states he uses his inhalers as prescribed.  He has history of hypertension.  He is on Lasix 40 mg daily, losartan 100 mg daily, Lopressor 50 mg twice a day and spironolactone 25 mg daily per chart review.  He did not bring his pill bottles but states he took 3 medications this morning including metoprolol.  He is not sure if he took other blood pressure medications but states they are ready to be picked up at the pharmacy.  Denied acute headache.  Denied acute weaknesses.  Denied acute vision changes.    Past Medical History:   Diagnosis Date     Accelerated hypertension      Arthritis      CAD (coronary artery disease)      Cerebrovascular accident (CVA), unspecified mechanism (H)      CHF (congestive heart failure) (H)      COPD (chronic obstructive pulmonary disease) (H)      Heart failure (H)      HLD (hyperlipidemia)      HTN (hypertension)      Hypertensive urgency      Myocardial infarction (H)      PAD (peripheral artery disease) (H)      Peripheral  "vascular disease with claudication (H)      Prolonged Q-T interval on ECG      Patient Active Problem List   Diagnosis     Right pontine stroke (H)       Allergies:    Allergies   Allergen Reactions     Penicillins Swelling       History   Smoking Status     Current Every Day Smoker     Packs/day: 0.50     Years: 30.00     Types: Cigarettes   Smokeless Tobacco     Never Used       Review of systems otherwise negative except as listed in HPI.   History   Smoking Status     Current Every Day Smoker     Packs/day: 0.50     Years: 30.00     Types: Cigarettes   Smokeless Tobacco     Never Used       OBJECTICE: BP (!) 170/100   Pulse 82   Temp 98.2  F (36.8  C) (Oral)   Ht 1.788 m (5' 10.39\")   Wt 78.6 kg (173 lb 6 oz)   SpO2 (!) 88%   BMI 24.60 kg/m          GEN-alert,  in no apparent distress.  HEENT- neck is supple.  CV-regular rate and rhythm with no murmur.   RESP-coarse breath sounds, no crackles or rhonchi.  ABDOMEN- Soft , not tender.  EXTREM- No edema.  SKIN-normal        Jabier Bass MD   4/15/2022   "

## 2022-04-28 ENCOUNTER — TELEPHONE (OUTPATIENT)
Dept: FAMILY MEDICINE | Facility: CLINIC | Age: 62
End: 2022-04-28
Payer: COMMERCIAL

## 2022-04-28 NOTE — TELEPHONE ENCOUNTER
Forms/Letter Request    Name of form/letter: [employer]    Have you been seen for this request: Yes     Do we have the form/letter: Yes:     When is form/letter needed by: asap    How would you like the form/letter returned:     Patient Notified form requests are processed in 3-5 business days:Yes    Okay to leave a detailed message? Yes Cell number on file:    Telephone Information:   Mobile [phone]   Form in Dr.Za carrillo folder.

## 2022-04-30 ENCOUNTER — HOSPITAL ENCOUNTER (EMERGENCY)
Facility: HOSPITAL | Age: 62
Discharge: ANOTHER HOSPITAL | End: 2022-04-30
Attending: EMERGENCY MEDICINE | Admitting: EMERGENCY MEDICINE
Payer: COMMERCIAL

## 2022-04-30 ENCOUNTER — HOSPITAL ENCOUNTER (INPATIENT)
Facility: CLINIC | Age: 62
LOS: 4 days | Discharge: HOME | DRG: 291 | End: 2022-05-04
Attending: EMERGENCY MEDICINE | Admitting: INTERNAL MEDICINE
Payer: COMMERCIAL

## 2022-04-30 ENCOUNTER — APPOINTMENT (OUTPATIENT)
Dept: RADIOLOGY | Facility: HOSPITAL | Age: 62
End: 2022-04-30
Attending: EMERGENCY MEDICINE
Payer: COMMERCIAL

## 2022-04-30 ENCOUNTER — APPOINTMENT (OUTPATIENT)
Dept: CT IMAGING | Facility: HOSPITAL | Age: 62
End: 2022-04-30
Attending: EMERGENCY MEDICINE
Payer: COMMERCIAL

## 2022-04-30 VITALS
OXYGEN SATURATION: 96 % | HEIGHT: 71 IN | BODY MASS INDEX: 24.92 KG/M2 | WEIGHT: 178 LBS | SYSTOLIC BLOOD PRESSURE: 159 MMHG | HEART RATE: 78 BPM | DIASTOLIC BLOOD PRESSURE: 110 MMHG | RESPIRATION RATE: 21 BRPM | TEMPERATURE: 98.6 F

## 2022-04-30 DIAGNOSIS — J44.9 CHRONIC OBSTRUCTIVE PULMONARY DISEASE, UNSPECIFIED COPD TYPE (H): ICD-10-CM

## 2022-04-30 DIAGNOSIS — R94.31 PROLONGED Q-T INTERVAL ON ECG: ICD-10-CM

## 2022-04-30 DIAGNOSIS — I50.22 CHRONIC SYSTOLIC HEART FAILURE (H): ICD-10-CM

## 2022-04-30 DIAGNOSIS — I50.9 ACUTE ON CHRONIC CONGESTIVE HEART FAILURE, UNSPECIFIED HEART FAILURE TYPE (H): ICD-10-CM

## 2022-04-30 DIAGNOSIS — I10 ESSENTIAL HYPERTENSION, BENIGN: ICD-10-CM

## 2022-04-30 DIAGNOSIS — I10 MALIGNANT ESSENTIAL HYPERTENSION: ICD-10-CM

## 2022-04-30 DIAGNOSIS — I48.0 PAROXYSMAL ATRIAL FIBRILLATION (H): ICD-10-CM

## 2022-04-30 DIAGNOSIS — Z99.81 REQUIRES SUPPLEMENTAL OXYGEN: ICD-10-CM

## 2022-04-30 DIAGNOSIS — E78.2 MIXED HYPERLIPIDEMIA: ICD-10-CM

## 2022-04-30 LAB
ANION GAP SERPL CALCULATED.3IONS-SCNC: 12 MMOL/L (ref 5–18)
ATRIAL RATE - MUSE: 78 BPM
BASOPHILS # BLD AUTO: 0 10E3/UL (ref 0–0.2)
BASOPHILS NFR BLD AUTO: 1 %
BNP SERPL-MCNC: 2619 PG/ML (ref 0–56)
BUN SERPL-MCNC: 14 MG/DL (ref 8–22)
CALCIUM SERPL-MCNC: 9.1 MG/DL (ref 8.5–10.5)
CHLORIDE BLD-SCNC: 106 MMOL/L (ref 98–107)
CO2 SERPL-SCNC: 19 MMOL/L (ref 22–31)
CREAT SERPL-MCNC: 1.09 MG/DL (ref 0.7–1.3)
D DIMER PPP FEU-MCNC: 2.77 UG/ML FEU (ref 0–0.5)
DIASTOLIC BLOOD PRESSURE - MUSE: NORMAL MMHG
EOSINOPHIL # BLD AUTO: 0.1 10E3/UL (ref 0–0.7)
EOSINOPHIL NFR BLD AUTO: 1 %
ERYTHROCYTE [DISTWIDTH] IN BLOOD BY AUTOMATED COUNT: 14.2 % (ref 10–15)
FLUAV RNA SPEC QL NAA+PROBE: NEGATIVE
FLUBV RNA RESP QL NAA+PROBE: NEGATIVE
GFR SERPL CREATININE-BSD FRML MDRD: 76 ML/MIN/1.73M2
GLUCOSE BLD-MCNC: 109 MG/DL (ref 70–125)
HCT VFR BLD AUTO: 41.9 % (ref 40–53)
HGB BLD-MCNC: 13.3 G/DL (ref 13.3–17.7)
HOLD SPECIMEN: NORMAL
IMM GRANULOCYTES # BLD: 0 10E3/UL
IMM GRANULOCYTES NFR BLD: 0 %
INTERPRETATION ECG - MUSE: NORMAL
LYMPHOCYTES # BLD AUTO: 1.8 10E3/UL (ref 0.8–5.3)
LYMPHOCYTES NFR BLD AUTO: 33 %
MAGNESIUM SERPL-MCNC: 1.9 MG/DL (ref 1.8–2.6)
MAGNESIUM SERPL-MCNC: 2 MG/DL (ref 1.6–2.3)
MAGNESIUM SERPL-MCNC: 2.2 MG/DL (ref 1.6–2.3)
MCH RBC QN AUTO: 28.9 PG (ref 26.5–33)
MCHC RBC AUTO-ENTMCNC: 31.7 G/DL (ref 31.5–36.5)
MCV RBC AUTO: 91 FL (ref 78–100)
MONOCYTES # BLD AUTO: 0.8 10E3/UL (ref 0–1.3)
MONOCYTES NFR BLD AUTO: 14 %
NEUTROPHILS # BLD AUTO: 2.9 10E3/UL (ref 1.6–8.3)
NEUTROPHILS NFR BLD AUTO: 51 %
NRBC # BLD AUTO: 0 10E3/UL
NRBC BLD AUTO-RTO: 0 /100
P AXIS - MUSE: 49 DEGREES
PLATELET # BLD AUTO: 235 10E3/UL (ref 150–450)
POTASSIUM BLD-SCNC: 3.8 MMOL/L (ref 3.4–5.3)
POTASSIUM BLD-SCNC: 4.1 MMOL/L (ref 3.4–5.3)
POTASSIUM BLD-SCNC: 4.2 MMOL/L (ref 3.5–5)
PR INTERVAL - MUSE: 194 MS
QRS DURATION - MUSE: 110 MS
QT - MUSE: 452 MS
QTC - MUSE: 515 MS
R AXIS - MUSE: 1 DEGREES
RBC # BLD AUTO: 4.61 10E6/UL (ref 4.4–5.9)
SARS-COV-2 RNA RESP QL NAA+PROBE: NEGATIVE
SODIUM SERPL-SCNC: 137 MMOL/L (ref 136–145)
SYSTOLIC BLOOD PRESSURE - MUSE: NORMAL MMHG
T AXIS - MUSE: -29 DEGREES
TROPONIN I SERPL-MCNC: <0.01 NG/ML (ref 0–0.29)
VENTRICULAR RATE- MUSE: 78 BPM
WBC # BLD AUTO: 5.6 10E3/UL (ref 4–11)

## 2022-04-30 PROCEDURE — 93005 ELECTROCARDIOGRAM TRACING: CPT | Performed by: EMERGENCY MEDICINE

## 2022-04-30 PROCEDURE — 83735 ASSAY OF MAGNESIUM: CPT | Performed by: EMERGENCY MEDICINE

## 2022-04-30 PROCEDURE — 96374 THER/PROPH/DIAG INJ IV PUSH: CPT | Mod: 59

## 2022-04-30 PROCEDURE — 84132 ASSAY OF SERUM POTASSIUM: CPT | Performed by: INTERNAL MEDICINE

## 2022-04-30 PROCEDURE — 250N000011 HC RX IP 250 OP 636: Performed by: EMERGENCY MEDICINE

## 2022-04-30 PROCEDURE — 85379 FIBRIN DEGRADATION QUANT: CPT | Performed by: EMERGENCY MEDICINE

## 2022-04-30 PROCEDURE — 85025 COMPLETE CBC W/AUTO DIFF WBC: CPT | Performed by: EMERGENCY MEDICINE

## 2022-04-30 PROCEDURE — 36415 COLL VENOUS BLD VENIPUNCTURE: CPT | Performed by: EMERGENCY MEDICINE

## 2022-04-30 PROCEDURE — 83880 ASSAY OF NATRIURETIC PEPTIDE: CPT | Performed by: EMERGENCY MEDICINE

## 2022-04-30 PROCEDURE — 99223 1ST HOSP IP/OBS HIGH 75: CPT | Mod: AI | Performed by: INTERNAL MEDICINE

## 2022-04-30 PROCEDURE — 120N000001 HC R&B MED SURG/OB

## 2022-04-30 PROCEDURE — 99285 EMERGENCY DEPT VISIT HI MDM: CPT | Mod: 25

## 2022-04-30 PROCEDURE — 71046 X-RAY EXAM CHEST 2 VIEWS: CPT

## 2022-04-30 PROCEDURE — 250N000013 HC RX MED GY IP 250 OP 250 PS 637: Performed by: INTERNAL MEDICINE

## 2022-04-30 PROCEDURE — 87636 SARSCOV2 & INF A&B AMP PRB: CPT | Performed by: EMERGENCY MEDICINE

## 2022-04-30 PROCEDURE — 80048 BASIC METABOLIC PNL TOTAL CA: CPT | Performed by: EMERGENCY MEDICINE

## 2022-04-30 PROCEDURE — 250N000013 HC RX MED GY IP 250 OP 250 PS 637: Performed by: EMERGENCY MEDICINE

## 2022-04-30 PROCEDURE — 36415 COLL VENOUS BLD VENIPUNCTURE: CPT | Performed by: INTERNAL MEDICINE

## 2022-04-30 PROCEDURE — C9803 HOPD COVID-19 SPEC COLLECT: HCPCS

## 2022-04-30 PROCEDURE — 83735 ASSAY OF MAGNESIUM: CPT | Performed by: INTERNAL MEDICINE

## 2022-04-30 PROCEDURE — 84484 ASSAY OF TROPONIN QUANT: CPT | Performed by: EMERGENCY MEDICINE

## 2022-04-30 PROCEDURE — 71275 CT ANGIOGRAPHY CHEST: CPT

## 2022-04-30 RX ORDER — ONDANSETRON 2 MG/ML
4 INJECTION INTRAMUSCULAR; INTRAVENOUS EVERY 6 HOURS PRN
Status: DISCONTINUED | OUTPATIENT
Start: 2022-04-30 | End: 2022-05-04 | Stop reason: HOSPADM

## 2022-04-30 RX ORDER — LOSARTAN POTASSIUM 50 MG/1
100 TABLET ORAL ONCE
Status: COMPLETED | OUTPATIENT
Start: 2022-04-30 | End: 2022-04-30

## 2022-04-30 RX ORDER — METOPROLOL TARTRATE 25 MG/1
50 TABLET, FILM COATED ORAL ONCE
Status: COMPLETED | OUTPATIENT
Start: 2022-04-30 | End: 2022-04-30

## 2022-04-30 RX ORDER — ACETAMINOPHEN 650 MG/1
650 SUPPOSITORY RECTAL EVERY 6 HOURS PRN
Status: DISCONTINUED | OUTPATIENT
Start: 2022-04-30 | End: 2022-05-04 | Stop reason: HOSPADM

## 2022-04-30 RX ORDER — LOSARTAN POTASSIUM 100 MG/1
100 TABLET ORAL DAILY
Status: DISCONTINUED | OUTPATIENT
Start: 2022-04-30 | End: 2022-05-04 | Stop reason: HOSPADM

## 2022-04-30 RX ORDER — GABAPENTIN 300 MG/1
300 CAPSULE ORAL EVERY EVENING
Status: DISCONTINUED | OUTPATIENT
Start: 2022-04-30 | End: 2022-05-04 | Stop reason: HOSPADM

## 2022-04-30 RX ORDER — ALBUTEROL SULFATE 90 UG/1
2 AEROSOL, METERED RESPIRATORY (INHALATION) EVERY 6 HOURS PRN
Status: DISCONTINUED | OUTPATIENT
Start: 2022-04-30 | End: 2022-05-04 | Stop reason: HOSPADM

## 2022-04-30 RX ORDER — METOPROLOL TARTRATE 50 MG
50 TABLET ORAL 2 TIMES DAILY
Status: DISCONTINUED | OUTPATIENT
Start: 2022-04-30 | End: 2022-05-03

## 2022-04-30 RX ORDER — LANOLIN ALCOHOL/MO/W.PET/CERES
3 CREAM (GRAM) TOPICAL
Status: DISCONTINUED | OUTPATIENT
Start: 2022-04-30 | End: 2022-05-04 | Stop reason: HOSPADM

## 2022-04-30 RX ORDER — ONDANSETRON 4 MG/1
4 TABLET, ORALLY DISINTEGRATING ORAL EVERY 6 HOURS PRN
Status: DISCONTINUED | OUTPATIENT
Start: 2022-04-30 | End: 2022-05-04 | Stop reason: HOSPADM

## 2022-04-30 RX ORDER — IOPAMIDOL 755 MG/ML
100 INJECTION, SOLUTION INTRAVASCULAR ONCE
Status: COMPLETED | OUTPATIENT
Start: 2022-04-30 | End: 2022-04-30

## 2022-04-30 RX ORDER — FUROSEMIDE 10 MG/ML
40 INJECTION INTRAMUSCULAR; INTRAVENOUS 2 TIMES DAILY
Status: COMPLETED | OUTPATIENT
Start: 2022-05-01 | End: 2022-05-01

## 2022-04-30 RX ORDER — ASPIRIN 81 MG/1
81 TABLET ORAL DAILY
Status: DISCONTINUED | OUTPATIENT
Start: 2022-04-30 | End: 2022-05-04 | Stop reason: HOSPADM

## 2022-04-30 RX ORDER — ATORVASTATIN CALCIUM 40 MG/1
80 TABLET, FILM COATED ORAL EVERY EVENING
Status: DISCONTINUED | OUTPATIENT
Start: 2022-04-30 | End: 2022-05-04 | Stop reason: HOSPADM

## 2022-04-30 RX ORDER — ACETAMINOPHEN 325 MG/1
650 TABLET ORAL EVERY 6 HOURS PRN
Status: DISCONTINUED | OUTPATIENT
Start: 2022-04-30 | End: 2022-05-04 | Stop reason: HOSPADM

## 2022-04-30 RX ORDER — MAGNESIUM HYDROXIDE/ALUMINUM HYDROXICE/SIMETHICONE 120; 1200; 1200 MG/30ML; MG/30ML; MG/30ML
30 SUSPENSION ORAL EVERY 4 HOURS PRN
Status: DISCONTINUED | OUTPATIENT
Start: 2022-04-30 | End: 2022-05-04 | Stop reason: HOSPADM

## 2022-04-30 RX ORDER — FUROSEMIDE 10 MG/ML
40 INJECTION INTRAMUSCULAR; INTRAVENOUS ONCE
Status: COMPLETED | OUTPATIENT
Start: 2022-04-30 | End: 2022-04-30

## 2022-04-30 RX ORDER — LIDOCAINE 40 MG/G
CREAM TOPICAL
Status: DISCONTINUED | OUTPATIENT
Start: 2022-04-30 | End: 2022-05-04 | Stop reason: HOSPADM

## 2022-04-30 RX ORDER — HYDRALAZINE HYDROCHLORIDE 20 MG/ML
10 INJECTION INTRAMUSCULAR; INTRAVENOUS EVERY 4 HOURS PRN
Status: DISCONTINUED | OUTPATIENT
Start: 2022-04-30 | End: 2022-05-04 | Stop reason: HOSPADM

## 2022-04-30 RX ADMIN — METOPROLOL TARTRATE 50 MG: 50 TABLET, FILM COATED ORAL at 20:01

## 2022-04-30 RX ADMIN — ATORVASTATIN CALCIUM 80 MG: 40 TABLET, FILM COATED ORAL at 20:01

## 2022-04-30 RX ADMIN — LOSARTAN POTASSIUM 100 MG: 100 TABLET, FILM COATED ORAL at 16:37

## 2022-04-30 RX ADMIN — LOSARTAN POTASSIUM 100 MG: 50 TABLET, FILM COATED ORAL at 09:58

## 2022-04-30 RX ADMIN — METOPROLOL TARTRATE 50 MG: 25 TABLET, FILM COATED ORAL at 09:58

## 2022-04-30 RX ADMIN — ASPIRIN 81 MG: 81 TABLET, COATED ORAL at 16:37

## 2022-04-30 RX ADMIN — IOPAMIDOL 100 ML: 755 INJECTION, SOLUTION INTRAVENOUS at 11:52

## 2022-04-30 RX ADMIN — GABAPENTIN 300 MG: 300 CAPSULE ORAL at 20:01

## 2022-04-30 RX ADMIN — FUROSEMIDE 40 MG: 10 INJECTION, SOLUTION INTRAMUSCULAR; INTRAVENOUS at 12:10

## 2022-04-30 ASSESSMENT — ACTIVITIES OF DAILY LIVING (ADL)
ADLS_ACUITY_SCORE: 16
ADLS_ACUITY_SCORE: 8
ADLS_ACUITY_SCORE: 16

## 2022-04-30 ASSESSMENT — ENCOUNTER SYMPTOMS
BLOOD IN STOOL: 0
COUGH: 1
ABDOMINAL PAIN: 0
DYSURIA: 0
DIARRHEA: 0
FEVER: 0
LIGHT-HEADEDNESS: 0
NAUSEA: 0
CHILLS: 0
SHORTNESS OF BREATH: 1
HEMATURIA: 0
VOMITING: 0

## 2022-04-30 NOTE — PHARMACY-ADMISSION MEDICATION HISTORY
Patient direct transfer from [facility] where med rec was completed by Britany Gonzales, pharmacy intern. Med list was updated per med rec from [facility]. See Britany's note for further details.     Татьяна Morris, pharmacy intern.

## 2022-04-30 NOTE — PROGRESS NOTES
"3-Hospitalist Huddle Documentation    Acuity/Preferred Bed Type: telemetry  Infection Concerns: none  Additional Specialist Needed Or Specialist Already Contacted:   None  Timely Treatments Needed: No  Important things to know/address during hospitalization: sitter not needed      Per Dr. Brown ED    Mr. Jin 61-year-old male with history of COPD hypertension who presents complaining of worsening shortness of breath has been going on for the last 2 weeks unable to lay flat to sleep worse with exertion.  Not associated with chest pain occasional cough no hemoptysis.  Has been vaccinated against COVID.   0829 Patient has also been out of his blood pressure medications for the last 2 weeks has been using his albuterol twice per day.  Denies any recent prednisone.     Hx CHF COPD, off BP meds for 2 weeks. \"Ran out\"   Hypoxia 87% with ambulation on 2L o2.   Lasix 40 mg given   Mg and K ok. BNP elevated in the 2K range.   Seen in clinic 1 week ago and given pred and levaquin for COPD exacerbation.   Suspect more CHF exacerbation   COVID negative   CXR and CT negative for PE/PNA. Looks like fluid overload.   Admit to cardiac tele for CHF exacerbation   Coming to [facility] via ambulance   Sign out taken 12:45pm  Admit under [facility] for now.   "

## 2022-04-30 NOTE — ED NOTES
Pt complained right 4th toe having pain on his toe nail.  Appears to be very long per pt it has been a month for the nali being trimmed.  Wants it check out

## 2022-04-30 NOTE — H&P
History and Physical     Eber Jin MRN# [id number]   YOB: 1960 Age: 61 year old      Date of Admission:  4/30/2022    Primary care provider: Elisabeth Nava          Assessment and Plan:     Summary of Stay: Eber Jin is a 61 year old male with a history of AFib on chronic rivaroxaban, CAD with ischemic CM most recent echo 3/2021 with EF 45% COPD not on home O2, ongoing tob use,hx of a stroke, PAD, htn/hlp admitted on 4/30/2022 with increasing SOB.     He's been feeling more SOB for the past 2 weeks.  No cough or fever, no CP, no palpitations.  Has not noticed significant increase in his  LE edema.  He was seen in clinic on 4/15 at which time he also described a productive cough and so prescribed levofloxacin/pred burst.      He was noted to be hypertensive to 170/100 at that visit, and stated it was due to the fact that he had only taken one of her BP meds that morning and was heading to the pharmacy for a refill.    He never picked up his BP medications or the levofloxacin/pred he had been prescribed due to inability to pay for it.      He presented to  [facility] with these complaints and was noted to desaturate to  87 % with minimal exertion. He was hypertensive in the 170/120 range.  Labs nl BMP x mildly depressed bicarb, cbc wnl, trop undetectable at <0.01, EKG NSR without ischemic changes but with prolonged QTc 515 BNP elevated at 2619    D dimer obtained and quite elevated  At 2.8 so CT PE protocol completed and negative for PE, notable for cardiomegaly/pulmonary edema consistent with CHF.    He was given losartan 100 mg po, metop 50 mg po, and furosemide  40 mg IV.  He states he's  Started to urinate quite a bit but sx are unchanged      I am asked to directly admit him to our cardiac floor due to lack of beds at [facility]    Problem List:   Acute hypoxic respiratory failure  Acute on chronic systolic HF exacerbation  Due to inability to pay for medications:  Likely  combination of uncontrolled BP, lack of diuretic, and  Potentially AF with RVR due to lackof BB   -resumed all medications with metop 50 mg bid, losartan 100 mg.  Given 40 mg IV furosemide in ER  -will give furosemide 40 mg iv bid  -monitor on tele  -follow weights, I/O     Hypertension   Prior medications losartan 100 mg every day, furosemide 40 mg po every day, metop 50 mg po bid  -resumed all and will have prn hydralazine in place    AFib  He states that he has been told that he has this, there is a reference to it in care everywhere as well  -resumed rivaroxaban, metop  -monitor on tele  -currently in NSR    Prolonged QTc  Has had in past but not as prolonged as on admission EKG here.  Runs in the 470-480 range, but here at 515.  Also getting diuretics  -check mag/K after diuretic, K and mag replacement protocol     hlp resumed statin     tob use  atq  -declined NRT    CAD/PAD  Hx of stroke  Secondary prevention with asa/BP control    COVID 19 negative at [facility]  -s/p 3 shot pfizer series 3/2022      DVT Prophylaxis: DOAC  Code Status: Full Code  Functional Status:  Lives with wife, independent in ADLs  Huggins: not needed  Access:   PIV              Chief Complaint:     sob       History of Present Illness:   Eber Jin is a 61 year old male with a history of AFib on chronic rivaroxaban, CAD with ischemic CM most recent echo 3/2021 with EF 45% COPD not on home O2, ongoing tob use,hx of a stroke, PAD, htn/hlp admitted on 4/30/2022 with increasing SOB.     He's been feeling more SOB for the past 2 weeks.  No cough or fever, no CP, no palpitations.  Has not noticed significant increase in his  LE edema.  He was seen in clinic on 4/15 at which time he also described a productive cough and so prescribed levofloxacin/pred burst.      He was noted to be hypertensive to 170/100 at that visit, and stated it was due to the fact that he had only taken one of her BP meds that morning and was heading to the pharmacy  for a refill.    He never picked up his BP medications or the levofloxacin/pred he had been prescribed due to inability to pay for it.      He presented to  [facility] with these complaints and was noted to desaturate to  87 % with minimal exertion. He was hypertensive in the 170/120 range.  Labs nl BMP x mildly depressed bicarb, cbc wnl, trop undetectable at <0.01, EKG NSR without ischemic changes but with prolonged QTc 515 BNP elevated at 2619    D dimer obtained and quite elevated  At 2.8 so CT PE protocol completed and negative for PE, notable for cardiomegaly/pulmonary edema consistent with CHF.    He was given losartan 100 mg po, metop 50 mg po, and furosemide  40 mg IV.  He states he's  Started to urinate quite a bit but sx are unchanged    I am asked to directly admit him to our cardiac floor due to lack of beds at [facility]    The history is obtained in discussion with the patient and extensive review of Epic and care everywhere        Past Medical History:     Past Medical History:   Diagnosis Date     Accelerated hypertension      Arthritis      CAD (coronary artery disease)      Cerebrovascular accident (CVA), unspecified mechanism (H)      CHF (congestive heart failure) (H)      Chronic atrial fibrillation (H)     on rivaroxaban     COPD (chronic obstructive pulmonary disease) (H)      Heart failure (H)     ischemic, EF 45 % im 3/2021     HLD (hyperlipidemia)      HTN (hypertension)      Hypertensive urgency      Myocardial infarction (H)      PAD (peripheral artery disease) (H)      Peripheral vascular disease with claudication (H)      Prolonged Q-T interval on ECG              Past Surgical History:     Past Surgical History:   Procedure Laterality Date     CARDIAC CATHETERIZATION       IR ABDOMINAL AORTOGRAM  8/6/2013     IR EXTREMITY ANGIOGRAM BILATERAL  8/6/2013             Social History:     Social History     Tobacco Use     Smoking status: Current Every Day Smoker     Packs/day: 0.50     Years:  30.00     Pack years: 15.00     Types: Cigarettes     Smokeless tobacco: Never Used   Substance Use Topics     Alcohol use: Not Currently     Alcohol/week: 1.0 - 2.0 standard drink     Comment: Alcoholic Drinks/day: rare use   Code Status: Full Code  Functional Status:  Lives with wife, independent in ADLs          Family History:     Family History   Problem Relation Age of Onset     Heart Failure Mother      No Known Problems Father      No Known Problems Brother             Allergies:     Allergies   Allergen Reactions     Penicillins Swelling             Medications:     Prior to Admission medications    Medication Sig Last Dose Taking? Auth Provider   albuterol (PROAIR HFA/PROVENTIL HFA/VENTOLIN HFA) 108 (90 Base) MCG/ACT inhaler Inhale 2 puffs into the lungs every 6 hours as needed for shortness of breath / dyspnea or wheezing Past Month at Unknown time Yes Mook Tavarez MD   ASPIRIN LOW DOSE 81 MG EC tablet Take 81 mg by mouth daily Past Month at Unknown time Yes Reported, Patient   atorvastatin (LIPITOR) 80 MG tablet Take 1 tablet (80 mg) by mouth every evening Past Month at Unknown time Yes Mook Tavarez MD   furosemide (LASIX) 40 MG tablet Take 1 tablet (40 mg) by mouth daily Past Month at Unknown time Yes Mook Tavarez MD   gabapentin (NEURONTIN) 300 MG capsule Take 1 capsule (300 mg) by mouth daily Past Month at Unknown time Yes Mook Tavarez MD   losartan (COZAAR) 100 MG tablet Take 1 tablet (100 mg) by mouth daily Past Month at Unknown time Yes Mook Tavarez MD   metoprolol tartrate (LOPRESSOR) 50 MG tablet Take 1 tablet (50 mg) by mouth 2 times daily Past Month at Unknown time Yes Mook Tavarez MD   rivaroxaban ANTICOAGULANT (XARELTO) 20 MG TABS tablet Take 1 tablet (20 mg) by mouth daily (with dinner) Past Month at Unknown time Yes Mook Tavarez MD   spironolactone (ALDACTONE) 25 MG tablet Take 1 tablet (25 mg) by mouth daily Past Month at Unknown time Yes Mook Taavrez  MD ANUPAMA               Review of Systems:     A Comprehensive greater than 10 system review of systems was carried out.  Pertinent positives and negatives are noted above.  Otherwise negative for contributory information.           Physical Exam:   Blood pressure (!) 158/106, pulse 79, temperature 97.6  F (36.4  C), temperature source Oral, resp. rate 18, weight 80.7 kg (177 lb 14.4 oz), SpO2 94 %.  Exam:    General:  Pleasant nad looks < stated age  HEENT:  Head nc/at sclera clear PERRL O/P:  Mask in placeNeck is supple  Lungs: cta b nl effort   CV:  RRR no m/r/g 1+ pitting le edema  Abd:  S/nt/nd no r/g  Neuro:  Cn 2-12 grossly intact and veliz  Alert and oriented affect appropriate   Skin:  W/d no c/c               Data:          Lab Results   Component Value Date     04/30/2022     03/22/2021    Lab Results   Component Value Date    CHLORIDE 106 04/30/2022    CHLORIDE 108 03/22/2021    Lab Results   Component Value Date    BUN 14 04/30/2022    BUN 18 03/22/2021      Lab Results   Component Value Date    POTASSIUM 4.1 04/30/2022    POTASSIUM 4.7 03/22/2021    Lab Results   Component Value Date    CO2 19 04/30/2022    CO2 28 03/22/2021    Lab Results   Component Value Date    CR 1.09 04/30/2022    CR 0.88 03/22/2021        No results found for: NTBNPI, NTBNP  Lab Results   Component Value Date     04/30/2022            Imaging:     Recent Results (from the past 24 hour(s))   Chest XR,  PA & LAT    Narrative    EXAM: XR CHEST 2 VW  LOCATION: [facility]  DATE/TIME: 4/30/2022 9:22 AM    INDICATION: Shortness of breath.  COMPARISON: 12/31/2020      Impression    IMPRESSION: Stable cardiomegaly with normal vascularity. No focal consolidation, pneumothorax or pleural effusion.   CT Chest Pulmonary Embolism w Contrast    Narrative    EXAM: CT CHEST PULMONARY EMBOLISM W CONTRAST  LOCATION: [facility]  DATE/TIME: 4/30/2022 11:51 AM    INDICATION: Shortness of  breath. Positive d-dimer. Assess for pulmonary embolism.  COMPARISON: Chest x-ray 4/30/2022 at 9:22 AM, CTA chest 9/21/2020  TECHNIQUE: CT chest pulmonary angiogram during arterial phase injection of IV contrast. Multiplanar reformats and MIP reconstructions were performed. Dose reduction techniques were used.   CONTRAST: Isovue 370    100ml    FINDINGS:  ANGIOGRAM CHEST: Pulmonary arteries are normal caliber and negative for pulmonary emboli. Thoracic aorta not opacified to assess for dissection. Mild ectasia of the thoracic aorta without aneurysmal dilatation. Reflux of contrast into the IVC and hepatic   veins suggest elevated right-sided heart pressures.    LUNGS AND PLEURA: Small right pleural effusion with dependent atelectasis. Subtle mild groundglass and interstitial opacities mid and lower lungs suggest mild edema. Mild centrilobular emphysema. Benign calcified granuloma right middle lobe. No left   effusion.    MEDIASTINUM/AXILLAE: No mediastinal, hilar or axillary lymphadenopathy. Cardiac enlargement. No pericardial effusion.    CORONARY ARTERY CALCIFICATION: Moderate.    UPPER ABDOMEN: Unremarkable.    MUSCULOSKELETAL: Normal.      Impression    IMPRESSION:  1.  No pulmonary embolism.  2.  Probable congestive heart failure with cardiac enlargement, small right pleural effusion, subtle groundglass and interstitial opacities likely due to edema. Reflux of contrast into the IVC and hepatic veins suggest elevated right-sided heart   pressures.

## 2022-04-30 NOTE — PHARMACY-ADMISSION MEDICATION HISTORY
Pharmacy Note - Admission Medication History    Pertinent Provider Information: Pt states that he has not taken any of his medications for approximately 2 weeks. Was prescribed a 7 day course of levofloxacin and a 5 day course of prednisone on 4/15, pt states that he never picked it up from the pharmacy so he did not start either course.      ______________________________________________________________________    Prior To Admission (PTA) med list completed and updated in EMR.       PTA Med List   Medication Sig Last Dose     albuterol (PROAIR HFA/PROVENTIL HFA/VENTOLIN HFA) 108 (90 Base) MCG/ACT inhaler Inhale 2 puffs into the lungs every 6 hours as needed for shortness of breath / dyspnea or wheezing Past Month at Unknown time     ASPIRIN LOW DOSE 81 MG EC tablet Take 81 mg by mouth daily Past Month at Unknown time     atorvastatin (LIPITOR) 80 MG tablet Take 1 tablet (80 mg) by mouth every evening Past Month at Unknown time     furosemide (LASIX) 40 MG tablet Take 1 tablet (40 mg) by mouth daily Past Month at Unknown time     gabapentin (NEURONTIN) 300 MG capsule Take 1 capsule (300 mg) by mouth daily Past Month at Unknown time     losartan (COZAAR) 100 MG tablet Take 1 tablet (100 mg) by mouth daily Past Month at Unknown time     metoprolol tartrate (LOPRESSOR) 50 MG tablet Take 1 tablet (50 mg) by mouth 2 times daily Past Month at Unknown time     rivaroxaban ANTICOAGULANT (XARELTO) 20 MG TABS tablet Take 1 tablet (20 mg) by mouth daily (with dinner) Past Month at Unknown time     spironolactone (ALDACTONE) 25 MG tablet Take 1 tablet (25 mg) by mouth daily Past Month at Unknown time       Information source(s): Patient and CareEverywhere/SureScripts  Method of interview communication: in-person    Summary of Changes to PTA Med List  New: N/A  Discontinued: N/A  Changed: N/A    Patient was asked about OTC/herbal products specifically.  PTA med list reflects this.    In the past week, patient estimated taking  medication this percent of the time:  less than 50% due to running out.    Allergies were reviewed, assessed, and updated with the patient.      Patient does not use any multi-dose medications prior to admission.    The information provided in this note is only as accurate as the sources available at the time of the update(s).    Thank you for the opportunity to participate in the care of this patient.    Britany Gonzales  4/30/2022 11:30 AM

## 2022-04-30 NOTE — ED TRIAGE NOTES
Patient has had increasing SOB over the past 2 weeks, especially with activity.  Has COPD and uses inhalers.  Mamadou any increase swelling, Mamadou fevers. Has not been taking his BP medication because he does not have it.

## 2022-04-30 NOTE — ED PROVIDER NOTES
EMERGENCY DEPARTMENT ENCOUNTER      NAME: Eber Jin  AGE: 61 year old male  YOB: 1960  MRN: [id number]  EVALUATION DATE & TIME: No admission date for patient encounter.    PCP: Elisabeth Nava    ED PROVIDER: Sarah Vincent M.D.      CHIEF COMPLAINT     Chief Complaint   Patient presents with     Shortness of Breath         FINAL IMPRESSION:     1. Acute on chronic congestive heart failure, unspecified heart failure type (H)    2. Malignant essential hypertension    3. Prolonged Q-T interval on ECG    4. Requires supplemental oxygen    5. Chronic obstructive pulmonary disease, unspecified COPD type (H)          MEDICAL DECISION MAKING:       Pertinent Labs & Imaging studies reviewed. (See chart for details)    61 year old male presents to the Emergency Department for evaluation of sob     ED Course as of 04/30/22 1339   Sat Apr 30, 2022   0829 Mr. Jin 61-year-old male with history of COPD hypertension who presents complaining of worsening shortness of breath has been going on for the last 2 weeks unable to lay flat to sleep worse with exertion.  Not associated with chest pain occasional cough no hemoptysis.  Has been vaccinated against COVID.   0829 Patient has also been out of his blood pressure medications for the last 2 weeks has been using his albuterol twice per day.  Denies any recent prednisone.   0829 On examination patient looks tired but is nontoxic cooperative with exam.  Poor dentition clear breath sounds abdomen soft has bilateral ankle edema no calf tenderness palpation.   0830 Differential diagnosis include but not limited to COPD exacerbation, COVID, CHF, anemia, hypertensive urgency, acute coronary syndrome pneumothorax among others.   0905 Normal hemoglobin and white blood cell count and platelets normal renal function elevated dimer 2.77.   0911 Patient reevaluated.  Per nurse as patient was walking into the room his saturation decreased to 87%.  Tolerating 2 L of  nasal cannula.   0911 Patient now tells me that he is off of all of his medications for at least 2 weeks.   0914 I requested pharmacist to review patient's medications.  Of his medication she takes Lasix losartan metoprolol and spironolactone.  I ordered her losartan and metoprolol.   0915 Patient has clear breath sounds respiratory wheezes with an elevated D-dimer and patient no longer taking his rivaroxaban will order CT chest.   1059 BNP 2619  Has not been taking his Lasix or his spironolactone.  We will give him Lasix IV.  COVID and influenza negative.   1115 Rashi has a history of prolonged QT and currently his QTC is prolonged at 515.  When I order Lasix he states that he is contraindicated.  Discussed with pharmacy.   1146 Sodium: 137   1223 With charge nurse unfortunately no beds available.  Patient is willing to transfer.  We will look for outside hospitals.   1231 CT chest no pulmonary embolism pulmonary edema I ordered not aneurysmal no pericardial effusion.   1335 Clinical impression and decision making  61-year-old male history of hypertension coronary disease congestive heart failure and COPD noncompliance of medication presents comprehensive for shortness of breath.  Recently treated for COPD exacerbation with Levaquin and prednisone.  He is here looks nontoxic but appears short of breath hypoxic on exertion CT chest no PE pulmonary hypertension no evidence of pulmonary edema prolonged QTC on EKG history of the same magnesium and potassium are normal.  Given Lasix.  Noncompliant with blood pressure medication he was given his regular dose of metoprolol losartan.  Currently denies any chest pain no beds available  Discussed with the patient he is amendable to being transferred thankfully there is a bed at [facility] and he has been accepted transfer in stable condition.             Vital Signs: Hypertension  EKG: Sinus rhythm inverted T wave in V3 V4 lead III Q wave in lead III long QT of  550  Imaging: X-ray cardiomegaly  Home Meds: Reviewed  ED meds/abx: Lasix  Fluids: oral    Labs  K 4.2  Cr 1.09  Wbc 5.6  Hgb 13.3  Platelets 235  BMP 2619  Troponin negative  Magnesium 1.9  With negative      Review of Previous Records    Clinic note 4/15/2029   COPD exacerbation (H)  F/U next week, appointment scheduled.  - levofloxacin (LEVAQUIN) 500 MG tablet; Take 1 tablet (500 mg) by mouth daily  - predniSONE (DELTASONE) 20 MG tablet; Take 1 tablet (20 mg) by mouth 2 times daily for 5 days     Hypertension, unspecified type  Uncontrolled.   Took only Lopressor this morning per patient. States the other blood pressure medications are at the pharmacy.  Instructed to  today and start all blood pressure medications.      This transcription uses voice recognition software, which may contain typographical errors.     SUBJECTIVE: Eber Jin is a 61-year-old male with history of COPD, hypertension, history of stroke, coronary artery disease, heart failure and others here with complaint of cough for the past 2 weeks.  He spoke to our triage nurse yesterday, note reviewed.  He reports cough and shortness of breath for the past 2 weeks.  No fever or chills.  Cough is productive at times.  He states he uses his inhalers as prescribed.  He has history of hypertension.  He is on Lasix 40 mg daily, losartan 100 mg daily, Lopressor 50 mg twice a day and spironolactone 25 mg daily per chart review.  He did not bring his pill bottles but states he took 3 medications this morning including metoprolol.  He is not sure if he took other blood pressure medications but states they are ready to be picked up at the pharmacy.  Denied acute headache.  Denied acute weaknesses.  Denied acute vision changes.       Consults  Pharmacy  Hermann, Hospitalist     ED COURSE   8:24 AM I met with the patient, obtained an initial history, performed an examination and discussed the plan. PPE worn throughout all interactions with the  patient, including surgical mask, surgical cap, gloves.   9:14 AM Rechecked patient. Patient now states that he has not been taking any of his regular medications.   11:02 AM Rechecked patient and updated.    11:46 AM discussed with him results because he is requiring oxygen discussed with him admission.  He rolled his eyes.  Stated he is hungry will order him on a low-sodium diet.    11:51 AM I spoke with the patient's wife on the phone and updated her on patient.    12:42 PM I discussed case with Hermann SIMON who accepts patient. Attending Dr. Velásquez, hospitalist who accepts patient for admission.    12:50 PM Rechecked patient. He is eating lunch and agreeable with transfer.      At the conclusion of the encounter I discussed the results of all of the tests and the disposition. The questions were answered. The patient and his daughter acknowledged understanding and was agreeable with the care plan.       35 minutes of critical care time     MEDICATIONS GIVEN IN THE EMERGENCY:     Medications   losartan (COZAAR) tablet 100 mg (100 mg Oral Given 4/30/22 0958)   metoprolol tartrate (LOPRESSOR) tablet 50 mg (50 mg Oral Given 4/30/22 0958)   iopamidol (ISOVUE-370) solution 100 mL (100 mLs Intravenous Given 4/30/22 1152)   furosemide (LASIX) injection 40 mg (40 mg Intravenous Given 4/30/22 1210)       NEW PRESCRIPTIONS STARTED AT TODAY'S ER VISIT     New Prescriptions    No medications on file          =================================================================    HPI     Patient information was obtained from: patient     Use of : N/A         Eber Jin is a 61 year old male who presents by private vehicle for evaluation of shortness of breath.    Patient reports that he has been short of breath for the last couple of weeks. His shortness of breath is worse with laying flat and exertion, but he has it throughout the whole day. Patient states that he hasn't been able to sleep well secondarily, prompting  him to come into the ED today. He has a history of COPD and has been using his inhaler about twice a day. He has not recently been on Prednisone. Patient isn't on oxygen supplement at home.     He also endorses a light, non-bloody cough, but denies chest pain, fever, chills, lightheaded, sore throat, nausea vomiting, diarrhea, abdominal pain, dysuria, hematuria, leg swelling, rash, or any additional symptoms at this time. Notably, patient ran out of his blood pressure medications about 2 weeks ago. He is covid vaccinated + booster. He has not recently traveled. Patient does use tobacco and drinks alcohol occasionally. Denies other drug use.     Update: Patient reports that he has not been taking any of his medications recently. He was prescribed Levaquin and prednisone on 4/15/22.       REVIEW OF SYSTEMS   Review of Systems   Constitutional: Negative for chills and fever.   Respiratory: Positive for cough and shortness of breath.    Cardiovascular: Negative for chest pain and leg swelling.   Gastrointestinal: Negative for abdominal pain, blood in stool, diarrhea, nausea and vomiting.   Genitourinary: Negative for dysuria and hematuria.   Skin: Negative for rash.   Neurological: Negative for light-headedness.   All other systems reviewed and are negative.      PAST MEDICAL HISTORY:     Past Medical History:   Diagnosis Date     Accelerated hypertension      Arthritis      CAD (coronary artery disease)      Cerebrovascular accident (CVA), unspecified mechanism (H)      CHF (congestive heart failure) (H)      COPD (chronic obstructive pulmonary disease) (H)      Heart failure (H)      HLD (hyperlipidemia)      HTN (hypertension)      Hypertensive urgency      Myocardial infarction (H)      PAD (peripheral artery disease) (H)      Peripheral vascular disease with claudication (H)      Prolonged Q-T interval on ECG        PAST SURGICAL HISTORY:     Past Surgical History:   Procedure Laterality Date     CARDIAC  "CATHETERIZATION       IR ABDOMINAL AORTOGRAM  8/6/2013     IR EXTREMITY ANGIOGRAM BILATERAL  8/6/2013         CURRENT MEDICATIONS:   albuterol (PROAIR HFA/PROVENTIL HFA/VENTOLIN HFA) 108 (90 Base) MCG/ACT inhaler  ASPIRIN LOW DOSE 81 MG EC tablet  atorvastatin (LIPITOR) 80 MG tablet  furosemide (LASIX) 40 MG tablet  gabapentin (NEURONTIN) 300 MG capsule  losartan (COZAAR) 100 MG tablet  metoprolol tartrate (LOPRESSOR) 50 MG tablet  rivaroxaban ANTICOAGULANT (XARELTO) 20 MG TABS tablet  spironolactone (ALDACTONE) 25 MG tablet  levofloxacin (LEVAQUIN) 500 MG tablet         ALLERGIES:     Allergies   Allergen Reactions     Penicillins Swelling       FAMILY HISTORY:     Family History   Problem Relation Age of Onset     Heart Failure Mother      No Known Problems Father      No Known Problems Brother        SOCIAL HISTORY:     Social History     Socioeconomic History     Marital status:    Tobacco Use     Smoking status: Current Every Day Smoker     Packs/day: 0.50     Years: 30.00     Pack years: 15.00     Types: Cigarettes     Smokeless tobacco: Never Used   Vaping Use     Vaping Use: Never used   Substance and Sexual Activity     Alcohol use: Not Currently     Alcohol/week: 1.0 - 2.0 standard drink     Comment: Alcoholic Drinks/day: rare use     Drug use: No     Sexual activity: Yes     Partners: Female   Social History Narrative    Patient is not on supplemental O2 at home. Patient does not use any assistive device for ambulation. Full code.        VITALS:   BP (!) 171/120   Pulse 80   Temp 98.6  F (37  C) (Oral)   Resp 27   Ht 1.803 m (5' 11\")   Wt 80.7 kg (178 lb)   SpO2 99%   BMI 24.83 kg/m      PHYSICAL EXAM     Physical Exam  Vitals and nursing note reviewed.   Constitutional:       Appearance: He is well-developed.   Pulmonary:      Breath sounds: Examination of the right-lower field reveals rales. Examination of the left-lower field reveals rales. Rales present.      Comments: Appears short of " breath with exertion.  Skin:     Capillary Refill: Capillary refill takes less than 2 seconds.   Neurological:      General: No focal deficit present.      Mental Status: He is alert and oriented to person, place, and time.         Physical Exam   Constitutional: Non-toxic and tired appearing. In no respiratory distress.    Head: Atraumatic.     Nose: Nose normal.     Mouth/Throat: Oropharynx is clear and moist. Moist mucous membranes, poor dentition.  Eyes: EOM are normal. Pupils are equal, round, and reactive to light.     Ears: bilateral pearly white TMs.    Neck: Normal range of motion. Neck supple.     Cardiovascular: Normal rate, regular rhythm and normal heart sounds.      Pulmonary/Chest: Dyspnea on exertion.  I do not appreciate any expiratory wheezes.  Occasional crackles.    Abdominal: non-tender    Musculoskeletal: Normal range of motion.     Neurological: No focal deficits     Lymphatics: bilateral trace ankle edema    : NA    Skin: Skin is warm and dry.     Psychiatric: Normal mood and affect. Behavior is normal.       LAB:     All pertinent labs reviewed and interpreted.  Labs Ordered and Resulted from Time of ED Arrival to Time of ED Departure   BASIC METABOLIC PANEL - Abnormal       Result Value    Sodium 137      Potassium 4.2      Chloride 106      Carbon Dioxide (CO2) 19 (*)     Anion Gap 12      Urea Nitrogen 14      Creatinine 1.09      Calcium 9.1      Glucose 109      GFR Estimate 76     B-TYPE NATRIURETIC PEPTIDE ([facility] ONLY) - Abnormal    BNP 2,619 (*)    D DIMER QUANTITATIVE - Abnormal    D-Dimer Quantitative 2.77 (*)    TROPONIN I - Normal    Troponin I <0.01     INFLUENZA A/B & SARS-COV2 PCR MULTIPLEX - Normal    Influenza A PCR Negative      Influenza B PCR Negative      SARS CoV2 PCR Negative     MAGNESIUM - Normal    Magnesium 1.9     CBC WITH PLATELETS AND DIFFERENTIAL    WBC Count 5.6      RBC Count 4.61      Hemoglobin 13.3      Hematocrit 41.9      MCV 91      MCH 28.9       MCHC 31.7      RDW 14.2      Platelet Count 235      % Neutrophils 51      % Lymphocytes 33      % Monocytes 14      % Eosinophils 1      % Basophils 1      % Immature Granulocytes 0      NRBCs per 100 WBC 0      Absolute Neutrophils 2.9      Absolute Lymphocytes 1.8      Absolute Monocytes 0.8      Absolute Eosinophils 0.1      Absolute Basophils 0.0      Absolute Immature Granulocytes 0.0      Absolute NRBCs 0.0          RADIOLOGY:     Reviewed all pertinent imaging. Please see official radiology report.  CT Chest Pulmonary Embolism w Contrast   Final Result   IMPRESSION:   1.  No pulmonary embolism.   2.  Probable congestive heart failure with cardiac enlargement, small right pleural effusion, subtle groundglass and interstitial opacities likely due to edema. Reflux of contrast into the IVC and hepatic veins suggest elevated right-sided heart    pressures.      Chest XR,  PA & LAT   Final Result   IMPRESSION: Stable cardiomegaly with normal vascularity. No focal consolidation, pneumothorax or pleural effusion.           EKG:     EKG #1  Normal sinus rhythm normal anterior progression inverted T waves in V3 V4 lead III Q wave inferiorly.  Prolonged QT at 550    Time:[id number]    Ventricular rate 78  bmp  Axis normal  UT interval 194  ms  QRS duration 110 ms  QT//515 ms    Compared to previous EKG on March 5, 2021 QT C4 70 inverted T waves in V5 V6 inverted T wave in V3 is new.  I have independently reviewed and interpreted the EKG(s) documented above.      PROCEDURES:     Procedures      I, Sia Wagner, am serving as a scribe to document services personally performed by Dr. Vincent based on my observation and the provider's statements to me. I, Sarah Vincent MD attest that Sia Wagner is acting in a scribe capacity, has observed my performance of the services and has documented them in accordance with my direction.    Sarah Vincent M.D.  Emergency Medicine  [facility]  [facility] EMERGENCY DEPARTMENT  [address] [place] [id number]  [phone]  Dept: [phone]     Sarah Vincent MD  04/30/22 5407

## 2022-05-01 LAB
ANION GAP SERPL CALCULATED.3IONS-SCNC: 4 MMOL/L (ref 3–14)
BUN SERPL-MCNC: 20 MG/DL (ref 7–30)
CALCIUM SERPL-MCNC: 9.1 MG/DL (ref 8.5–10.1)
CHLORIDE BLD-SCNC: 109 MMOL/L (ref 94–109)
CO2 SERPL-SCNC: 23 MMOL/L (ref 20–32)
CREAT SERPL-MCNC: 1.1 MG/DL (ref 0.66–1.25)
ERYTHROCYTE [DISTWIDTH] IN BLOOD BY AUTOMATED COUNT: 14.2 % (ref 10–15)
GFR SERPL CREATININE-BSD FRML MDRD: 75 ML/MIN/1.73M2
GLUCOSE BLD-MCNC: 82 MG/DL (ref 70–99)
HCT VFR BLD AUTO: 43.6 % (ref 40–53)
HGB BLD-MCNC: 13.4 G/DL (ref 13.3–17.7)
MCH RBC QN AUTO: 28.7 PG (ref 26.5–33)
MCHC RBC AUTO-ENTMCNC: 30.7 G/DL (ref 31.5–36.5)
MCV RBC AUTO: 93 FL (ref 78–100)
PLATELET # BLD AUTO: 224 10E3/UL (ref 150–450)
POTASSIUM BLD-SCNC: 4.4 MMOL/L (ref 3.4–5.3)
RBC # BLD AUTO: 4.67 10E6/UL (ref 4.4–5.9)
SODIUM SERPL-SCNC: 136 MMOL/L (ref 133–144)
WBC # BLD AUTO: 6.1 10E3/UL (ref 4–11)

## 2022-05-01 PROCEDURE — 84520 ASSAY OF UREA NITROGEN: CPT | Performed by: INTERNAL MEDICINE

## 2022-05-01 PROCEDURE — 99232 SBSQ HOSP IP/OBS MODERATE 35: CPT | Performed by: INTERNAL MEDICINE

## 2022-05-01 PROCEDURE — 120N000001 HC R&B MED SURG/OB

## 2022-05-01 PROCEDURE — 93010 ELECTROCARDIOGRAM REPORT: CPT | Performed by: INTERNAL MEDICINE

## 2022-05-01 PROCEDURE — 85027 COMPLETE CBC AUTOMATED: CPT | Performed by: INTERNAL MEDICINE

## 2022-05-01 PROCEDURE — 250N000013 HC RX MED GY IP 250 OP 250 PS 637: Performed by: INTERNAL MEDICINE

## 2022-05-01 PROCEDURE — 36415 COLL VENOUS BLD VENIPUNCTURE: CPT | Performed by: INTERNAL MEDICINE

## 2022-05-01 PROCEDURE — 250N000011 HC RX IP 250 OP 636: Performed by: INTERNAL MEDICINE

## 2022-05-01 RX ORDER — SPIRONOLACTONE 25 MG/1
25 TABLET ORAL DAILY
Status: DISCONTINUED | OUTPATIENT
Start: 2022-05-01 | End: 2022-05-04 | Stop reason: HOSPADM

## 2022-05-01 RX ADMIN — FUROSEMIDE 40 MG: 10 INJECTION, SOLUTION INTRAMUSCULAR; INTRAVENOUS at 09:28

## 2022-05-01 RX ADMIN — FUROSEMIDE 40 MG: 10 INJECTION, SOLUTION INTRAMUSCULAR; INTRAVENOUS at 20:41

## 2022-05-01 RX ADMIN — RIVAROXABAN 20 MG: 20 TABLET, FILM COATED ORAL at 20:41

## 2022-05-01 RX ADMIN — LOSARTAN POTASSIUM 100 MG: 100 TABLET, FILM COATED ORAL at 09:29

## 2022-05-01 RX ADMIN — METOPROLOL TARTRATE 50 MG: 50 TABLET, FILM COATED ORAL at 09:28

## 2022-05-01 RX ADMIN — METOPROLOL TARTRATE 50 MG: 50 TABLET, FILM COATED ORAL at 20:41

## 2022-05-01 RX ADMIN — ATORVASTATIN CALCIUM 80 MG: 40 TABLET, FILM COATED ORAL at 20:41

## 2022-05-01 RX ADMIN — GABAPENTIN 300 MG: 300 CAPSULE ORAL at 20:41

## 2022-05-01 RX ADMIN — ASPIRIN 81 MG: 81 TABLET, COATED ORAL at 09:29

## 2022-05-01 RX ADMIN — SPIRONOLACTONE 25 MG: 25 TABLET ORAL at 20:57

## 2022-05-01 ASSESSMENT — ACTIVITIES OF DAILY LIVING (ADL)
ADLS_ACUITY_SCORE: 12
ADLS_ACUITY_SCORE: 8
ADLS_ACUITY_SCORE: 12
ADLS_ACUITY_SCORE: 8
ADLS_ACUITY_SCORE: 12
ADLS_ACUITY_SCORE: 8
ADLS_ACUITY_SCORE: 12
ADLS_ACUITY_SCORE: 12
ADLS_ACUITY_SCORE: 8
ADLS_ACUITY_SCORE: 12
ADLS_ACUITY_SCORE: 8
ADLS_ACUITY_SCORE: 8
ADLS_ACUITY_SCORE: 12
ADLS_ACUITY_SCORE: 8
ADLS_ACUITY_SCORE: 12
ADLS_ACUITY_SCORE: 12

## 2022-05-01 NOTE — PLAN OF CARE
Goal Outcome Evaluation:      Admitting Diagnosis: CHF  Pertinent History: hx Afib, CAD, EF 45% COPD, hx of stroke. For vitals and assessment please see flow sheet.   Living Situation: Home with wife  Pain plan: denies pain.   Mobility: assistance, stand-by  Baseline activity: Independent  Alarms/Safety: Steady gait.  LDA's: PIV.   Pertinent test results: K+ 3.8, Mg+ 2.0.   Consults: none.  Abnormals/Pending:BNP 2619  Other Cares/Comments: A & O x4, VSS, high BP please  see flow sheet..   Discharge Disposition: TBD.  Discharge Time:TBD.

## 2022-05-01 NOTE — PROGRESS NOTES
Hospitalist Medicine Progress Note   [facility]       Eber Jin is a 61 year old gentleman with history of atrial fibrillation on chronic rivaroxaban, CAD with ischemic cardiomyopathy and LVEF of 45%, COPD not on oxygen, ongoing tobacco use, stroke, peripheral vascular disease, hypertension, hypercholesteremia came in to [facility] 4/30/2022 with increased shortness of breath x2 weeks with elevated blood pressures with history of not taking all of his blood pressure medications had a blood pressure of 170/128 admission.  CT scan of the chest was negative for PE but showed pulmonary edema with cardiomegaly received IV furosemide 40 mg twice daily (takes 40 mg oral daily)        Date of Admission:  4/30/2022  Assessment & Plan     Acute hypoxic respiratory failure  Probably on the basis of acute exacerbation of chronic systolic CHF  Probably on the basis of noncompliance to blood pressure medication due to affordability  Symptoms seems to be better though patient states they are not his weight is down by 2 pounds  Will monitor weight and input and output objectively    Atrial fibrillation   Controlled ventricular response  We will continue rivaroxaban and metoprolol    Uncontrolled hypertension  Probably on the basis of noncompliance to medications due to affordability  Blood pressures are better but still elevated  Will increase the dose of blood pressure medications if the blood pressure is not better by tomorrow    Prolonged QT interval  Magnesium and potassium are normal            Plan:   Monitor input output and daily weights  Discharge in 1 to 2 days  BMP in a.m.    Diet: Combination Diet Regular Diet Adult; 2 gm NA Diet    DVT Prophylaxis: DOAC  Huggins Catheter: Not present  Code Status: Full Code               The patient's care was discussed with the Patient .    Nicko Henson MD  Hospitalist Service  [facility]  Hospital    ______________________________________________________________________    Interval History     Symptoms   Patient and her comfortable when I entered the room though did tell me that he was short of breath and I think was also hyperventilating which decreased when distracted    Review of Systems:   Denies any chest pain    Data reviewed today: I reviewed all medications, new labs and imaging results over the last 24 hours.     Physical Exam   Vital Signs: Temp: 98  F (36.7  C) Temp src: Oral BP: (!) 148/108 Pulse: 80   Resp: 20 SpO2: 96 % O2 Device: Nasal cannula Oxygen Delivery: 2 LPM  Weight: 174 lbs 3.2 oz      GENERAL: Patient is not in acute distress  HEENT: EOM+,Conjunctiva is clear   NECK: 1 cm Jugular Venous distention  HEART: S1 S2 regular Rate and Rhythm, there is no murmur,   LUNGS: Respirations are not labored (when distracted), Lungs have decreased breath sounds with few crackles to auscultation without Wheezing   ABDOMEN: Soft , there is no tenderness ,Bowel Sounds are  Positive   LOWER LIMBS: no  Pedal Edema  Bilaterally   CNS:  Alert,  Oriented x 3, Moving all the Four Limbs     Data   Recent Labs   Lab 05/01/22  0738 04/30/22  1758 04/30/22  1541 04/30/22  0821   WBC 6.1  --   --  5.6   HGB 13.4  --   --  13.3   MCV 93  --   --  91     --   --  235     --   --  137   POTASSIUM 4.4 3.8 4.1 4.2   CHLORIDE 109  --   --  106   CO2 23  --   --  19*   BUN 20  --   --  14   CR 1.10  --   --  1.09   ANIONGAP 4  --   --  12   LISA 9.1  --   --  9.1   GLC 82  --   --  109         No results found for this or any previous visit (from the past 24 hour(s)).

## 2022-05-01 NOTE — PLAN OF CARE
BP (!) 141/97 (BP Location: Left arm)   Pulse 82   Temp 97.4  F (36.3  C) (Oral)   Resp 20   Wt 80.7 kg (177 lb 14.4 oz)   SpO2 91%   BMI 24.81 kg/m       Patient is Alert and Oriented x4. They are denying pain. Patient is Saline locked. Patient is SBA with cane.Pt is a 2 gram sodium diet.  Patient increased to 3L NC while sleeping. Tele SR. Will continue current POC.

## 2022-05-01 NOTE — PLAN OF CARE
Pt is stable, sleeping most of the day on and off.  Good urine output per urinal with lasix ordered BID-see flowsheets.  Pt is eating low Na diet without difficulty, able to make needs known. Up with SBA to use urinal/bathroom.  BP continues to be somewhat elevated: 154/99.  Tele is SR PVC-PAC occasionally  with notched P wave noted.  Electrolytes are WDL.  Lung sounds are diminished, clear otherwise.  Pt does have a np cough occasionally. Continues to be on 2L nc.  RR 24 with some shortness of breath noted at rest.  While patient sleeping, there are some apparent apnea periods noted.  02 kept on while sleeping with pulse ox continuous.    Goal Outcome Evaluation: Ongoing, progressing

## 2022-05-02 LAB
ANION GAP SERPL CALCULATED.3IONS-SCNC: 5 MMOL/L (ref 3–14)
BUN SERPL-MCNC: 17 MG/DL (ref 7–30)
CALCIUM SERPL-MCNC: 9.1 MG/DL (ref 8.5–10.1)
CHLORIDE BLD-SCNC: 106 MMOL/L (ref 94–109)
CO2 SERPL-SCNC: 26 MMOL/L (ref 20–32)
CREAT SERPL-MCNC: 0.9 MG/DL (ref 0.66–1.25)
GFR SERPL CREATININE-BSD FRML MDRD: >90 ML/MIN/1.73M2
GLUCOSE BLD-MCNC: 86 MG/DL (ref 70–99)
MAGNESIUM SERPL-MCNC: 2 MG/DL (ref 1.6–2.3)
POTASSIUM BLD-SCNC: 3.8 MMOL/L (ref 3.4–5.3)
SODIUM SERPL-SCNC: 137 MMOL/L (ref 133–144)

## 2022-05-02 PROCEDURE — 99232 SBSQ HOSP IP/OBS MODERATE 35: CPT | Performed by: INTERNAL MEDICINE

## 2022-05-02 PROCEDURE — 36415 COLL VENOUS BLD VENIPUNCTURE: CPT | Performed by: INTERNAL MEDICINE

## 2022-05-02 PROCEDURE — 83735 ASSAY OF MAGNESIUM: CPT | Performed by: INTERNAL MEDICINE

## 2022-05-02 PROCEDURE — 250N000013 HC RX MED GY IP 250 OP 250 PS 637: Performed by: INTERNAL MEDICINE

## 2022-05-02 PROCEDURE — 80048 BASIC METABOLIC PNL TOTAL CA: CPT | Performed by: INTERNAL MEDICINE

## 2022-05-02 PROCEDURE — 120N000001 HC R&B MED SURG/OB

## 2022-05-02 RX ADMIN — LOSARTAN POTASSIUM 100 MG: 100 TABLET, FILM COATED ORAL at 08:33

## 2022-05-02 RX ADMIN — RIVAROXABAN 20 MG: 20 TABLET, FILM COATED ORAL at 16:27

## 2022-05-02 RX ADMIN — ASPIRIN 81 MG: 81 TABLET, COATED ORAL at 08:33

## 2022-05-02 RX ADMIN — GABAPENTIN 300 MG: 300 CAPSULE ORAL at 20:30

## 2022-05-02 RX ADMIN — SPIRONOLACTONE 25 MG: 25 TABLET ORAL at 08:33

## 2022-05-02 RX ADMIN — METOPROLOL TARTRATE 50 MG: 50 TABLET, FILM COATED ORAL at 20:30

## 2022-05-02 RX ADMIN — METOPROLOL TARTRATE 50 MG: 50 TABLET, FILM COATED ORAL at 08:36

## 2022-05-02 RX ADMIN — ATORVASTATIN CALCIUM 80 MG: 40 TABLET, FILM COATED ORAL at 20:31

## 2022-05-02 ASSESSMENT — ACTIVITIES OF DAILY LIVING (ADL)
ADLS_ACUITY_SCORE: 12

## 2022-05-02 NOTE — PLAN OF CARE
VSS with elevated BP at times. A/Ox4. LS course with expiratory wheezes and some diminished areas, on 3L NC at night and 2L NC during the day. Pt had long apneic periods where he would dip down to low 80s but then take a breath and O2 sats would increase to mid 90s. Pt would benefit from sleep study. Tele SR with notched p waves and prolonged QT interval. SBA with cane, using urinal ind at bedside.

## 2022-05-02 NOTE — PLAN OF CARE
BP (!) 163/96 (BP Location: Right arm)   Pulse 71   Temp 98.3  F (36.8  C) (Oral)   Resp 16   Wt 76.7 kg (169 lb)   SpO2 93%   BMI 23.57 kg/m      A&O. SBA. Tele is SR w/ notched P waves. On Xarelto. Zero edema, down 8 lbs. Getting PO Aldactone. Will continue POC. Plan to discharge home tomorrow.

## 2022-05-02 NOTE — PROGRESS NOTES
Hospitalist Medicine Progress Note   [facility]       Eber Jin is a 61 year old gentleman with history of atrial fibrillation on chronic rivaroxaban, CAD with ischemic cardiomyopathy and LVEF of 45%, COPD not on oxygen, ongoing tobacco use, stroke, peripheral vascular disease, hypertension, hypercholesteremia came in to [facility] 4/30/2022 with increased shortness of breath x2 weeks with elevated blood pressures with history of not taking all of his blood pressure medications had a blood pressure of 170/128 admission.  CT scan of the chest was negative for PE but showed pulmonary edema with cardiomegaly received IV furosemide 40 mg twice daily (takes 40 mg oral daily)        Date of Admission:  4/30/2022  Assessment & Plan     Acute hypoxic respiratory failure  Probably on the basis of acute exacerbation of chronic systolic CHF  Probably on the basis of noncompliance to blood pressure medication due to affordability  Symptoms seems to be better with 9 lb weight loss on IV diuretics   Will monitor weight and input and output objectively    Atrial fibrillation   Controlled ventricular response  We will continue rivaroxaban and metoprolol    Uncontrolled hypertension  Probably on the basis of noncompliance to medications due to affordability  Blood pressures are better but still elevated  Will increase the dose of blood pressure medications if the blood pressure is not better by tomorrow    Prolonged QT interval  Magnesium and potassium are normal            Plan:   Monitor input output and daily weights  Discharge in am  BMP in a.m.    Diet: Combination Diet Regular Diet Adult; 2 gm NA Diet    DVT Prophylaxis: DOAC  Huggins Catheter: Not present  Code Status: Full Code               The patient's care was discussed with the Patient .    Nicko Henson MD  Hospitalist Service  [facility]  Hospital    ______________________________________________________________________    Interval History     Symptoms   Feels SOB is improving     Review of Systems:   Denies any chest pain    Data reviewed today: I reviewed all medications, new labs and imaging results over the last 24 hours.     Physical Exam   Vital Signs: Temp: 98.3  F (36.8  C) Temp src: Oral BP: (!) 163/96 Pulse: 71   Resp: 16 SpO2: 93 % O2 Device: Nasal cannula Oxygen Delivery: 2 LPM  Weight: 169 lbs 0 oz      GENERAL: Patient is not in acute distress  HEENT: EOM+,Conjunctiva is clear   NECK:no Jugular Venous distention  HEART: S1 S2 regular Rate and Rhythm, there is no murmur,   LUNGS: Respirations are not labored (when distracted), Lungs have decreased breath sounds with few crackles to auscultation without Wheezing   ABDOMEN: Soft , there is no tenderness ,Bowel Sounds are  Positive   LOWER LIMBS: no  Pedal Edema  Bilaterally   CNS:  Alert,  Oriented x 3, Moving all the Four Limbs     Data   Recent Labs   Lab 05/02/22  0651 05/01/22  0738 04/30/22  1758 04/30/22  1541 04/30/22  0821   WBC  --  6.1  --   --  5.6   HGB  --  13.4  --   --  13.3   MCV  --  93  --   --  91   PLT  --  224  --   --  235    136  --   --  137   POTASSIUM 3.8 4.4 3.8   < > 4.2   CHLORIDE 106 109  --   --  106   CO2 26 23  --   --  19*   BUN 17 20  --   --  14   CR 0.90 1.10  --   --  1.09   ANIONGAP 5 4  --   --  12   LISA 9.1 9.1  --   --  9.1   GLC 86 82  --   --  109    < > = values in this interval not displayed.         No results found for this or any previous visit (from the past 24 hour(s)).

## 2022-05-02 NOTE — CONSULTS
Patient has [employer].    Albuterol $1/mo   Aspirin 81mg $0.50/mo   Lipitor $1/mo   Furosemide $1/mo   Gabapentin $1/mo   Losartan $1/mo   Metoprolol $1/mo   Xarelto $3/mo   Spironolactone $1/mo     Unfortunately, I do not have any resources that would decrease these copays.    Edwina Alford  Pharmacy Technician/Liaison, Discharge Pharmacy   [phone]  [email or web address]

## 2022-05-03 LAB
ANION GAP SERPL CALCULATED.3IONS-SCNC: 3 MMOL/L (ref 3–14)
ATRIAL RATE - MUSE: 93 BPM
BUN SERPL-MCNC: 18 MG/DL (ref 7–30)
CALCIUM SERPL-MCNC: 9.2 MG/DL (ref 8.5–10.1)
CHLORIDE BLD-SCNC: 107 MMOL/L (ref 94–109)
CO2 SERPL-SCNC: 23 MMOL/L (ref 20–32)
CREAT SERPL-MCNC: 0.88 MG/DL (ref 0.66–1.25)
DIASTOLIC BLOOD PRESSURE - MUSE: NORMAL MMHG
GFR SERPL CREATININE-BSD FRML MDRD: >90 ML/MIN/1.73M2
GLUCOSE BLD-MCNC: 102 MG/DL (ref 70–99)
INTERPRETATION ECG - MUSE: NORMAL
P AXIS - MUSE: 77 DEGREES
POTASSIUM BLD-SCNC: 4.2 MMOL/L (ref 3.4–5.3)
PR INTERVAL - MUSE: 194 MS
QRS DURATION - MUSE: 100 MS
QT - MUSE: 408 MS
QTC - MUSE: 507 MS
R AXIS - MUSE: 25 DEGREES
SODIUM SERPL-SCNC: 133 MMOL/L (ref 133–144)
SYSTOLIC BLOOD PRESSURE - MUSE: NORMAL MMHG
T AXIS - MUSE: -76 DEGREES
VENTRICULAR RATE- MUSE: 93 BPM

## 2022-05-03 PROCEDURE — 120N000001 HC R&B MED SURG/OB

## 2022-05-03 PROCEDURE — 80048 BASIC METABOLIC PNL TOTAL CA: CPT | Performed by: INTERNAL MEDICINE

## 2022-05-03 PROCEDURE — 36415 COLL VENOUS BLD VENIPUNCTURE: CPT | Performed by: INTERNAL MEDICINE

## 2022-05-03 PROCEDURE — 250N000011 HC RX IP 250 OP 636: Performed by: INTERNAL MEDICINE

## 2022-05-03 PROCEDURE — 250N000013 HC RX MED GY IP 250 OP 250 PS 637: Performed by: INTERNAL MEDICINE

## 2022-05-03 PROCEDURE — 99233 SBSQ HOSP IP/OBS HIGH 50: CPT | Performed by: INTERNAL MEDICINE

## 2022-05-03 RX ORDER — METOPROLOL TARTRATE 100 MG
100 TABLET ORAL 2 TIMES DAILY
Status: DISCONTINUED | OUTPATIENT
Start: 2022-05-03 | End: 2022-05-04 | Stop reason: HOSPADM

## 2022-05-03 RX ORDER — FUROSEMIDE 10 MG/ML
40 INJECTION INTRAMUSCULAR; INTRAVENOUS
Status: DISCONTINUED | OUTPATIENT
Start: 2022-05-03 | End: 2022-05-04 | Stop reason: HOSPADM

## 2022-05-03 RX ADMIN — FUROSEMIDE 40 MG: 10 INJECTION, SOLUTION INTRAMUSCULAR; INTRAVENOUS at 14:38

## 2022-05-03 RX ADMIN — METOPROLOL TARTRATE 50 MG: 50 TABLET, FILM COATED ORAL at 08:04

## 2022-05-03 RX ADMIN — RIVAROXABAN 20 MG: 20 TABLET, FILM COATED ORAL at 16:13

## 2022-05-03 RX ADMIN — ASPIRIN 81 MG: 81 TABLET, COATED ORAL at 08:04

## 2022-05-03 RX ADMIN — SPIRONOLACTONE 25 MG: 25 TABLET ORAL at 08:04

## 2022-05-03 RX ADMIN — FUROSEMIDE 40 MG: 10 INJECTION, SOLUTION INTRAMUSCULAR; INTRAVENOUS at 20:27

## 2022-05-03 RX ADMIN — METOPROLOL TARTRATE 100 MG: 100 TABLET, FILM COATED ORAL at 20:28

## 2022-05-03 RX ADMIN — GABAPENTIN 300 MG: 300 CAPSULE ORAL at 20:27

## 2022-05-03 RX ADMIN — LOSARTAN POTASSIUM 100 MG: 100 TABLET, FILM COATED ORAL at 08:04

## 2022-05-03 RX ADMIN — ATORVASTATIN CALCIUM 80 MG: 40 TABLET, FILM COATED ORAL at 20:27

## 2022-05-03 ASSESSMENT — ACTIVITIES OF DAILY LIVING (ADL)
ADLS_ACUITY_SCORE: 12

## 2022-05-03 NOTE — PROGRESS NOTES
[facility]  Hospitalist Progress Note  Jack Moreno MD  05/03/2022    Assessment & Plan   Eber Jin is a 61 year old gentleman with history of atrial fibrillation on chronic rivaroxaban, CAD with ischemic cardiomyopathy and LVEF of 45%, COPD not on oxygen, ongoing tobacco use, stroke, peripheral vascular disease, hypertension, hypercholesteremia came in to [facility] 4/30/2022 with increased shortness of breath x2 weeks with elevated blood pressures with history of not taking all of his blood pressure medications had a blood pressure of 170/128 admission.  CT scan of the chest was negative for PE but showed pulmonary edema with cardiomegaly received IV furosemide 40 mg twice daily (takes 40 mg oral daily)        Date of Admission:  4/30/2022     Assessment & Plan     Acute hypoxic respiratory failure  Secondary to acute exacerbation of chronic systolic CHF  Medical non compliance  Probably on the basis of noncompliance to blood pressure medication due to affordability  Symptoms seems to be better with 9 lb weight loss on IV diuretics   - 5 L, weight down 177 > 169 lbs, he really doesn't know his baseline weight  - would continue diuretics while monitoring Cr, CO2,   - will also place patient on fluid restriction 1500 ml   - need to establish dry weight before discharge     Atrial fibrillation   Controlled ventricular response  continue rivaroxaban and metoprolol     Uncontrolled hypertension  Probably on the basis of noncompliance to medications due to affordability  Blood pressures are better but still elevated, especially diastolics  Increase metoprolol from 50 to 100 mg     Prolonged QT interval  Magnesium and potassium are normal     Plan:   Monitor input output and daily weights  Increase metoprolol, needs better diastolic bp control     Diet: Combination Diet Regular Diet Adult; 2 gm NA Diet , add fluid restriction   DVT Prophylaxis: DOAC  Huggins Catheter: Not  present  Code Status: Full Code      Disposition:   -- anticipate home in 1-3 days  -- he is attempting for disability   -- he will need some means of getting meds otherwise he will return  -- social work for possible community resources    Interval History   -- chart reviewed  -- labs reviewed  -- he states he is breathing better but not at his baseline.      -Data reviewed today: I reviewed all new labs and imaging over the last 24 hours. I personally reviewed no images or EKG's today.    Physical Exam    , Blood pressure (!) 142/93, pulse 75, temperature 97  F (36.1  C), temperature source Axillary, resp. rate 18, weight 76.9 kg (169 lb 9.6 oz), SpO2 92 %.  Vitals:    05/01/22 0600 05/02/22 0748 05/03/22 0626   Weight: 79 kg (174 lb 3.2 oz) 76.7 kg (169 lb) 76.9 kg (169 lb 9.6 oz)     Vital Signs with Ranges  Temp:  [97  F (36.1  C)-98.6  F (37  C)] 97  F (36.1  C)  Pulse:  [74-79] 75  Resp:  [17-18] 18  BP: (139-149)/() 142/93  SpO2:  [92 %-98 %] 92 %  I/O's Last 24 hours  I/O last 3 completed shifts:  In: 800 [P.O.:800]  Out: 2450 [Urine:2450]    Constitutional: Awake, alert, cooperative, no apparent distress, +JVD  Respiratory: Diminished with some rales at the base  Cardiovascular: irregular  normal S1 and S2   GI: Normal bowel sounds, soft, non-distended, non-tender  Skin/Integumen: No rashes, no cyanosis, no edema  Other:      Medications   All medications were reviewed.    - MEDICATION INSTRUCTIONS -         aspirin  81 mg Oral Daily     atorvastatin  80 mg Oral QPM     gabapentin  300 mg Oral QPM     losartan  100 mg Oral Daily     metoprolol tartrate  50 mg Oral BID     rivaroxaban ANTICOAGULANT  20 mg Oral Daily with supper     sodium chloride (PF)  3 mL Intracatheter Q8H     spironolactone  25 mg Oral Daily        Data   Recent Labs   Lab 05/03/22  0807 05/02/22  0651 05/01/22  0738 04/30/22  1541 04/30/22  0821   WBC  --   --  6.1  --  5.6   HGB  --   --  13.4  --  13.3   MCV  --   --  93  --   91   PLT  --   --  224  --  235    137 136  --  137   POTASSIUM 4.2 3.8 4.4   < > 4.2   CHLORIDE 107 106 109  --  106   CO2 23 26 23  --  19*   BUN 18 17 20  --  14   CR 0.88 0.90 1.10  --  1.09   ANIONGAP 3 5 4  --  12   LISA 9.2 9.1 9.1  --  9.1   * 86 82  --  109    < > = values in this interval not displayed.       No results found for this or any previous visit (from the past 24 hour(s)).    Jack Moreno MD  Text Page  (7am to 6pm)

## 2022-05-03 NOTE — PLAN OF CARE
Goal Outcome Evaluation:    Vitals: BP (!) 143/98 (BP Location: Right arm, Patient Position: Supine, Cuff Size: Adult Regular)   Pulse 74   Temp 98.4  F (36.9  C) (Oral)   Resp 18   Wt 76.7 kg (169 lb)   SpO2 94%   BMI 23.57 kg/m       Successfully weaned off 02.     Orientation: A&OX4. Speech clear. Pleasant & cooperative.   Pain: Denies  Respiratory: LS diminished. GAMEZ. Infrequent dry cough.   Cardiac/Tele: SR  Bowel Sounds: +X4Q. ABD soft, non-tender.   GI/: Continent urine, no bm this shift.   LDA: L PIV SL.   Protocols: K and Mag  Diet: Regular 2 Gr Sodium diet.  Activity: Indep in room. Gait steady.   Education: Educated on the importance of taking prescribed medications as ordered. Reminded him that there are programs available to help if cost is an issue. Verbalized understanding.   Plan: discharge home this am.

## 2022-05-03 NOTE — PLAN OF CARE
BP (!) 142/93 (BP Location: Left arm)   Pulse 75   Temp 97  F (36.1  C) (Axillary)   Resp 18   Wt 76.9 kg (169 lb 9.6 oz)   SpO2 92%   BMI 23.65 kg/m        A&O. Up ad precious. Tele is SR w/ 1st degree AVB. On RA, c/o GAMEZ. Started on 1500ml FR. 40mg IV Lasix given. Will continue to diurese. Pt updated on POC.

## 2022-05-04 VITALS
DIASTOLIC BLOOD PRESSURE: 94 MMHG | TEMPERATURE: 98.6 F | WEIGHT: 164.2 LBS | OXYGEN SATURATION: 95 % | RESPIRATION RATE: 28 BRPM | HEART RATE: 62 BPM | BODY MASS INDEX: 22.9 KG/M2 | SYSTOLIC BLOOD PRESSURE: 148 MMHG

## 2022-05-04 LAB
ANION GAP SERPL CALCULATED.3IONS-SCNC: 5 MMOL/L (ref 3–14)
BUN SERPL-MCNC: 22 MG/DL (ref 7–30)
CALCIUM SERPL-MCNC: 9.3 MG/DL (ref 8.5–10.1)
CHLORIDE BLD-SCNC: 104 MMOL/L (ref 94–109)
CO2 SERPL-SCNC: 22 MMOL/L (ref 20–32)
CREAT SERPL-MCNC: 0.87 MG/DL (ref 0.66–1.25)
ERYTHROCYTE [DISTWIDTH] IN BLOOD BY AUTOMATED COUNT: 13.9 % (ref 10–15)
GFR SERPL CREATININE-BSD FRML MDRD: >90 ML/MIN/1.73M2
GLUCOSE BLD-MCNC: 125 MG/DL (ref 70–99)
GLUCOSE BLDC GLUCOMTR-MCNC: 107 MG/DL (ref 70–99)
HCT VFR BLD AUTO: 49.9 % (ref 40–53)
HGB BLD-MCNC: 15.5 G/DL (ref 13.3–17.7)
MAGNESIUM SERPL-MCNC: 2.4 MG/DL (ref 1.6–2.3)
MCH RBC QN AUTO: 28.8 PG (ref 26.5–33)
MCHC RBC AUTO-ENTMCNC: 31.1 G/DL (ref 31.5–36.5)
MCV RBC AUTO: 93 FL (ref 78–100)
PLATELET # BLD AUTO: 265 10E3/UL (ref 150–450)
POTASSIUM BLD-SCNC: 4 MMOL/L (ref 3.4–5.3)
RBC # BLD AUTO: 5.38 10E6/UL (ref 4.4–5.9)
SODIUM SERPL-SCNC: 131 MMOL/L (ref 133–144)
WBC # BLD AUTO: 6.2 10E3/UL (ref 4–11)

## 2022-05-04 PROCEDURE — 82310 ASSAY OF CALCIUM: CPT | Performed by: INTERNAL MEDICINE

## 2022-05-04 PROCEDURE — 250N000013 HC RX MED GY IP 250 OP 250 PS 637: Performed by: INTERNAL MEDICINE

## 2022-05-04 PROCEDURE — 85027 COMPLETE CBC AUTOMATED: CPT | Performed by: INTERNAL MEDICINE

## 2022-05-04 PROCEDURE — 83735 ASSAY OF MAGNESIUM: CPT | Performed by: INTERNAL MEDICINE

## 2022-05-04 PROCEDURE — 99239 HOSP IP/OBS DSCHRG MGMT >30: CPT | Performed by: INTERNAL MEDICINE

## 2022-05-04 PROCEDURE — 250N000011 HC RX IP 250 OP 636: Performed by: INTERNAL MEDICINE

## 2022-05-04 PROCEDURE — 36415 COLL VENOUS BLD VENIPUNCTURE: CPT | Performed by: INTERNAL MEDICINE

## 2022-05-04 RX ORDER — METOPROLOL TARTRATE 100 MG
100 TABLET ORAL 2 TIMES DAILY
Qty: 60 TABLET | Refills: 0 | Status: SHIPPED | OUTPATIENT
Start: 2022-05-04 | End: 2022-06-23

## 2022-05-04 RX ORDER — FUROSEMIDE 40 MG
40 TABLET ORAL DAILY
Qty: 30 TABLET | Refills: 0 | Status: SHIPPED | OUTPATIENT
Start: 2022-05-04 | End: 2022-06-23

## 2022-05-04 RX ORDER — ATORVASTATIN CALCIUM 80 MG/1
80 TABLET, FILM COATED ORAL EVERY EVENING
Qty: 30 TABLET | Refills: 0 | Status: SHIPPED | OUTPATIENT
Start: 2022-05-04 | End: 2022-06-23

## 2022-05-04 RX ORDER — SPIRONOLACTONE 25 MG/1
25 TABLET ORAL DAILY
Qty: 30 TABLET | Refills: 0 | Status: SHIPPED | OUTPATIENT
Start: 2022-05-04 | End: 2022-06-23

## 2022-05-04 RX ORDER — LOSARTAN POTASSIUM 100 MG/1
100 TABLET ORAL DAILY
Qty: 30 TABLET | Refills: 0 | Status: SHIPPED | OUTPATIENT
Start: 2022-05-04 | End: 2022-06-23

## 2022-05-04 RX ADMIN — METOPROLOL TARTRATE 100 MG: 100 TABLET, FILM COATED ORAL at 08:17

## 2022-05-04 RX ADMIN — SPIRONOLACTONE 25 MG: 25 TABLET ORAL at 08:16

## 2022-05-04 RX ADMIN — LOSARTAN POTASSIUM 100 MG: 100 TABLET, FILM COATED ORAL at 08:16

## 2022-05-04 RX ADMIN — FUROSEMIDE 40 MG: 10 INJECTION, SOLUTION INTRAMUSCULAR; INTRAVENOUS at 08:16

## 2022-05-04 RX ADMIN — ASPIRIN 81 MG: 81 TABLET, COATED ORAL at 08:16

## 2022-05-04 ASSESSMENT — ACTIVITIES OF DAILY LIVING (ADL)
ADLS_ACUITY_SCORE: 12

## 2022-05-04 NOTE — PLAN OF CARE
Goal Outcome Evaluation:    Temp: 97.6  F (36.4  C) Temp src: Oral BP: (!) 149/71 Pulse: 74   Resp: 16 SpO2: 93 % O2 Device: None (Room air)      AxOx4. Up indp. Uses urinal. Denies pain. L PIV SL. Last lasix dose given 2000. Tele SR first depree AV block w notched P waves. 1500mL fluid restriction. Abt 500mL this shift, remberto abt AM. No edema.

## 2022-05-04 NOTE — DISCHARGE SUMMARY
[facility]  Discharge Summary  Name: Eber Jin    MRN: [id number]  YOB: 1960    Age: 61 year old  Date of Discharge:  5/4/2022  Date of Admission: 4/30/2022  Primary Care Provider: Elisabeth Nava  Discharge Physician:  Aroldo Simons MD  Discharging Service:  Hospitalist      Hospital Course/Discharge Diagnoses:    Mr. Eber Jin is a 61-year-old man with history of chronic systolic CHF with EF of 45%, COPD with ongoing tobacco use, PVD, stroke, hypertension among others who had recently stopped taking all of his blood pressure medications due to running out and feeling financially unable to refill these.  He presented here on 4/30/2022 with severe hypertension to 170/128.  CT chest was negative for PE but showed pulmonary edema with cardiomegaly.  He was uncertain of his dry weight.    Eber was admitted for reinitiation of goal-directed medical therapy and IV diuresis.  He was given 40 mg IV Lasix twice daily and weight is now down about 15 pounds since admission and breathing has normalized.      1.  Acute on chronic systolic CHF: Appears to be due to medication noncompliance leading to accelerated hypertension/hypertensive emergency and fluid retention.  Also has noted history of atrial fibrillation.  -- losartan 100 mg daily   --metoprolol 100 mg twice daily  --spironolactone 25 mg  --Xarelto 20 mg with supper  --atorvastatin 80 mg  -- aspirin 81 mg   -- Lasix is being transitioned to 40 mg daily oral upon discharge.  -- Social work consulted given financial concerns.  I filled 30 days of most of his medications upon discharge.    2.  History of paroxysmal A. Fib: Metoprolol 100 mg twice daily and Xarelto 20 mg in the evening    3.  COPD: Not in acute exacerbation.          Discharge Disposition:  Discharged to home     Allergies:  Allergies   Allergen Reactions     Penicillins Swelling        Discharge Medications:        Review of your medicines       CONTINUE these medicines which may have CHANGED, or have new prescriptions. If we are uncertain of the size of tablets/capsules you have at home, strength may be listed as something that might have changed.      Dose / Directions   metoprolol tartrate 100 MG tablet  Commonly known as: LOPRESSOR  This may have changed:     medication strength    how much to take  Used for: Essential hypertension, benign      Dose: 100 mg  Take 1 tablet (100 mg) by mouth 2 times daily  Quantity: 60 tablet  Refills: 0        CONTINUE these medicines which have NOT CHANGED      Dose / Directions   albuterol 108 (90 Base) MCG/ACT inhaler  Commonly known as: PROAIR HFA/PROVENTIL HFA/VENTOLIN HFA  Used for: Chronic obstructive pulmonary disease, unspecified COPD type (H)      Dose: 2 puff  Inhale 2 puffs into the lungs every 6 hours as needed for shortness of breath / dyspnea or wheezing  Quantity: 18 g  Refills: 11     Aspirin Low Dose 81 MG EC tablet  Generic drug: aspirin      Dose: 81 mg  Take 81 mg by mouth daily  Refills: 0     atorvastatin 80 MG tablet  Commonly known as: LIPITOR  Used for: Mixed hyperlipidemia      Dose: 80 mg  Take 1 tablet (80 mg) by mouth every evening  Quantity: 30 tablet  Refills: 0     furosemide 40 MG tablet  Commonly known as: LASIX  Used for: Chronic systolic heart failure (H)      Dose: 40 mg  Take 1 tablet (40 mg) by mouth daily  Quantity: 30 tablet  Refills: 0     gabapentin 300 MG capsule  Commonly known as: NEURONTIN  Used for: Neuropathic pain      Dose: 300 mg  Take 1 capsule (300 mg) by mouth daily  Quantity: 30 capsule  Refills: 11     losartan 100 MG tablet  Commonly known as: COZAAR  Used for: Essential hypertension, benign      Dose: 100 mg  Take 1 tablet (100 mg) by mouth daily  Quantity: 30 tablet  Refills: 0     rivaroxaban ANTICOAGULANT 20 MG Tabs tablet  Commonly known as: XARELTO  Used for: Paroxysmal atrial fibrillation (H)      Dose: 20 mg  Take 1 tablet (20 mg) by mouth daily (with  "dinner)  Quantity: 30 tablet  Refills: 0     spironolactone 25 MG tablet  Commonly known as: ALDACTONE  Used for: Chronic systolic heart failure (H)      Dose: 25 mg  Take 1 tablet (25 mg) by mouth daily  Quantity: 30 tablet  Refills: 0           Where to get your medicines      These medications were sent to [place], [place] - [address] [facility]  [address] [facility] [address]    Phone: [phone]     atorvastatin 80 MG tablet    furosemide 40 MG tablet    losartan 100 MG tablet    metoprolol tartrate 100 MG tablet    rivaroxaban ANTICOAGULANT 20 MG Tabs tablet    spironolactone 25 MG tablet         Condition on Discharge:  Discharge condition: Stable       Code status on discharge: Full Code     History of Illness:  See detailed admission note for full details.    Physical Exam:  Vital signs:  Temp: 98.6  F (37  C) Temp src: Oral BP: (!) 148/94 Pulse: 62   Resp: 28 SpO2: 95 % O2 Device: None (Room air) Oxygen Delivery: 1 LPM   Weight: 74.5 kg (164 lb 3.2 oz)  Estimated body mass index is 22.9 kg/m  as calculated from the following:    Height as of an earlier encounter on 4/30/22: 1.803 m (5' 11\").    Weight as of this encounter: 74.5 kg (164 lb 3.2 oz).    Wt Readings from Last 1 Encounters:   05/04/22 74.5 kg (164 lb 3.2 oz)     General: Alert, awake, no acute distress.  HEENT: NC/AT, eyes anicteric, external occular movements intact, face symmetric.  Cardiac: RRR, S1, S2.  No murmurs appreciated.  Pulmonary: Normal chest rise, normal work of breathing.  Lungs CTA BL  Abdomen: soft, non-tender, non-distended.  Bowel Sounds Present.  No guarding.  Extremities: no deformities.  Warm, well perfused.  No edema.  Skin: no rashes or lesions noted.  Warm and Dry.  Neuro: No focal deficits noted.  Speech clear.  Coordination and strength grossly normal.  Psych: Appropriate affect.    Procedures other than Imaging:  None     Imaging:  Results for orders placed or performed " during the hospital encounter of 04/30/22   Chest XR,  PA & LAT    Narrative    EXAM: XR CHEST 2 VW  LOCATION: [facility]  DATE/TIME: 4/30/2022 9:22 AM    INDICATION: Shortness of breath.  COMPARISON: 12/31/2020      Impression    IMPRESSION: Stable cardiomegaly with normal vascularity. No focal consolidation, pneumothorax or pleural effusion.   CT Chest Pulmonary Embolism w Contrast    Narrative    EXAM: CT CHEST PULMONARY EMBOLISM W CONTRAST  LOCATION: [facility]  DATE/TIME: 4/30/2022 11:51 AM    INDICATION: Shortness of breath. Positive d-dimer. Assess for pulmonary embolism.  COMPARISON: Chest x-ray 4/30/2022 at 9:22 AM, CTA chest 9/21/2020  TECHNIQUE: CT chest pulmonary angiogram during arterial phase injection of IV contrast. Multiplanar reformats and MIP reconstructions were performed. Dose reduction techniques were used.   CONTRAST: Isovue 370    100ml    FINDINGS:  ANGIOGRAM CHEST: Pulmonary arteries are normal caliber and negative for pulmonary emboli. Thoracic aorta not opacified to assess for dissection. Mild ectasia of the thoracic aorta without aneurysmal dilatation. Reflux of contrast into the IVC and hepatic   veins suggest elevated right-sided heart pressures.    LUNGS AND PLEURA: Small right pleural effusion with dependent atelectasis. Subtle mild groundglass and interstitial opacities mid and lower lungs suggest mild edema. Mild centrilobular emphysema. Benign calcified granuloma right middle lobe. No left   effusion.    MEDIASTINUM/AXILLAE: No mediastinal, hilar or axillary lymphadenopathy. Cardiac enlargement. No pericardial effusion.    CORONARY ARTERY CALCIFICATION: Moderate.    UPPER ABDOMEN: Unremarkable.    MUSCULOSKELETAL: Normal.      Impression    IMPRESSION:  1.  No pulmonary embolism.  2.  Probable congestive heart failure with cardiac enlargement, small right pleural effusion, subtle groundglass and interstitial opacities likely due to  edema. Reflux of contrast into the IVC and hepatic veins suggest elevated right-sided heart   pressures.        Consultations:  Pharmacy.       Recent Lab Results:  Recent Labs   Lab 05/04/22  0811 05/01/22  0738 04/30/22  0821   WBC 6.2 6.1 5.6   HGB 15.5 13.4 13.3   HCT 49.9 43.6 41.9   MCV 93 93 91    224 235          Lab Results   Component Value Date     05/04/2022     05/03/2022     05/02/2022     03/22/2021     03/15/2021     03/10/2021    Lab Results   Component Value Date    CHLORIDE 104 05/04/2022    CHLORIDE 107 05/03/2022    CHLORIDE 106 05/02/2022    CHLORIDE 108 03/22/2021    CHLORIDE 104 03/15/2021    CHLORIDE 104 03/10/2021    Lab Results   Component Value Date    BUN 22 05/04/2022    BUN 18 05/03/2022    BUN 17 05/02/2022    BUN 18 03/22/2021    BUN 16 03/15/2021    BUN 17 03/10/2021      Lab Results   Component Value Date    POTASSIUM 4.0 05/04/2022    POTASSIUM 4.2 05/03/2022    POTASSIUM 3.8 05/02/2022    POTASSIUM 4.7 03/22/2021    POTASSIUM 4.8 03/15/2021    POTASSIUM 4.3 03/10/2021    Lab Results   Component Value Date    CO2 22 05/04/2022    CO2 23 05/03/2022    CO2 26 05/02/2022    CO2 28 03/22/2021    CO2 30 03/15/2021    CO2 28 03/10/2021    Lab Results   Component Value Date    CR 0.87 05/04/2022    CR 0.88 05/03/2022    CR 0.90 05/02/2022    CR 0.88 03/22/2021    CR 0.85 03/15/2021    CR 0.77 03/10/2021             Pending Results:    Unresulted Labs Ordered in the Past 30 Days of this Admission     No orders found from 3/31/2022 to 5/1/2022.           Discharge Instructions and Follow-Up:   Discharge Procedure Orders   Reason for your hospital stay   Order Comments: CHF exacerbation     Follow-up and recommended labs and tests    Order Comments: Follow up with primary care provider, Elisabeth Nava, within 7 days for hospital follow- up.  The following labs/tests are recommended: recheck blood pressure, oxygen level and basic metabolic panel  (kidney function and electrolytes).  Please discuss a plan for getting further refills and establishing care with a heart failure clinic.     Activity   Order Comments: Your activity upon discharge: activity as tolerated     Order Specific Question Answer Comments   Is discharge order? Yes      Diet   Order Comments: Follow this diet upon discharge: Orders Placed This Encounter      Fluid restriction 1500 ML FLUID      Combination Diet Regular Diet Adult; 2 gm NA Diet     Order Specific Question Answer Comments   Is discharge order? Yes        Total time spent in face to face contact with the patient and coordinating discharge was:  50 Minutes.

## 2022-05-04 NOTE — PROGRESS NOTES
Care Management Discharge Note    Discharge Date: 05/04/2022       Discharge Disposition:  Home    Private pay costs discussed: Not applicable      Patient/Family in Agreement with the Plan:  yes    Handoff Referral Completed: Yes    Additional Information:  Pt identified as a Service Bundle #4. No needs or assessment needed at this time. Please consult CM/SW  if discharge needs should arise.    JOYCE Rae, [facility]  Inpatient Care Coordination  [facility]  [phone]

## 2022-05-04 NOTE — PLAN OF CARE
Goal Outcome Evaluation:      VS slightly elevated at times, on BP med. D-dimer and BP elevated on admission. CT negative for PE, see results for further details. POt on IV Lasix and aldactone. Wt improving, GAMEZ improving. On Fluid restriction 1500cc. Went over discharge paperwork with pt. Questions asked and answered. Verbalized understanding. Pt waiting for ride. Pt has all belongings,paperwork and meds. Pt discharged at 1329.

## 2022-05-04 NOTE — PLAN OF CARE
Goal Outcome Evaluation:    Vitals: BP (!) 140/97 (BP Location: Right arm, Patient Position: Right side)   Pulse 80   Temp 97.9  F (36.6  C) (Oral)   Resp 28   Wt 76.9 kg (169 lb 9.6 oz)   SpO2 93%   BMI 23.65 kg/m      Orientation: A&OX4. Speech clear. Pleasant & cooperative.   Pain: Denies  Respiratory: LS diminished. GAMEZ. Infrequent dry, non-productive cough.   Cardiac/Tele: SR w/ notched P waves and inverted T waves.   Bowel Sounds: +X4Q. ABD soft, non-tender.  GI/: Continent urine. No bm this shift.   Skin: Intact.   LDA: L PIV SL  Protocols: K and Mag.   Diet: Regular 2 Gram Sodium w/ 1500 mL fluid restriction.   Activity: Indep in room. Steady.   Education: Educated on taking medications as ordered by MD and ways to seek out programs to get help if needed. Talk to  if needs further methods.  Plan: Continue to diurese. discharge home when stable.

## 2022-05-05 ENCOUNTER — PATIENT OUTREACH (OUTPATIENT)
Dept: NURSING | Facility: CLINIC | Age: 62
End: 2022-05-05
Attending: INTERNAL MEDICINE
Payer: COMMERCIAL

## 2022-05-05 DIAGNOSIS — I50.22 CHRONIC SYSTOLIC HEART FAILURE (H): ICD-10-CM

## 2022-05-05 NOTE — PROGRESS NOTES
Clinic Care Coordination Contact  Patient was referral to [facility] post discharge from the hospital. [facility] spoke with patient today via phone. Per patient, he's doing fine and declined an assessment and denied the need for community resources. Patient agreed to scheduled INF appt with PCP on 5/12/2022 at 3:00pm. No further assist needed from [facility].

## 2022-05-12 ENCOUNTER — OFFICE VISIT (OUTPATIENT)
Dept: FAMILY MEDICINE | Facility: CLINIC | Age: 62
End: 2022-05-12
Payer: COMMERCIAL

## 2022-05-12 VITALS
TEMPERATURE: 98.9 F | WEIGHT: 172 LBS | HEIGHT: 71 IN | OXYGEN SATURATION: 94 % | DIASTOLIC BLOOD PRESSURE: 68 MMHG | HEART RATE: 75 BPM | BODY MASS INDEX: 24.08 KG/M2 | SYSTOLIC BLOOD PRESSURE: 106 MMHG | RESPIRATION RATE: 20 BRPM

## 2022-05-12 DIAGNOSIS — R73.09 ELEVATED GLUCOSE: ICD-10-CM

## 2022-05-12 DIAGNOSIS — R79.0 ABNORMAL BLOOD LEVEL OF MAGNESIUM: ICD-10-CM

## 2022-05-12 DIAGNOSIS — M62.81 GENERALIZED MUSCLE WEAKNESS: ICD-10-CM

## 2022-05-12 DIAGNOSIS — F10.10 ALCOHOL ABUSE: ICD-10-CM

## 2022-05-12 DIAGNOSIS — M79.672 LEFT FOOT PAIN: ICD-10-CM

## 2022-05-12 DIAGNOSIS — I63.50 RIGHT PONTINE STROKE (H): ICD-10-CM

## 2022-05-12 DIAGNOSIS — E78.2 MIXED HYPERLIPIDEMIA: ICD-10-CM

## 2022-05-12 DIAGNOSIS — R53.83 FATIGUE, UNSPECIFIED TYPE: ICD-10-CM

## 2022-05-12 DIAGNOSIS — I48.0 PAROXYSMAL ATRIAL FIBRILLATION (H): ICD-10-CM

## 2022-05-12 DIAGNOSIS — I50.23 ACUTE ON CHRONIC SYSTOLIC CONGESTIVE HEART FAILURE (H): ICD-10-CM

## 2022-05-12 DIAGNOSIS — G81.94 LEFT HEMIPLEGIA (H): ICD-10-CM

## 2022-05-12 DIAGNOSIS — M10.9 ACUTE GOUT, UNSPECIFIED CAUSE, UNSPECIFIED SITE: ICD-10-CM

## 2022-05-12 DIAGNOSIS — Z09 HOSPITAL DISCHARGE FOLLOW-UP: Primary | ICD-10-CM

## 2022-05-12 DIAGNOSIS — I73.9 PERIPHERAL VASCULAR DISEASE (H): ICD-10-CM

## 2022-05-12 DIAGNOSIS — J41.0 SIMPLE CHRONIC BRONCHITIS (H): ICD-10-CM

## 2022-05-12 DIAGNOSIS — Z91.148 NONCOMPLIANCE WITH MEDICATION REGIMEN: ICD-10-CM

## 2022-05-12 DIAGNOSIS — Z79.899 POLYPHARMACY: ICD-10-CM

## 2022-05-12 DIAGNOSIS — R97.20 ELEVATED PROSTATE SPECIFIC ANTIGEN (PSA): ICD-10-CM

## 2022-05-12 PROBLEM — I50.22 CHRONIC SYSTOLIC HEART FAILURE (H): Status: ACTIVE | Noted: 2022-05-12

## 2022-05-12 PROBLEM — I10 ESSENTIAL HYPERTENSION, BENIGN: Status: ACTIVE | Noted: 2017-03-24

## 2022-05-12 PROBLEM — J44.9 COPD (CHRONIC OBSTRUCTIVE PULMONARY DISEASE) (H): Status: ACTIVE | Noted: 2020-09-21

## 2022-05-12 PROBLEM — I21.9 MYOCARDIAL INFARCTION (H): Status: ACTIVE | Noted: 2022-05-12

## 2022-05-12 PROBLEM — I10 ACCELERATED HYPERTENSION: Status: ACTIVE | Noted: 2022-05-12

## 2022-05-12 LAB
ALBUMIN SERPL-MCNC: 3.1 G/DL (ref 3.5–5)
ALP SERPL-CCNC: 89 U/L (ref 45–120)
ALT SERPL W P-5'-P-CCNC: 25 U/L (ref 0–45)
ANION GAP SERPL CALCULATED.3IONS-SCNC: 9 MMOL/L (ref 5–18)
AST SERPL W P-5'-P-CCNC: 18 U/L (ref 0–40)
BILIRUB SERPL-MCNC: 0.5 MG/DL (ref 0–1)
BUN SERPL-MCNC: 12 MG/DL (ref 8–22)
CALCIUM SERPL-MCNC: 9.3 MG/DL (ref 8.5–10.5)
CHLORIDE BLD-SCNC: 102 MMOL/L (ref 98–107)
CO2 SERPL-SCNC: 28 MMOL/L (ref 22–31)
CREAT SERPL-MCNC: 0.87 MG/DL (ref 0.7–1.3)
GFR SERPL CREATININE-BSD FRML MDRD: >90 ML/MIN/1.73M2
GGT SERPL-CCNC: 108 U/L (ref 0–50)
GLUCOSE BLD-MCNC: 72 MG/DL (ref 70–125)
HBA1C MFR BLD: 5.8 % (ref 0–5.6)
MAGNESIUM SERPL-MCNC: 1.7 MG/DL (ref 1.8–2.6)
POTASSIUM BLD-SCNC: 4 MMOL/L (ref 3.5–5)
PROT SERPL-MCNC: 7.4 G/DL (ref 6–8)
PSA SERPL-MCNC: 9.43 UG/L (ref 0–4.5)
SODIUM SERPL-SCNC: 139 MMOL/L (ref 136–145)
TSH SERPL DL<=0.005 MIU/L-ACNC: 0.81 UIU/ML (ref 0.3–5)
URATE SERPL-MCNC: 7.7 MG/DL (ref 3–8)

## 2022-05-12 PROCEDURE — 80053 COMPREHEN METABOLIC PANEL: CPT | Performed by: FAMILY MEDICINE

## 2022-05-12 PROCEDURE — 83036 HEMOGLOBIN GLYCOSYLATED A1C: CPT | Performed by: FAMILY MEDICINE

## 2022-05-12 PROCEDURE — G0103 PSA SCREENING: HCPCS | Performed by: FAMILY MEDICINE

## 2022-05-12 PROCEDURE — 84550 ASSAY OF BLOOD/URIC ACID: CPT | Performed by: FAMILY MEDICINE

## 2022-05-12 PROCEDURE — 99496 TRANSJ CARE MGMT HIGH F2F 7D: CPT | Performed by: FAMILY MEDICINE

## 2022-05-12 PROCEDURE — 36415 COLL VENOUS BLD VENIPUNCTURE: CPT | Performed by: FAMILY MEDICINE

## 2022-05-12 PROCEDURE — 83735 ASSAY OF MAGNESIUM: CPT | Performed by: FAMILY MEDICINE

## 2022-05-12 PROCEDURE — 82977 ASSAY OF GGT: CPT | Performed by: FAMILY MEDICINE

## 2022-05-12 PROCEDURE — 84443 ASSAY THYROID STIM HORMONE: CPT | Performed by: FAMILY MEDICINE

## 2022-05-12 NOTE — PROGRESS NOTES
Hospital Follow-up Visit:    Hospital/Nursing Home/IP Rehab Facility: [facility]  Date of Admission: 4/30/22  Date of Discharge: 5/4/22  Reason(s) for Admission: CHF      Was your hospitalization related to COVID-19? No   Problems taking medications regularly:  None  Medication changes since discharge: None  Problems adhering to non-medication therapy:  None  PER discharge summary   1.  Acute on chronic systolic CHF: Appears to be due to medication noncompliance leading to accelerated hypertension/hypertensive emergency and fluid retention.  Also has noted history of atrial fibrillation.  -- losartan 100 mg daily   --metoprolol 100 mg twice daily  --spironolactone 25 mg  --Xarelto 20 mg with supper  --atorvastatin 80 mg  -- aspirin 81 mg   -- Lasix is being transitioned to 40 mg daily oral upon discharge.     2.  History of paroxysmal A. Fib: Metoprolol 100 mg twice daily and Xarelto 20 mg in the evening  3.  COPD: Not in acute exacerbation.    abnl labs  Last Na+ 131  Mag 2.4  Glucose     Summary of hospitalization:  [facility] discharge summary reviewed  Diagnostic Tests/Treatments reviewed.  Follow up needed: cardiology  Other Healthcare Providers Involved in Patient s Care:         Care Coordination and Specialist appointment - needs cardiology   Update since discharge: stable.   Post Discharge Medication Reconciliation: unable to reconcile discharge medications due to he did not bring in pill bottles.  Plan of care communicated with patient      Per chart review:   Echo 3/5/21    Left ventricular ejection fraction is mildly decreased with regional akinesis. The estimated left ventricular ejection fraction is 45%. Wall motion abnormalities suggest prior myocardial infarction the the territory of the right coronary and/or left   circumflex arteries.    Normal right ventricular size and systolic function.    The aortic valve is sclerotic without significant aortic stenosis.  "Mild aortic regurgitation.    The ascending aorta is mildly dilated measuring 4.0 cm.    When compared to the previous study dated 1/1/2021, the degree of mitral regurgitation has decreased. Findings are otherwise similar.    H/o gout -   left ankle painful   Not taking allopurinol  No Etoh for 1 mo      COPD  -   Only using albuterol 1-2 x per week  No longer on controller inhaler          Patient Active Problem List   Diagnosis     Right pontine stroke (H)     CHF exacerbation (H)     Peripheral vascular disease (H)     Left hemiplegia (H)     Alcohol abuse     Accelerated hypertension     Chronic systolic heart failure (H)     COPD (chronic obstructive pulmonary disease) (H)     Essential hypertension, benign     Gout     Ischemic cardiomyopathy     Mixed hyperlipidemia     Myocardial infarction (H)     Noncompliance with medication regimen     Paroxysmal atrial fibrillation (H)     Current Outpatient Medications   Medication Instructions     albuterol (PROAIR HFA/PROVENTIL HFA/VENTOLIN HFA) 108 (90 Base) MCG/ACT inhaler 2 puffs, Inhalation, EVERY 6 HOURS PRN     Aspirin Low Dose 81 mg, Oral, DAILY     atorvastatin (LIPITOR) 80 mg, Oral, EVERY EVENING     furosemide (LASIX) 40 mg, Oral, DAILY     gabapentin (NEURONTIN) 300 mg, Oral, DAILY     losartan (COZAAR) 100 mg, Oral, DAILY     metoprolol tartrate (LOPRESSOR) 100 mg, Oral, 2 TIMES DAILY     rivaroxaban ANTICOAGULANT (XARELTO) 20 mg, Oral, DAILY WITH SUPPER     spironolactone (ALDACTONE) 25 mg, Oral, DAILY     Vitals:    05/12/22 1501   BP: 106/68   Pulse: 75   Resp: 20   Temp: 98.9  F (37.2  C)   TempSrc: Oral   SpO2: 94%   Weight: 78 kg (172 lb)   Height: 1.803 m (5' 11\")     OBJECTIVE:  Vitals listed above within normal limits  General appearance: well groomed, pleasant, well hydrated, nontoxic appearing  ENT: PERRL, throat clear  Neck: neck supple, no lymphadenopathy, no thyromegaly  Lungs: lungs clear to auscultation bilaterally, no wheezes or " rhonchi  Heart: regular rate and rhythm, no murmurs, rubs or gallops  Abdomen: soft, nontender  Neuro: no focal deficits, CN II-XII grossly intact, alert and oriented  Psych:  mood stable, appears to have good insight and judgment    A/P   Hospital discharge follow-up  Acute on chronic systolic congestive heart failure (H)   Paroxysmal atrial fibrillation (H)  Sx improved after restarting meds  discharge summary, imaging, labs, recommendations reviewed  Patient long over due for cardiology f/up  Continue cardiac meds: ASA, lipitor, lasix, losartan, metoprolol, xarelto, spironolacone  - Adult Cardiology Eval  Referral; Future  - TSH with free T4 reflex     Mixed hyperlipidemia  Continue lipitor     Generalized muscle weakness   Fatigue, unspecified type  Will check TSH, magnesium, PSA which have been abnormal at times in the past  Will screen for DM   - TSH with free T4 reflex     Acute gout, unspecified cause, unspecified site   Left foot pain  He is reporting joint pain in left ankle and foot  Not currently on allopurinol  He does have a h/o etoh use  Will check uric acid to see if pain if actually related to gout  Referral to podiatry for help with foot pain  - Uric acid  - Orthopedic  Referral; Future    Left hemiplegia (H)   Right pontine stroke (H)  H/o leftsided weakness after CVA and needs paperwork for county filled out since he is no longer able to work   See scanned document     Peripheral vascular disease (H)  Continue current meds    Abnormal blood level of magnesium  - Magnesium    Elevated glucose  Screen for DM with A1c  - Hemoglobin A1c     Alcohol abuse  He reports he stopped drinking since recent hospitalization and not had any etoh for a month  - Comprehensive metabolic panel (BMP + Alb, Alk Phos, ALT, AST, Total. Bili, TP)  - GGT     Elevated prostate specific antigen (PSA)  Per chart review, he had seen urology last year in 6/2021 for elevated PSA. MRI of prostate was ordered  9/2021 that was never done.   Will recheck PSA and likely refer back to urology if still elevated  - PSA, screen     Simple chronic bronchitis (H)  Continue inhaler prn  Discussed possible need for controller inhaler if sx persist    Noncompliance with medication regimen  Long history of noncompliance, sometimes out of confusion or motivation but recently he report it was b/c of lack of finances to pay for all the meds. He reports he now is getting disability /[facility] so he can now afford his meds.      Polypharmacy  He did not bring in pill bottles to be able to reconcile meds.

## 2022-05-15 DIAGNOSIS — R97.20 ELEVATED PROSTATE SPECIFIC ANTIGEN (PSA): ICD-10-CM

## 2022-05-15 DIAGNOSIS — R79.0 LOW MAGNESIUM LEVEL: Primary | ICD-10-CM

## 2022-05-15 RX ORDER — MAGNESIUM OXIDE 400 MG/1
400 TABLET ORAL DAILY
Qty: 30 TABLET | Refills: 4 | Status: SHIPPED | OUTPATIENT
Start: 2022-05-15

## 2022-05-15 NOTE — RESULT ENCOUNTER NOTE
Please notify patient:  1. Magnesium is low - new Rx for Magnesium to take 1 tab daily  2.elevated PSA - recommend follow with urology and referral ordered today    Both of these could be contributing to the fatigue and low energy.       Dr. Elisabeth Nava  5/15/2022

## 2022-05-23 ENCOUNTER — OFFICE VISIT (OUTPATIENT)
Dept: PODIATRY | Facility: CLINIC | Age: 62
End: 2022-05-23
Attending: FAMILY MEDICINE
Payer: COMMERCIAL

## 2022-05-23 VITALS — HEIGHT: 71 IN | WEIGHT: 171 LBS | BODY MASS INDEX: 23.94 KG/M2 | HEART RATE: 72 BPM | OXYGEN SATURATION: 98 %

## 2022-05-23 DIAGNOSIS — M20.41 HAMMER TOES OF BOTH FEET: ICD-10-CM

## 2022-05-23 DIAGNOSIS — B35.1 ONYCHOMYCOSIS: Primary | ICD-10-CM

## 2022-05-23 DIAGNOSIS — I73.9 PAD (PERIPHERAL ARTERY DISEASE) (H): ICD-10-CM

## 2022-05-23 DIAGNOSIS — M20.42 HAMMER TOES OF BOTH FEET: ICD-10-CM

## 2022-05-23 DIAGNOSIS — L74.4 ANHIDROSIS: ICD-10-CM

## 2022-05-23 PROCEDURE — 99203 OFFICE O/P NEW LOW 30 MIN: CPT | Mod: 25 | Performed by: PODIATRIST

## 2022-05-23 PROCEDURE — 11721 DEBRIDE NAIL 6 OR MORE: CPT | Mod: Q8 | Performed by: PODIATRIST

## 2022-05-23 RX ORDER — AMMONIUM LACTATE 12 G/100G
LOTION TOPICAL 2 TIMES DAILY
Qty: 500 G | Refills: 3 | Status: SHIPPED | OUTPATIENT
Start: 2022-05-23 | End: 2023-10-02

## 2022-05-23 ASSESSMENT — PAIN SCALES - GENERAL: PAINLEVEL: MILD PAIN (3)

## 2022-05-23 NOTE — LETTER
5/23/2022         RE: Eber Jin  [address]  [place] [address]        Dear Colleague,    Thank you for referring your patient, Eber Jin, to the [facility]. Please see a copy of my visit note below.          FOOT AND ANKLE SURGERY/PODIATRY CONSULT NOTE         ASSESSMENT:   Onychomycosis  PAD  Anhidrosis  Hammertoe      TREATMENT:  -I debrided nails 1-10 in length and thickness.    -I discussed with the patient that he has dry, thickened skin on both feet. I will start him on LacHydrin cream bid.     -I will add him to the nurse nail trimming service waiting list.     -Patient's questions invited and answered. He was encouraged to call my office with any further questions or concerns.     Mark Holbrook DPM  [facility] Podiatry/Foot & Ankle Surgery      HPI: I was asked to see Eber Jin today for fungal nails on both feet. He has difficulty trimming his nails and requests a nail trim. He also has dry skin and has not used moisturizing lotion in the past. Admits to smoking.      Past Medical History:   Diagnosis Date     Accelerated hypertension      Arthritis      CAD (coronary artery disease)      Cerebrovascular accident (CVA), unspecified mechanism (H)      CHF (congestive heart failure) (H)      Chronic atrial fibrillation (H)     on rivaroxaban     COPD (chronic obstructive pulmonary disease) (H)      Heart failure (H)     ischemic, EF 45 % im 3/2021     HLD (hyperlipidemia)      HTN (hypertension)      Hypertensive urgency      Myocardial infarction (H)      PAD (peripheral artery disease) (H)      Peripheral vascular disease with claudication (H)      Prolonged Q-T interval on ECG          Social History     Socioeconomic History     Marital status:      Spouse name: Not on file     Number of children: Not on file     Years of education: Not on file     Highest education level: Not on file   Occupational History     Not on  file   Tobacco Use     Smoking status: Current Every Day Smoker     Packs/day: 0.50     Years: 30.00     Pack years: 15.00     Types: Cigarettes     Smokeless tobacco: Never Used   Vaping Use     Vaping Use: Never used   Substance and Sexual Activity     Alcohol use: Not Currently     Alcohol/week: 1.0 - 2.0 standard drink     Comment: Alcoholic Drinks/day: rare use     Drug use: No     Sexual activity: Yes     Partners: Female   Other Topics Concern     Not on file   Social History Narrative    Patient is not on supplemental O2 at home. Patient does not use any assistive device for ambulation. Full code.      Social Determinants of Health     Financial Resource Strain: Not on file   Food Insecurity: Not on file   Transportation Needs: Not on file   Physical Activity: Not on file   Stress: Not on file   Social Connections: Not on file   Intimate Partner Violence: Not on file   Housing Stability: Not on file            Allergies   Allergen Reactions     Penicillins Swelling         MEDICATIONS:   Current Outpatient Medications   Medication     albuterol (PROAIR HFA/PROVENTIL HFA/VENTOLIN HFA) 108 (90 Base) MCG/ACT inhaler     ammonium lactate (LAC-HYDRIN) 12 % external lotion     ASPIRIN LOW DOSE 81 MG EC tablet     atorvastatin (LIPITOR) 80 MG tablet     furosemide (LASIX) 40 MG tablet     losartan (COZAAR) 100 MG tablet     magnesium oxide (MAG-OX) 400 MG tablet     metoprolol tartrate (LOPRESSOR) 100 MG tablet     rivaroxaban ANTICOAGULANT (XARELTO) 20 MG TABS tablet     spironolactone (ALDACTONE) 25 MG tablet     gabapentin (NEURONTIN) 300 MG capsule     No current facility-administered medications for this visit.        Family History   Problem Relation Age of Onset     Heart Failure Mother      No Known Problems Father      No Known Problems Brother           Review of Systems - 10 point Review of Systems is negative except for painful nails which is noted in HPI.    OBJECTIVE:  Appearance: alert, well  "appearing, and in no distress.    VITAL SIGNS: Pulse 72   Ht 1.803 m (5' 11\")   Wt 77.6 kg (171 lb)   SpO2 98%   BMI 23.85 kg/m        General appearance: Patient is alert and fully cooperative with history & exam.  No sign of distress is noted during the visit.     Psychiatric: Affect is pleasant & appropriate.  Patient appears motivated to improve health.     Respiratory: Breathing is regular & unlabored while sitting.     HEENT: Hearing is intact to spoken word.  Speech is clear.  No gross evidence of visual impairment that would impact ambulation.      Vascular: Dorsalis pedis and posterior tibial pulses are non-palpable. There is no appreciable edema noted.  Dermatologic: Nails 1-10 elongated, thick, yellow with subungal debris. Trophic changes noted including diminished hair growth and shiny skin. Dry, flaky skin bilateral feet. No erythema bilateral.   Neurologic: All epicritic and proprioceptive sensations are grossly intact bilateral.  Musculoskeletal: Contracted digits bilateral.             Again, thank you for allowing me to participate in the care of your patient.        Sincerely,        Mark Holbrook DPM    "

## 2022-05-23 NOTE — PROGRESS NOTES
FOOT AND ANKLE SURGERY/PODIATRY CONSULT NOTE         ASSESSMENT:   Onychomycosis  PAD  Anhidrosis  Emmanuel      TREATMENT:  -I debrided nails 1-10 in length and thickness.    -I discussed with the patient that he has dry, thickened skin on both feet. I will start him on LacHydrin cream bid.     -I will add him to the nurse nail trimming service waiting list.     -Patient's questions invited and answered. He was encouraged to call my office with any further questions or concerns.     Mark Holbrook DPM  [facility] Podiatry/Foot & Ankle Surgery      HPI: I was asked to see Eber Jin today for fungal nails on both feet. He has difficulty trimming his nails and requests a nail trim. He also has dry skin and has not used moisturizing lotion in the past. Admits to smoking.      Past Medical History:   Diagnosis Date     Accelerated hypertension      Arthritis      CAD (coronary artery disease)      Cerebrovascular accident (CVA), unspecified mechanism (H)      CHF (congestive heart failure) (H)      Chronic atrial fibrillation (H)     on rivaroxaban     COPD (chronic obstructive pulmonary disease) (H)      Heart failure (H)     ischemic, EF 45 % im 3/2021     HLD (hyperlipidemia)      HTN (hypertension)      Hypertensive urgency      Myocardial infarction (H)      PAD (peripheral artery disease) (H)      Peripheral vascular disease with claudication (H)      Prolonged Q-T interval on ECG          Social History     Socioeconomic History     Marital status:      Spouse name: Not on file     Number of children: Not on file     Years of education: Not on file     Highest education level: Not on file   Occupational History     Not on file   Tobacco Use     Smoking status: Current Every Day Smoker     Packs/day: 0.50     Years: 30.00     Pack years: 15.00     Types: Cigarettes     Smokeless tobacco: Never Used   Vaping Use     Vaping Use: Never used   Substance and Sexual Activity     Alcohol  "use: Not Currently     Alcohol/week: 1.0 - 2.0 standard drink     Comment: Alcoholic Drinks/day: rare use     Drug use: No     Sexual activity: Yes     Partners: Female   Other Topics Concern     Not on file   Social History Narrative    Patient is not on supplemental O2 at home. Patient does not use any assistive device for ambulation. Full code.      Social Determinants of Health     Financial Resource Strain: Not on file   Food Insecurity: Not on file   Transportation Needs: Not on file   Physical Activity: Not on file   Stress: Not on file   Social Connections: Not on file   Intimate Partner Violence: Not on file   Housing Stability: Not on file            Allergies   Allergen Reactions     Penicillins Swelling         MEDICATIONS:   Current Outpatient Medications   Medication     albuterol (PROAIR HFA/PROVENTIL HFA/VENTOLIN HFA) 108 (90 Base) MCG/ACT inhaler     ammonium lactate (LAC-HYDRIN) 12 % external lotion     ASPIRIN LOW DOSE 81 MG EC tablet     atorvastatin (LIPITOR) 80 MG tablet     furosemide (LASIX) 40 MG tablet     losartan (COZAAR) 100 MG tablet     magnesium oxide (MAG-OX) 400 MG tablet     metoprolol tartrate (LOPRESSOR) 100 MG tablet     rivaroxaban ANTICOAGULANT (XARELTO) 20 MG TABS tablet     spironolactone (ALDACTONE) 25 MG tablet     gabapentin (NEURONTIN) 300 MG capsule     No current facility-administered medications for this visit.        Family History   Problem Relation Age of Onset     Heart Failure Mother      No Known Problems Father      No Known Problems Brother           Review of Systems - 10 point Review of Systems is negative except for painful nails which is noted in HPI.    OBJECTIVE:  Appearance: alert, well appearing, and in no distress.    VITAL SIGNS: Pulse 72   Ht 1.803 m (5' 11\")   Wt 77.6 kg (171 lb)   SpO2 98%   BMI 23.85 kg/m        General appearance: Patient is alert and fully cooperative with history & exam.  No sign of distress is noted during the visit.   "   Psychiatric: Affect is pleasant & appropriate.  Patient appears motivated to improve health.     Respiratory: Breathing is regular & unlabored while sitting.     HEENT: Hearing is intact to spoken word.  Speech is clear.  No gross evidence of visual impairment that would impact ambulation.      Vascular: Dorsalis pedis and posterior tibial pulses are non-palpable. There is no appreciable edema noted.  Dermatologic: Nails 1-10 elongated, thick, yellow with subungal debris. Trophic changes noted including diminished hair growth and shiny skin. Dry, flaky skin bilateral feet. No erythema bilateral.   Neurologic: All epicritic and proprioceptive sensations are grossly intact bilateral.  Musculoskeletal: Contracted digits bilateral.

## 2022-06-05 DIAGNOSIS — Z76.0 ENCOUNTER FOR MEDICATION REFILL: Primary | ICD-10-CM

## 2022-06-06 RX ORDER — ASPIRIN 81 MG/1
TABLET, COATED ORAL
Qty: 90 TABLET | Refills: 3 | Status: SHIPPED | OUTPATIENT
Start: 2022-06-06 | End: 2023-01-09

## 2022-06-23 ENCOUNTER — OFFICE VISIT (OUTPATIENT)
Dept: CARDIOLOGY | Facility: CLINIC | Age: 62
End: 2022-06-23
Attending: FAMILY MEDICINE
Payer: COMMERCIAL

## 2022-06-23 VITALS
SYSTOLIC BLOOD PRESSURE: 152 MMHG | HEIGHT: 71 IN | DIASTOLIC BLOOD PRESSURE: 96 MMHG | BODY MASS INDEX: 24.5 KG/M2 | WEIGHT: 175 LBS | HEART RATE: 76 BPM | RESPIRATION RATE: 16 BRPM

## 2022-06-23 DIAGNOSIS — Z76.0 ENCOUNTER FOR MEDICATION REFILL: ICD-10-CM

## 2022-06-23 DIAGNOSIS — E78.2 MIXED HYPERLIPIDEMIA: ICD-10-CM

## 2022-06-23 DIAGNOSIS — I50.23 ACUTE ON CHRONIC SYSTOLIC CONGESTIVE HEART FAILURE (H): Primary | ICD-10-CM

## 2022-06-23 DIAGNOSIS — I48.0 PAROXYSMAL ATRIAL FIBRILLATION (H): ICD-10-CM

## 2022-06-23 DIAGNOSIS — I10 ESSENTIAL HYPERTENSION, BENIGN: ICD-10-CM

## 2022-06-23 DIAGNOSIS — I50.22 CHRONIC SYSTOLIC HEART FAILURE (H): ICD-10-CM

## 2022-06-23 PROCEDURE — 99204 OFFICE O/P NEW MOD 45 MIN: CPT | Performed by: INTERNAL MEDICINE

## 2022-06-23 RX ORDER — SPIRONOLACTONE 25 MG/1
25 TABLET ORAL DAILY
Qty: 90 TABLET | Refills: 3 | Status: SHIPPED | OUTPATIENT
Start: 2022-06-23 | End: 2023-07-13

## 2022-06-23 RX ORDER — FUROSEMIDE 40 MG
40 TABLET ORAL DAILY
Qty: 90 TABLET | Refills: 3 | Status: SHIPPED | OUTPATIENT
Start: 2022-06-23 | End: 2022-08-31

## 2022-06-23 RX ORDER — LOSARTAN POTASSIUM 100 MG/1
100 TABLET ORAL DAILY
Qty: 90 TABLET | Refills: 3 | Status: SHIPPED | OUTPATIENT
Start: 2022-06-23 | End: 2022-11-25

## 2022-06-23 RX ORDER — ATORVASTATIN CALCIUM 80 MG/1
80 TABLET, FILM COATED ORAL EVERY EVENING
Qty: 90 TABLET | Refills: 3 | Status: SHIPPED | OUTPATIENT
Start: 2022-06-23 | End: 2023-07-13

## 2022-06-23 RX ORDER — METOPROLOL SUCCINATE 100 MG/1
100 TABLET, EXTENDED RELEASE ORAL DAILY
Qty: 90 TABLET | Refills: 3 | Status: SHIPPED | OUTPATIENT
Start: 2022-06-23 | End: 2023-07-13

## 2022-06-23 NOTE — LETTER
6/23/2022    Elisabeth Nava MD  [address] [place] [address]    RE: Eber Jin       Dear Colleague,     I had the pleasure of seeing Eber Jin in the [facility].    HEART CARE NOTE          Assessment/Recommendations     1. HFmrEF   Assessment / Plan    Near euvolemia on physical exam - denies HF symptoms of orthopnea, fluid retention/edema, PND; patient reports that he ran out of his cardiac meds (last dose yesterday) and hasn't taken anything today; discusses/emphasised the pitfalls of his noncompliance with his recent admission being the most obvious example - he voiced understanding and agreed to be complaint as well as call when Rxs are close to running out     GDMT as detailed below; mainstay of treatment for HFmrEF includes diuretics (class I) and SGLT2-I (class 2a); additional medical therapy demonstrated with less robust evidence for indication but should be considered per guideline recommendations (2b)    Will transition to     Current Pharmacotherapy AHA Guideline-Directed Medical Therapy   Losartan 100 mg daily Lisinopril 20 mg twice daily   Metoprolol succinate 100 mg daily  Carvedilol 25 mg twice daily   Spironolactone 25 mg Spironolactone 25 mg once daily   Hydralazine not started  Hydralazine 100 mg three times daily   Isosorbide dinitrate not started Isosorbide dinitrate 40 mg three times daily   SGLT2 inhibitor: not started Dapagliflozin or Empagliflozin 10 mg daily       2. Paroxysmal atrial fibrillation  Assessment / Plan    Rate regular - likely remains in NSR    Continue rivaroxaban    Will refer to afib clinic    3. HTN  Assessment / Plan    Patient reports that he has not taken any of his medications today - will wait to make any adjustments until patient is back on his regimen       History of Present Illness/Subjective    Mr. Eber Jin is a 61 year old male with a PMHx significant for chronic systolic CHF with EF of 45%, COPD  "with ongoing tobacco use, PVD, stroke, hypertension who present to [facility] to establish care.    Today, Mr. Jin denies any HF symptoms. We discussed HF diet and lifestyle modifications including the 2 g Na and 2L fluid restrictions. He does not currently adhere, but will do so moving forward. Patient was provided with the HF educational binder as well as a book to log his daily weights. Management plan as detailed above.     ECG: Personally reviewed. normal sinus rhythm.    ECHO (personnaly Reviewed):     Left ventricular ejection fraction is mildly decreased with regional akinesis. The estimated left ventricular ejection fraction is 45%. Wall motion abnormalities suggest prior myocardial infarction the the territory of the right coronary and/or left   circumflex arteries.    Normal right ventricular size and systolic function.    The aortic valve is sclerotic without significant aortic stenosis. Mild aortic regurgitation.    The ascending aorta is mildly dilated measuring 4.0 cm.    When compared to the previous study dated 1/1/2021, the degree of mitral regurgitation has decreased. Findings are otherwise similar.        Physical Examination Review of Systems   BP (!) 152/96 (BP Location: Left arm, Patient Position: Sitting, Cuff Size: Adult Regular)   Pulse 76   Resp 16   Ht 1.803 m (5' 11\")   Wt 79.4 kg (175 lb)   BMI 24.41 kg/m    Body mass index is 24.41 kg/m .  Wt Readings from Last 3 Encounters:   06/23/22 79.4 kg (175 lb)   05/23/22 77.6 kg (171 lb)   05/12/22 78 kg (172 lb)     General Appearance:   no distress, normal body habitus   ENT/Mouth: membranes moist, no oral lesions or bleeding gums.      EYES:  no scleral icterus, normal conjunctivae   Neck: no carotid bruits or thyromegaly   Chest/Lungs:   lungs are clear to auscultation, no rales or wheezing, equal chest wall expansion    Cardiovascular:   Regular. Normal first and second heart sounds with no murmurs, rubs, or gallops; the " carotid, radial and posterior tibial pulses are intact, no JVD or LE edema bilaterally    Abdomen:  no organomegaly, masses, bruits, or tenderness; bowel sounds are present   Extremities: no cyanosis or clubbing   Skin: no xanthelasma, warm.    Neurologic: alert and oriented x3, calm     Psychiatric: alert and oriented x3, calm     A complete 10 systems ROS was reviewed  And is negative except what is listed in the HPI.          Medical History  Surgical History Family History Social History   Past Medical History:   Diagnosis Date     Accelerated hypertension      Arthritis      CAD (coronary artery disease)      Cerebrovascular accident (CVA), unspecified mechanism (H)      CHF (congestive heart failure) (H)      Chronic atrial fibrillation (H)     on rivaroxaban     COPD (chronic obstructive pulmonary disease) (H)      Heart failure (H)     ischemic, EF 45 % im 3/2021     HLD (hyperlipidemia)      HTN (hypertension)      Hypertensive urgency      Myocardial infarction (H)      PAD (peripheral artery disease) (H)      Peripheral vascular disease with claudication (H)      Prolonged Q-T interval on ECG     Past Surgical History:   Procedure Laterality Date     CARDIAC CATHETERIZATION       IR ABDOMINAL AORTOGRAM  8/6/2013     IR EXTREMITY ANGIOGRAM BILATERAL  8/6/2013    no family history of premature coronary artery disease Social History     Socioeconomic History     Marital status:      Spouse name: Not on file     Number of children: Not on file     Years of education: Not on file     Highest education level: Not on file   Occupational History     Not on file   Tobacco Use     Smoking status: Current Every Day Smoker     Packs/day: 0.50     Years: 30.00     Pack years: 15.00     Types: Cigarettes     Smokeless tobacco: Never Used   Vaping Use     Vaping Use: Never used   Substance and Sexual Activity     Alcohol use: Not Currently     Alcohol/week: 1.0 - 2.0 standard drink     Comment: Alcoholic  Drinks/day: rare use     Drug use: No     Sexual activity: Yes     Partners: Female   Other Topics Concern     Not on file   Social History Narrative    Patient is not on supplemental O2 at home. Patient does not use any assistive device for ambulation. Full code.      Social Determinants of Health     Financial Resource Strain: Not on file   Food Insecurity: Not on file   Transportation Needs: Not on file   Physical Activity: Not on file   Stress: Not on file   Social Connections: Not on file   Intimate Partner Violence: Not on file   Housing Stability: Not on file           Lab Results    Chemistry/lipid CBC Cardiac Enzymes/BNP/TSH/INR   Lab Results   Component Value Date    CHOL 160 03/07/2021    HDL 32 (L) 03/07/2021    TRIG 110 03/07/2021    BUN 12 05/12/2022     05/12/2022    CO2 28 05/12/2022    Lab Results   Component Value Date    WBC 6.2 05/04/2022    HGB 15.5 05/04/2022    HCT 49.9 05/04/2022    MCV 93 05/04/2022     05/04/2022    Lab Results   Component Value Date    TROPONINI <0.01 04/30/2022    BNP 2,619 (H) 04/30/2022    TSH 0.81 05/12/2022    INR 1.81 (H) 03/05/2021     Lab Results   Component Value Date    TROPONINI <0.01 04/30/2022          Weight:    Wt Readings from Last 3 Encounters:   05/23/22 77.6 kg (171 lb)   05/12/22 78 kg (172 lb)   05/04/22 74.5 kg (164 lb 3.2 oz)       Allergies  Allergies   Allergen Reactions     Penicillins Swelling         Surgical History  Past Surgical History:   Procedure Laterality Date     CARDIAC CATHETERIZATION       IR ABDOMINAL AORTOGRAM  8/6/2013     IR EXTREMITY ANGIOGRAM BILATERAL  8/6/2013       Social History  Tobacco:   History   Smoking Status     Current Every Day Smoker     Packs/day: 0.50     Years: 30.00     Types: Cigarettes   Smokeless Tobacco     Never Used    Alcohol:   Social History    Substance and Sexual Activity      Alcohol use: Not Currently        Alcohol/week: 1.0 - 2.0 standard drink        Comment: Alcoholic Drinks/day:  rare use   Illicit Drugs:   History   Drug Use No       Family History  Family History   Problem Relation Age of Onset     Heart Failure Mother      No Known Problems Father      No Known Problems Brother       Gregory Carter MD on 6/23/2022    cc: Elisabeth Nava    Thank you for allowing me to participate in the care of your patient.      Sincerely,     Gregory Carter MD     [facility]  cc:   Elisabeth Nava MD  [address] [place] [address]

## 2022-06-23 NOTE — PROGRESS NOTES
HEART CARE NOTE          Assessment/Recommendations     1. HFmrEF   Assessment / Plan    Near euvolemia on physical exam - denies HF symptoms of orthopnea, fluid retention/edema, PND; patient reports that he ran out of his cardiac meds (last dose yesterday) and hasn't taken anything today; discusses/emphasised the pitfalls of his noncompliance with his recent admission being the most obvious example - he voiced understanding and agreed to be complaint as well as call when Rxs are close to running out     GDMT as detailed below; mainstay of treatment for HFmrEF includes diuretics (class I) and SGLT2-I (class 2a); additional medical therapy demonstrated with less robust evidence for indication but should be considered per guideline recommendations (2b)    Will transition to     Current Pharmacotherapy AHA Guideline-Directed Medical Therapy   Losartan 100 mg daily Lisinopril 20 mg twice daily   Metoprolol succinate 100 mg daily  Carvedilol 25 mg twice daily   Spironolactone 25 mg Spironolactone 25 mg once daily   Hydralazine not started  Hydralazine 100 mg three times daily   Isosorbide dinitrate not started Isosorbide dinitrate 40 mg three times daily   SGLT2 inhibitor: not started Dapagliflozin or Empagliflozin 10 mg daily       2. Paroxysmal atrial fibrillation  Assessment / Plan    Rate regular - likely remains in NSR    Continue rivaroxaban    Will refer to afib clinic    3. HTN  Assessment / Plan    Patient reports that he has not taken any of his medications today - will wait to make any adjustments until patient is back on his regimen       History of Present Illness/Subjective    Mr. Eber Jin is a 61 year old male with a PMHx significant for chronic systolic CHF with EF of 45%, COPD with ongoing tobacco use, PVD, stroke, hypertension who present to [facility] to establish care.    Today, Mr. Jin denies any HF symptoms. We discussed HF diet and lifestyle modifications including the 2 g Na  "and 2L fluid restrictions. He does not currently adhere, but will do so moving forward. Patient was provided with the HF educational binder as well as a book to log his daily weights. Management plan as detailed above.     ECG: Personally reviewed. normal sinus rhythm.    ECHO (personnaly Reviewed):     Left ventricular ejection fraction is mildly decreased with regional akinesis. The estimated left ventricular ejection fraction is 45%. Wall motion abnormalities suggest prior myocardial infarction the the territory of the right coronary and/or left   circumflex arteries.    Normal right ventricular size and systolic function.    The aortic valve is sclerotic without significant aortic stenosis. Mild aortic regurgitation.    The ascending aorta is mildly dilated measuring 4.0 cm.    When compared to the previous study dated 1/1/2021, the degree of mitral regurgitation has decreased. Findings are otherwise similar.        Physical Examination Review of Systems   BP (!) 152/96 (BP Location: Left arm, Patient Position: Sitting, Cuff Size: Adult Regular)   Pulse 76   Resp 16   Ht 1.803 m (5' 11\")   Wt 79.4 kg (175 lb)   BMI 24.41 kg/m    Body mass index is 24.41 kg/m .  Wt Readings from Last 3 Encounters:   06/23/22 79.4 kg (175 lb)   05/23/22 77.6 kg (171 lb)   05/12/22 78 kg (172 lb)     General Appearance:   no distress, normal body habitus   ENT/Mouth: membranes moist, no oral lesions or bleeding gums.      EYES:  no scleral icterus, normal conjunctivae   Neck: no carotid bruits or thyromegaly   Chest/Lungs:   lungs are clear to auscultation, no rales or wheezing, equal chest wall expansion    Cardiovascular:   Regular. Normal first and second heart sounds with no murmurs, rubs, or gallops; the carotid, radial and posterior tibial pulses are intact, no JVD or LE edema bilaterally    Abdomen:  no organomegaly, masses, bruits, or tenderness; bowel sounds are present   Extremities: no cyanosis or clubbing   Skin: " no xanthelasma, warm.    Neurologic: alert and oriented x3, calm     Psychiatric: alert and oriented x3, calm     A complete 10 systems ROS was reviewed  And is negative except what is listed in the HPI.          Medical History  Surgical History Family History Social History   Past Medical History:   Diagnosis Date     Accelerated hypertension      Arthritis      CAD (coronary artery disease)      Cerebrovascular accident (CVA), unspecified mechanism (H)      CHF (congestive heart failure) (H)      Chronic atrial fibrillation (H)     on rivaroxaban     COPD (chronic obstructive pulmonary disease) (H)      Heart failure (H)     ischemic, EF 45 % im 3/2021     HLD (hyperlipidemia)      HTN (hypertension)      Hypertensive urgency      Myocardial infarction (H)      PAD (peripheral artery disease) (H)      Peripheral vascular disease with claudication (H)      Prolonged Q-T interval on ECG     Past Surgical History:   Procedure Laterality Date     CARDIAC CATHETERIZATION       IR ABDOMINAL AORTOGRAM  8/6/2013     IR EXTREMITY ANGIOGRAM BILATERAL  8/6/2013    no family history of premature coronary artery disease Social History     Socioeconomic History     Marital status:      Spouse name: Not on file     Number of children: Not on file     Years of education: Not on file     Highest education level: Not on file   Occupational History     Not on file   Tobacco Use     Smoking status: Current Every Day Smoker     Packs/day: 0.50     Years: 30.00     Pack years: 15.00     Types: Cigarettes     Smokeless tobacco: Never Used   Vaping Use     Vaping Use: Never used   Substance and Sexual Activity     Alcohol use: Not Currently     Alcohol/week: 1.0 - 2.0 standard drink     Comment: Alcoholic Drinks/day: rare use     Drug use: No     Sexual activity: Yes     Partners: Female   Other Topics Concern     Not on file   Social History Narrative    Patient is not on supplemental O2 at home. Patient does not use any  assistive device for ambulation. Full code.      Social Determinants of Health     Financial Resource Strain: Not on file   Food Insecurity: Not on file   Transportation Needs: Not on file   Physical Activity: Not on file   Stress: Not on file   Social Connections: Not on file   Intimate Partner Violence: Not on file   Housing Stability: Not on file           Lab Results    Chemistry/lipid CBC Cardiac Enzymes/BNP/TSH/INR   Lab Results   Component Value Date    CHOL 160 03/07/2021    HDL 32 (L) 03/07/2021    TRIG 110 03/07/2021    BUN 12 05/12/2022     05/12/2022    CO2 28 05/12/2022    Lab Results   Component Value Date    WBC 6.2 05/04/2022    HGB 15.5 05/04/2022    HCT 49.9 05/04/2022    MCV 93 05/04/2022     05/04/2022    Lab Results   Component Value Date    TROPONINI <0.01 04/30/2022    BNP 2,619 (H) 04/30/2022    TSH 0.81 05/12/2022    INR 1.81 (H) 03/05/2021     Lab Results   Component Value Date    TROPONINI <0.01 04/30/2022          Weight:    Wt Readings from Last 3 Encounters:   05/23/22 77.6 kg (171 lb)   05/12/22 78 kg (172 lb)   05/04/22 74.5 kg (164 lb 3.2 oz)       Allergies  Allergies   Allergen Reactions     Penicillins Swelling         Surgical History  Past Surgical History:   Procedure Laterality Date     CARDIAC CATHETERIZATION       IR ABDOMINAL AORTOGRAM  8/6/2013     IR EXTREMITY ANGIOGRAM BILATERAL  8/6/2013       Social History  Tobacco:   History   Smoking Status     Current Every Day Smoker     Packs/day: 0.50     Years: 30.00     Types: Cigarettes   Smokeless Tobacco     Never Used    Alcohol:   Social History    Substance and Sexual Activity      Alcohol use: Not Currently        Alcohol/week: 1.0 - 2.0 standard drink        Comment: Alcoholic Drinks/day: rare use   Illicit Drugs:   History   Drug Use No       Family History  Family History   Problem Relation Age of Onset     Heart Failure Mother      No Known Problems Father      No Known Problems Brother            Gregory Carter MD on 6/23/2022      cc: Elisabeth Nava

## 2022-06-23 NOTE — PATIENT INSTRUCTIONS
Thank you for allowing the [facility] to be a part of your care. Please pay close attention to the following medications as they have been changed during this visit.    1.) Please stop taking metoprolol tartrate (Lopressor).     2.) Please start taking metoprolol succinate (Toprol XL) 100 mg daily

## 2022-06-27 ENCOUNTER — TELEPHONE (OUTPATIENT)
Dept: CARDIOLOGY | Facility: CLINIC | Age: 62
End: 2022-06-27

## 2022-06-27 NOTE — TELEPHONE ENCOUNTER
LM for patient to return my call to advise if has resumed his Cardiac medications including his diuretics. Asked him to call back with updates on symptoms and wt response.     Sona Wahl RN BSN, CHFN

## 2022-06-27 NOTE — TELEPHONE ENCOUNTER
----- Message from Gregory Carter MD sent at 6/24/2022 12:12 PM CDT -----  Regarding: Follow-up  Hi Shari;  Can you contact Eber on Monday (6/27/22) to follow-up if he picked up his cardiac Rxs and has resumed his diuretics?    Thanks;  ~ Sandra

## 2022-08-22 ENCOUNTER — TELEPHONE (OUTPATIENT)
Dept: FAMILY MEDICINE | Facility: CLINIC | Age: 62
End: 2022-08-22

## 2022-08-22 NOTE — TELEPHONE ENCOUNTER
Reason for Call:  Appointment Request    Patient requesting this type of appt:  COPD, med check, podiatry    Requested provider: any provider - as PCP no longer at clinic    Reason patient unable to be scheduled: 8/31/22 at 4 pm with Dr. Boyle    When does patient want to be seen/preferred time: as soon as able    Okay to leave a detailed message?: No     Call taken on 8/22/2022 at 2:29 PM by Francie Cueto CMA TC

## 2022-08-31 ENCOUNTER — OFFICE VISIT (OUTPATIENT)
Dept: FAMILY MEDICINE | Facility: CLINIC | Age: 62
End: 2022-08-31
Payer: COMMERCIAL

## 2022-08-31 VITALS
SYSTOLIC BLOOD PRESSURE: 144 MMHG | TEMPERATURE: 97.3 F | RESPIRATION RATE: 16 BRPM | BODY MASS INDEX: 23.64 KG/M2 | WEIGHT: 169.5 LBS | OXYGEN SATURATION: 97 % | DIASTOLIC BLOOD PRESSURE: 84 MMHG | HEART RATE: 90 BPM

## 2022-08-31 DIAGNOSIS — J44.9 CHRONIC OBSTRUCTIVE PULMONARY DISEASE, UNSPECIFIED COPD TYPE (H): ICD-10-CM

## 2022-08-31 DIAGNOSIS — I50.22 CHRONIC SYSTOLIC HEART FAILURE (H): Primary | ICD-10-CM

## 2022-08-31 DIAGNOSIS — I10 ESSENTIAL HYPERTENSION, BENIGN: ICD-10-CM

## 2022-08-31 DIAGNOSIS — Z23 ENCOUNTER FOR IMMUNIZATION: ICD-10-CM

## 2022-08-31 DIAGNOSIS — J41.0 SIMPLE CHRONIC BRONCHITIS (H): ICD-10-CM

## 2022-08-31 DIAGNOSIS — Z71.6 ENCOUNTER FOR SMOKING CESSATION COUNSELING: ICD-10-CM

## 2022-08-31 DIAGNOSIS — Z13.220 SCREENING FOR HYPERLIPIDEMIA: ICD-10-CM

## 2022-08-31 PROCEDURE — 90677 PCV20 VACCINE IM: CPT | Performed by: FAMILY MEDICINE

## 2022-08-31 PROCEDURE — 99214 OFFICE O/P EST MOD 30 MIN: CPT | Mod: 25 | Performed by: FAMILY MEDICINE

## 2022-08-31 PROCEDURE — 90471 IMMUNIZATION ADMIN: CPT | Performed by: FAMILY MEDICINE

## 2022-08-31 RX ORDER — ALBUTEROL SULFATE 90 UG/1
2 AEROSOL, METERED RESPIRATORY (INHALATION) EVERY 6 HOURS PRN
Qty: 18 G | Refills: 11 | Status: SHIPPED | OUTPATIENT
Start: 2022-08-31

## 2022-08-31 RX ORDER — FUROSEMIDE 40 MG
40 TABLET ORAL DAILY
Qty: 90 TABLET | Refills: 3 | Status: SHIPPED | OUTPATIENT
Start: 2022-08-31 | End: 2023-07-13

## 2022-08-31 NOTE — PROGRESS NOTES
Assessment & Plan     Immunization    Pneumococcal vaccine given today  Screening for hyperlipidemia  Patient will have this test done in the future.    Chronic systolic heart failure (H)    Cardiology notes reviewed with the patient.  He is taking his Lasix faithfully he is taking his blood pressure medication faithfully denies any shortness of breath he denies any swelling in his legs.2    - furosemide (LASIX) 40 MG tablet; Take 1 tablet (40 mg) by mouth daily    Simple chronic bronchitis (H)    Recurrent cough occasional wheezing noted.  We discussed the need for him to quit smoking he is agreed today please see below    Essential hypertension, benign    Blood pressure recheck the first value is elevated.  I did mention him that smoking will cause his blood pressure to become elevated.    Chronic obstructive pulmonary disease, unspecified COPD type (H)    Albuterol refilled no chest tightness noted in the last 72 hours.  - albuterol (PROAIR HFA/PROVENTIL HFA/VENTOLIN HFA) 108 (90 Base) MCG/ACT inhaler; Inhale 2 puffs into the lungs every 6 hours as needed for shortness of breath / dyspnea or wheezing    Encounter for smoking cessation counseling    Detailed discussion about nicotine use she has been using a patch and substituting with cigarettes when needed.  We discussed the benefits of smoking cessation he is opted to use the Chantix potential side effects discussed  - varenicline (CHANTIX JUDY) 0.5 MG X 11 & 1 MG X 42 tablet; Take 0.5 mg tab daily for 3 days, THEN 0.5 mg tab twice daily for 4 days, THEN 1 mg twice daily.                   No follow-ups on file.    Hima Boyle MD  [facility] NAYELI Velázquez is a 61 year old, presenting for the following health issues:  F/U copd /meds  Follow-up congestive heart failure, follow-up hypertension follow-up COPD questions regarding smoking    HPI   61-year-old male with a history of hyperlipidemia, hypertension, COPD, acute on chronic  heart failure and nicotine abuse.  Patient reports is been doing relatively well he still feels some shortness of breath with walking and says it is hard to walk because of his previous stroke where he feels weakness on the right side.  He has been taking his medications faithfully he smokes about 10 cigarettes a day and uses nicotine patches to try to substitute for smoking.  Denies any chest pain or shortness of breath.  Energy level is described as being normal his appetite is described as being normal.        Review of Systems   Constitutional, HEENT, cardiovascular, pulmonary, gi and gu systems are negative, except as otherwise noted.      Objective    BP (!) 144/84   Pulse 90   Temp 97.3  F (36.3  C) (Temporal)   Resp 16   Wt 76.9 kg (169 lb 8 oz)   SpO2 97%   BMI 23.64 kg/m    Body mass index is 23.64 kg/m .  Physical Exam   GENERAL: healthy, alert and no distress  NECK: no adenopathy, no asymmetry, masses, or scars and thyroid normal to palpation  RESP: lungs clear to auscultation - no rales, rhonchi or wheezes  CV: regular rate and rhythm, normal S1 S2, no S3 or S4, no murmur, click or rub, no peripheral edema and peripheral pulses strong  ABDOMEN: soft, nontender, no hepatosplenomegaly, no masses and bowel sounds normal  NEURO: Normal strength and tone, mentation intact and speech normal  PSYCH: mentation appears normal, affect normal/bright            .  ..

## 2022-11-13 NOTE — TELEPHONE ENCOUNTER
"Kristina Daugherty Nurse  calling from [employer] [facility].  [phone].    Reporting she had previously been speaking with patient 1 time a month regarding COPD management. Stating patient had stopped responding to calls.    Patient placed call to Pat today stating he had been more \"short of breath then usual\" in past 2 weeks.  History of smoking.    Patient stating she wanted to update clinic as she was unsure when patient was last seen and questioning if patient should be on a maintenance medication for COPD.     Placed call to patient to complete triage.    Patient reporting symptoms starting 2 weeks ago.     Stating he has intermittent episodes \"where I have to breathe 5 or 6 times real fast.\"     Reporting episodes have occurred \"a couple of times a day.\"  Stating episodes have occurred at rest.    Reporting cough x 2 weeks, nasal discharge.    Denies COVID 19 exposure. Stating he is fully vaccinated including booster for COVID 19.    Afebrile.    Stating he has used Albuterol occasional with no improvement.   Patient is denying any shortness of breath during triage.    Stating he has not had his Albuterol as he just called the pharmacy now for a refill. Agrees to  refill today.    Denies feeling dizzy or lightheaded.    Disposition to see today or tomorrow in clinic per triage guidelines.    Encouraged patient to be seen today. Patient stating he did not have access to transportation today and will schedule for tomorrow.     Patient agrees to call back with any change or increase in symptoms.      Evelia Deleon RN  [facility]      COVID 19 Nurse Triage Plan/Patient Instructions    Please be aware that novel coronavirus (COVID-19) may be circulating in the community. If you develop symptoms such as fever, cough, or SOB or if you have concerns about the presence of another infection including coronavirus (COVID-19), please contact your health care provider or visit " Cough and wheezing since last night. [email or web address].     Disposition/Instructions    In-Person Visit with provider recommended. Reference Visit Selection Guide.    Thank you for taking steps to prevent the spread of this virus.  o Limit your contact with others.  o Wear a simple mask to cover your cough.  o Wash your hands well and often.    Resources    [facility]: About COVID-19: [email or web address]    CDC: What to Do If You're Sick: www.cdc.gov/coronavirus/2019-ncov/about/steps-when-sick.html    CDC: Ending Home Isolation: www.cdc.gov/coronavirus/2019-ncov/hcp/disposition-in-home-patients.html     CDC: Caring for Someone: www.cdc.gov/coronavirus/2019-ncov/if-you-are-sick/care-for-someone.html     [facility]: Interim Guidance for Hospital Discharge to Home: [email or web address]./diseases/coronavirus/hcp/hospdischarge.pdf    [facility] clinical trials (COVID-19 research studies): clinicalaffairs.[facility]-clinical-trials     Below are the COVID-19 hotlines at the [place] [facility] ([facility]). Interpreters are available.   o For health questions: Call 124-763-9136 or 1-432.208.3054 (7 a.m. to 7 p.m.)  o For questions about schools and childcare: Call 342-452-0289 or 1-911.383.5618 (7 a.m. to 7 p.m.)                             Reason for Disposition    MILD asthma attack (e.g., no SOB at rest, mild SOB with walking, speaks normally in sentences, mild wheezing) and persists > 24 hours on appropriate treatment    Additional Information    Negative: Severe difficulty breathing (e.g., struggling for each breath, speaks in single words)    Negative: Bluish (or gray) lips or face    Negative: Wheezing started suddenly after medicine, an allergic food, or bee sting    Negative: Passed out (i.e., fainted, collapsed and was not responding)    Negative: Sounds like a life-threatening emergency to the triager    Negative: SEVERE asthma attack (e.g., very SOB at rest, speaks in single words, loud wheezes)     Negative: SEVERE wheezing or coughing and doesn't have nebulizer or inhaler available    Negative: Peak flow rate less than 50% of baseline level (RED zone)    Negative: Hospitalized before with asthma; now feels same    Negative: Chest pain    Negative: Patient sounds very sick or weak to the triager    Negative: MODERATE asthma attack (e.g., SOB at rest, speaks in phrases, audible wheezes) and not resolved after 2 nebulizer or inhaler treatments given 20 minutes apart    Negative: Peak flow rate 50-80% of baseline level (YELLOW zone) after using 2 nebulizer or inhaler treatments given 20 minutes) apart    Negative: Fever > 103 F (39.4 C)    Negative: Fever > 101 F (38.3 C) and over 60 years of age    Negative: Continuous (nonstop) coughing that keeps patient from working or sleeping, and not improved after inhaler or nebulizer    Negative: Asthma medicine (nebulizer or inhaler) is needed more frequently than every 4 hours to keep you comfortable    Negative: Fever present > 3 days (72 hours)    Negative: Patient wants to be seen    Protocols used: ASTHMA ATTACK-A-OH

## 2022-11-25 ENCOUNTER — OFFICE VISIT (OUTPATIENT)
Dept: FAMILY MEDICINE | Facility: CLINIC | Age: 62
End: 2022-11-25
Payer: COMMERCIAL

## 2022-11-25 VITALS
TEMPERATURE: 97.8 F | SYSTOLIC BLOOD PRESSURE: 160 MMHG | HEART RATE: 71 BPM | DIASTOLIC BLOOD PRESSURE: 90 MMHG | BODY MASS INDEX: 24.6 KG/M2 | HEIGHT: 71 IN | WEIGHT: 175.75 LBS | OXYGEN SATURATION: 97 % | RESPIRATION RATE: 16 BRPM

## 2022-11-25 DIAGNOSIS — I50.23 ACUTE ON CHRONIC SYSTOLIC CONGESTIVE HEART FAILURE (H): ICD-10-CM

## 2022-11-25 DIAGNOSIS — I10 ACCELERATED HYPERTENSION: ICD-10-CM

## 2022-11-25 DIAGNOSIS — M79.2 NEUROPATHIC PAIN: ICD-10-CM

## 2022-11-25 DIAGNOSIS — I50.22 CHRONIC SYSTOLIC HEART FAILURE (H): ICD-10-CM

## 2022-11-25 DIAGNOSIS — I48.0 PAROXYSMAL ATRIAL FIBRILLATION (H): Primary | ICD-10-CM

## 2022-11-25 DIAGNOSIS — Z71.6 ENCOUNTER FOR SMOKING CESSATION COUNSELING: ICD-10-CM

## 2022-11-25 DIAGNOSIS — Z13.220 SCREENING FOR HYPERLIPIDEMIA: ICD-10-CM

## 2022-11-25 DIAGNOSIS — I10 ESSENTIAL HYPERTENSION, BENIGN: ICD-10-CM

## 2022-11-25 LAB
ANION GAP SERPL CALCULATED.3IONS-SCNC: 13 MMOL/L (ref 7–15)
BUN SERPL-MCNC: 11.2 MG/DL (ref 8–23)
CALCIUM SERPL-MCNC: 9 MG/DL (ref 8.8–10.2)
CHLORIDE SERPL-SCNC: 101 MMOL/L (ref 98–107)
CHOLEST SERPL-MCNC: 201 MG/DL
CREAT SERPL-MCNC: 0.96 MG/DL (ref 0.67–1.17)
DEPRECATED HCO3 PLAS-SCNC: 27 MMOL/L (ref 22–29)
GFR SERPL CREATININE-BSD FRML MDRD: 89 ML/MIN/1.73M2
GLUCOSE SERPL-MCNC: 80 MG/DL (ref 70–99)
HDLC SERPL-MCNC: 38 MG/DL
LDLC SERPL CALC-MCNC: 144 MG/DL
NONHDLC SERPL-MCNC: 163 MG/DL
POTASSIUM SERPL-SCNC: 3.2 MMOL/L (ref 3.4–5.3)
SODIUM SERPL-SCNC: 141 MMOL/L (ref 136–145)
TRIGL SERPL-MCNC: 96 MG/DL

## 2022-11-25 PROCEDURE — 80048 BASIC METABOLIC PNL TOTAL CA: CPT | Performed by: FAMILY MEDICINE

## 2022-11-25 PROCEDURE — 36415 COLL VENOUS BLD VENIPUNCTURE: CPT | Performed by: FAMILY MEDICINE

## 2022-11-25 PROCEDURE — 99214 OFFICE O/P EST MOD 30 MIN: CPT | Performed by: FAMILY MEDICINE

## 2022-11-25 PROCEDURE — 80061 LIPID PANEL: CPT | Performed by: FAMILY MEDICINE

## 2022-11-25 RX ORDER — LOSARTAN POTASSIUM 100 MG/1
100 TABLET ORAL DAILY
Qty: 90 TABLET | Refills: 3 | Status: SHIPPED | OUTPATIENT
Start: 2022-11-25 | End: 2024-01-29

## 2022-11-25 RX ORDER — HYDRALAZINE HYDROCHLORIDE 10 MG/1
25 TABLET, FILM COATED ORAL 3 TIMES DAILY
Qty: 90 TABLET | Refills: 11 | Status: SHIPPED | OUTPATIENT
Start: 2022-11-25 | End: 2022-11-25

## 2022-11-25 RX ORDER — HYDRALAZINE HYDROCHLORIDE 10 MG/1
10 TABLET, FILM COATED ORAL 3 TIMES DAILY
Qty: 90 TABLET | Refills: 11 | Status: SHIPPED | OUTPATIENT
Start: 2022-11-25 | End: 2023-02-10

## 2022-11-25 NOTE — PROGRESS NOTES
Assessment & Plan     Screening for hyperlipidemia  Patient agrees for lipid value recheck today.  - Lipid panel reflex to direct LDL Non-fasting; Future  - Lipid panel reflex to direct LDL Non-fasting    CHF exacerbation (H)    Discussed about avoiding exacerbation of his CHF he needs to avoid sodium he just had his Thanksgiving dinner asked him not to have leftovers.  He is to continue monitoring his blood pressures and his weights at home.  I have started him on Jardiance 10 mg daily.    Accelerated hypertension    Blood pressure is poorly controlled he states he did not take his medications and then told me that his losartan has been out I refilled that on checking a BMP today.  - BASIC METABOLIC PANEL; Future  - BASIC METABOLIC PANEL    Paroxysmal atrial fibrillation (H)    No dizziness noted    Neuropathic pain    Currently he does not have any pain I have not refilled his gabapentin    Essential hypertension, benign    He has not scheduled for dental procedure but needs multiple teeth pulled.  He saw dentist about 2 months ago and then went down to [place] to visit his brother no dental point noted his blood pressure is poorly controlled I refilled his losartan I have started hydralazine at a low dose of 10 mg 3 times a day  - hydrALAZINE (APRESOLINE) 10 MG tablet; take 1 tablet 3 times a day  - losartan (COZAAR) 100 MG tablet; Take 1 tablet (100 mg) by mouth daily    Chronic systolic heart failure (H)    No shortness of breath no chest pain no weight gain noted  - empagliflozin (JARDIANCE) 10 MG TABS tablet; Take 1 tablet (10 mg) by mouth daily  - hydrALAZINE (APRESOLINE) 10 MG tablet; Take 2.5 tablets (25 mg) by mouth 3 times daily    Encounter for smoking cessation counseling  I refilled the Chantix again.  She needs a prior authorization he continues to smoke I have asked him to cut down the amount of nicotine consumption he can is this will exacerbate his CHF and elevate his blood  "pressures        Nicotine/Tobacco Cessation:  He reports that he has been smoking cigarettes. He has a 15.00 pack-year smoking history. He has never used smokeless tobacco.  Nicotine/Tobacco Cessation Plan:             Return in about 2 months (around 1/25/2023) for hypertension.    Hima Boyle MD  [facility] NAYELI Velázquez is a 61 year old, presenting for the following health issues:  Hypertension  History of presenting illness    61-year-old male who was accelerated hypertension, history of nicotine abuse CHF and paroxysmal atrial fibrillation here for follow-up I saw him more than 2 months ago.  He reports that he has been out of his losartan and did not take his blood pressure medications today.  He continues to smoke.  He states that he did have some turkey dinner yesterday which he knows is not low-sodium denies any weight gain, shortness of breath, or chest pain.  Scheduled for potential dental procedure sometime in the upcoming 6 weeks but does not know the date needs his teeth removed.  Has no dental pain today.  Denies any dizziness.  States he never got any medication for smoking cessation.    History of Present Illness       Hypertension: He presents for follow up of hypertension.  He does not check blood pressure  regularly outside of the clinic. Outpatient blood pressures have not been over 140/90. He follows a low salt diet.               Review of Systems   Constitutional, HEENT, cardiovascular, pulmonary, gi and gu systems are negative, except as otherwise noted.      Objective      BP (!) 168/98   Pulse 71   Temp 97.8  F (36.6  C) (Oral)   Resp 16   Ht 1.803 m (5' 11\")   Wt 79.7 kg (175 lb 12 oz)   SpO2 97%   BMI 24.51 kg/m      Physical Exam   GENERAL: healthy, alert and no distress  EYES: Eyes grossly normal to inspection, PERRL and conjunctivae and sclerae normal  NECK: no adenopathy, no asymmetry, masses, or scars and thyroid normal to palpation  RESP: lungs " clear to auscultation - no rales, rhonchi or wheezes  CV: regular rate and rhythm, normal S1 S2, no S3 or S4, no murmur, click or rub, no peripheral edema and peripheral pulses strong  ABDOMEN: soft, nontender, no hepatosplenomegaly, no masses and bowel sounds normal  MS: no gross musculoskeletal defects noted, no edema  SKIN: no suspicious lesions or rashes  NEURO: Normal strength and tone, mentation intact and speech normal  PSYCH: mentation appears normal, affect normal/bright

## 2022-11-25 NOTE — PATIENT INSTRUCTIONS
Was a pleasure to see you today in clinic    I have refilled the medication for smoking cessation  to stop smoking I do not know why they did not feel that the last time they may require authorization from insurance and we will work on that for you      Your blood pressure still high I know you did not take your medications today but this is worrisome because this could lead to other complications for you including worsening her heart failure I have refilled your losartan you have your other medications I have started hydralazine which is another blood pressure medication for you which was suggested by cardiology this is a very low dose however you need to take it 3 times a day      Because of your heart failure I have started you on a medication called Jardiance which is a medication take once a day please watch the salt intake in your diet.  Do not eat extra salt.    We will write a letter to your dentist however it is very important that you get your blood pressure under control before you have your dental procedure

## 2022-12-21 ENCOUNTER — TELEPHONE (OUTPATIENT)
Dept: CARDIOLOGY | Facility: CLINIC | Age: 62
End: 2022-12-21

## 2022-12-21 NOTE — TELEPHONE ENCOUNTER
[place] Community Measures Blood Pressure guideline reviewed.  Patients recent blood pressure is outside of guideline parameters.  Called pt to review, no answer.  Left voicemail message asking patient to check their blood pressure using a home blood pressure cuff or by going to a [facility].  Patient instructed to then call [phone] ([facility]) and leave a message with their name, date of birth, and blood pressure reading that was completed within the last 24 hours and where it was completed.  Will await call back for further review.

## 2022-12-23 ENCOUNTER — TELEPHONE (OUTPATIENT)
Dept: CARDIOLOGY | Facility: CLINIC | Age: 62
End: 2022-12-23

## 2022-12-23 NOTE — TELEPHONE ENCOUNTER
[place] Community Measures Blood Pressure [place] reviewed.  Patients recent blood pressure is outside of guideline parameters.  Called pt to review, no answer.  Left voicemail message asking patient to check their blood pressure using a home blood pressure cuff or by going to a [facility].  Patient instructed to then call [phone] ([facility]) and leave a message with their name, date of birth, and blood pressure reading that was completed within the last 24 hours and where it was completed.  Will await call back for further review.  Temi Childers MA

## 2023-01-09 ENCOUNTER — VIRTUAL VISIT (OUTPATIENT)
Dept: PHARMACY | Facility: CLINIC | Age: 63
End: 2023-01-09
Payer: COMMERCIAL

## 2023-01-09 DIAGNOSIS — I10 ESSENTIAL HYPERTENSION, BENIGN: ICD-10-CM

## 2023-01-09 DIAGNOSIS — I50.22 CHRONIC SYSTOLIC HEART FAILURE (H): ICD-10-CM

## 2023-01-09 DIAGNOSIS — F17.200 NICOTINE DEPENDENCE, UNCOMPLICATED, UNSPECIFIED NICOTINE PRODUCT TYPE: Primary | ICD-10-CM

## 2023-01-09 DIAGNOSIS — I48.0 PAROXYSMAL ATRIAL FIBRILLATION (H): ICD-10-CM

## 2023-01-09 DIAGNOSIS — J41.0 SIMPLE CHRONIC BRONCHITIS (H): ICD-10-CM

## 2023-01-09 DIAGNOSIS — E83.42 HYPOMAGNESEMIA: ICD-10-CM

## 2023-01-09 DIAGNOSIS — E78.2 MIXED HYPERLIPIDEMIA: ICD-10-CM

## 2023-01-09 PROCEDURE — 99605 MTMS BY PHARM NP 15 MIN: CPT | Performed by: PHARMACIST

## 2023-01-09 PROCEDURE — 99607 MTMS BY PHARM ADDL 15 MIN: CPT | Performed by: PHARMACIST

## 2023-01-09 NOTE — Clinical Note
SMITAI - I saw pt yesterday. I have f/up with him next month. I am concerned about his elevated BP and had long discussion with him today.  I am not sure why his amlodipine was discontinued, though this may be something I recommend restarting in the future. Please let me know if you have any questions or concerns with my plan for him today. Thanks Orville

## 2023-01-09 NOTE — PROGRESS NOTES
Medication Therapy Management (MTM) Encounter    ASSESSMENT:                            Medication Adherence/Access: See below for considerations and Advised use of pill boxes and/or use of an alarm to help med adherence    1. Essential hypertension, benign/Chronic systolic heart failure (H)  Patients in clinic blood pressure and at home blood pressure not meeting goal of <140/90.  Reviewed with patient today my concern for his elevated blood pressures and increased risk for stroke/heart attack if not getting his blood pressures under control.  Primary concern today is medication adherence to help with blood pressure control, if blood pressure is not controlled at follow-up visit would recommend medication adjustment.  Of note, patient has incorrectly been taking hydralazine, only taking once daily rather than 3 times daily, though patient agreeable to take this as prescribed.  Recommended patient monitor daily blood pressures more consistently at home and bring blood pressure log to next visit.  Could consider adding calcium channel blocker, such as amlodipine in the future, appears that he was previously prescribed amlodipine, though unclear why he is not currently taking this or when it was discontinued.    2. Paroxysmal atrial fibrillation (H)  Patient appropriately taking Xarelto as prescribed.    3. Simple chronic bronchitis (H)  Recommend patient refill of albuterol inhaler at the pharmacy and use as needed for symptoms of shortness of breath.  Recommend reassessing lung function at next clinic, could consider adding LAMA inhaler to prevent exacerbations (Spiriva).    4. Mixed hyperlipidemia  Stable.    5. Nicotine dependence, uncomplicated, unspecified nicotine product type  Patient currently precontemplation stage of tobacco cessation.  Reviewed with patient today the importance of tobacco cessation, patient agreeable to try alternative therapy, nicotine inhaler.  MTM pharmacist reviewed directions for  appropriate use today and prescription sent to the pharmacy.  Reviewed with patient the importance of setting a quit date and discontinuing cigarette use prior to initiating nicotine inhaler.    6. Hypomagnesemia  Patient's last magnesium level was monitored last year and was low, which is when magnesium supplement was prescribed.  Recommend rechecking mag level prior to resuming magnesium supplement.    PLAN:                            1.  Patient to take hydralazine 10 mg 3 times daily as prescribed  2.  Recommend patient monitor blood pressure at home once daily or once every other day and bring BP log to next clinic visit  3.  Initiate nicotine inhaler for smoking cessation    Medication issues to be addressed at a future visit:   1. Consider restarting calcium channel blocker such as amlodipine  2. Consider adding controller inhaler for COPD such as LAMA  3. Recheck magnesium level    Follow-up: Return in about 6 weeks (around 2/23/2023) for with me.    SUBJECTIVE/OBJECTIVE:                          Eber Jin is a 62 year old male called for an initial visit. He was referred to me from Hima Boyle.      Reason for visit: MT initial 2023.    Allergies/ADRs: Reviewed in chart  Past Medical History: Reviewed in chart  Tobacco: He reports that he has been smoking cigarettes. He has a 15.00 pack-year smoking history. He has never used smokeless tobacco.Nicotine/Tobacco Cessation Plan:   Pharmacotherapies : Nicotine inhaler  Alcohol: Unable to address today    Medication Adherence/Access: Self management.   Patient takes medications directly from bottles.  Patient takes medications 1 time(s) per day in morning.   Per patient, misses medication 2 times per week. Forgets to take them sometimes, notes that he forgets due to history of history. Keeps them in one area to help remind him to take them every day.     Hypertension/CHF: Losartan 100 mg daily, furosemide 40 mg daily, metoprolol succinate  mg  daily, spironolactone 25 mg daily, hydralazine 10 mg 3 times daily (patient has only been taking 10 mg daily), Jardiance 10 mg daily  - Patient notes that he has only been taking hydralazine once daily rather than three times daily. He states that his bottle says to take 2.5 tablets (25 mg three times daily).  - Since having a stroke he feels dizzy when standing up. No falls though.   - Self monitors his BP at home twice per week. Per machine, BP was 196/130 on 12/26, 194/129 on 12/15, 161/115 on 12/15, 116/67 on 12/14. 143/112 today; pulse 86.  - Has a scale downstairs, but doesn't use it lately.   BP Readings from Last 3 Encounters:   11/25/22 (!) 160/90   08/31/22 (!) 144/84   06/23/22 (!) 152/96      Pulse Readings from Last 3 Encounters:   11/25/22 71   08/31/22 90   06/23/22 76     Creatinine   Date Value Ref Range Status   11/25/2022 0.96 0.67 - 1.17 mg/dL Final   03/22/2021 0.88 0.66 - 1.25 mg/dL Final     Paroxysmal atrial fibrillation: Xarelto 20 mg daily  - No bleeding noted.     COPD: Albuterol inhaler as needed, using about 2-3 times per day when he has the inhaler. Though patient states that this is empty and he is not currently using it.     - No breathing issues when he is taking his medications as prescribed (when there is less pressure from fluid on his lungs).    Hyperlipidemia/CAD: Atorvastatin 80 mg daily, aspirin 81 mg daily  Recent Labs   Lab Test 11/25/22 0922 03/07/21  0633   CHOL 201* 160   HDL 38* 32*   * 106   TRIG 96 110     Tobacco cessation: Chantix - states that he never got this. States that the patches didn't stay on him due to sweating.   Still smoking, less than before, a pack will last him about 3-4 days now.  - Knows that he wants to quit. Feels that he is ready to quit.  Patient states that he has never tried nicotine inhaler before, to try this.    Hypomagnesemia: Per medication list, prescribed magnesium oxide 400 mg daily.  Patient states that he is no longer taking  this, not sure when his last dose was. Prescribed in May 2022 per chart.   Magnesium   Date Value Ref Range Status   05/12/2022 1.7 (L) 1.8 - 2.6 mg/dL Final   03/10/2021 1.8 1.6 - 2.3 mg/dL Final         Today's Vitals: There were no vitals taken for this visit.  ----------------      I spent 40 minutes with this patient today. All changes were made via collaborative practice agreement with Hima Boyle MD. A copy of the visit note was provided to the patient's provider(s).    A summary of these recommendations was declined by the patient.    Michelle Huertas, PharmD, [facility]  Medication Therapy Management Pharmacist    Telemedicine Visit Details  Type of service:  Telephone visit  Start Time: 2:18 PM  End Time: 2:57 PM     Medication Therapy Recommendations  Essential hypertension, benign    Current Medication: hydrALAZINE (APRESOLINE) 10 MG tablet   Rationale: Does not understand instructions - Adherence - Adherence   Recommendation: Provide Education   Status: Patient Agreed - Adherence/Education         Nicotine dependence, uncomplicated, unspecified nicotine product type    Current Medication: nicotine (NICOTROL) 10 MG inhaler   Rationale: Untreated condition - Needs additional medication therapy - Indication   Recommendation: Start Medication   Status: Accepted per CPA

## 2023-01-10 NOTE — PATIENT INSTRUCTIONS
"Recommendations from today's MTM visit:                                                         1.  Patient to take hydralazine 10 mg 3 times daily as prescribed  2.  Recommend patient monitor blood pressure at home once daily or once every other day and bring BP log to next clinic visit  3.  Initiate nicotine inhaler for smoking cessation    Follow-up: Return in about 6 weeks (around 2/23/2023) for with me.    It was great speaking with you today.  I value your experience and would be very thankful for your time in providing feedback in our clinic survey. In the next few days, you may receive an email or text message from [employer] with a link to a survey related to your  clinical pharmacist.\"     To schedule another MTM appointment, please call the clinic directly or you may call the MTM scheduling line at [phone] or toll-free at [phone].     My Clinical Pharmacist's contact information:                                                      Please feel free to contact me with any questions or concerns you have.      Michelle Huertas, PharmD, BCACP  Medication Therapy Management Pharmacist   "

## 2023-02-10 ENCOUNTER — OFFICE VISIT (OUTPATIENT)
Dept: CARDIOLOGY | Facility: CLINIC | Age: 63
End: 2023-02-10
Payer: COMMERCIAL

## 2023-02-10 VITALS
HEART RATE: 70 BPM | WEIGHT: 172 LBS | SYSTOLIC BLOOD PRESSURE: 199 MMHG | DIASTOLIC BLOOD PRESSURE: 115 MMHG | RESPIRATION RATE: 16 BRPM | BODY MASS INDEX: 24.08 KG/M2 | HEIGHT: 71 IN

## 2023-02-10 DIAGNOSIS — I50.22 CHRONIC SYSTOLIC HEART FAILURE (H): Primary | ICD-10-CM

## 2023-02-10 DIAGNOSIS — I10 ESSENTIAL HYPERTENSION, BENIGN: ICD-10-CM

## 2023-02-10 PROCEDURE — 99215 OFFICE O/P EST HI 40 MIN: CPT | Performed by: INTERNAL MEDICINE

## 2023-02-10 RX ORDER — HYDRALAZINE HYDROCHLORIDE 10 MG/1
30 TABLET, FILM COATED ORAL 3 TIMES DAILY
Qty: 800 TABLET | Refills: 3 | Status: ON HOLD | OUTPATIENT
Start: 2023-02-10 | End: 2023-10-18

## 2023-02-10 RX ORDER — HYDRALAZINE HYDROCHLORIDE 10 MG/1
30 TABLET, FILM COATED ORAL 3 TIMES DAILY
Qty: 800 TABLET | Refills: 3 | Status: SHIPPED | OUTPATIENT
Start: 2023-02-10 | End: 2023-02-10

## 2023-02-10 NOTE — PATIENT INSTRUCTIONS
Thank you for allowing the [facility] to be a part of your care. Please pay close attention to the following medications as they have been changed during this visit.    1.) Please increase your hydralazine to 30 mg (three 10mg tablets) three times daily.

## 2023-02-10 NOTE — PROGRESS NOTES
HEART CARE NOTE          Assessment/Recommendations     1. HFmrEF   Assessment / Plan    Near euvolemia on physical exam - denies HF symptoms of orthopnea, fluid retention/edema, PND; patient reports that he ran out of his cardiac meds once again, but was able to get his BP meds and furosemide; discusses/emphasised again the pitfalls of his noncompliance with his recent admission being the most obvious example - he voiced understanding and agreed to be complaint      GDMT as detailed below; mainstay of treatment for HFmrEF includes diuretics (class I) and SGLT2-I (class 2a); additional medical therapy demonstrated with less robust evidence for indication but should be considered per guideline recommendations (2b)    Patient declines ED referral, but states that he will increase hydralazine to 30 mg TID and be compliant with his regimen     Current Pharmacotherapy AHA Guideline-Directed Medical Therapy   Losartan 100 mg daily Lisinopril 20 mg twice daily   Metoprolol succinate 100 mg daily  Carvedilol 25 mg twice daily   Spironolactone 25 mg Spironolactone 25 mg once daily   Hydralazine 30 mg TID Hydralazine 100 mg three times daily   Isosorbide dinitrate not started Isosorbide dinitrate 40 mg three times daily   SGLT2 inhibitor: not started Dapagliflozin or Empagliflozin 10 mg daily       2. Paroxysmal atrial fibrillation  Assessment / Plan    Rate regular - likely remains in NSR    Continue rivaroxaban    Will refer to afib clinic     3. Hypertensive Urgency  Assessment / Plan    Difficult to control especially in light of the fact of patient's persistent non-compliance with Rxs.     /121 today - discussed extensively with patient about the dangers of such profound HTN and the need to be compliant with meds; repeat BP was 199/115 and patient was instructed to present to the ED for management - he declined even after another extensive discussion; he reports that he will make sure his Rxs are always filled  "and contact his PMD regarding additional management of his BP    History of Present Illness/Subjective      Mr. Eber Jin is a 61 year old male with a PMHx significant for chronic systolic CHF with EF of 45%, COPD with ongoing tobacco use, PVD, stroke, hypertension who [facility] to [facility] care.     Today, Mr. Jin denies any HF symptoms; declines ED referral despite hypertensive urgency; Management plan as detailed above.      ECG: Personally reviewed. normal sinus rhythm.     ECHO (personnaly Reviewed):     Left ventricular ejection fraction is mildly decreased with regional akinesis. The estimated left ventricular ejection fraction is 45%. Wall motion abnormalities suggest prior myocardial infarction the the territory of the right coronary and/or left   circumflex arteries.    Normal right ventricular size and systolic function.    The aortic valve is sclerotic without significant aortic stenosis. Mild aortic regurgitation.    The ascending aorta is mildly dilated measuring 4.0 cm.    When compared to the previous study dated 1/1/2021, the degree of mitral regurgitation has decreased. Findings are otherwise similar.          Physical Examination Review of Systems   BP (!) 211/121 (BP Location: Right arm, Patient Position: Sitting, Cuff Size: Adult Regular)   Pulse 70   Resp 16   Ht 1.803 m (5' 11\")   Wt 78 kg (172 lb)   BMI 23.99 kg/m    Body mass index is 23.99 kg/m .  Wt Readings from Last 3 Encounters:   11/25/22 79.7 kg (175 lb 12 oz)   08/31/22 76.9 kg (169 lb 8 oz)   06/23/22 79.4 kg (175 lb)     General Appearance:   no distress, normal body habitus   ENT/Mouth: membranes moist, no oral lesions or bleeding gums.      EYES:  no scleral icterus, normal conjunctivae   Neck: no carotid bruits or thyromegaly   Chest/Lungs:   lungs are clear to auscultation, no rales or wheezing, equal chest wall expansion    Cardiovascular:   Regular. Normal first and second heart sounds with " no murmurs, rubs, or gallops; the carotid, radial and posterior tibial pulses are intact, no JVD or LE edema bilaterally    Abdomen:  no organomegaly, masses, bruits, or tenderness; bowel sounds are present   Extremities: no cyanosis or clubbing   Skin: no xanthelasma, warm.    Neurologic: normal gait, normal  bilateral, no tremors     Psychiatric: alert and oriented x3, calm     A complete 10 systems ROS was reviewed  And is negative except what is listed in the HPI.          Medical History  Surgical History Family History Social History   Past Medical History:   Diagnosis Date     Accelerated hypertension      Arthritis      CAD (coronary artery disease)      Cerebrovascular accident (CVA), unspecified mechanism (H)      CHF (congestive heart failure) (H)      Chronic atrial fibrillation (H)     on rivaroxaban     COPD (chronic obstructive pulmonary disease) (H)      Heart failure (H)     ischemic, EF 45 % im 3/2021     HLD (hyperlipidemia)      HTN (hypertension)      Hypertensive urgency      Myocardial infarction (H)      PAD (peripheral artery disease) (H)      Peripheral vascular disease with claudication (H)      Prolonged Q-T interval on ECG     Past Surgical History:   Procedure Laterality Date     CARDIAC CATHETERIZATION       IR ABDOMINAL AORTOGRAM  8/6/2013     IR EXTREMITY ANGIOGRAM BILATERAL  8/6/2013    no family history of premature coronary artery disease Social History     Socioeconomic History     Marital status:      Spouse name: Not on file     Number of children: Not on file     Years of education: Not on file     Highest education level: Not on file   Occupational History     Not on file   Tobacco Use     Smoking status: Every Day     Packs/day: 0.50     Years: 30.00     Pack years: 15.00     Types: Cigarettes     Smokeless tobacco: Never   Vaping Use     Vaping Use: Never used   Substance and Sexual Activity     Alcohol use: Not Currently     Alcohol/week: 1.0 - 2.0 standard  drink     Comment: Alcoholic Drinks/day: rare use     Drug use: No     Sexual activity: Yes     Partners: Female   Other Topics Concern     Not on file   Social History Narrative    Patient is not on supplemental O2 at home. Patient does not use any assistive device for ambulation. Full code.      Social Determinants of Health     Financial Resource Strain: Not on file   Food Insecurity: Not on file   Transportation Needs: Not on file   Physical Activity: Not on file   Stress: Not on file   Social Connections: Not on file   Intimate Partner Violence: Not on file   Housing Stability: Not on file           Lab Results    Chemistry/lipid CBC Cardiac Enzymes/BNP/TSH/INR   Lab Results   Component Value Date    CHOL 201 (H) 11/25/2022    HDL 38 (L) 11/25/2022    TRIG 96 11/25/2022    BUN 11.2 11/25/2022     11/25/2022    CO2 27 11/25/2022    Lab Results   Component Value Date    WBC 6.2 05/04/2022    HGB 15.5 05/04/2022    HCT 49.9 05/04/2022    MCV 93 05/04/2022     05/04/2022    Lab Results   Component Value Date    TROPONINI <0.01 04/30/2022    BNP 2,619 (H) 04/30/2022    TSH 0.81 05/12/2022    INR 1.81 (H) 03/05/2021     Lab Results   Component Value Date    TROPONINI <0.01 04/30/2022          Weight:    Wt Readings from Last 3 Encounters:   11/25/22 79.7 kg (175 lb 12 oz)   08/31/22 76.9 kg (169 lb 8 oz)   06/23/22 79.4 kg (175 lb)       Allergies  Allergies   Allergen Reactions     Penicillins Swelling         Surgical History  Past Surgical History:   Procedure Laterality Date     CARDIAC CATHETERIZATION       IR ABDOMINAL AORTOGRAM  8/6/2013     IR EXTREMITY ANGIOGRAM BILATERAL  8/6/2013       Social History  Tobacco:   History   Smoking Status     Every Day     Packs/day: 0.50     Years: 30.00     Types: Cigarettes   Smokeless Tobacco     Never    Alcohol:   Social History    Substance and Sexual Activity      Alcohol use: Not Currently        Alcohol/week: 1.0 - 2.0 standard drink        Comment:  Alcoholic Drinks/day: rare use   Illicit Drugs:   History   Drug Use No       Family History  Family History   Problem Relation Age of Onset     Heart Failure Mother      No Known Problems Father      No Known Problems Brother           Gregory Carter MD on 2/10/2023      cc: Hima Boyle

## 2023-02-10 NOTE — LETTER
2/10/2023    Hima Boyle MD  [address] [facility] [address]  [place] [address]    RE: Eber Watsonson       Dear Colleague,     I had the pleasure of seeing Eber Jin in the [facility].    HEART CARE NOTE          Assessment/Recommendations     1. HFmrEF   Assessment / Plan    Near euvolemia on physical exam - denies HF symptoms of orthopnea, fluid retention/edema, PND; patient reports that he ran out of his cardiac meds once again, but was able to get his BP meds and furosemide; discusses/emphasised again the pitfalls of his noncompliance with his recent admission being the most obvious example - he voiced understanding and agreed to be complaint      GDMT as detailed below; mainstay of treatment for HFmrEF includes diuretics (class I) and SGLT2-I (class 2a); additional medical therapy demonstrated with less robust evidence for indication but should be considered per guideline recommendations (2b)    Patient declines ED referral, but states that he will increase hydralazine to 30 mg TID and be compliant with his regimen     Current Pharmacotherapy AHA Guideline-Directed Medical Therapy   Losartan 100 mg daily Lisinopril 20 mg twice daily   Metoprolol succinate 100 mg daily  Carvedilol 25 mg twice daily   Spironolactone 25 mg Spironolactone 25 mg once daily   Hydralazine 30 mg TID Hydralazine 100 mg three times daily   Isosorbide dinitrate not started Isosorbide dinitrate 40 mg three times daily   SGLT2 inhibitor: not started Dapagliflozin or Empagliflozin 10 mg daily       2. Paroxysmal atrial fibrillation  Assessment / Plan    Rate regular - likely remains in NSR    Continue rivaroxaban    Will refer to afib clinic     3. Hypertensive Urgency  Assessment / Plan    Difficult to control especially in light of the fact of patient's persistent non-compliance with Rxs.     /121 today - discussed extensively with patient about the dangers of such profound HTN and the need to be compliant with  "meds; repeat BP was 199/115 and patient was instructed to present to the ED for management - he declined even after another extensive discussion; he reports that he will make sure his Rxs are always filled and contact his PMD regarding additional management of his BP    History of Present Illness/Subjective      Mr. Eber Jin is a 61 year old male with a PMHx significant for chronic systolic CHF with EF of 45%, COPD with ongoing tobacco use, PVD, stroke, hypertension who [facility] to [facility] care.     Today, Mr. Jin denies any HF symptoms; declines ED referral despite hypertensive urgency; Management plan as detailed above.      ECG: Personally reviewed. normal sinus rhythm.     ECHO (personnaly Reviewed):     Left ventricular ejection fraction is mildly decreased with regional akinesis. The estimated left ventricular ejection fraction is 45%. Wall motion abnormalities suggest prior myocardial infarction the the territory of the right coronary and/or left   circumflex arteries.    Normal right ventricular size and systolic function.    The aortic valve is sclerotic without significant aortic stenosis. Mild aortic regurgitation.    The ascending aorta is mildly dilated measuring 4.0 cm.    When compared to the previous study dated 1/1/2021, the degree of mitral regurgitation has decreased. Findings are otherwise similar.          Physical Examination Review of Systems   BP (!) 211/121 (BP Location: Right arm, Patient Position: Sitting, Cuff Size: Adult Regular)   Pulse 70   Resp 16   Ht 1.803 m (5' 11\")   Wt 78 kg (172 lb)   BMI 23.99 kg/m    Body mass index is 23.99 kg/m .  Wt Readings from Last 3 Encounters:   11/25/22 79.7 kg (175 lb 12 oz)   08/31/22 76.9 kg (169 lb 8 oz)   06/23/22 79.4 kg (175 lb)     General Appearance:   no distress, normal body habitus   ENT/Mouth: membranes moist, no oral lesions or bleeding gums.      EYES:  no scleral icterus, normal conjunctivae "   Neck: no carotid bruits or thyromegaly   Chest/Lungs:   lungs are clear to auscultation, no rales or wheezing, equal chest wall expansion    Cardiovascular:   Regular. Normal first and second heart sounds with no murmurs, rubs, or gallops; the carotid, radial and posterior tibial pulses are intact, no JVD or LE edema bilaterally    Abdomen:  no organomegaly, masses, bruits, or tenderness; bowel sounds are present   Extremities: no cyanosis or clubbing   Skin: no xanthelasma, warm.    Neurologic: normal gait, normal  bilateral, no tremors     Psychiatric: alert and oriented x3, calm     A complete 10 systems ROS was reviewed  And is negative except what is listed in the HPI.          Medical History  Surgical History Family History Social History   Past Medical History:   Diagnosis Date     Accelerated hypertension      Arthritis      CAD (coronary artery disease)      Cerebrovascular accident (CVA), unspecified mechanism (H)      CHF (congestive heart failure) (H)      Chronic atrial fibrillation (H)     on rivaroxaban     COPD (chronic obstructive pulmonary disease) (H)      Heart failure (H)     ischemic, EF 45 % im 3/2021     HLD (hyperlipidemia)      HTN (hypertension)      Hypertensive urgency      Myocardial infarction (H)      PAD (peripheral artery disease) (H)      Peripheral vascular disease with claudication (H)      Prolonged Q-T interval on ECG     Past Surgical History:   Procedure Laterality Date     CARDIAC CATHETERIZATION       IR ABDOMINAL AORTOGRAM  8/6/2013     IR EXTREMITY ANGIOGRAM BILATERAL  8/6/2013    no family history of premature coronary artery disease Social History     Socioeconomic History     Marital status:      Spouse name: Not on file     Number of children: Not on file     Years of education: Not on file     Highest education level: Not on file   Occupational History     Not on file   Tobacco Use     Smoking status: Every Day     Packs/day: 0.50     Years: 30.00      Pack years: 15.00     Types: Cigarettes     Smokeless tobacco: Never   Vaping Use     Vaping Use: Never used   Substance and Sexual Activity     Alcohol use: Not Currently     Alcohol/week: 1.0 - 2.0 standard drink     Comment: Alcoholic Drinks/day: rare use     Drug use: No     Sexual activity: Yes     Partners: Female   Other Topics Concern     Not on file   Social History Narrative    Patient is not on supplemental O2 at home. Patient does not use any assistive device for ambulation. Full code.      Social Determinants of Health     Financial Resource Strain: Not on file   Food Insecurity: Not on file   Transportation Needs: Not on file   Physical Activity: Not on file   Stress: Not on file   Social Connections: Not on file   Intimate Partner Violence: Not on file   Housing Stability: Not on file           Lab Results    Chemistry/lipid CBC Cardiac Enzymes/BNP/TSH/INR   Lab Results   Component Value Date    CHOL 201 (H) 11/25/2022    HDL 38 (L) 11/25/2022    TRIG 96 11/25/2022    BUN 11.2 11/25/2022     11/25/2022    CO2 27 11/25/2022    Lab Results   Component Value Date    WBC 6.2 05/04/2022    HGB 15.5 05/04/2022    HCT 49.9 05/04/2022    MCV 93 05/04/2022     05/04/2022    Lab Results   Component Value Date    TROPONINI <0.01 04/30/2022    BNP 2,619 (H) 04/30/2022    TSH 0.81 05/12/2022    INR 1.81 (H) 03/05/2021     Lab Results   Component Value Date    TROPONINI <0.01 04/30/2022          Weight:    Wt Readings from Last 3 Encounters:   11/25/22 79.7 kg (175 lb 12 oz)   08/31/22 76.9 kg (169 lb 8 oz)   06/23/22 79.4 kg (175 lb)       Allergies  Allergies   Allergen Reactions     Penicillins Swelling         Surgical History  Past Surgical History:   Procedure Laterality Date     CARDIAC CATHETERIZATION       IR ABDOMINAL AORTOGRAM  8/6/2013     IR EXTREMITY ANGIOGRAM BILATERAL  8/6/2013       Social History  Tobacco:   History   Smoking Status     Every Day     Packs/day: 0.50     Years: 30.00      Types: Cigarettes   Smokeless Tobacco     Never    Alcohol:   Social History    Substance and Sexual Activity      Alcohol use: Not Currently        Alcohol/week: 1.0 - 2.0 standard drink        Comment: Alcoholic Drinks/day: rare use   Illicit Drugs:   History   Drug Use No       Family History  Family History   Problem Relation Age of Onset     Heart Failure Mother      No Known Problems Father      No Known Problems Brother       Gregory Carter MD on 2/10/2023      cc: Hima Boyle    Thank you for allowing me to participate in the care of your patient.    Sincerely,   Gregory Carter MD   [facility]  cc: Referred Self

## 2023-02-23 ENCOUNTER — VIRTUAL VISIT (OUTPATIENT)
Dept: PHARMACY | Facility: CLINIC | Age: 63
End: 2023-02-23
Payer: COMMERCIAL

## 2023-02-23 DIAGNOSIS — F17.200 NICOTINE DEPENDENCE, UNCOMPLICATED, UNSPECIFIED NICOTINE PRODUCT TYPE: ICD-10-CM

## 2023-02-23 DIAGNOSIS — I10 ESSENTIAL HYPERTENSION, BENIGN: Primary | ICD-10-CM

## 2023-02-23 DIAGNOSIS — I48.0 PAROXYSMAL ATRIAL FIBRILLATION (H): ICD-10-CM

## 2023-02-23 DIAGNOSIS — E83.42 HYPOMAGNESEMIA: ICD-10-CM

## 2023-02-23 DIAGNOSIS — E78.2 MIXED HYPERLIPIDEMIA: ICD-10-CM

## 2023-02-23 PROCEDURE — 99606 MTMS BY PHARM EST 15 MIN: CPT | Mod: 93 | Performed by: PHARMACIST

## 2023-02-23 PROCEDURE — 99607 MTMS BY PHARM ADDL 15 MIN: CPT | Mod: 93 | Performed by: PHARMACIST

## 2023-02-23 NOTE — PATIENT INSTRUCTIONS
"Recommendations from today's MTM visit:                                                      1. Patient to refill and take ALL medications as prescribed AND  monitor blood pressure at home once daily and bring BP log to next clinic visit  2.  Check with pharmacy regarding coverage of nicotine inhaler for smoking cessation    Follow-up: Return in about 20 days (around 3/15/2023) for with me.    It was great speaking with you today.  I value your experience and would be very thankful for your time in providing feedback in our clinic survey. In the next few days, you may receive an email or text message from [employer] with a link to a survey related to your  clinical pharmacist.\"     To schedule another MTM appointment, please call the clinic directly or you may call the MTM scheduling line at [phone] or toll-free at [phone].     My Clinical Pharmacist's contact information:                                                      Please feel free to contact me with any questions or concerns you have.      Michelle Huertas, PharmD, BCACP  Medication Therapy Management Pharmacist   "
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
## 2023-02-23 NOTE — Clinical Note
Boyle, I am placing orders for BMP and mag to be rechecked at my visit next month, is there anything else you want me to check? I think it would be good if he had a follow up with you as well.   [place]

## 2023-03-15 ENCOUNTER — TELEPHONE (OUTPATIENT)
Dept: PHARMACY | Facility: CLINIC | Age: 63
End: 2023-03-15

## 2023-03-15 NOTE — TELEPHONE ENCOUNTER
PharmD Outbound Call:     Reason for call: MTM appointment today - due to diarrhea  He is agreeable to reschedule but would prefer a call back later to reschedule visit.     Called patient back at 12:45 and LVM for patient to call and schedule f/up MTM visit when able at [phone]       Michelle Huertas, PharmD, BCACP  Medication Therapy Management Pharmacist

## 2023-03-17 ENCOUNTER — TELEPHONE (OUTPATIENT)
Dept: CARDIOLOGY | Facility: CLINIC | Age: 63
End: 2023-03-17

## 2023-04-27 ENCOUNTER — TELEPHONE (OUTPATIENT)
Dept: PHARMACY | Facility: CLINIC | Age: 63
End: 2023-04-27

## 2023-04-27 NOTE — TELEPHONE ENCOUNTER
PharmD Outbound Call:     Reason for call: MTM appointment today - states that his wife is in the hospital and he has been focusing on this. States that he has been taking his medications daily and does have a BP cuff at home but doesn't use it very regularly. He wants to wait to reschedule until later, MTM to reach out in about 1 month to see if ready to schedule follow up.        Michelle Huertas, [place], BCACP  Medication Therapy Management Pharmacist

## 2023-07-13 DIAGNOSIS — E78.2 MIXED HYPERLIPIDEMIA: ICD-10-CM

## 2023-07-13 DIAGNOSIS — I50.22 CHRONIC SYSTOLIC HEART FAILURE (H): ICD-10-CM

## 2023-07-13 DIAGNOSIS — Z76.0 ENCOUNTER FOR MEDICATION REFILL: ICD-10-CM

## 2023-07-13 DIAGNOSIS — I10 ESSENTIAL HYPERTENSION, BENIGN: ICD-10-CM

## 2023-07-13 DIAGNOSIS — I48.0 PAROXYSMAL ATRIAL FIBRILLATION (H): ICD-10-CM

## 2023-07-13 DIAGNOSIS — I50.23 ACUTE ON CHRONIC SYSTOLIC CONGESTIVE HEART FAILURE (H): ICD-10-CM

## 2023-07-13 RX ORDER — FUROSEMIDE 40 MG
40 TABLET ORAL DAILY
Qty: 90 TABLET | Refills: 1 | Status: SHIPPED | OUTPATIENT
Start: 2023-07-13

## 2023-07-13 RX ORDER — METOPROLOL SUCCINATE 100 MG/1
100 TABLET, EXTENDED RELEASE ORAL DAILY
Qty: 90 TABLET | Refills: 1 | Status: SHIPPED | OUTPATIENT
Start: 2023-07-13

## 2023-07-13 RX ORDER — SPIRONOLACTONE 25 MG/1
25 TABLET ORAL DAILY
Qty: 90 TABLET | Refills: 1 | Status: SHIPPED | OUTPATIENT
Start: 2023-07-13

## 2023-07-13 RX ORDER — ATORVASTATIN CALCIUM 80 MG/1
80 TABLET, FILM COATED ORAL EVERY EVENING
Qty: 90 TABLET | Refills: 1 | Status: SHIPPED | OUTPATIENT
Start: 2023-07-13

## 2023-07-19 ENCOUNTER — TRANSFERRED RECORDS (OUTPATIENT)
Dept: HEALTH INFORMATION MANAGEMENT | Facility: CLINIC | Age: 63
End: 2023-07-19

## 2023-09-12 ENCOUNTER — TELEPHONE (OUTPATIENT)
Dept: FAMILY MEDICINE | Facility: CLINIC | Age: 63
End: 2023-09-12
Payer: COMMERCIAL

## 2023-09-12 NOTE — TELEPHONE ENCOUNTER
Home Care is calling regarding an established patient with [facility].       Requesting orders from: Hima Boyle  Provider is following patient: Yes  Is this a 60-day recertification request?  No    Orders Requested    Physical Therapy  Request for initial evaluation and treatment (one time)     Occupational Therapy  Request for initial evaluation and treatment (one time)       Verbal orders given.  Home Care will send orders for provider to sign. Erica from [facility] Christopher Antony RN

## 2023-09-13 ENCOUNTER — MEDICAL CORRESPONDENCE (OUTPATIENT)
Dept: HEALTH INFORMATION MANAGEMENT | Facility: CLINIC | Age: 63
End: 2023-09-13
Payer: COMMERCIAL

## 2023-09-14 NOTE — TELEPHONE ENCOUNTER
Called TREVA Del Valle in attempt to relay provider verbal authorization below. However, no answer. Detailed message below left on vm with clinic number provided.    MAE Smart, RN  [facility]      Please approve requested orders for physical therapy     MAE Smart, RN  [facility]

## 2023-09-14 NOTE — TELEPHONE ENCOUNTER
Dr. Boyle --    Ivon PT stated patient's blood pressure was elevated this mornin/115. Heart rate: 119.   Patient had not yet taken blood pressure medications.       Home Care is calling regarding an established patient with [facility].       Requesting orders from: Hima Boyle  Provider is following patient: Yes  Is this a 60-day recertification request?  No    Orders Requested    Physical Therapy  Request for initial certification (first set of orders)   Frequency:  1x/wk for 1 wks  then 2x/wk for 3 wks then 1x/wk for 4 wks.      Information was gathered and will be sent to provider for review.  RN will contact Home Care with information after provider review.  Confirmed ok to leave a detailed message with call back.  Contact information confirmed and updated as needed.    [facility] @ [phone].    Joslyn Carpio RN

## 2023-09-19 ENCOUNTER — TELEPHONE (OUTPATIENT)
Dept: FAMILY MEDICINE | Facility: CLINIC | Age: 63
End: 2023-09-19
Payer: COMMERCIAL

## 2023-09-19 NOTE — TELEPHONE ENCOUNTER
Home Health Care    Reason for call:  Verbal order    Orders are needed for this patient.  Skilled Nursinx a week for 2 week, 1 every other week for x2 week for medication management.    Update on medication hydrALAZINE (APRESOLINE) 10 MG tablet, patient has been taking 1 tab 3x a day instead of following the given direction of dosage. RN informed patient to take as directed. BP reading at 160-80, pain rate at 8.  Patient did not take pain medication for fracture of left rib.    Pt Provider: Dr. Boyle      Phone Number Homecare Nurse can be reached at: [phone]    Can we leave a detailed message on this number? YES      Okay to leave a detailed message?: Yes at [phone]

## 2023-09-20 ENCOUNTER — APPOINTMENT (OUTPATIENT)
Dept: CT IMAGING | Facility: HOSPITAL | Age: 63
End: 2023-09-20
Attending: EMERGENCY MEDICINE
Payer: COMMERCIAL

## 2023-09-20 ENCOUNTER — HOSPITAL ENCOUNTER (EMERGENCY)
Facility: HOSPITAL | Age: 63
Discharge: ANOTHER HOSPITAL | End: 2023-09-20
Attending: EMERGENCY MEDICINE | Admitting: EMERGENCY MEDICINE
Payer: COMMERCIAL

## 2023-09-20 VITALS
SYSTOLIC BLOOD PRESSURE: 150 MMHG | OXYGEN SATURATION: 100 % | TEMPERATURE: 97.8 F | DIASTOLIC BLOOD PRESSURE: 97 MMHG | RESPIRATION RATE: 22 BRPM | HEART RATE: 96 BPM

## 2023-09-20 DIAGNOSIS — J90 PLEURAL EFFUSION: ICD-10-CM

## 2023-09-20 DIAGNOSIS — I48.92 ATRIAL FLUTTER WITH RAPID VENTRICULAR RESPONSE (H): ICD-10-CM

## 2023-09-20 DIAGNOSIS — S22.5XXA CLOSED FRACTURE OF MULTIPLE RIBS WITH FLAIL CHEST, INITIAL ENCOUNTER: ICD-10-CM

## 2023-09-20 LAB
ANION GAP SERPL CALCULATED.3IONS-SCNC: 12 MMOL/L (ref 7–15)
BASOPHILS # BLD AUTO: 0 10E3/UL (ref 0–0.2)
BASOPHILS NFR BLD AUTO: 0 %
BUN SERPL-MCNC: 10 MG/DL (ref 8–23)
CALCIUM SERPL-MCNC: 8.9 MG/DL (ref 8.8–10.2)
CHLORIDE SERPL-SCNC: 95 MMOL/L (ref 98–107)
CREAT SERPL-MCNC: 0.98 MG/DL (ref 0.67–1.17)
DEPRECATED HCO3 PLAS-SCNC: 26 MMOL/L (ref 22–29)
EGFRCR SERPLBLD CKD-EPI 2021: 86 ML/MIN/1.73M2
EOSINOPHIL # BLD AUTO: 0 10E3/UL (ref 0–0.7)
EOSINOPHIL NFR BLD AUTO: 0 %
ERYTHROCYTE [DISTWIDTH] IN BLOOD BY AUTOMATED COUNT: 13.2 % (ref 10–15)
GLUCOSE SERPL-MCNC: 87 MG/DL (ref 70–99)
HCT VFR BLD AUTO: 39.6 % (ref 40–53)
HGB BLD-MCNC: 13.2 G/DL (ref 13.3–17.7)
HOLD SPECIMEN: NORMAL
IMM GRANULOCYTES # BLD: 0.1 10E3/UL
IMM GRANULOCYTES NFR BLD: 1 %
LACTATE SERPL-SCNC: 1.9 MMOL/L (ref 0.7–2)
LYMPHOCYTES # BLD AUTO: 1.6 10E3/UL (ref 0.8–5.3)
LYMPHOCYTES NFR BLD AUTO: 15 %
MCH RBC QN AUTO: 30.8 PG (ref 26.5–33)
MCHC RBC AUTO-ENTMCNC: 33.3 G/DL (ref 31.5–36.5)
MCV RBC AUTO: 92 FL (ref 78–100)
MONOCYTES # BLD AUTO: 1.3 10E3/UL (ref 0–1.3)
MONOCYTES NFR BLD AUTO: 12 %
NEUTROPHILS # BLD AUTO: 7.3 10E3/UL (ref 1.6–8.3)
NEUTROPHILS NFR BLD AUTO: 72 %
NRBC # BLD AUTO: 0 10E3/UL
NRBC BLD AUTO-RTO: 0 /100
PLATELET # BLD AUTO: 346 10E3/UL (ref 150–450)
POTASSIUM SERPL-SCNC: 3.6 MMOL/L (ref 3.4–5.3)
RBC # BLD AUTO: 4.29 10E6/UL (ref 4.4–5.9)
SODIUM SERPL-SCNC: 133 MMOL/L (ref 136–145)
TROPONIN T SERPL HS-MCNC: 35 NG/L
TSH SERPL DL<=0.005 MIU/L-ACNC: 1.83 UIU/ML (ref 0.3–4.2)
WBC # BLD AUTO: 10.3 10E3/UL (ref 4–11)

## 2023-09-20 PROCEDURE — 84484 ASSAY OF TROPONIN QUANT: CPT | Performed by: EMERGENCY MEDICINE

## 2023-09-20 PROCEDURE — 250N000011 HC RX IP 250 OP 636: Mod: JZ | Performed by: EMERGENCY MEDICINE

## 2023-09-20 PROCEDURE — 80048 BASIC METABOLIC PNL TOTAL CA: CPT | Performed by: EMERGENCY MEDICINE

## 2023-09-20 PROCEDURE — 99291 CRITICAL CARE FIRST HOUR: CPT | Mod: 25

## 2023-09-20 PROCEDURE — 85025 COMPLETE CBC W/AUTO DIFF WBC: CPT | Performed by: EMERGENCY MEDICINE

## 2023-09-20 PROCEDURE — 71275 CT ANGIOGRAPHY CHEST: CPT

## 2023-09-20 PROCEDURE — 84443 ASSAY THYROID STIM HORMONE: CPT | Performed by: EMERGENCY MEDICINE

## 2023-09-20 PROCEDURE — 83605 ASSAY OF LACTIC ACID: CPT | Performed by: EMERGENCY MEDICINE

## 2023-09-20 PROCEDURE — 36415 COLL VENOUS BLD VENIPUNCTURE: CPT | Performed by: EMERGENCY MEDICINE

## 2023-09-20 PROCEDURE — 93005 ELECTROCARDIOGRAM TRACING: CPT | Performed by: EMERGENCY MEDICINE

## 2023-09-20 PROCEDURE — 99204 OFFICE O/P NEW MOD 45 MIN: CPT | Performed by: SURGERY

## 2023-09-20 RX ORDER — IOPAMIDOL 755 MG/ML
89 INJECTION, SOLUTION INTRAVASCULAR ONCE
Status: COMPLETED | OUTPATIENT
Start: 2023-09-20 | End: 2023-09-20

## 2023-09-20 RX ADMIN — IOPAMIDOL 89 ML: 755 INJECTION, SOLUTION INTRAVENOUS at 12:59

## 2023-09-20 ASSESSMENT — ACTIVITIES OF DAILY LIVING (ADL)
ADLS_ACUITY_SCORE: 39
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
## 2023-09-20 NOTE — ED NOTES
Bed: JNED-02  Expected date:   Expected time:   Means of arrival: Ambulance  Comments:  MPLW: Weak, hypoxic

## 2023-09-20 NOTE — ED PROVIDER NOTES
EMERGENCY DEPARTMENT ENCOUNTER      NAME: Eber Jin  AGE: 62 year old male  YOB: 1960  MRN: [id number]  EVALUATION DATE & TIME: 2023 10:49 AM    PCP: Hima Boyle    ED PROVIDER: Pedro Vegas D.O.      Chief Complaint   Patient presents with    Shortness of Breath       FINAL IMPRESSION:  1. Closed fracture of multiple ribs with flail chest, initial encounter    2. Pleural effusion    3. Atrial flutter with rapid ventricular response (H)        ED COURSE & MEDICAL DECISION MAKIN:05 AM I met with the patient to gather history and to perform my initial exam. I discussed the plan for care while in the Emergency Department.  2:27 PM I spoke with Dr. Anjel Putnam in surgery. He recommends transferring the patient to [facility].   2:46 PM I spoke with Dr. Ulloa, the accepting provider at [facility] ER Trauma.         Pertinent Labs & Imaging studies reviewed. (See chart for details)  62 year old male presents to the Emergency Department for evaluation of left-sided chest pain and shortness of breath.  Patient initially arrived tachycardic, and what appeared to be atrial flutter with RVR.  This did spontaneously resolve in the emergency department.  Initial differential based on the history given by the patient included PE, pneumonia, pneumothorax, ACS, or other acute process.  When CT scan was performed for evaluation for potential for PE, no PE was performed however large pleural effusion was seen, and the patient had multiple rib fractures from the fifth through ninth ribs that were concerning for the potential for flail chest as well as fractures of 10th 11th and 12th ribs on the same side.  Patient is vague on history of falling, potentially over the last few weeks, but was uncertain.  No additional injury or complaint was described, and he is otherwise stable at this time.  However, I had our trauma surgeon here evaluate the patient and he did not believe this patient was appropriate for  admission here but rather should be transferred to [facility].  Therefore I did consult with the trauma surgeon and ER physician at [facility], the patient was accepted for transfer for further evaluation with an ER to ER transfer.    Medical Decision Making    History:  Supplemental history from: Documented in chart, if applicable  External Record(s) reviewed: Documented in chart, if applicable. and Outpatient Record: 02/10/2023 Office visit cardiology    Work Up:  Chart documentation includes differential considered and any EKGs or imaging independently interpreted by provider, where specified.  In additional to work up documented, I considered the following work up: Documented in chart, if applicable.    External consultation:  Discussion of management with another provider: General Surgery    Complicating factors:  Care impacted by chronic illness: Heart Disease, Hyperlipidemia, and Hypertension  Care affected by social determinants of health: N/A    Disposition considerations: Admit.        At the conclusion of the encounter I discussed the results of all of the tests and the disposition. The questions were answered. The patient or family acknowledged understanding and was agreeable with the care plan.      CRITICAL CARE:  Critical Care  Performed by: AZUL BAKER  Authorized by: AZUL BAKER  Total critical care time: 35 minutes  Critical care time was exclusive of separately billable procedures and treating other patients.  Critical care was necessary to treat or prevent imminent or life-threatening deterioration of the following conditions: Flail chest  Critical care was time spent personally by me on the following activities: development of treatment plan with patient or surrogate, discussions with consultants, examination of patient, evaluation of patient's response to treatment, obtaining history from patient or surrogate, ordering and performing treatments and interventions,  ordering and review of laboratory studies, ordering and review of radiographic studies and re-evaluation of patient's condition, this excludes any separately billable procedures.        HPI    Patient information was obtained from: Patient and EMS    Use of : N/A        Eber Jin is a 62 year old male who presents with lower left chest pain and shortness of breath. Patient has a known history of A-fib, and is reportedly on Xarelto. He additionally endorses mild lightheadedness, weakness, and fatigue. Denies any fevers, nausea, or vomiting.     Per Chart Review: 02/10/2023 Office visit cardiology, patient has a PMHx of chronic systolic CHF with EF of 45%, COPD with ongoing tobacco use, PVD, stroke, paroxysmal A-fib, HTN.       REVIEW OF SYSTEMS  Constitutional:  Denies fever, chills, weight loss. Endorses weakness and fatigue  Eyes:  No pain, discharge, redness  HENT:  Denies sore throat, ear pain, congestion  Respiratory: No SOB, wheeze or cough  Cardiovascular:  No CP, palpitations  GI:  Denies abdominal pain, vomiting, diarrhea. Endorses nausea.   : Denies dysuria, hematuria  Musculoskeletal:  Denies any new muscle/joint pain, swelling or loss of function.  Skin:  Denies rash, pallor  Neurologic:  Denies headache, focal weakness or sensory changes. Endorses mild lightheadedness.   Lymph: Denies swollen nodes    All other systems negative unless noted in HPI.    PAST MEDICAL HISTORY:  Past Medical History:   Diagnosis Date    Accelerated hypertension     Arthritis     CAD (coronary artery disease)     Cerebrovascular accident (CVA), unspecified mechanism (H)     CHF (congestive heart failure) (H)     Chronic atrial fibrillation (H)     on rivaroxaban    COPD (chronic obstructive pulmonary disease) (H)     Heart failure (H)     ischemic, EF 45 % im 3/2021    HLD (hyperlipidemia)     HTN (hypertension)     Hypertensive urgency     Myocardial infarction (H)     PAD (peripheral artery disease) (H)      Peripheral vascular disease with claudication (H)     Prolonged Q-T interval on ECG        PAST SURGICAL HISTORY:  Past Surgical History:   Procedure Laterality Date    CARDIAC CATHETERIZATION      IR ABDOMINAL AORTOGRAM  8/6/2013    IR EXTREMITY ANGIOGRAM BILATERAL  8/6/2013         CURRENT MEDICATIONS:    No current facility-administered medications for this encounter.     Current Outpatient Medications   Medication    albuterol (PROAIR HFA/PROVENTIL HFA/VENTOLIN HFA) 108 (90 Base) MCG/ACT inhaler    ammonium lactate (LAC-HYDRIN) 12 % external lotion    aspirin (ASA) 81 MG EC tablet    atorvastatin (LIPITOR) 80 MG tablet    empagliflozin (JARDIANCE) 10 MG TABS tablet    furosemide (LASIX) 40 MG tablet    hydrALAZINE (APRESOLINE) 10 MG tablet    losartan (COZAAR) 100 MG tablet    magnesium oxide (MAG-OX) 400 MG tablet    metoprolol succinate ER (TOPROL XL) 100 MG 24 hr tablet    rivaroxaban ANTICOAGULANT (XARELTO) 20 MG TABS tablet    spironolactone (ALDACTONE) 25 MG tablet         ALLERGIES:  Allergies   Allergen Reactions    Penicillins Swelling       FAMILY HISTORY:  Family History   Problem Relation Age of Onset    Heart Failure Mother     No Known Problems Father     No Known Problems Brother        SOCIAL HISTORY:  Social History     Socioeconomic History    Marital status:      Spouse name: None    Number of children: None    Years of education: None    Highest education level: None   Tobacco Use    Smoking status: Every Day     Packs/day: 0.50     Years: 30.00     Pack years: 15.00     Types: Cigarettes    Smokeless tobacco: Never   Vaping Use    Vaping Use: Never used   Substance and Sexual Activity    Alcohol use: Not Currently     Alcohol/week: 1.0 - 2.0 standard drink of alcohol     Comment: Alcoholic Drinks/day: rare use    Drug use: No    Sexual activity: Yes     Partners: Female   Social History Narrative    Patient is not on supplemental O2 at home. Patient does not use any assistive  device for ambulation. Full code.        VITALS:  Patient Vitals for the past 24 hrs:   BP Temp Temp src Pulse Resp SpO2   09/20/23 1431 (!) 150/97 -- -- 96 22 100 %   09/20/23 1401 (!) 172/101 -- -- 116 24 100 %   09/20/23 1340 -- -- -- 93 27 100 %   09/20/23 1325 (!) 175/71 -- -- 98 (!) 40 100 %   09/20/23 1310 (!) 159/88 -- -- 93 25 100 %   09/20/23 1240 -- -- -- 95 22 100 %   09/20/23 1225 -- -- -- 95 20 100 %   09/20/23 1210 -- -- -- 94 24 100 %   09/20/23 1155 -- -- -- 95 23 100 %   09/20/23 1140 -- -- -- 96 21 100 %   09/20/23 1106 -- -- -- (!) 159 29 99 %   09/20/23 1100 (!) 135/99 -- -- (!) 159 28 100 %   09/20/23 1057 -- -- -- -- -- 100 %   09/20/23 1052 (!) 151/114 97.8  F (36.6  C) Oral (!) 150 24 --       PHYSICAL EXAM    VITAL SIGNS: BP (!) 150/97   Pulse 96   Temp 97.8  F (36.6  C) (Oral)   Resp 22   SpO2 100%     General Appearance: tired-appearing, well-nourished, no acute distress  Head:  Normocephalic, without obvious abnormality, atraumatic  Eyes:  PERRL, conjunctiva/corneas clear, EOM's intact,  ENT:  Lips, mucosa, and tongue normal, membranes are moist without pallor  Neck:  Normal ROM, symmetrical, trachea midline    Chest:  No deformity, no crepitus. Left lower chest wall tender laterally.   Cardio: Tachycardic. Regular rhythm, no murmur, rub or gallop, 2+ pulses symmetric in all extremities  Pulm:  Clear to auscultation bilaterally, respirations unlabored,  Abdomen:  Soft, non-tender, no rebound or guarding.  Musculoskeletal: Full ROM, no edema, no cyanosis, good ROM of major joints  Integument:  Warm, Dry, No erythema, No rash.    Neurologic:  Alert & oriented.  No focal deficits appreciated.  Ambulatory.  Psychiatric:  Affect normal, Judgment normal, Mood normal.      LABS  Results for orders placed or performed during the hospital encounter of 09/20/23 (from the past 24 hour(s))   [place] Draw    Narrative    The following orders were created for panel order [place] Draw.  Procedure                                Abnormality         Status                     ---------                               -----------         ------                     Extra Blue Top Tube[[id number]]                              Final result               Extra Green Top (Lithium...[[id number]]                      Final result               Extra Purple Top Tube[[id number]]                            Final result                 Please view results for these tests on the individual orders.   Extra Blue Top Tube   Result Value Ref Range    Hold Specimen JIC    Extra Green Top (Lithium Heparin) Tube   Result Value Ref Range    Hold Specimen JIC    Extra Purple Top Tube   Result Value Ref Range    Hold Specimen JIC    CBC with platelets + differential    Narrative    The following orders were created for panel order CBC with platelets + differential.  Procedure                               Abnormality         Status                     ---------                               -----------         ------                     CBC with platelets and d...[[id number]]  Abnormal            Final result                 Please view results for these tests on the individual orders.   Basic metabolic panel   Result Value Ref Range    Sodium 133 (L) 136 - 145 mmol/L    Potassium 3.6 3.4 - 5.3 mmol/L    Chloride 95 (L) 98 - 107 mmol/L    Carbon Dioxide (CO2) 26 22 - 29 mmol/L    Anion Gap 12 7 - 15 mmol/L    Urea Nitrogen 10.0 8.0 - 23.0 mg/dL    Creatinine 0.98 0.67 - 1.17 mg/dL    Calcium 8.9 8.8 - 10.2 mg/dL    Glucose 87 70 - 99 mg/dL    GFR Estimate 86 >60 mL/min/1.73m2   Troponin T, High Sensitivity (now)   Result Value Ref Range    Troponin T, High Sensitivity 35 (H) <=22 ng/L   TSH with free T4 reflex   Result Value Ref Range    TSH 1.83 0.30 - 4.20 uIU/mL   CBC with platelets and differential   Result Value Ref Range    WBC Count 10.3 4.0 - 11.0 10e3/uL    RBC Count 4.29 (L) 4.40 - 5.90 10e6/uL    Hemoglobin 13.2 (L) 13.3 - 17.7  g/dL    Hematocrit 39.6 (L) 40.0 - 53.0 %    MCV 92 78 - 100 fL    MCH 30.8 26.5 - 33.0 pg    MCHC 33.3 31.5 - 36.5 g/dL    RDW 13.2 10.0 - 15.0 %    Platelet Count 346 150 - 450 10e3/uL    % Neutrophils 72 %    % Lymphocytes 15 %    % Monocytes 12 %    % Eosinophils 0 %    % Basophils 0 %    % Immature Granulocytes 1 %    NRBCs per 100 WBC 0 <1 /100    Absolute Neutrophils 7.3 1.6 - 8.3 10e3/uL    Absolute Lymphocytes 1.6 0.8 - 5.3 10e3/uL    Absolute Monocytes 1.3 0.0 - 1.3 10e3/uL    Absolute Eosinophils 0.0 0.0 - 0.7 10e3/uL    Absolute Basophils 0.0 0.0 - 0.2 10e3/uL    Absolute Immature Granulocytes 0.1 <=0.4 10e3/uL    Absolute NRBCs 0.0 10e3/uL   Lactic acid whole blood   Result Value Ref Range    Lactic Acid 1.9 0.7 - 2.0 mmol/L   CT Chest Pulmonary Embolism w Contrast    Narrative    EXAM: CT CHEST PULMONARY EMBOLISM W CONTRAST  LOCATION: [facility]  DATE: 9/20/2023    INDICATION: Chest pain, dyspnea, tachycardia  COMPARISON: CTA chest 04/30/2022  TECHNIQUE: CT chest pulmonary angiogram during arterial phase injection of IV contrast. Multiplanar reformats and MIP reconstructions were performed. Dose reduction techniques were used.   CONTRAST: 89 mL Isovue 370    FINDINGS:  ANGIOGRAM CHEST: Pulmonary arteries are normal caliber and negative for pulmonary emboli. No CT evidence of right heart strain.    LUNGS AND PLEURA: New large left pleural effusion with collapse of the majority of the left lower lobe. Similar moderate emphysema. No focal consolidation or worrisome lung nodules.    MEDIASTINUM/AXILLAE: Left ventricular hypertrophy. No pericardial effusion. Thoracic aorta is nonaneurysmal with moderate atheromatous disease; evaluate for dissection is limited due to timing of contrast. No new or enlarging thoracic lymph nodes, with   unchanged subcarinal calcified lymph nodes from prior granulomatous disease.    CORONARY ARTERY CALCIFICATION: Severe.    UPPER ABDOMEN: Questionable  crescentic density in the abdominal aorta (series 5 image #96), which is incompletely evaluated but may represent dissection given the apparent trauma.    MUSCULOSKELETAL: Left 5th - 9th rib fractures, which are segmental (fractured laterally and posteriorly). Left posterior 10th - 12th rib fractures are also present.      Impression    IMPRESSION:  1.  Segmental left 5th - 9th rib fractures. Correlate clinically for findings of flail chest. Additional posterior left 10th - 12th rib fractures.   2.  Large left pleural effusion with collapse of a large portion of the left lower lobe.  3.  No pulmonary embolism.  4.  Possible crescentic density in the abdominal aorta at the inferior field of view, which is incompletely evaluated due to timing of contrast. Given apparent trauma, further evaluation of the abdomen and pelvis with CTA to evaluate for traumatic aortic   injury is recommended.         RADIOLOGY  CT Chest Pulmonary Embolism w Contrast   Final Result   IMPRESSION:   1.  Segmental left 5th - 9th rib fractures. Correlate clinically for findings of flail chest. Additional posterior left 10th - 12th rib fractures.    2.  Large left pleural effusion with collapse of a large portion of the left lower lobe.   3.  No pulmonary embolism.   4.  Possible crescentic density in the abdominal aorta at the inferior field of view, which is incompletely evaluated due to timing of contrast. Given apparent trauma, further evaluation of the abdomen and pelvis with CTA to evaluate for traumatic aortic    injury is recommended.           I have independently interpreted the above image, large pleural effusion on CT chest. See radiology report for detail.    EKG:    Rate: 160 bpm  Rhythm: Atrial flutter with RVR  Axis: Normal  Interval: Normal  Conduction: Normal  QRS: Normal  ST: Normal  T-wave: Normal  QT: Not prolonged  Comparison EKG: Patient was in sinus rhythm on 1 May 2022 otherwise no significant change  Impression:  No  acute ischemic change   I have independently reviewed and interpreted today's EKG, pending Cardiologist read    EKG #2     Rate: 87 bpm  Rhythm:  sinus rhythm with occasional PACs and PVCs  Axis: Normal  Interval: Normal  Conduction: Normal  QRS: Normal  ST: Normal  T-wave: Normal  QT: Not prolonged  Comparison EKG: Patient is no longer in atrial flutter when compared to previous  Impression:  No acute ischemic change   I have independently reviewed and interpreted today's EKG, pending Cardiologist read        MEDICATIONS GIVEN IN THE EMERGENCY:  Medications   iopamidol (ISOVUE-370) solution 89 mL (89 mLs Intravenous $Given 9/20/23 1259)       NEW PRESCRIPTIONS STARTED AT TODAY'S ER VISIT  Discharge Medication List as of 9/20/2023  3:10 PM           I, Thelma Llamas, am serving as a scribe to document services personally performed by Pedro Vegas D.O., based on my observations and the provider's statements to me.  I, Pedro Vegas D.O., attest that Thelma Llamas is acting in a scribe capacity, has observed my performance of the services and has documented them in accordance with my direction.     Pedro Vegas D.O.  Emergency Medicine  [facility] EMERGENCY DEPARTMENT  [facility] [facility] [address] [phone]  Dept: [phone]       Pedro Vegas,   09/20/23 8823

## 2023-09-20 NOTE — CONSULTS
TRAUMA SURGERY EVALUATION    TRAUMA LEVEL:  Trauma consult    TIME OF EVALUATION: 14:30     MODE OF ARRIVAL: Ambulance     HPI  62 year old year old male presenting with left-sided chest pain.  The patient is having difficulty breathing in association with a left-sided chest pain.  Patient states that he fell from his house approximately 3 weeks ago.  Due to the unresolving pain and shortness of breath, the patient came in for further evaluation.  Trauma team asked to evaluate this patient due to the findings of flail chest on CT scan.    On evaluation, the patient is a very poor historian and is not very cooperative.  The patient states that he fell from the house but he will not describe the height that he fell from or how.  The patient just states that he has medications for anticoagulation for his heart.  Patient also states that he does not use alcohol and does not use any recreational use of drugs.  Patient is alert and oriented x3 GCS of 15.  Patient denies any head trauma.  He has no further complaints at this time.  His breathing at 90% saturation on room air.    CC/Mechanism of Injury: Fall    Allergies:Penicillins    Past Medical History:   Diagnosis Date    Accelerated hypertension     Arthritis     CAD (coronary artery disease)     Cerebrovascular accident (CVA), unspecified mechanism (H)     CHF (congestive heart failure) (H)     Chronic atrial fibrillation (H)     on rivaroxaban    COPD (chronic obstructive pulmonary disease) (H)     Heart failure (H)     ischemic, EF 45 % im 3/2021    HLD (hyperlipidemia)     HTN (hypertension)     Hypertensive urgency     Myocardial infarction (H)     PAD (peripheral artery disease) (H)     Peripheral vascular disease with claudication (H)     Prolonged Q-T interval on ECG        Past Surgical History:   Procedure Laterality Date    CARDIAC CATHETERIZATION      IR ABDOMINAL AORTOGRAM  8/6/2013    IR EXTREMITY ANGIOGRAM BILATERAL  8/6/2013       CURRENT  MEDS:  No current facility-administered medications for this encounter.    Current Outpatient Medications:     albuterol (PROAIR HFA/PROVENTIL HFA/VENTOLIN HFA) 108 (90 Base) MCG/ACT inhaler, Inhale 2 puffs into the lungs every 6 hours as needed for shortness of breath / dyspnea or wheezing, Disp: 18 g, Rfl: 11    ammonium lactate (LAC-HYDRIN) 12 % external lotion, Apply topically 2 times daily, Disp: 500 g, Rfl: 3    aspirin (ASA) 81 MG EC tablet, Take 1 tablet (81 mg) by mouth daily, Disp: 90 tablet, Rfl: 3    atorvastatin (LIPITOR) 80 MG tablet, Take 1 tablet (80 mg) by mouth every evening, Disp: 90 tablet, Rfl: 1    empagliflozin (JARDIANCE) 10 MG TABS tablet, Take 1 tablet (10 mg) by mouth daily, Disp: 90 tablet, Rfl: 3    furosemide (LASIX) 40 MG tablet, Take 1 tablet (40 mg) by mouth daily, Disp: 90 tablet, Rfl: 1    hydrALAZINE (APRESOLINE) 10 MG tablet, Take 3 tablets (30 mg) by mouth 3 times daily, Disp: 800 tablet, Rfl: 3    losartan (COZAAR) 100 MG tablet, Take 1 tablet (100 mg) by mouth daily, Disp: 90 tablet, Rfl: 3    magnesium oxide (MAG-OX) 400 MG tablet, Take 1 tablet (400 mg) by mouth daily, Disp: 30 tablet, Rfl: 4    metoprolol succinate ER (TOPROL XL) 100 MG 24 hr tablet, Take 1 tablet (100 mg) by mouth daily, Disp: 90 tablet, Rfl: 1    rivaroxaban ANTICOAGULANT (XARELTO) 20 MG TABS tablet, Take 1 tablet (20 mg) by mouth daily (with dinner), Disp: 90 tablet, Rfl: 3    spironolactone (ALDACTONE) 25 MG tablet, Take 1 tablet (25 mg) by mouth daily, Disp: 90 tablet, Rfl: 1    Family History   Problem Relation Age of Onset    Heart Failure Mother     No Known Problems Father     No Known Problems Brother         reports that he has been smoking cigarettes. He has a 15.00 pack-year smoking history. He has never used smokeless tobacco. He reports that he does not currently use alcohol after a past usage of about 1.0 - 2.0 standard drink of alcohol per week. He reports that he does not use  drugs.    Review of Systems:  The 12 point review of systems  is within normal limits except for as mentioned above in the HPI.    EXAM:  BP (!) 150/97   Pulse 96   Temp 97.8  F (36.6  C) (Oral)   Resp 22   SpO2 100%     GCS:15    GEN:No Acute distress, conversant, uncooperative  HEENT:Normocephalic, no obvious signs of trauma, EOMI, PERRLA  RESP:CTA bilaterally, no wheezes, no rales.  Breath sounds better on the right side than left.  CHEST: tender to palpation over the left posterior back and left flank.  The left posterior back does have a deformity where there is more of a swelling.  Also there is appreciable crepitus of the left posterior back and left leg., stable in AP and lateral compression  CV:RRR, no murmurs, gallops or rubs  ABD:Soft, non tender, no obvious signs of trauma  PELVIS:Stable to AP and Lateral compression, no obvious signs of trauma  :No obvious trauma or abnormalities  EXT: Bilateral upper and lower extremities with no obvious trauma or deformities  C/T/L SPINE:Non tender, no step offs or obvious signs of trauma          LABS:  Lab Results   Component Value Date    WBC 10.3 09/20/2023    WBC 8.2 03/22/2021    HGB 13.2 09/20/2023    HGB 12.5 03/22/2021    HCT 39.6 09/20/2023    HCT 39.8 03/22/2021    MCV 92 09/20/2023    MCV 90 03/22/2021     09/20/2023     03/22/2021     INR/Prothrombin Time  Recent Labs   Lab 09/20/23  1100   *   CO2 26   BUN 10.0     Lab Results   Component Value Date    ALT 25 05/12/2022    AST 18 05/12/2022     (H) 05/12/2022    ALKPHOS 89 05/12/2022       IMAGES:   Relevant images were reviewed and discussed with the patient.  Notable findings were:     1.  CT PE shows segmental left-sided fifth through ninth rib fractures concerning for flail chest.  Patient also has additional left-sided 10 through 12 rib fractures patient also has a large left-sided pleural effusion.    Assessment/Plan:   Eber Jin is a 62 year old male with  pain and shortness of breath secondary to flail chest on the left due to multiple rib fractures from a fall.  Patient current condition is complicated from the fact that he is on Xarelto for atrial fibrillation.  Patient also has a left-sided pleural effusion which could be old hematoma due to the rib fractures.     Currently, the patient is stable with good saturations in the low 90s on room air.  Pain is controlled currently with her pain regiment.    Plan  -Recommendation is to transfer the patient to a higher facility of care were a thoracic/trauma team is available to provide him with possible rib plating for the flail chest if needed.  -Hold off any Xarelto in case patient does require immediate surgical intervention with the findings of the left-sided pleural effusion/possible hematoma  -Keep patient n.p.o.  -Continue with resuscitation per trauma protocol    Anjel Putnam DO  General Surgeon  [facility]  Surgery Clinic - [address]  [place] [address]?  Office: [phone]  Employed by - [employer]  Pager: [phone]

## 2023-09-20 NOTE — TELEPHONE ENCOUNTER
I left a message for the nursing team on the number provided please note that orders needed for the patient can be approved thank you

## 2023-09-20 NOTE — TELEPHONE ENCOUNTER
Call Ángel Knutson  with no answer.  Detail message left on  to provide order for home care.  Clinic number provided.    MAE Smart, RN  [facility]      I left a message for the nursing team on the number provided please note that orders needed for the patient can be approved thank you         Note

## 2023-09-22 LAB
ATRIAL RATE - MUSE: 160 BPM
ATRIAL RATE - MUSE: 87 BPM
DIASTOLIC BLOOD PRESSURE - MUSE: NORMAL MMHG
DIASTOLIC BLOOD PRESSURE - MUSE: NORMAL MMHG
INTERPRETATION ECG - MUSE: NORMAL
INTERPRETATION ECG - MUSE: NORMAL
P AXIS - MUSE: 59 DEGREES
P AXIS - MUSE: 75 DEGREES
PR INTERVAL - MUSE: 104 MS
PR INTERVAL - MUSE: 154 MS
QRS DURATION - MUSE: 92 MS
QRS DURATION - MUSE: 96 MS
QT - MUSE: 308 MS
QT - MUSE: 414 MS
QTC - MUSE: 498 MS
QTC - MUSE: 502 MS
R AXIS - MUSE: -17 DEGREES
R AXIS - MUSE: 0 DEGREES
SYSTOLIC BLOOD PRESSURE - MUSE: NORMAL MMHG
SYSTOLIC BLOOD PRESSURE - MUSE: NORMAL MMHG
T AXIS - MUSE: 137 DEGREES
T AXIS - MUSE: 88 DEGREES
VENTRICULAR RATE- MUSE: 160 BPM
VENTRICULAR RATE- MUSE: 87 BPM

## 2023-09-27 ENCOUNTER — TELEPHONE (OUTPATIENT)
Dept: FAMILY MEDICINE | Facility: CLINIC | Age: 63
End: 2023-09-27
Payer: COMMERCIAL

## 2023-09-27 NOTE — TELEPHONE ENCOUNTER
Called return to Ivon Neal [phone] to provide provider verbal order below.  No answer. Detailed message below left on provider's secure vm.  Clinic number provided for questions.    MAE Smart, RN  [facility]      Please approve required orders thank you

## 2023-09-27 NOTE — TELEPHONE ENCOUNTER
Home Care is calling regarding an established patient with [facility].       Requesting orders from: Hima Boyle  Provider is following patient: Yes  Is this a 60-day recertification request?  No    Orders Requested    Physical Therapy  Request for resumption in care.         PT resumption of orders. 2 times per week for 3 weeks and once per week for 5 weeks.  Asking for a RN eval. (Meds are a mess).  Pt is declining the MMA ordered from hosp/TCU. FYI.      Skilled Nursing  Request for initial evaluation and treatment (one time)  VO still needs to be given.    Confirmed ok to leave a detailed message with call back.  Contact information confirmed and updated as needed.    F2F was from hosp/TCU. PT was hosp for a pleural effusion.  No future appt with PCP.  Last appt was 11/25/22.  PT  has been noncompliant with homebound status.  If this continues, pt will be discharged from homecare.  They will direct pt to make follow up appt with PCP clinic.      Pema Naranjo, GALINA-BC  [facility]

## 2023-10-02 ENCOUNTER — APPOINTMENT (OUTPATIENT)
Dept: CT IMAGING | Facility: HOSPITAL | Age: 63
End: 2023-10-02
Attending: EMERGENCY MEDICINE
Payer: COMMERCIAL

## 2023-10-02 ENCOUNTER — TELEPHONE (OUTPATIENT)
Dept: FAMILY MEDICINE | Facility: CLINIC | Age: 63
End: 2023-10-02

## 2023-10-02 ENCOUNTER — HOSPITAL ENCOUNTER (OUTPATIENT)
Facility: HOSPITAL | Age: 63
Setting detail: OBSERVATION
Discharge: OTHER INSTITUTION | End: 2023-10-03
Attending: EMERGENCY MEDICINE | Admitting: EMERGENCY MEDICINE
Payer: COMMERCIAL

## 2023-10-02 DIAGNOSIS — I63.232 CEREBROVASCULAR ACCIDENT (CVA) DUE TO STENOSIS OF LEFT CAROTID ARTERY (H): Primary | ICD-10-CM

## 2023-10-02 DIAGNOSIS — S00.03XA CONTUSION OF SCALP, INITIAL ENCOUNTER: ICD-10-CM

## 2023-10-02 DIAGNOSIS — I63.512 CEREBROVASCULAR ACCIDENT (CVA) DUE TO OCCLUSION OF LEFT MIDDLE CEREBRAL ARTERY (H): ICD-10-CM
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
## 2023-10-02 LAB
ANION GAP SERPL CALCULATED.3IONS-SCNC: 15 MMOL/L (ref 7–15)
BUN SERPL-MCNC: 13.8 MG/DL (ref 8–23)
CALCIUM SERPL-MCNC: 9.7 MG/DL (ref 8.8–10.2)
CHLORIDE SERPL-SCNC: 101 MMOL/L (ref 98–107)
CREAT SERPL-MCNC: 0.99 MG/DL (ref 0.67–1.17)
DEPRECATED HCO3 PLAS-SCNC: 22 MMOL/L (ref 22–29)
EGFRCR SERPLBLD CKD-EPI 2021: 85 ML/MIN/1.73M2
ERYTHROCYTE [DISTWIDTH] IN BLOOD BY AUTOMATED COUNT: 12.8 % (ref 10–15)
GLUCOSE SERPL-MCNC: 90 MG/DL (ref 70–99)
HCT VFR BLD AUTO: 37.7 % (ref 40–53)
HGB BLD-MCNC: 12.8 G/DL (ref 13.3–17.7)
MCH RBC QN AUTO: 31.2 PG (ref 26.5–33)
MCHC RBC AUTO-ENTMCNC: 34 G/DL (ref 31.5–36.5)
MCV RBC AUTO: 92 FL (ref 78–100)
PLATELET # BLD AUTO: 278 10E3/UL (ref 150–450)
POTASSIUM SERPL-SCNC: 4.4 MMOL/L (ref 3.4–5.3)
RBC # BLD AUTO: 4.1 10E6/UL (ref 4.4–5.9)
SODIUM SERPL-SCNC: 138 MMOL/L (ref 135–145)
WBC # BLD AUTO: 9.6 10E3/UL (ref 4–11)

## 2023-10-02 PROCEDURE — 80048 BASIC METABOLIC PNL TOTAL CA: CPT | Performed by: EMERGENCY MEDICINE

## 2023-10-02 PROCEDURE — 94150 VITAL CAPACITY TEST: CPT

## 2023-10-02 PROCEDURE — 999N000157 HC STATISTIC RCP TIME EA 10 MIN

## 2023-10-02 PROCEDURE — 36415 COLL VENOUS BLD VENIPUNCTURE: CPT | Performed by: EMERGENCY MEDICINE

## 2023-10-02 PROCEDURE — 96360 HYDRATION IV INFUSION INIT: CPT | Mod: 59

## 2023-10-02 PROCEDURE — 96361 HYDRATE IV INFUSION ADD-ON: CPT

## 2023-10-02 PROCEDURE — 250N000013 HC RX MED GY IP 250 OP 250 PS 637: Performed by: STUDENT IN AN ORGANIZED HEALTH CARE EDUCATION/TRAINING PROGRAM

## 2023-10-02 PROCEDURE — G0378 HOSPITAL OBSERVATION PER HR: HCPCS

## 2023-10-02 PROCEDURE — 80321 ALCOHOLS BIOMARKERS 1OR 2: CPT | Performed by: PAIN MEDICINE

## 2023-10-02 PROCEDURE — 93005 ELECTROCARDIOGRAM TRACING: CPT | Performed by: EMERGENCY MEDICINE

## 2023-10-02 PROCEDURE — 258N000003 HC RX IP 258 OP 636: Performed by: EMERGENCY MEDICINE

## 2023-10-02 PROCEDURE — 99285 EMERGENCY DEPT VISIT HI MDM: CPT | Mod: 25

## 2023-10-02 PROCEDURE — 250N000011 HC RX IP 250 OP 636: Mod: JZ | Performed by: EMERGENCY MEDICINE

## 2023-10-02 PROCEDURE — 85027 COMPLETE CBC AUTOMATED: CPT | Performed by: EMERGENCY MEDICINE

## 2023-10-02 PROCEDURE — 70450 CT HEAD/BRAIN W/O DYE: CPT

## 2023-10-02 PROCEDURE — 99222 1ST HOSP IP/OBS MODERATE 55: CPT | Performed by: STUDENT IN AN ORGANIZED HEALTH CARE EDUCATION/TRAINING PROGRAM

## 2023-10-02 PROCEDURE — 71275 CT ANGIOGRAPHY CHEST: CPT

## 2023-10-02 RX ORDER — LIDOCAINE 40 MG/G
CREAM TOPICAL
Status: DISCONTINUED | OUTPATIENT
Start: 2023-10-02 | End: 2023-10-02

## 2023-10-02 RX ORDER — SPIRONOLACTONE 25 MG/1
25 TABLET ORAL DAILY
Status: DISCONTINUED | OUTPATIENT
Start: 2023-10-03 | End: 2023-10-03 | Stop reason: HOSPADM

## 2023-10-02 RX ORDER — ALBUTEROL SULFATE 90 UG/1
2 AEROSOL, METERED RESPIRATORY (INHALATION) EVERY 6 HOURS PRN
Status: DISCONTINUED | OUTPATIENT
Start: 2023-10-02 | End: 2023-10-03 | Stop reason: HOSPADM

## 2023-10-02 RX ORDER — LOSARTAN POTASSIUM 50 MG/1
100 TABLET ORAL DAILY
Status: DISCONTINUED | OUTPATIENT
Start: 2023-10-03 | End: 2023-10-03 | Stop reason: HOSPADM

## 2023-10-02 RX ORDER — ONDANSETRON 4 MG/1
4 TABLET, ORALLY DISINTEGRATING ORAL EVERY 6 HOURS PRN
Status: DISCONTINUED | OUTPATIENT
Start: 2023-10-02 | End: 2023-10-03 | Stop reason: HOSPADM

## 2023-10-02 RX ORDER — LIDOCAINE 4 G/G
1 PATCH TOPICAL
Status: DISCONTINUED | OUTPATIENT
Start: 2023-10-02 | End: 2023-10-03

## 2023-10-02 RX ORDER — FUROSEMIDE 20 MG
40 TABLET ORAL DAILY
Status: DISCONTINUED | OUTPATIENT
Start: 2023-10-03 | End: 2023-10-03 | Stop reason: HOSPADM

## 2023-10-02 RX ORDER — METHOCARBAMOL 500 MG/1
500 TABLET, FILM COATED ORAL 4 TIMES DAILY PRN
COMMUNITY
End: 2023-10-02

## 2023-10-02 RX ORDER — HYDRALAZINE HYDROCHLORIDE 10 MG/1
30 TABLET, FILM COATED ORAL 3 TIMES DAILY
Status: DISCONTINUED | OUTPATIENT
Start: 2023-10-02 | End: 2023-10-03 | Stop reason: HOSPADM

## 2023-10-02 RX ORDER — ACETAMINOPHEN 650 MG/1
650 SUPPOSITORY RECTAL EVERY 6 HOURS PRN
Status: DISCONTINUED | OUTPATIENT
Start: 2023-10-02 | End: 2023-10-02

## 2023-10-02 RX ORDER — AMOXICILLIN 250 MG
2 CAPSULE ORAL 2 TIMES DAILY PRN
Status: DISCONTINUED | OUTPATIENT
Start: 2023-10-02 | End: 2023-10-03

## 2023-10-02 RX ORDER — ACETAMINOPHEN 325 MG/1
650 TABLET ORAL EVERY 6 HOURS PRN
Status: DISCONTINUED | OUTPATIENT
Start: 2023-10-02 | End: 2023-10-02

## 2023-10-02 RX ORDER — ONDANSETRON 2 MG/ML
4 INJECTION INTRAMUSCULAR; INTRAVENOUS EVERY 6 HOURS PRN
Status: DISCONTINUED | OUTPATIENT
Start: 2023-10-02 | End: 2023-10-03 | Stop reason: HOSPADM

## 2023-10-02 RX ORDER — ACETAMINOPHEN 325 MG/1
975 TABLET ORAL EVERY 8 HOURS SCHEDULED
Status: DISCONTINUED | OUTPATIENT
Start: 2023-10-02 | End: 2023-10-03 | Stop reason: HOSPADM

## 2023-10-02 RX ORDER — METHOCARBAMOL 500 MG/1
500 TABLET, FILM COATED ORAL EVERY 6 HOURS PRN
Status: DISCONTINUED | OUTPATIENT
Start: 2023-10-02 | End: 2023-10-03 | Stop reason: HOSPADM

## 2023-10-02 RX ORDER — METOPROLOL SUCCINATE 50 MG/1
100 TABLET, EXTENDED RELEASE ORAL DAILY
Status: DISCONTINUED | OUTPATIENT
Start: 2023-10-03 | End: 2023-10-03 | Stop reason: HOSPADM

## 2023-10-02 RX ORDER — IOPAMIDOL 755 MG/ML
90 INJECTION, SOLUTION INTRAVASCULAR ONCE
Status: COMPLETED | OUTPATIENT
Start: 2023-10-02 | End: 2023-10-02

## 2023-10-02 RX ORDER — GABAPENTIN 100 MG/1
100 CAPSULE ORAL 3 TIMES DAILY
Status: DISCONTINUED | OUTPATIENT
Start: 2023-10-02 | End: 2023-10-03 | Stop reason: HOSPADM

## 2023-10-02 RX ORDER — AMOXICILLIN 250 MG
1 CAPSULE ORAL 2 TIMES DAILY PRN
Status: DISCONTINUED | OUTPATIENT
Start: 2023-10-02 | End: 2023-10-03

## 2023-10-02 RX ORDER — ASPIRIN 81 MG/1
81 TABLET ORAL DAILY
Status: DISCONTINUED | OUTPATIENT
Start: 2023-10-03 | End: 2023-10-03 | Stop reason: HOSPADM

## 2023-10-02 RX ADMIN — GABAPENTIN 100 MG: 100 CAPSULE ORAL at 22:47

## 2023-10-02 RX ADMIN — IOPAMIDOL 90 ML: 755 INJECTION, SOLUTION INTRAVENOUS at 15:21

## 2023-10-02 RX ADMIN — SODIUM CHLORIDE 1000 ML: 9 INJECTION, SOLUTION INTRAVENOUS at 14:09

## 2023-10-02 RX ADMIN — HYDRALAZINE HYDROCHLORIDE 30 MG: 10 TABLET, FILM COATED ORAL at 22:47

## 2023-10-02 RX ADMIN — ACETAMINOPHEN 975 MG: 325 TABLET ORAL at 22:47

## 2023-10-02 ASSESSMENT — ACTIVITIES OF DAILY LIVING (ADL)
ADLS_ACUITY_SCORE: 41

## 2023-10-02 NOTE — ED NOTES
Pt's son was called by writer to obtain more information:  When pt fell yesterday he did hit his head, he keeps falling and would not go to TCU when discharged from hospital (for stroke) last week.
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
## 2023-10-02 NOTE — ED TRIAGE NOTES
Pt arrived via EMS from home due to multiple falls (3 yesterday, 2 today. Pt is on thinners, denies hitting head. Pt has hx of CVA. Per EMS report John del castillo, feels he cannot take care of pt in home anymore. Pt is A&O VSS.     Triage Assessment       Row Name 10/02/23 6724       Triage Assessment (Adult)    Airway WDL WDL       Respiratory WDL    Respiratory WDL WDL       Cardiac WDL    Cardiac WDL X;rhythm    Pulse Rate & Regularity tachycardic       Peripheral/Neurovascular WDL    Peripheral Neurovascular WDL X  Pt endorses chonic neuropathy

## 2023-10-02 NOTE — ED NOTES
Bed: [facility]-  Expected date:   Expected time:   Means of arrival:   Comments:  MPWD- 63yo M Falls,+thinners, did hit head

## 2023-10-02 NOTE — PHARMACY-ADMISSION MEDICATION HISTORY
Pharmacist Admission Medication History    Admission medication history is complete. The information provided in this note is only as accurate as the sources available at the time of the update.    Medication reconciliation/reorder completed by provider prior to medication history? No    Information Source(s): Patient, Clinic records, Hospital records, and CareEverywhere/SureScripts via in-person    Pertinent Information: unable to see [facility] discharge summary from two weeks ago. Home care visit from today notes medication non compliance     Changes made to PTA medication list:  Added: none  Deleted: Am-Lac  Changed: None    Medication Affordability:       Allergies reviewed with patient and updates made in EHR: no    Medication History Completed By: Jv Spence [facility] 10/2/2023 2:46 PM    Prior to Admission medications    Medication Sig Last Dose Taking? Auth Provider Long Term End Date   albuterol (PROAIR HFA/PROVENTIL HFA/VENTOLIN HFA) 108 (90 Base) MCG/ACT inhaler Inhale 2 puffs into the lungs every 6 hours as needed for shortness of breath / dyspnea or wheezing  Yes Hima Boyle MD Yes    aspirin (ASA) 81 MG EC tablet Take 1 tablet (81 mg) by mouth daily Past Week Yes Gregory Carter MD     atorvastatin (LIPITOR) 80 MG tablet Take 1 tablet (80 mg) by mouth every evening Past Week Yes Gregory Carter MD Yes    empagliflozin (JARDIANCE) 10 MG TABS tablet Take 1 tablet (10 mg) by mouth daily Past Month Yes Hima Boyle MD     furosemide (LASIX) 40 MG tablet Take 1 tablet (40 mg) by mouth daily Past Week Yes Gregory Carter MD Yes    hydrALAZINE (APRESOLINE) 10 MG tablet Take 3 tablets (30 mg) by mouth 3 times daily Past Week Yes Gregory Carter MD Yes    losartan (COZAAR) 100 MG tablet Take 1 tablet (100 mg) by mouth daily Past Week Yes Hima Boyle MD Yes    magnesium oxide (MAG-OX) 400 MG tablet Take 1 tablet (400 mg) by mouth daily Past Week Yes Elisabeth Nava MD     metoprolol  succinate ER (TOPROL XL) 100 MG 24 hr tablet Take 1 tablet (100 mg) by mouth daily Past Week Yes Gregory Carter MD Yes    rivaroxaban ANTICOAGULANT (XARELTO) 20 MG TABS tablet Take 1 tablet (20 mg) by mouth daily (with dinner) Past Week Yes Gregory Carter MD Yes    spironolactone (ALDACTONE) 25 MG tablet Take 1 tablet (25 mg) by mouth daily Past Week Yes Gregory Carter MD Yes

## 2023-10-02 NOTE — ED PROVIDER NOTES
EMERGENCY DEPARTMENT ENCOUnter      NAME: Eber Jin  AGE: 62 year old male  YOB: 1960  MRN: [id number]  EVALUATION DATE & TIME: 10/2/2023  1:13 PM    PCP: Hima Boyle    ED PROVIDER: Trae Greer DO      Chief Complaint   Patient presents with    Fall    Generalized Weakness         FINAL IMPRESSION:  1. Cerebrovascular accident (CVA) due to stenosis of left carotid artery (H)    2. Fall at home, initial encounter    3. Contusion of scalp, initial encounter    4. Cerebrovascular accident (CVA) due to occlusion of left middle cerebral artery (H)          ED COURSE & MEDICAL DECISION MAKING:    The patient presented to the emergency department today following several recent falls at home.  He has hit his head but denies loss of consciousness.  No concerning findings on physical exam.  CT imaging of the head is unremarkable.  I do not see any other evidence of significant traumatic injury.  Given how much difficulty he is having safely ambulating at home, I feel would be best to keep him overnight in the hospital.  His case was discussed with the hospitalist.  Given a recent admission for multiple rib fractures, a repeat CT of the chest has been ordered and is pending at the time of admission.    Medical Decision Making    History:  Supplemental history from: Documented in chart, if applicable  External Record(s) reviewed: Documented in chart, if applicable.    Work Up:  Chart documentation includes differential considered and any EKGs or imaging independently interpreted by provider, where specified.  In additional to work up documented, I considered the following work up: Documented in chart, if applicable.    External consultation:  Discussion of management with another provider: Documented in chart, if applicable    Complicating factors:  Care impacted by chronic illness: N/A  Care affected by social determinants of health: N/A    Disposition considerations: Admit.        At the  conclusion of the encounter I discussed the results of all of the tests and the disposition. The questions were answered. The patient or family acknowledged understanding and was agreeable with the care plan.          =================================================================    HPI        Eber Jin is a 62 year old male with a prior history of stroke who presents to the emergency department today after having several falls at home.  Per EMS, family stated that he has had 5 falls in the last 2 days.  He has hit his head.  They state that he does take a blood thinner medication.  His chart shows that he is on Xarelto.  He denies any pain at this time and only states that he feels dizzy.  No other current complaints.        PAST MEDICAL HISTORY:  Past Medical History:   Diagnosis Date    Accelerated hypertension     Arthritis     CAD (coronary artery disease)     Cerebrovascular accident (CVA), unspecified mechanism (H)     CHF (congestive heart failure) (H)     Chronic atrial fibrillation (H)     on rivaroxaban    COPD (chronic obstructive pulmonary disease) (H)     Heart failure (H)     ischemic, EF 45 % im 3/2021    HLD (hyperlipidemia)     HTN (hypertension)     Hypertensive urgency     Myocardial infarction (H)     PAD (peripheral artery disease) (H24)     Peripheral vascular disease with claudication (H24)     Prolonged Q-T interval on ECG        PAST SURGICAL HISTORY:  Past Surgical History:   Procedure Laterality Date    CARDIAC CATHETERIZATION      IR ABDOMINAL AORTOGRAM  8/6/2013    IR CAROTID CEREBRAL ANGIOGRAM BILATERAL  10/3/2023    IR EXTREMITY ANGIOGRAM BILATERAL  8/6/2013           CURRENT MEDICATIONS:    No current outpatient medications on file.      ALLERGIES:  Allergies   Allergen Reactions    Penicillins Swelling       FAMILY HISTORY:  Family History   Problem Relation Age of Onset    Heart Failure Mother     No Known Problems Father     No Known Problems Brother        SOCIAL  HISTORY:   Social History     Socioeconomic History    Marital status:      Spouse name: None    Number of children: None    Years of education: None    Highest education level: None   Tobacco Use    Smoking status: Every Day     Packs/day: 0.50     Years: 30.00     Pack years: 15.00     Types: Cigarettes    Smokeless tobacco: Never   Vaping Use    Vaping Use: Never used   Substance and Sexual Activity    Alcohol use: Not Currently     Alcohol/week: 1.0 - 2.0 standard drink of alcohol     Comment: Alcoholic Drinks/day: rare use    Drug use: No    Sexual activity: Yes     Partners: Female   Social History Narrative    Patient is not on supplemental O2 at home. Patient does not use any assistive device for ambulation. Full code.        VITALS:  No data found.    PHYSICAL EXAM    Constitutional:  Well developed, Well nourished,  HENT:  Normocephalic, Atraumatic, Oropharynx moist, Nose normal.   Eyes:  EOMI, Conjunctiva normal, No discharge.   Respiratory:  Normal breath sounds, No respiratory distress, No wheezing, No chest tenderness.   Cardiovascular:  Normal heart rate, Normal rhythm, No murmurs  GI:  Soft, No tenderness, No guarding, No CVA tenderness.   Musculoskeletal:  No tenderness to palpation or major deformities noted.   Extremities: No lower extremity edema.  Neurologic:  Alert & oriented x 3, No focal deficits noted.   Psychiatric:  Affect normal, Judgment normal, Mood normal.        LAB:  All pertinent labs reviewed and interpreted.  Results for orders placed or performed during the hospital encounter of 10/02/23                                                                     CBC with platelets   Result Value Ref Range    WBC Count 9.6 4.0 - 11.0 10e3/uL    RBC Count 4.10 (L) 4.40 - 5.90 10e6/uL    Hemoglobin 12.8 (L) 13.3 - 17.7 g/dL    Hematocrit 37.7 (L) 40.0 - 53.0 %    MCV 92 78 - 100 fL    MCH 31.2 26.5 - 33.0 pg    MCHC 34.0 31.5 - 36.5 g/dL    RDW 12.8 10.0 - 15.0 %    Platelet Count  278 150 - 450 10e3/uL   Basic metabolic panel   Result Value Ref Range    Sodium 138 135 - 145 mmol/L    Potassium 4.4 3.4 - 5.3 mmol/L    Chloride 101 98 - 107 mmol/L    Carbon Dioxide (CO2) 22 22 - 29 mmol/L    Anion Gap 15 7 - 15 mmol/L    Urea Nitrogen 13.8 8.0 - 23.0 mg/dL    Creatinine 0.99 0.67 - 1.17 mg/dL    GFR Estimate 85 >60 mL/min/1.73m2    Calcium 9.7 8.8 - 10.2 mg/dL    Glucose 90 70 - 99 mg/dL   Basic metabolic panel   Result Value Ref Range    Sodium 137 135 - 145 mmol/L    Potassium 4.5 3.4 - 5.3 mmol/L    Chloride 101 98 - 107 mmol/L    Carbon Dioxide (CO2) 26 22 - 29 mmol/L    Anion Gap 10 7 - 15 mmol/L    Urea Nitrogen 11.0 8.0 - 23.0 mg/dL    Creatinine 0.93 0.67 - 1.17 mg/dL    GFR Estimate >90 >60 mL/min/1.73m2    Calcium 9.3 8.8 - 10.2 mg/dL    Glucose 98 70 - 99 mg/dL   Result Value Ref Range    Magnesium 1.8 1.7 - 2.3 mg/dL   Result Value Ref Range    Phosphorus 2.9 2.5 - 4.5 mg/dL         RADIOLOGY:  I have independently reviewed and interpreted the above imaging, pending the final radiology read.                                                                                                                                                                                                  CT Head w/o Contrast   Final Result   IMPRESSION:   1.  No CT evidence for acute intracranial process.   2.  Brain atrophy and presumed chronic microvascular ischemic changes as above.   3.  Multifocal untreated dental disease, incompletely evaluated.          EKG:    Normal sinus rhythm at 100 bpm.  Normal axis.  No signs of acute ischemia.  QRS 96 ms, QTc 485 ms.    I have independently reviewed and interpreted this EKG          Trae Greer DO  Emergency Medicine  [facility] EMERGENCY DEPARTMENT  [address] [id number] [phone]  Dept: [phone]     Trae Greer MD  10/06/23 0675

## 2023-10-02 NOTE — ED NOTES
[facility] ED Handoff Report    ED Chief Complaint: Falls    ED Diagnosis:  (W19.XXXA,  Y92.009) Fall at home, initial encounter  Comment: multiple falls- family wanted TCU he went home and is now falling   Plan: admit to obs with potential for TCU placement     (S00.03XA) Contusion of scalp, initial encounter  Comment: NA  Plan: NA       PMH:    Past Medical History:   Diagnosis Date    Accelerated hypertension     Arthritis     CAD (coronary artery disease)     Cerebrovascular accident (CVA), unspecified mechanism (H)     CHF (congestive heart failure) (H)     Chronic atrial fibrillation (H)     on rivaroxaban    COPD (chronic obstructive pulmonary disease) (H)     Heart failure (H)     ischemic, EF 45 % im 3/2021    HLD (hyperlipidemia)     HTN (hypertension)     Hypertensive urgency     Myocardial infarction (H)     PAD (peripheral artery disease) (H24)     Peripheral vascular disease with claudication (H24)     Prolonged Q-T interval on ECG         Code Status:  Prior     Falls Risk: Yes Band: Applied    Current Living Situation/Residence: lives alone     Elimination Status: Continent: Yes     Activity Level: 2 assist    Patients Preferred Language:  English     Needed: No    Vital Signs:  BP (!) 175/90   Pulse 99   Temp 98.7  F (37.1  C) (Oral)   Resp 10   SpO2 96%      Cardiac Rhythm: a FIB    Pain Score: 0/10    Is the Patient Confused:  No    Last Food or Drink: 10/02/23 at 1600    Focused Assessment:  pt alert and oriented denies pain    Tests Performed: Done: Imaging    Treatments Provided:     Family Dynamics/Concerns: No    Family Updated On Visitor Policy: Yes    Plan of Care Communicated to Family: Yes            Medications sent with patient:     Covid: asymptomatic , not tested     Additional Information: NA    RN: Leni Petersen RN  10/2/2023 4:35 PM    \

## 2023-10-02 NOTE — ED NOTES
Nurse notes pt HR will jump to 130s and then decline to 90s and then jump again. MD notified. EKG ordered.

## 2023-10-03 ENCOUNTER — HOSPITAL ENCOUNTER (INPATIENT)
Facility: CLINIC | Age: 63
LOS: 15 days | Discharge: HOME HEALTH | DRG: 034 | End: 2023-10-18
Attending: PSYCHIATRY & NEUROLOGY | Admitting: PSYCHIATRY & NEUROLOGY
Payer: COMMERCIAL

## 2023-10-03 ENCOUNTER — APPOINTMENT (OUTPATIENT)
Dept: OCCUPATIONAL THERAPY | Facility: HOSPITAL | Age: 63
End: 2023-10-03
Attending: STUDENT IN AN ORGANIZED HEALTH CARE EDUCATION/TRAINING PROGRAM
Payer: COMMERCIAL

## 2023-10-03 ENCOUNTER — APPOINTMENT (OUTPATIENT)
Dept: CT IMAGING | Facility: CLINIC | Age: 63
DRG: 034 | End: 2023-10-03
Attending: RADIOLOGY
Payer: COMMERCIAL

## 2023-10-03 ENCOUNTER — APPOINTMENT (OUTPATIENT)
Dept: PHYSICAL THERAPY | Facility: HOSPITAL | Age: 63
End: 2023-10-03
Attending: STUDENT IN AN ORGANIZED HEALTH CARE EDUCATION/TRAINING PROGRAM
Payer: COMMERCIAL

## 2023-10-03 ENCOUNTER — APPOINTMENT (OUTPATIENT)
Dept: RADIOLOGY | Facility: HOSPITAL | Age: 63
End: 2023-10-03
Attending: EMERGENCY MEDICINE
Payer: COMMERCIAL

## 2023-10-03 ENCOUNTER — HOSPITAL ENCOUNTER (OUTPATIENT)
Facility: CLINIC | Age: 63
End: 2023-10-03
Payer: COMMERCIAL

## 2023-10-03 ENCOUNTER — TELEPHONE (OUTPATIENT)
Dept: FAMILY MEDICINE | Facility: CLINIC | Age: 63
End: 2023-10-03
Payer: COMMERCIAL

## 2023-10-03 ENCOUNTER — APPOINTMENT (OUTPATIENT)
Dept: RADIOLOGY | Facility: HOSPITAL | Age: 63
End: 2023-10-03
Attending: STUDENT IN AN ORGANIZED HEALTH CARE EDUCATION/TRAINING PROGRAM
Payer: COMMERCIAL

## 2023-10-03 ENCOUNTER — APPOINTMENT (OUTPATIENT)
Dept: GENERAL RADIOLOGY | Facility: CLINIC | Age: 63
DRG: 034 | End: 2023-10-03
Payer: COMMERCIAL

## 2023-10-03 ENCOUNTER — APPOINTMENT (OUTPATIENT)
Dept: CT IMAGING | Facility: CLINIC | Age: 63
DRG: 034 | End: 2023-10-03
Payer: COMMERCIAL

## 2023-10-03 ENCOUNTER — APPOINTMENT (OUTPATIENT)
Dept: CT IMAGING | Facility: HOSPITAL | Age: 63
End: 2023-10-03
Attending: STUDENT IN AN ORGANIZED HEALTH CARE EDUCATION/TRAINING PROGRAM
Payer: COMMERCIAL

## 2023-10-03 ENCOUNTER — APPOINTMENT (OUTPATIENT)
Dept: INTERVENTIONAL RADIOLOGY/VASCULAR | Facility: CLINIC | Age: 63
DRG: 034 | End: 2023-10-03
Payer: COMMERCIAL

## 2023-10-03 ENCOUNTER — APPOINTMENT (OUTPATIENT)
Dept: MRI IMAGING | Facility: HOSPITAL | Age: 63
End: 2023-10-03
Attending: PHYSICIAN ASSISTANT
Payer: COMMERCIAL

## 2023-10-03 VITALS
SYSTOLIC BLOOD PRESSURE: 140 MMHG | HEART RATE: 122 BPM | TEMPERATURE: 98.8 F | OXYGEN SATURATION: 100 % | DIASTOLIC BLOOD PRESSURE: 88 MMHG | RESPIRATION RATE: 18 BRPM

## 2023-10-03 DIAGNOSIS — I63.512 CEREBROVASCULAR ACCIDENT (CVA) DUE TO STENOSIS OF LEFT MIDDLE CEREBRAL ARTERY (H): ICD-10-CM

## 2023-10-03 DIAGNOSIS — F10.10 ALCOHOL ABUSE: ICD-10-CM

## 2023-10-03 DIAGNOSIS — I63.232 CEREBROVASCULAR ACCIDENT (CVA) DUE TO STENOSIS OF LEFT CAROTID ARTERY (H): Primary | ICD-10-CM

## 2023-10-03 PROBLEM — I63.9 STROKE (H): Status: ACTIVE | Noted: 2023-10-03

## 2023-10-03 LAB
ALBUMIN SERPL BCG-MCNC: 3 G/DL (ref 3.5–5.2)
ALP SERPL-CCNC: 83 U/L (ref 40–129)
ALT SERPL W P-5'-P-CCNC: <5 U/L (ref 0–70)
AMPHETAMINES UR QL SCN: ABNORMAL
ANION GAP SERPL CALCULATED.3IONS-SCNC: 10 MMOL/L (ref 7–15)
ANION GAP SERPL CALCULATED.3IONS-SCNC: 16 MMOL/L (ref 7–15)
APTT PPP: 29 SECONDS (ref 22–38)
APTT PPP: 30 SECONDS (ref 22–38)
APTT PPP: 32 SECONDS (ref 22–38)
AST SERPL W P-5'-P-CCNC: 25 U/L (ref 0–45)
BARBITURATES UR QL SCN: ABNORMAL
BASO+EOS+MONOS # BLD AUTO: ABNORMAL 10*3/UL
BASO+EOS+MONOS NFR BLD AUTO: ABNORMAL %
BASOPHILS # BLD AUTO: 0 10E3/UL (ref 0–0.2)
BASOPHILS NFR BLD AUTO: 0 %
BENZODIAZ UR QL SCN: ABNORMAL
BILIRUB DIRECT SERPL-MCNC: 0.24 MG/DL (ref 0–0.3)
BILIRUB SERPL-MCNC: 0.9 MG/DL
BUN SERPL-MCNC: 11 MG/DL (ref 8–23)
BUN SERPL-MCNC: 13.2 MG/DL (ref 8–23)
BZE UR QL SCN: ABNORMAL
CALCIUM SERPL-MCNC: 8.9 MG/DL (ref 8.8–10.2)
CALCIUM SERPL-MCNC: 9.3 MG/DL (ref 8.8–10.2)
CANNABINOIDS UR QL SCN: ABNORMAL
CHLORIDE SERPL-SCNC: 101 MMOL/L (ref 98–107)
CHLORIDE SERPL-SCNC: 101 MMOL/L (ref 98–107)
CHOLEST SERPL-MCNC: 160 MG/DL
CREAT SERPL-MCNC: 0.93 MG/DL (ref 0.67–1.17)
CREAT SERPL-MCNC: 1.04 MG/DL (ref 0.67–1.17)
DEPRECATED HCO3 PLAS-SCNC: 18 MMOL/L (ref 22–29)
DEPRECATED HCO3 PLAS-SCNC: 26 MMOL/L (ref 22–29)
EGFRCR SERPLBLD CKD-EPI 2021: 80 ML/MIN/1.73M2
EGFRCR SERPLBLD CKD-EPI 2021: >90 ML/MIN/1.73M2
EOSINOPHIL # BLD AUTO: 0.1 10E3/UL (ref 0–0.7)
EOSINOPHIL NFR BLD AUTO: 1 %
ERYTHROCYTE [DISTWIDTH] IN BLOOD BY AUTOMATED COUNT: 12.5 % (ref 10–15)
ERYTHROCYTE [DISTWIDTH] IN BLOOD BY AUTOMATED COUNT: 12.6 % (ref 10–15)
ERYTHROCYTE [DISTWIDTH] IN BLOOD BY AUTOMATED COUNT: 13.1 % (ref 10–15)
FENTANYL UR QL: ABNORMAL
GLUCOSE BLDC GLUCOMTR-MCNC: 100 MG/DL (ref 70–99)
GLUCOSE BLDC GLUCOMTR-MCNC: 88 MG/DL (ref 70–99)
GLUCOSE SERPL-MCNC: 78 MG/DL (ref 70–99)
GLUCOSE SERPL-MCNC: 98 MG/DL (ref 70–99)
HBA1C MFR BLD: 5.3 %
HCT VFR BLD AUTO: 37.9 % (ref 40–53)
HCT VFR BLD AUTO: 38.4 % (ref 40–53)
HCT VFR BLD AUTO: 39.1 % (ref 40–53)
HDLC SERPL-MCNC: 36 MG/DL
HGB BLD-MCNC: 12.5 G/DL (ref 13.3–17.7)
HGB BLD-MCNC: 12.8 G/DL (ref 13.3–17.7)
HGB BLD-MCNC: 12.8 G/DL (ref 13.3–17.7)
HOLD SPECIMEN: NORMAL
IMM GRANULOCYTES # BLD: 0 10E3/UL
IMM GRANULOCYTES NFR BLD: 0 %
INR PPP: 1.1 (ref 0.85–1.15)
INR PPP: 1.13 (ref 0.85–1.15)
INR PPP: 1.13 (ref 0.85–1.15)
LDLC SERPL CALC-MCNC: 103 MG/DL
LYMPHOCYTES # BLD AUTO: 1.3 10E3/UL (ref 0.8–5.3)
LYMPHOCYTES NFR BLD AUTO: 18 %
MAGNESIUM SERPL-MCNC: 1.8 MG/DL (ref 1.7–2.3)
MCH RBC QN AUTO: 30.7 PG (ref 26.5–33)
MCH RBC QN AUTO: 31 PG (ref 26.5–33)
MCH RBC QN AUTO: 31.2 PG (ref 26.5–33)
MCHC RBC AUTO-ENTMCNC: 32.7 G/DL (ref 31.5–36.5)
MCHC RBC AUTO-ENTMCNC: 33 G/DL (ref 31.5–36.5)
MCHC RBC AUTO-ENTMCNC: 33.3 G/DL (ref 31.5–36.5)
MCV RBC AUTO: 94 FL (ref 78–100)
MONOCYTES # BLD AUTO: 0.8 10E3/UL (ref 0–1.3)
MONOCYTES NFR BLD AUTO: 12 %
NEUTROPHILS # BLD AUTO: 4.9 10E3/UL (ref 1.6–8.3)
NEUTROPHILS NFR BLD AUTO: 69 %
NONHDLC SERPL-MCNC: 124 MG/DL
NRBC # BLD AUTO: 0 10E3/UL
NRBC BLD AUTO-RTO: 0 /100
OPIATES UR QL SCN: ABNORMAL
PCP QUAL URINE (ROCHE): ABNORMAL
PHOSPHATE SERPL-MCNC: 2.9 MG/DL (ref 2.5–4.5)
PLATELET # BLD AUTO: 234 10E3/UL (ref 150–450)
PLATELET # BLD AUTO: 256 10E3/UL (ref 150–450)
PLATELET # BLD AUTO: 258 10E3/UL (ref 150–450)
POTASSIUM SERPL-SCNC: 4.3 MMOL/L (ref 3.4–5.3)
POTASSIUM SERPL-SCNC: 4.5 MMOL/L (ref 3.4–5.3)
PROT SERPL-MCNC: 6.4 G/DL (ref 6.4–8.3)
RADIOLOGIST FLAGS: ABNORMAL
RBC # BLD AUTO: 4.03 10E6/UL (ref 4.4–5.9)
RBC # BLD AUTO: 4.1 10E6/UL (ref 4.4–5.9)
RBC # BLD AUTO: 4.17 10E6/UL (ref 4.4–5.9)
SARS-COV-2 RNA RESP QL NAA+PROBE: NEGATIVE
SODIUM SERPL-SCNC: 135 MMOL/L (ref 135–145)
SODIUM SERPL-SCNC: 137 MMOL/L (ref 135–145)
TRIGL SERPL-MCNC: 107 MG/DL
TROPONIN T SERPL HS-MCNC: 35 NG/L
TROPONIN T SERPL HS-MCNC: 39 NG/L
WBC # BLD AUTO: 7.2 10E3/UL (ref 4–11)
WBC # BLD AUTO: 7.7 10E3/UL (ref 4–11)
WBC # BLD AUTO: 9.8 10E3/UL (ref 4–11)

## 2023-10-03 PROCEDURE — 70551 MRI BRAIN STEM W/O DYE: CPT

## 2023-10-03 PROCEDURE — 99222 1ST HOSP IP/OBS MODERATE 55: CPT | Performed by: INTERNAL MEDICINE

## 2023-10-03 PROCEDURE — 36415 COLL VENOUS BLD VENIPUNCTURE: CPT | Performed by: STUDENT IN AN ORGANIZED HEALTH CARE EDUCATION/TRAINING PROGRAM

## 2023-10-03 PROCEDURE — 99152 MOD SED SAME PHYS/QHP 5/>YRS: CPT | Mod: GC | Performed by: RADIOLOGY

## 2023-10-03 PROCEDURE — 85610 PROTHROMBIN TIME: CPT | Performed by: PSYCHIATRY & NEUROLOGY

## 2023-10-03 PROCEDURE — G0378 HOSPITAL OBSERVATION PER HR: HCPCS

## 2023-10-03 PROCEDURE — 70496 CT ANGIOGRAPHY HEAD: CPT

## 2023-10-03 PROCEDURE — 93010 ELECTROCARDIOGRAM REPORT: CPT | Mod: XU | Performed by: INTERNAL MEDICINE

## 2023-10-03 PROCEDURE — C1769 GUIDE WIRE: HCPCS

## 2023-10-03 PROCEDURE — 82962 GLUCOSE BLOOD TEST: CPT

## 2023-10-03 PROCEDURE — 250N000013 HC RX MED GY IP 250 OP 250 PS 637

## 2023-10-03 PROCEDURE — 250N000013 HC RX MED GY IP 250 OP 250 PS 637: Performed by: STUDENT IN AN ORGANIZED HEALTH CARE EDUCATION/TRAINING PROGRAM

## 2023-10-03 PROCEDURE — 99152 MOD SED SAME PHYS/QHP 5/>YRS: CPT

## 2023-10-03 PROCEDURE — C9460 INJECTION, CANGRELOR: HCPCS | Performed by: STUDENT IN AN ORGANIZED HEALTH CARE EDUCATION/TRAINING PROGRAM

## 2023-10-03 PROCEDURE — C1887 CATHETER, GUIDING: HCPCS

## 2023-10-03 PROCEDURE — 61635 INTRACRAN ANGIOPLSTY W/STENT: CPT | Mod: GC | Performed by: RADIOLOGY

## 2023-10-03 PROCEDURE — C1760 CLOSURE DEV, VASC: HCPCS

## 2023-10-03 PROCEDURE — G0508 CRIT CARE TELEHEA CONSULT 60: HCPCS | Mod: G0 | Performed by: PHYSICIAN ASSISTANT

## 2023-10-03 PROCEDURE — 84484 ASSAY OF TROPONIN QUANT: CPT | Performed by: STUDENT IN AN ORGANIZED HEALTH CARE EDUCATION/TRAINING PROGRAM

## 2023-10-03 PROCEDURE — 200N000002 HC R&B ICU UMMC

## 2023-10-03 PROCEDURE — 70450 CT HEAD/BRAIN W/O DYE: CPT | Mod: 26 | Performed by: RADIOLOGY

## 2023-10-03 PROCEDURE — 250N000009 HC RX 250: Performed by: EMERGENCY MEDICINE

## 2023-10-03 PROCEDURE — 71045 X-RAY EXAM CHEST 1 VIEW: CPT

## 2023-10-03 PROCEDURE — 80307 DRUG TEST PRSMV CHEM ANLYZR: CPT | Performed by: PAIN MEDICINE

## 2023-10-03 PROCEDURE — 82310 ASSAY OF CALCIUM: CPT

## 2023-10-03 PROCEDURE — 999N000065 XR CHEST PORT 1 VIEW

## 2023-10-03 PROCEDURE — 93005 ELECTROCARDIOGRAM TRACING: CPT | Performed by: STUDENT IN AN ORGANIZED HEALTH CARE EDUCATION/TRAINING PROGRAM

## 2023-10-03 PROCEDURE — 5A1935Z RESPIRATORY VENTILATION, LESS THAN 24 CONSECUTIVE HOURS: ICD-10-PCS | Performed by: PSYCHIATRY & NEUROLOGY

## 2023-10-03 PROCEDURE — 99222 1ST HOSP IP/OBS MODERATE 55: CPT | Performed by: PHYSICIAN ASSISTANT

## 2023-10-03 PROCEDURE — 85027 COMPLETE CBC AUTOMATED: CPT

## 2023-10-03 PROCEDURE — 70498 CT ANGIOGRAPHY NECK: CPT

## 2023-10-03 PROCEDURE — 250N000009 HC RX 250

## 2023-10-03 PROCEDURE — 85576 BLOOD PLATELET AGGREGATION: CPT

## 2023-10-03 PROCEDURE — 84100 ASSAY OF PHOSPHORUS: CPT | Performed by: STUDENT IN AN ORGANIZED HEALTH CARE EDUCATION/TRAINING PROGRAM

## 2023-10-03 PROCEDURE — 85025 COMPLETE CBC W/AUTO DIFF WBC: CPT | Performed by: STUDENT IN AN ORGANIZED HEALTH CARE EDUCATION/TRAINING PROGRAM

## 2023-10-03 PROCEDURE — 36415 COLL VENOUS BLD VENIPUNCTURE: CPT

## 2023-10-03 PROCEDURE — 250N000011 HC RX IP 250 OP 636: Performed by: STUDENT IN AN ORGANIZED HEALTH CARE EDUCATION/TRAINING PROGRAM

## 2023-10-03 PROCEDURE — 85730 THROMBOPLASTIN TIME PARTIAL: CPT | Performed by: STUDENT IN AN ORGANIZED HEALTH CARE EDUCATION/TRAINING PROGRAM

## 2023-10-03 PROCEDURE — 70450 CT HEAD/BRAIN W/O DYE: CPT

## 2023-10-03 PROCEDURE — 83036 HEMOGLOBIN GLYCOSYLATED A1C: CPT

## 2023-10-03 PROCEDURE — 037L3DZ DILATION OF LEFT INTERNAL CAROTID ARTERY WITH INTRALUMINAL DEVICE, PERCUTANEOUS APPROACH: ICD-10-PCS | Performed by: RADIOLOGY

## 2023-10-03 PROCEDURE — 97166 OT EVAL MOD COMPLEX 45 MIN: CPT | Mod: GO

## 2023-10-03 PROCEDURE — 85730 THROMBOPLASTIN TIME PARTIAL: CPT | Performed by: PSYCHIATRY & NEUROLOGY

## 2023-10-03 PROCEDURE — 94150 VITAL CAPACITY TEST: CPT

## 2023-10-03 PROCEDURE — 258N000003 HC RX IP 258 OP 636

## 2023-10-03 PROCEDURE — 99291 CRITICAL CARE FIRST HOUR: CPT | Mod: 25 | Performed by: PHYSICIAN ASSISTANT

## 2023-10-03 PROCEDURE — 97162 PT EVAL MOD COMPLEX 30 MIN: CPT | Mod: GP

## 2023-10-03 PROCEDURE — 255N000002 HC RX 255 OP 636: Performed by: RADIOLOGY

## 2023-10-03 PROCEDURE — 99239 HOSP IP/OBS DSCHRG MGMT >30: CPT | Performed by: STUDENT IN AN ORGANIZED HEALTH CARE EDUCATION/TRAINING PROGRAM

## 2023-10-03 PROCEDURE — 272N000116 HC CATH CR1

## 2023-10-03 PROCEDURE — HZ2ZZZZ DETOXIFICATION SERVICES FOR SUBSTANCE ABUSE TREATMENT: ICD-10-PCS | Performed by: PSYCHIATRY & NEUROLOGY

## 2023-10-03 PROCEDURE — 87635 SARS-COV-2 COVID-19 AMP PRB: CPT

## 2023-10-03 PROCEDURE — 80053 COMPREHEN METABOLIC PANEL: CPT | Performed by: STUDENT IN AN ORGANIZED HEALTH CARE EDUCATION/TRAINING PROGRAM

## 2023-10-03 PROCEDURE — 250N000011 HC RX IP 250 OP 636: Mod: JZ

## 2023-10-03 PROCEDURE — 99222 1ST HOSP IP/OBS MODERATE 55: CPT | Performed by: PAIN MEDICINE

## 2023-10-03 PROCEDURE — 70450 CT HEAD/BRAIN W/O DYE: CPT | Mod: 77

## 2023-10-03 PROCEDURE — 83735 ASSAY OF MAGNESIUM: CPT | Performed by: STUDENT IN AN ORGANIZED HEALTH CARE EDUCATION/TRAINING PROGRAM

## 2023-10-03 PROCEDURE — 71045 X-RAY EXAM CHEST 1 VIEW: CPT | Mod: 26 | Performed by: RADIOLOGY

## 2023-10-03 PROCEDURE — 36415 COLL VENOUS BLD VENIPUNCTURE: CPT | Performed by: INTERNAL MEDICINE

## 2023-10-03 PROCEDURE — 94002 VENT MGMT INPAT INIT DAY: CPT

## 2023-10-03 PROCEDURE — 99153 MOD SED SAME PHYS/QHP EA: CPT

## 2023-10-03 PROCEDURE — 272N000192 HC ACCESSORY CR2

## 2023-10-03 PROCEDURE — C1725 CATH, TRANSLUMIN NON-LASER: HCPCS

## 2023-10-03 PROCEDURE — 85730 THROMBOPLASTIN TIME PARTIAL: CPT | Performed by: INTERNAL MEDICINE

## 2023-10-03 PROCEDURE — 80061 LIPID PANEL: CPT

## 2023-10-03 PROCEDURE — 97535 SELF CARE MNGMENT TRAINING: CPT | Mod: GO

## 2023-10-03 PROCEDURE — 96374 THER/PROPH/DIAG INJ IV PUSH: CPT

## 2023-10-03 PROCEDURE — 272N000566 HC SHEATH CR3

## 2023-10-03 PROCEDURE — 999N000157 HC STATISTIC RCP TIME EA 10 MIN

## 2023-10-03 PROCEDURE — 999N000104 HC STATISTIC NO CHARGE

## 2023-10-03 PROCEDURE — 82248 BILIRUBIN DIRECT: CPT

## 2023-10-03 PROCEDURE — 85610 PROTHROMBIN TIME: CPT | Performed by: INTERNAL MEDICINE

## 2023-10-03 PROCEDURE — C1757 CATH, THROMBECTOMY/EMBOLECT: HCPCS

## 2023-10-03 PROCEDURE — 36226 PLACE CATH VERTEBRAL ART: CPT | Mod: LT

## 2023-10-03 PROCEDURE — 3E033XZ INTRODUCTION OF VASOPRESSOR INTO PERIPHERAL VEIN, PERCUTANEOUS APPROACH: ICD-10-PCS | Performed by: PSYCHIATRY & NEUROLOGY

## 2023-10-03 PROCEDURE — 250N000011 HC RX IP 250 OP 636: Performed by: EMERGENCY MEDICINE

## 2023-10-03 PROCEDURE — 85027 COMPLETE CBC AUTOMATED: CPT | Mod: 91 | Performed by: STUDENT IN AN ORGANIZED HEALTH CARE EDUCATION/TRAINING PROGRAM

## 2023-10-03 PROCEDURE — 85610 PROTHROMBIN TIME: CPT | Mod: 91 | Performed by: STUDENT IN AN ORGANIZED HEALTH CARE EDUCATION/TRAINING PROGRAM

## 2023-10-03 PROCEDURE — 37216 TRANSCATH STENT CCA W/O EPS: CPT

## 2023-10-03 PROCEDURE — 84484 ASSAY OF TROPONIN QUANT: CPT

## 2023-10-03 PROCEDURE — C1876 STENT, NON-COA/NON-COV W/DEL: HCPCS

## 2023-10-03 RX ORDER — IODIXANOL 320 MG/ML
150 INJECTION, SOLUTION INTRAVASCULAR ONCE
Status: COMPLETED | OUTPATIENT
Start: 2023-10-03 | End: 2023-10-03

## 2023-10-03 RX ORDER — PROPOFOL 10 MG/ML
5-75 INJECTION, EMULSION INTRAVENOUS CONTINUOUS
Status: DISCONTINUED | OUTPATIENT
Start: 2023-10-03 | End: 2023-10-04

## 2023-10-03 RX ORDER — PROPOFOL 10 MG/ML
5-75 INJECTION, EMULSION INTRAVENOUS CONTINUOUS
Status: DISCONTINUED | OUTPATIENT
Start: 2023-10-03 | End: 2023-10-03

## 2023-10-03 RX ORDER — VECURONIUM BROMIDE 1 MG/ML
10 INJECTION, POWDER, LYOPHILIZED, FOR SOLUTION INTRAVENOUS ONCE
Status: COMPLETED | OUTPATIENT
Start: 2023-10-03 | End: 2023-10-03

## 2023-10-03 RX ORDER — METOCLOPRAMIDE HYDROCHLORIDE 5 MG/ML
10 INJECTION INTRAMUSCULAR; INTRAVENOUS ONCE
Status: COMPLETED | OUTPATIENT
Start: 2023-10-03 | End: 2023-10-04

## 2023-10-03 RX ORDER — LIDOCAINE HYDROCHLORIDE 20 MG/ML
JELLY TOPICAL ONCE
Status: COMPLETED | OUTPATIENT
Start: 2023-10-03 | End: 2023-10-04

## 2023-10-03 RX ORDER — NALOXONE HYDROCHLORIDE 0.4 MG/ML
0.4 INJECTION, SOLUTION INTRAMUSCULAR; INTRAVENOUS; SUBCUTANEOUS
Status: DISCONTINUED | OUTPATIENT
Start: 2023-10-03 | End: 2023-10-03 | Stop reason: HOSPADM

## 2023-10-03 RX ORDER — SODIUM CHLORIDE 9 MG/ML
INJECTION, SOLUTION INTRAVENOUS CONTINUOUS
Status: DISCONTINUED | OUTPATIENT
Start: 2023-10-03 | End: 2023-10-05

## 2023-10-03 RX ORDER — HYDRALAZINE HYDROCHLORIDE 20 MG/ML
10-20 INJECTION INTRAMUSCULAR; INTRAVENOUS EVERY 30 MIN PRN
Status: DISCONTINUED | OUTPATIENT
Start: 2023-10-03 | End: 2023-10-18 | Stop reason: HOSPADM

## 2023-10-03 RX ORDER — ASPIRIN 81 MG/1
81 TABLET, CHEWABLE ORAL DAILY
Status: DISCONTINUED | OUTPATIENT
Start: 2023-10-04 | End: 2023-10-04

## 2023-10-03 RX ORDER — NALOXONE HYDROCHLORIDE 0.4 MG/ML
0.2 INJECTION, SOLUTION INTRAMUSCULAR; INTRAVENOUS; SUBCUTANEOUS
Status: DISCONTINUED | OUTPATIENT
Start: 2023-10-03 | End: 2023-10-03 | Stop reason: HOSPADM

## 2023-10-03 RX ORDER — SODIUM CHLORIDE 9 MG/ML
INJECTION, SOLUTION INTRAVENOUS CONTINUOUS
Status: DISCONTINUED | OUTPATIENT
Start: 2023-10-03 | End: 2023-10-03 | Stop reason: HOSPADM

## 2023-10-03 RX ORDER — ROPIVACAINE IN 0.9% SOD CHL/PF 0.1 %
.03-.125 PLASTIC BAG, INJECTION (ML) EPIDURAL CONTINUOUS
Status: DISCONTINUED | OUTPATIENT
Start: 2023-10-03 | End: 2023-10-04

## 2023-10-03 RX ORDER — CHLORHEXIDINE GLUCONATE ORAL RINSE 1.2 MG/ML
15 SOLUTION DENTAL EVERY 12 HOURS
Status: DISCONTINUED | OUTPATIENT
Start: 2023-10-03 | End: 2023-10-04

## 2023-10-03 RX ORDER — LIDOCAINE 40 MG/G
CREAM TOPICAL
Status: DISCONTINUED | OUTPATIENT
Start: 2023-10-03 | End: 2023-10-03 | Stop reason: HOSPADM

## 2023-10-03 RX ORDER — FENTANYL CITRATE 50 UG/ML
25-50 INJECTION, SOLUTION INTRAMUSCULAR; INTRAVENOUS EVERY 5 MIN PRN
Status: DISCONTINUED | OUTPATIENT
Start: 2023-10-03 | End: 2023-10-03 | Stop reason: HOSPADM

## 2023-10-03 RX ORDER — PROPOFOL 10 MG/ML
5-75 INJECTION, EMULSION INTRAVENOUS CONTINUOUS
Status: DISCONTINUED | OUTPATIENT
Start: 2023-10-03 | End: 2023-10-03 | Stop reason: HOSPADM

## 2023-10-03 RX ORDER — NICOTINE POLACRILEX 4 MG
15-30 LOZENGE BUCCAL
Status: DISCONTINUED | OUTPATIENT
Start: 2023-10-03 | End: 2023-10-08

## 2023-10-03 RX ORDER — LIDOCAINE 4 G/G
3 PATCH TOPICAL
Status: DISCONTINUED | OUTPATIENT
Start: 2023-10-03 | End: 2023-10-03 | Stop reason: HOSPADM

## 2023-10-03 RX ORDER — NOREPINEPHRINE BITARTRATE 0.02 MG/ML
.01-.6 INJECTION, SOLUTION INTRAVENOUS CONTINUOUS
Status: ON HOLD | DISCHARGE
Start: 2023-10-03 | End: 2023-10-18

## 2023-10-03 RX ORDER — NOREPINEPHRINE BITARTRATE 0.02 MG/ML
.01-.6 INJECTION, SOLUTION INTRAVENOUS CONTINUOUS
Status: DISCONTINUED | OUTPATIENT
Start: 2023-10-03 | End: 2023-10-03 | Stop reason: HOSPADM

## 2023-10-03 RX ORDER — HEPARIN SODIUM 200 [USP'U]/100ML
1 INJECTION, SOLUTION INTRAVENOUS CONTINUOUS PRN
Status: DISCONTINUED | OUTPATIENT
Start: 2023-10-03 | End: 2023-10-03 | Stop reason: HOSPADM

## 2023-10-03 RX ORDER — DEXTROSE MONOHYDRATE 25 G/50ML
25-50 INJECTION, SOLUTION INTRAVENOUS
Status: DISCONTINUED | OUTPATIENT
Start: 2023-10-03 | End: 2023-10-08

## 2023-10-03 RX ORDER — PROPOFOL 10 MG/ML
0.5 INJECTION, EMULSION INTRAVENOUS ONCE
Status: COMPLETED | OUTPATIENT
Start: 2023-10-03 | End: 2023-10-03

## 2023-10-03 RX ORDER — AMOXICILLIN 250 MG
1 CAPSULE ORAL AT BEDTIME
Status: DISCONTINUED | OUTPATIENT
Start: 2023-10-03 | End: 2023-10-03 | Stop reason: HOSPADM

## 2023-10-03 RX ORDER — METOPROLOL TARTRATE 1 MG/ML
2.5 INJECTION, SOLUTION INTRAVENOUS EVERY 5 MIN PRN
Status: DISCONTINUED | OUTPATIENT
Start: 2023-10-03 | End: 2023-10-18 | Stop reason: HOSPADM

## 2023-10-03 RX ORDER — AMOXICILLIN 250 MG
1-2 CAPSULE ORAL 2 TIMES DAILY
Status: DISCONTINUED | OUTPATIENT
Start: 2023-10-03 | End: 2023-10-18 | Stop reason: HOSPADM

## 2023-10-03 RX ORDER — LABETALOL HYDROCHLORIDE 5 MG/ML
10-20 INJECTION, SOLUTION INTRAVENOUS EVERY 10 MIN PRN
Status: DISCONTINUED | OUTPATIENT
Start: 2023-10-03 | End: 2023-10-18 | Stop reason: HOSPADM

## 2023-10-03 RX ORDER — ETOMIDATE 2 MG/ML
10 INJECTION INTRAVENOUS ONCE
Status: COMPLETED | OUTPATIENT
Start: 2023-10-03 | End: 2023-10-03

## 2023-10-03 RX ORDER — LIDOCAINE 40 MG/G
CREAM TOPICAL
Status: DISCONTINUED | OUTPATIENT
Start: 2023-10-03 | End: 2023-10-08

## 2023-10-03 RX ORDER — FLUMAZENIL 0.1 MG/ML
0.2 INJECTION, SOLUTION INTRAVENOUS
Status: DISCONTINUED | OUTPATIENT
Start: 2023-10-03 | End: 2023-10-03 | Stop reason: HOSPADM

## 2023-10-03 RX ORDER — NOREPINEPHRINE BITARTRATE 0.02 MG/ML
INJECTION, SOLUTION INTRAVENOUS
Status: COMPLETED
Start: 2023-10-03 | End: 2023-10-03

## 2023-10-03 RX ORDER — IOPAMIDOL 755 MG/ML
75 INJECTION, SOLUTION INTRAVASCULAR ONCE
Status: COMPLETED | OUTPATIENT
Start: 2023-10-03 | End: 2023-10-03

## 2023-10-03 RX ORDER — PROPOFOL 10 MG/ML
5-75 INJECTION, EMULSION INTRAVENOUS CONTINUOUS
Status: ON HOLD | DISCHARGE
Start: 2023-10-03 | End: 2023-10-18

## 2023-10-03 RX ADMIN — Medication 2 MCG/KG/MIN: at 19:20

## 2023-10-03 RX ADMIN — PROPOFOL 30 MCG/KG/MIN: 10 INJECTION, EMULSION INTRAVENOUS at 21:15

## 2023-10-03 RX ADMIN — IODIXANOL 100 ML: 320 INJECTION, SOLUTION INTRAVASCULAR at 19:58

## 2023-10-03 RX ADMIN — MIDAZOLAM 1 MG: 1 INJECTION INTRAMUSCULAR; INTRAVENOUS at 19:17

## 2023-10-03 RX ADMIN — LOSARTAN POTASSIUM 100 MG: 50 TABLET, FILM COATED ORAL at 08:19

## 2023-10-03 RX ADMIN — HEPARIN SODIUM 4 BAG: 200 INJECTION, SOLUTION INTRAVENOUS at 19:55

## 2023-10-03 RX ADMIN — SPIRONOLACTONE 25 MG: 25 TABLET ORAL at 08:19

## 2023-10-03 RX ADMIN — HYDRALAZINE HYDROCHLORIDE 30 MG: 10 TABLET, FILM COATED ORAL at 15:25

## 2023-10-03 RX ADMIN — Medication 100 MG: at 17:34

## 2023-10-03 RX ADMIN — PROPOFOL 30 MCG/KG/MIN: 10 INJECTION, EMULSION INTRAVENOUS at 21:38

## 2023-10-03 RX ADMIN — ACETAMINOPHEN 975 MG: 325 TABLET ORAL at 08:19

## 2023-10-03 RX ADMIN — PROPOFOL 10 MCG/KG/MIN: 10 INJECTION, EMULSION INTRAVENOUS at 17:35

## 2023-10-03 RX ADMIN — ETOMIDATE 10 MG: 20 INJECTION, SOLUTION INTRAVENOUS at 17:33

## 2023-10-03 RX ADMIN — SODIUM CHLORIDE: 9 INJECTION, SOLUTION INTRAVENOUS at 21:28

## 2023-10-03 RX ADMIN — EMPAGLIFLOZIN 10 MG: 10 TABLET, FILM COATED ORAL at 08:19

## 2023-10-03 RX ADMIN — VECURONIUM BROMIDE 10 MG: 1 INJECTION, POWDER, LYOPHILIZED, FOR SOLUTION INTRAVENOUS at 17:45

## 2023-10-03 RX ADMIN — Medication 81 MG: at 08:19

## 2023-10-03 RX ADMIN — GABAPENTIN 100 MG: 100 CAPSULE ORAL at 08:18

## 2023-10-03 RX ADMIN — CHLORHEXIDINE GLUCONATE 15 ML: 1.2 RINSE ORAL at 22:21

## 2023-10-03 RX ADMIN — NOREPINEPHRINE BITARTRATE 0.03 MCG/KG/MIN: 0.02 INJECTION, SOLUTION INTRAVENOUS at 18:13

## 2023-10-03 RX ADMIN — Medication 0.03 MCG/KG/MIN: at 18:13

## 2023-10-03 RX ADMIN — IOPAMIDOL 75 ML: 755 INJECTION, SOLUTION INTRAVENOUS at 15:59

## 2023-10-03 RX ADMIN — FUROSEMIDE 40 MG: 20 TABLET ORAL at 08:18

## 2023-10-03 RX ADMIN — FAMOTIDINE 20 MG: 10 INJECTION, SOLUTION INTRAVENOUS at 21:54

## 2023-10-03 RX ADMIN — HYDRALAZINE HYDROCHLORIDE 30 MG: 10 TABLET, FILM COATED ORAL at 08:19

## 2023-10-03 RX ADMIN — ACETAMINOPHEN 975 MG: 325 TABLET ORAL at 15:25

## 2023-10-03 RX ADMIN — FENTANYL CITRATE 50 MCG: 50 INJECTION, SOLUTION INTRAMUSCULAR; INTRAVENOUS at 19:23

## 2023-10-03 RX ADMIN — METOPROLOL SUCCINATE 100 MG: 50 TABLET, EXTENDED RELEASE ORAL at 08:18

## 2023-10-03 RX ADMIN — GABAPENTIN 100 MG: 100 CAPSULE ORAL at 15:26

## 2023-10-03 ASSESSMENT — ACTIVITIES OF DAILY LIVING (ADL)
PREVIOUS_RESPONSIBILITIES: MEAL PREP;LAUNDRY;SHOPPING;MEDICATION MANAGEMENT;FINANCES
ADLS_ACUITY_SCORE: 46
ADLS_ACUITY_SCORE: 47
ADLS_ACUITY_SCORE: 46
ADLS_ACUITY_SCORE: 39
ADLS_ACUITY_SCORE: 46
ADLS_ACUITY_SCORE: 46
ADLS_ACUITY_SCORE: 39
ADLS_ACUITY_SCORE: 51
ADLS_ACUITY_SCORE: 51

## 2023-10-03 ASSESSMENT — VISUAL ACUITY
OU: OTHER (SEE COMMENT)

## 2023-10-03 NOTE — TELEPHONE ENCOUNTER
FYI - Status Update    Who is Calling: nurseIvon with [facility]    Update: Patient is currently admitted to [facility] ER. Ivon would would like to inform Dr. Boyle that [facility] will not be accepting patient back at [facility] when patient is discharge from ER due to patient has not been compliance with taking medications and drinking. If patient need home care again it will not be with [facility]. Ivon can be reach at [phone] with any questions.    Does caller want a call/response back: No

## 2023-10-03 NOTE — PROGRESS NOTES
[facility]  OUTPATIENT OCCUPATIONAL THERAPY  EVALUATION  PLAN OF TREATMENT FOR OUTPATIENT REHABILITATION  (COMPLETE FOR INITIAL CLAIMS ONLY)  Patient's Last Name, First Name, M.I.  YOB: 1960  Eber Jin                          Provider's Name  [facility] Medical Record No.  [id number]                             Onset Date:  10/02/23   Start of Care Date:  (P) 10/03/23   Type:     ___PT   _X_OT   ___SLP Medical Diagnosis:  (P) contusion of scalp                    OT Diagnosis:  (P) contision of scalp, fall Visits from SOC:  1     See note for plan of treatment, functional goals and certification details    I CERTIFY THE NEED FOR THESE SERVICES FURNISHED UNDER        THIS PLAN OF TREATMENT AND WHILE UNDER MY CARE     (Physician co-signature of this document indicates review and certification of the therapy plan).                               10/03/23 1009   Appointment Info   Signing Clinician's Name / Credentials (OT) Zuleyma Borges OT   Quick Adds   Quick Adds Certification   Living Environment   People in Home alone   Current Living Arrangements independent living facility   Home Accessibility no concerns   Transportation Anticipated car, drives self   Living Environment Comments was independent w/his ADLs and mobility   Self-Care   Usual Activity Tolerance good   Current Activity Tolerance fair   Equipment Currently Used at Home cane, straight;grab bar, toilet;grab bar, tub/shower;raised toilet seat;shower chair   Fall history within last six months yes   Number of times patient has fallen within last six months 2   Instrumental Activities of Daily Living (IADL)   Previous Responsibilities meal prep;laundry;shopping;medication management;finances  (friend cleans for pt)   General Information   Onset of Illness/Injury or Date of Surgery 10/02/23   Referring Physician dr neely   Patient/Family Therapy Goal Statement (OT)  go home   Additional Occupational Profile Info/Pertinent History of Current Problem Eber Jin is a 62 year old male admitted on 10/2/2023. He has a h/o HFmrEF, PAF, HTN, recurrent falls, rib # [facility] and [facility] #. Patient had 5 falls in last 2 days. He stated hitting his head. Denied dizziness, loss of consciousness. In the ED, CT-head was negative for ICH. Chest CT showed multiple left rib fractures with many of them displaced. ED spoke with Dr. Beyer (Surgery) and they agreed with admitting the patient and they will see pt in am.   Existing Precautions/Restrictions fall   Left Upper Extremity (Weight-bearing Status) full weight-bearing (FWB)   Right Upper Extremity (Weight-bearing Status) full weight-bearing (FWB)   Left Lower Extremity (Weight-bearing Status) full weight-bearing (FWB)   Right Lower Extremity (Weight-bearing Status) full weight-bearing (FWB)   Cognitive Status Examination   Orientation Status orientation to person, place and time   Follows Commands follows multi-step commands;75-90% accuracy   Visual Perception   Visual Impairment/Limitations corrective lenses full-time   Sensory   Sensory Quick Adds sensation intact   Pain Assessment   Patient Currently in Pain No   Range of Motion Comprehensive   General Range of Motion no range of motion deficits identified   Strength Comprehensive (MMT)   General Manual Muscle Testing (MMT) Assessment no strength deficits identified   Muscle Tone Assessment   Muscle Tone Quick Adds No deficits were identified   Coordination   Upper Extremity Coordination No deficits were identified   Bed Mobility   Comment (Bed Mobility) N/T   Transfers   Transfers bed-chair transfer   Transfer Skill: Bed to Chair/Chair to Bed   Bed-Chair [place] (Transfers) moderate assist (50% patient effort)   Assistive Device (Bed-Chair Transfers) rolling walker   Transfer Comments cues for walker safety   Balance   Balance Assessment  standing balance: dynamic   Balance Comments fair   Activities of Daily Living   BADL Assessment/Intervention lower body dressing   Lower Body Dressing Assessment/Training   Position (Lower Body Dressing) unsupported sitting;unsupported standing   Assistive Devices (Lower Body Dressing) other (see comments)  (none)   [place] Level (Lower Body Dressing) minimum assist (75% patient effort)   Clinical Impression   Criteria for Skilled Therapeutic Interventions Met (OT) Yes, treatment indicated   OT Diagnosis contision of scalp, fall   Influenced by the following impairments fatigue, decreased ADLs/balance   OT Problem List-Impairments impacting ADL balance;cognition   Assessment of Occupational Performance 3-5 Performance Deficits   Identified Performance Deficits fatigue, decreased ADLs/balance   Planned Therapy Interventions (OT) ADL retraining;cognition   Clinical Decision Making Complexity (OT) moderate complexity   Anticipated Equipment Needs Upon Discharge (OT) other (see comments)  (none)   Risk & Benefits of therapy have been explained evaluation/treatment results reviewed;care plan/treatment goals reviewed;participants voiced agreement with care plan   OT Total Evaluation Time   OT Eval, Moderate Complexity Minutes (98324) 15   Therapy Certification   Medical Diagnosis contusion of scalp   Start of Care Date 10/03/23   Certification date from 10/03/23   Certification date to 10/09/23   OT Goals   Therapy Frequency (OT) Daily   OT Predicted Duration/Target Date for Goal Attainment 10/09/23   OT Goals Hygiene/Grooming;Lower Body Dressing;Toilet Transfer/Toileting;Cognition   OT: Hygiene/Grooming modified independent   OT: Lower Body Dressing Modified independent   OT: Toilet Transfer/Toileting Modified independent   OT: Cognitive Patient/caregiver will verbalize understanding of cognitive assessment results/recommendations as needed for safe discharge planning   Interventions   Interventions Quick Adds  Self-Care/Home Management   Self-Care/Home Management   Self-Care/Home Mgmt/ADL, Compensatory, Meal Prep Minutes (91115) 10   Symptoms Noted During/After Treatment (Meal Preparation/Planning Training) fatigue   Treatment Detail/Skilled Intervention transfers mod A due to weak LE's, G/H Min A sitting supported, LB dress Mod A due to decreased standing balance   OT Discharge Planning   OT Plan transfers/toileting, G/H, LB dress, SLUMs   OT Discharge Recommendation (DC Rec) Transitional Care Facility   OT Rationale for DC Rec pt lives aone and needs assist w/all ADLs/mobility requiring a TCU   OT Brief overview of current status transfers/ADLs mod A

## 2023-10-03 NOTE — ED NOTES
OG placement attempted multiple times by 3 different staff members. OG placement was unsuccessful and was pulled as it was shown to be in the lung on x-ray.
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
## 2023-10-03 NOTE — SIGNIFICANT EVENT
Significant Event Note    Time of event: 3.30pm    Description of event:  Rapid response was called at 3.30pm  I was informed that the patient was not verbal as he did earlier.   Patient was mumbling to saying one word yes/no, where as he was verbal earlier. Last seen normal was around 2pm by the RN    Patient is awake, following commands  Vitals wnl, CBG wnl  Decreased sensation noted on the RUE, normal LUE  RUE weakness 2/5, LUE 5/5  RLE 5/5 and LLE 4/5    Stroke code called    Plan:  Held asa, Xarelto  CT Stroke, CTA Head and Neck  NPO  Tele Stroke  Discussed with patient's family, Wife Aryan, Dinorah      Zena Ramirez MD

## 2023-10-03 NOTE — CONSULTS
[facility]    Stroke Consult Note    Reason for Consult: Stroke Code     Chief Complaint: Fall and Generalized Weakness      HPI  Eber Jin is a 62 year old male With pertinent past medical history of alcohol/tobacco use disorder, HTN, CAD, HFrEF 35%, 9/9/23, PAD, HLD, COPD, Atrial fibrillation on Xarelto 20 mg daily and ASA 81 mg daily (presumed to be taking his anticoagulation but patient not able to confirm and wife is currently hospitalized so was not at home the past couple days), CVA with baseline LEFT sided weakness (per wife did rehab and had improved significantly only with mild residual LLE weakness, does have recurrent falls and gait imbalance whenever he drinks), recent hospitalization at [facility] after a fall with rib fracture and PE. On PTA Lipitor 80 mg daily.    He presented to [facility] ED yesterday evening due to multiple falls at home. CTH was without acute pathology. Patient was admitted. Today at 1540 RRT/stroke code called due to patient not being able to talk (just mumbling), no other acute deficits. I spoke with hospitalist, Dr. Ramirez who reportedly had last assessed patient around 1100 today and he was not very talkative but did give some clear answers. RN confirmed that they last assessed patient 1.5 hours prior (around 1400 and he was at his baseline). /87.     CT/CTA was poor quality but showed L ICA >90% stenosis and possible L superior division M2 occlusion. He was seen via telemedicine video exam. NIHSS 17 (see below). While assessing he was globally aphasic but also not able to lift up his RUE. I asked if the RUE was present when stroke code called as well since the weakness hadn't been reported. RN and hospitalist indicated that he had baseline R sided weakness from his prior stroke. I reviewed chart and was only able to find his baseline L sided weakness from prior stroke. I asked if that R arm weakness was present this AM and team  "unsure. I spoke to his wife Dinorah over the phone who is currently hospitalized and has not been home with him the past couple days. She confirmed that he only has mild residual LLE weakness, no weakness on the right and most often does not require cane or walker for ambulation, but had spoken with him on the phone yesterday and he told her that he was having difficulty eating noodles using his Right hand. She is not sure if he was taking his medications or not since she has been gone. She stated their son, John, had seen him yesterday and was the one who called EMS.    I called and spoke with son, John, he reported that since Sunday patient has fallen multiple times and very unsteady on his feet, did not notice if R side was any weaker than the left. Said he fell to both sides, a couple times in the house yesterday and almost hit his head on the table then fell on the concrete outside. He had been drinking a lot on Sunday and is usually very unsteady when that happens. I inquired if he was able to speak in any full sentences and was told that his speech was \"delayed\" but able to say a few things.     Attending, Dr. Grady, contacted neuro IR. I spoke with Dr. Grady who recommended hyperacute MRI then emergent transfer to [facility] given unclear LKW. I ordered hyperacute MRI at 1632, attempted to call MRI multiple times to notify and there was no answer, requested RN work on checklist and notify MRI techs, RN kindly agreed and called patient's wife to fill out checklist. I spoke with hospitalist and recommended emergency transfer to [facility] for thrombectomy. There was issue with possible [facility] bypass and inpatient instead of ED transfer concerns from hospitalist so attending, Dr. Gillespie, reached out to transfer line to coordinate.     Patient was brought to MRI, images still not up at 1732 and patient returned to room and was being intubated per Neuro IR request. Called MRI and inquired if images had been pushed over and " was told they had and should be up within 5 minutes. Hand off was given to attendings Dr. Grady and Dr. Gillespie and Neuro IR fellows, Dr. Zaldivar and Dr. Blanton (of note: images still not available in Epic at 1745 then finally up at 1800 showing multiple L MCA watershed territory ischemic infarcts)    Imaging Findings  HEAD CT:  1. Senescent changes and sequelae of chronic microangiopathy without acute intracranial abnormality.     HEAD CTA:  1. The internal carotid arteries are diminutive opacification to the ICA terminus is and proximal anterior cerebral and middle cerebral arteries with poor visualization of the distal anterior vasculature.      2. No significant contrast opacification is identified within the posterior circulation.     3. While these findings can be seen in setting of hypoperfusion, injection related artifact could have a similar appearance. MRI brain/MRA head and neck is recommended for further evaluation.     NECK CTA:  1. Atherosclerotic plaque results in critical, 90% or greater, stenosis of the proximal left ICA.      2. Right vertebral artery occlusion.      3. The left vertebral artery remains patent throughout its course, however shortly after entering the intracranial compartment both vertebral arteries, basilar artery, and posterior circulation are not visualized.    HYPERACUTE MRI:  1.  Multiple foci of acute to subacute ischemic change throughout the left cerebral hemisphere, distribution is typical of watershed ischemia.  2.  Additional acute to subacute ischemic change in the left occipital lobe.  3.  Age-related changes as above.    Intravenous Thrombolysis  Not given due to:   - DOAC dose within 48 hours or INR > 1.7    Endovascular Treatment  Endovascular treatment initiated for L superior division M2 occlusion    Impression   Acute L MCA territory watershed infarcts suspect due to large artery atherosclerosis from L ICA severe stenosis and likely artery to artery embolization  "to L superior division M2    History of atrial fibrillation and recent PE on xarelto    Recommendations  - Use orderset: \"Ischemic Stroke Post-Thrombectomy ICU Admission\"  -Discussed with vascular neurology attending, Dr. Grady, Dr. Gillespie  -Emergency transfer for thrombectomy (Dr. Gillespie coordinating whether [facility] or [facility])  -per Neuro IR recommend placing 2 IVs, Huggins, and intubating prior to transfer  -neuro checks and vital signs per post-TNK orderset protocol  -Hold all antiplatelets/anticoagulants/NSAIDs, and pharmacologic VTE prophylaxis until cleared by neuro teams post-procedure  -permissive HTN pending EVT, BP soft but complicated by cardiomyopathy, IVF if necessary to avoid hypotension  -repeat imaging post procedure per neuro IR  -TTE (with bubble study if 60 yrs or less)   -Smoking screen, depression screen, sleep apnea screen  -PT/OT/SPT, NPO, dysphagia screen  -ECG/troponins x 3, telemetry  -blood glucose monitoring, check Hgb A1c (goal <7%)  -PTA Lipitor 80 mg daily when cleared for oral diet, check Lipid panel (titrate to goal LDL 40-70), <40 increases risk of ICH  -Euthermia, euglycemia, eunatremia   -Stroke Education  -Stroke Class per [facility]    Patient Follow-up     -defer to accepting team    Thank you for this consult.      Savannah Tellez PA-C  Vascular Neurology    To page me or covering stroke neurology team member, click here: [facility]COM  Choose \"On Call\" tab at top, then select \"NEUROLOGY/ALL SITES\" from middle drop-down box, press Enter, then look for \"stroke\" or \"telestroke\" for your site.  ______________________________________________________    Clinically Significant Risk Factors Present on Admission                 # Drug Induced Coagulation Defect: home medication list includes an anticoagulant medication    # Drug Induced Platelet Defect: home medication list includes an antiplatelet medication     # Hypertension: Noted on problem list                   Past " Medical History   Past Medical History:   Diagnosis Date    Accelerated hypertension     Arthritis     CAD (coronary artery disease)     Cerebrovascular accident (CVA), unspecified mechanism (H)     CHF (congestive heart failure) (H)     Chronic atrial fibrillation (H)     on rivaroxaban    COPD (chronic obstructive pulmonary disease) (H)     Heart failure (H)     ischemic, EF 45 % im 3/2021    HLD (hyperlipidemia)     HTN (hypertension)     Hypertensive urgency     Myocardial infarction (H)     PAD (peripheral artery disease) (H24)     Peripheral vascular disease with claudication (H24)     Prolonged Q-T interval on ECG      Past Surgical History   Past Surgical History:   Procedure Laterality Date    CARDIAC CATHETERIZATION      IR ABDOMINAL AORTOGRAM  8/6/2013    IR EXTREMITY ANGIOGRAM BILATERAL  8/6/2013     Medications   Home Meds  Prior to Admission medications    Medication Sig Start Date End Date Taking? Authorizing Provider   albuterol (PROAIR HFA/PROVENTIL HFA/VENTOLIN HFA) 108 (90 Base) MCG/ACT inhaler Inhale 2 puffs into the lungs every 6 hours as needed for shortness of breath / dyspnea or wheezing 8/31/22  Yes Hima Boyle MD   aspirin (ASA) 81 MG EC tablet Take 1 tablet (81 mg) by mouth daily 6/23/22  Yes Gregory Carter MD   atorvastatin (LIPITOR) 80 MG tablet Take 1 tablet (80 mg) by mouth every evening 7/13/23  Yes Gregory Carter MD   empagliflozin (JARDIANCE) 10 MG TABS tablet Take 1 tablet (10 mg) by mouth daily 11/25/22  Yes Hima Boyle MD   furosemide (LASIX) 40 MG tablet Take 1 tablet (40 mg) by mouth daily 7/13/23  Yes Gregory Carter MD   hydrALAZINE (APRESOLINE) 10 MG tablet Take 3 tablets (30 mg) by mouth 3 times daily 2/10/23  Yes Gregory Carter MD   losartan (COZAAR) 100 MG tablet Take 1 tablet (100 mg) by mouth daily 11/25/22  Yes Hima Boyle MD   magnesium oxide (MAG-OX) 400 MG tablet Take 1 tablet (400 mg) by mouth daily 5/15/22  Yes Elisabeth Nava MD    metoprolol succinate ER (TOPROL XL) 100 MG 24 hr tablet Take 1 tablet (100 mg) by mouth daily 7/13/23  Yes Gregory Carter MD   rivaroxaban ANTICOAGULANT (XARELTO) 20 MG TABS tablet Take 1 tablet (20 mg) by mouth daily (with dinner) 6/23/22  Yes Gregory Carter MD   spironolactone (ALDACTONE) 25 MG tablet Take 1 tablet (25 mg) by mouth daily 7/13/23  Yes Gregory Carter MD       Scheduled Meds   acetaminophen  975 mg Oral Q8H JONATHAN    [Held by provider] aspirin  81 mg Oral Daily    empagliflozin  10 mg Oral Daily    furosemide  40 mg Oral Daily    gabapentin  100 mg Oral TID    hydrALAZINE  30 mg Oral TID    lidocaine  3 patch Transdermal Q24h    losartan  100 mg Oral Daily    metoprolol succinate ER  100 mg Oral Daily    propofol  0.5 mg/kg (Dosing Weight) Intravenous Once    [Held by provider] rivaroxaban ANTICOAGULANT  20 mg Oral Daily with supper    senna-docusate  1 tablet Oral At Bedtime    spironolactone  25 mg Oral Daily       Infusion Meds      PRN Meds  albuterol, melatonin, methocarbamol, naloxone **OR** naloxone **OR** naloxone **OR** naloxone, ondansetron **OR** ondansetron, oxyCODONE IR    Allergies   Allergies   Allergen Reactions    Penicillins Swelling     Family History   Family History   Problem Relation Age of Onset    Heart Failure Mother     No Known Problems Father     No Known Problems Brother      Social History   Social History     Tobacco Use    Smoking status: Every Day     Packs/day: 0.50     Years: 30.00     Pack years: 15.00     Types: Cigarettes    Smokeless tobacco: Never   Vaping Use    Vaping Use: Never used   Substance Use Topics    Alcohol use: Not Currently     Alcohol/week: 1.0 - 2.0 standard drink of alcohol     Comment: Alcoholic Drinks/day: rare use    Drug use: No       Review of Systems   Not able to obtain due to aphasia       PHYSICAL EXAMINATION  Temp:  [97.8  F (36.6  C)-98.8  F (37.1  C)] 98.8  F (37.1  C)  Pulse:  [] 122  Resp:  [11-26]  18  BP: (104-160)/() 133/75  SpO2:  [93 %-100 %] 100 %     General Exam  General:  patient lying in bed without any acute distress    HEENT:  normocephalic/atraumatic  Pulmonary:  no respiratory distress    Neuro Exam  Mental Status:  alert, not able to answer orientation questions, occasional inappropriate word expressed but otherwise no usable speech, severe to global expressive/receptive aphasia  Cranial Nerves:  not blinking to threat on the LEFT side (tested by RN), EOMI with normal smooth pursuit, hearing not formally tested but intact to conversation, mild R facial droop  Motor:  R arm weakness not able to lift antigravity,   Reflexes:  unable to test (telestroke)  Sensory:  some slight grimace to noxious R arm/leg but no withdrawal, withdrawals and grimaces to noxious L arm/leg, does not follow commands for extinction testing on double simultaneous stimulation  Coordination:  not able to follow commands for testing finger to nose or heel to shin  Station/Gait:  unable to test due to telestroke    Dysphagia Screen  Dysarthria or facial droop present - Maintain NPO, consult SLP    Stroke Scales    NIHSS  1a. Level of Consciousness 0-->Alert, keenly responsive   1b. LOC Questions 2-->Answers neither question correctly   1c. LOC Commands 0-->Performs both tasks correctly   2.   Best Gaze 0-->Normal   3.   Visual (S) 1-->Partial hemianopia (doesn't blink to threat for the RN on the LEFT)   4.   Facial Palsy 1-->Minor paralysis (flattened nasolabial fold, asymmetry on smiling)   5a. Motor Arm, Left 1-->Drift, limb holds 90 (or 45) degrees, but drifts down before full 10 seconds, does not hit bed or other support   5b. Motor Arm, Right 3-->No effort against gravity, limb falls   6a. Motor Leg, Left 1-->Drift, leg falls by the end of the 5-sec period but does not hit bed   6b. Motor Leg, right 1-->Drift, leg falls by the end of the 5-sec period but does not hit bed   7.   Limb Ataxia 0-->Absent   8.   Sensory  (S) 2-->Severe to total sensory loss, patient is not aware of being touched in the face, arm, and leg (R arm severe but with deep pressure mild grimace)   9.   Best Language 3-->Mute, global aphasia, no usable speech or auditory comprehension   10. Dysarthria 2-->Severe dysarthria, patients speech is so slurred as to be unintelligible in the absence of or out of proportion to any dysphasia, or is mute/anarthric   11. Extinction and Inattention  0-->No abnormality   Total 17 (10/03/23 1609)       Modified Lisa Score (Pre-morbid)  (S) 2 (some residual L sided weakness but able to walk without cane or walker most of the time) - (S) Slight disability.  Able to look after own affairs without assistance, but unable to carry out all previous activities. (some residual L sided weakness but able to walk without cane or walker most of the time)    Imaging  I personally reviewed all imaging; relevant findings per HPI.     Lab Results Data   CBC  Recent Labs   Lab 10/03/23  1646 10/03/23  0735 10/02/23  1333   WBC 7.7 7.2 9.6   RBC 4.03* 4.10* 4.10*   HGB 12.5* 12.8* 12.8*   HCT 37.9* 38.4* 37.7*    256 278     Basic Metabolic Panel    Recent Labs   Lab 10/03/23  1536 10/03/23  0735 10/02/23  1333   NA  --  137 138   POTASSIUM  --  4.5 4.4   CHLORIDE  --  101 101   CO2  --  26 22   BUN  --  11.0 13.8   CR  --  0.93 0.99   * 98 90   LISA  --  9.3 9.7     Liver Panel  No results for input(s): PROTTOTAL, ALBUMIN, BILITOTAL, ALKPHOS, AST, ALT, BILIDIRECT in the last 168 hours.  INR    Recent Labs   Lab Test 10/03/23  1646 10/03/23  1103 03/05/21  1137   INR 1.13 1.13 1.81*      Lipid Profile    Recent Labs   Lab Test 11/25/22  0922 03/07/21  0633 12/03/19  0859   CHOL 201* 160 164   HDL 38* 32* 18*   * 106 112   TRIG 96 110 169*     A1C    Recent Labs   Lab Test 05/12/22  1604 07/19/18  1033   A1C 5.8* 5.8     Troponin    Recent Labs   Lab 10/03/23  1646   CTROPT 39*          Stroke Code Data Data   Stroke  Code Data  (for stroke code with tele)  Stroke code activated 10/03/23   1540   First stroke provider response 10/03/23   1541   Video start time 10/03/23   1609   Video end time 10/03/23   1740   Last known normal         Time of discovery  (or onset of symptoms)  10/03/23   (S) 1530   Head CT read by Stroke Neuro Dr/Provider 10/03/23   1552   Was stroke code de-escalated? (S) No (transfer for possible EVT)               Telestroke Service Details  Type of service telemedicine diagnostic assessment of acute neurological changes   Reason telemedicine is appropriate patient requires assessment with a specialist for diagnosis and treatment of neurological symptoms   Mode of transmission secure interactive audio and video communication per Ricarda   Originating site (patient location) [facility]    Distant site (provider location) [facility]       I personally examined and evaluated the patient today. At the time of my evaluation and management the patient was in critical condition today due to stroke code. I personally managed review of chart, medical record, meds, imaging, history, exam, and discussion with attending regarding plan and documentation. I spent a total of 120 minutes providing critical care services, evaluating the patient, directing care and reviewing laboratory values and radiologic reports.     *All or a portion of this note was generated using voice recognition software and may contain transcription errors.

## 2023-10-03 NOTE — CONSULTS
[facility] ACUTE PAIN SERVICE    ([facility], [facility], [facility], Formerly [facility])  Pain consult    Assessment/Plan:  Eber Jin is a 62 year old male who was admitted on 10/2/2023.  I was asked to see the patient for rib fracture related pain. Admitted for fall and found to have many significantly displaced fractures at the left fifth through 11th ribs with small pleural effusion. History of balance issues, HTN, HF, tobacco abuse.    shows 1 refill of oxycodone. Describes pain as not present when discussed with nurse this morning, for me he states pain is 7/10 and mostly in the sternum.     PLAN: Atypical chest pain secondary to 5-11 rib fractures.  Opioid naïve at baseline.  Would monitor closely for sedation or hypoxia given smoking status and age.  Suggest judicious use of incentive spirometry.  Multimodal Medication Therapy:   Adjuvants: Agree with Tylenol, gabapentin, increase lidocaine to 3 patches  Opioids: Agree with oxycodone 2.5 mg as needed  Non-medication interventions- Ice   Constipation Prophylaxis-would schedule senna  Follow up /Discharge Recommendations - We recommend prescribing the following at the time of discharge: 15 tabs of oxycodone          Subjective:    Today I met with the patient at the bedside.  He states that he has had balance issues lately and fell.  He reports that most of the pain is centered in the sternum.  He does smoke about a half a pack per day.  He drinks about 4 alcoholic beverages per week.  He denies any street drug use.  He told his nurse this morning he has no pain.  Then he tells me his pain is around 7/10.  We talked about medications for comfort.  He does not recall when he took oxycodone during his last admission for rib fractures.  Reminded him that he did in fact refill and take oxycodone.  Reviewed  which indicates that he did refill 10 tablets of oxycodone on 9/10.  He does not remember this.  He also does not remember if  it was effective.           Contusion of scalp, initial encounter   Patient Active Problem List   Diagnosis    Right pontine stroke (H)    CHF exacerbation (H)    Peripheral vascular disease (H24)    Left hemiplegia (H)    Alcohol abuse    Accelerated hypertension    Chronic systolic heart failure (H)    COPD (chronic obstructive pulmonary disease) (H)    Essential hypertension, benign    Gout    Ischemic cardiomyopathy    Mixed hyperlipidemia    Myocardial infarction (H)    Noncompliance with medication regimen    Paroxysmal atrial fibrillation (H)    Contusion of scalp, initial encounter    Fall at home, initial encounter        History   Drug Use No         Tobacco Use      Smoking status: Every Day        Packs/day: 0.50        Years: 30.00        Pack years: 15        Types: Cigarettes      Smokeless tobacco: Never         acetaminophen  975 mg Oral Q8H JONATHAN    aspirin  81 mg Oral Daily    empagliflozin  10 mg Oral Daily    furosemide  40 mg Oral Daily    gabapentin  100 mg Oral TID    hydrALAZINE  30 mg Oral TID    lidocaine  1 patch Transdermal Q24h    losartan  100 mg Oral Daily    metoprolol succinate ER  100 mg Oral Daily    [Held by provider] rivaroxaban ANTICOAGULANT  20 mg Oral Daily with supper    spironolactone  25 mg Oral Daily       Objective:  Vital signs in last 24 hours:  B/P: 130/97, T: 98.2, P: 107, R: 18   Blood pressure (!) 130/97, pulse 107, temperature 98.2  F (36.8  C), temperature source Oral, resp. rate 18, SpO2 96 %.      Weight:   Wt Readings from Last 2 Encounters:   02/10/23 78 kg (172 lb)   11/25/22 79.7 kg (175 lb 12 oz)           Intake/Output:    Intake/Output Summary (Last 24 hours) at 10/3/2023 0851  Last data filed at 10/3/2023 0600  Gross per 24 hour   Intake 1120 ml   Output 150 ml   Net 970 ml        Review of Systems:   As per subjective, all others negative.    Physical Exam:     General Appearance:  Alert, cooperative, no distress, appears stated age   Patient is sitting  up in bed and eating breakfast   Head:  Normocephalic, without obvious abnormality, atraumatic   Eyes:  PERRL, conjunctiva/corneas clear, EOM's intact   ENT/Throat: Lips somewhat dry   Lymph/Neck: Supple, symmetrical, trachea midline, no adenopathy, thyroid: not enlarged, symmetric    Lungs:   Coarse but equal to auscultation bilaterally, respirations unlabored   Chest Wall:  No tenderness or deformity   Cardiovascular/Heart:  Regular     Abdomen:   Soft, non-tender, bowel sounds active all four quadrants,  no masses, no organomegaly   Musculoskeletal: Extremities normal, atraumatic  Black debris under fingernails   Skin: Skin is intact   Neurologic: Alert and oriented X 3, Moves all 4 extremities           Imaging:  Personally Reviewed.    Results for orders placed or performed during the hospital encounter of 10/02/23   CT Head w/o Contrast    Impression    IMPRESSION:  1.  No CT evidence for acute intracranial process.  2.  Brain atrophy and presumed chronic microvascular ischemic changes as above.  3.  Multifocal untreated dental disease, incompletely evaluated.   CT Chest Pulmonary Embolism w Contrast    Impression    IMPRESSION:  1.  No significant PE.  2.  Multiple left rib fractures are again seen. Many of these are displaced. Consider surgical repair.  3.  A small left pleural effusion has decreased from the comparison study.  4.  Possible left ventricular hypertrophy.   5.  Severe coronary artery disease.     XR Chest Port 1 View    Impression    IMPRESSION: Multiple significantly displaced fractures are seen in the left fifth through 11th ribs, many with 2 part fractures. A small left pleural effusion is present. No right pleural effusion. No airspace consolidation or pneumothorax. Heart size   and pulmonary vascularity are within normal limits.        Lab Results:  Personally Reviewed.   Last Comprehensive Metabolic Panel:  Sodium   Date Value Ref Range Status   10/03/2023 137 135 - 145 mmol/L Final      Comment:     Reference intervals for this test were updated on 09/26/2023 to more accurately reflect our healthy population. There may be differences in the flagging of prior results with similar values performed with this method. Interpretation of those prior results can be made in the context of the updated reference intervals.    03/22/2021 139 133 - 144 mmol/L Final     Potassium   Date Value Ref Range Status   10/03/2023 4.5 3.4 - 5.3 mmol/L Final   05/12/2022 4.0 3.5 - 5.0 mmol/L Final   03/22/2021 4.7 3.4 - 5.3 mmol/L Final     Chloride   Date Value Ref Range Status   10/03/2023 101 98 - 107 mmol/L Final   05/12/2022 102 98 - 107 mmol/L Final   03/22/2021 108 94 - 109 mmol/L Final     Carbon Dioxide   Date Value Ref Range Status   03/22/2021 28 20 - 32 mmol/L Final     Carbon Dioxide (CO2)   Date Value Ref Range Status   10/03/2023 26 22 - 29 mmol/L Final   05/12/2022 28 22 - 31 mmol/L Final     Anion Gap   Date Value Ref Range Status   10/03/2023 10 7 - 15 mmol/L Final   05/12/2022 9 5 - 18 mmol/L Final   03/22/2021 3 3 - 14 mmol/L Final     Glucose   Date Value Ref Range Status   10/03/2023 98 70 - 99 mg/dL Final   05/12/2022 72 70 - 125 mg/dL Final   03/22/2021 83 70 - 99 mg/dL Final     Urea Nitrogen   Date Value Ref Range Status   10/03/2023 11.0 8.0 - 23.0 mg/dL Final   05/12/2022 12 8 - 22 mg/dL Final   03/22/2021 18 7 - 30 mg/dL Final     Creatinine   Date Value Ref Range Status   10/03/2023 0.93 0.67 - 1.17 mg/dL Final   03/22/2021 0.88 0.66 - 1.25 mg/dL Final     GFR Estimate   Date Value Ref Range Status   10/03/2023 >90 >60 mL/min/1.73m2 Final   03/22/2021 >90 >60 mL/min/[1.73_m2] Final     Comment:     Non  GFR Calc  Starting 12/18/2018, serum creatinine based estimated GFR (eGFR) will be   calculated using the Chronic Kidney Disease Epidemiology Collaboration   (CKD-EPI) equation.       Calcium   Date Value Ref Range Status   10/03/2023 9.3 8.8 - 10.2 mg/dL Final   03/22/2021 9.1  8.5 - 10.1 mg/dL Final        UA: No results found for: UAMP, UBARB, BENZODIAZEUR, UCANN, UCOC, OPIT, UPCP           Please see A&P for additional details of medical decision making.  MANAGEMENT DISCUSSED with the following over the past 24 hours: Discussed with nurse   NOTE(S)/MEDICAL RECORDS REVIEWED over the past 24 hours: Reviewed notes from ER doctor and ER nursing team  Tests personally interpreted in the past 24 hours:  - CHEST CT showing multiple rib fractures on the left  Tests ORDERED & REVIEWED in the past 24 hours:  - Ordered urine drug screen and reviewed creatinine clearance  SUPPLEMENTAL HISTORY, in addition to the patient's history, over the past 24 hours obtained from:   - Nurse  Medical complexity over the past 24 hours:  -------------------------- MODERATE RISK FOR MORBIDITY --------------------------------------------------  - Prescription DRUG MANAGEMENT performed        Samanta Flores APRN, CNS-BC, CNP, ACHPN  Acute Care Pain Management Program   Hours of pain coverage 7a-1700- after 1700 please call the house officer    [employer] [place] ([place] JULIO, [place], [place], [place])   Page via [employer]- Click HERE to page Samanta or Cherry text web console

## 2023-10-03 NOTE — CONSULTS
General Surgery Consultation  Eber Jin MRN# [id number]   Age/Sex: 62 year old male YOB: 1960     Reason for consult: 1. Fall at home, initial encounter    2. Contusion of scalp, initial encounter            Requesting physician: Rosangela Arroyo                    Assessment and Plan:   Assessment:  Multiple rib fractures multiple comminuted and displaced left fifth through 12th without pneumothorax or hemothorax and small pleural effusion with high risk of respiratory failure-this also is not acute and this is from a previous injury  History of emphysema  Atrial fibrillation   Initially cannot decompensation with drinking with decrease of oxygenation and increase and tachycardia that slowly improves over 2 minutes  Recent PE-and started on Xarelto and patient appears to be noncompliant not taking with current CT showing no PE   plan:  -Agree with cardiology consult  -Okay to resume Xarelto however patient has not been compliant with the Xarelto as well as CT not showing PE the question is what is the safest thing for the patient to do.  We will need to discuss further with the surgical team as well.  -Pain control  -Needs continue to work on incentive spirometry every hour  -Recommend oxygen saturation 2 L  -We will continue to follow along.          Chief Complaint:     Chief Complaint   Patient presents with    Fall    Generalized Weakness        History is obtained from the patient as well as notes this patient is a poor historian.    HPI:   Eber Jin is a 62 year old male significant history of hypertension, CAD, CVA, CHF, chronic A-fib, COPD, cardiomyopathy with a EF of 40%, hypertension, history of MI and PAD who presents with acute fall.  He has fallen 5 times the past 2 days.  He states that this last time he fell backwards onto his back.  He did hit his head.  Recently discharged from [facility].  His first hospital stay at [facility] on 9/7 after he was found to have a PE and  left rib fractures 4 through 12 concerning for flail chest.  He was discharged on Xarelto.  Then readmitted through the [facility] trauma surgery service after chest tube placement on second admission on 9/20 with chief complaint of shortness of breath and chest pain.  900 cc of dark bloody output from chest tube placement.  Eventually patient was restarted on his Xarelto and discharged 10/2.       Past Medical History:     Past Medical History:   Diagnosis Date    Accelerated hypertension     Arthritis     CAD (coronary artery disease)     Cerebrovascular accident (CVA), unspecified mechanism (H)     CHF (congestive heart failure) (H)     Chronic atrial fibrillation (H)     on rivaroxaban    COPD (chronic obstructive pulmonary disease) (H)     Heart failure (H)     ischemic, EF 45 % im 3/2021    HLD (hyperlipidemia)     HTN (hypertension)     Hypertensive urgency     Myocardial infarction (H)     PAD (peripheral artery disease) (H24)     Peripheral vascular disease with claudication (H24)     Prolonged Q-T interval on ECG               Past Surgical History:     Past Surgical History:   Procedure Laterality Date    CARDIAC CATHETERIZATION      IR ABDOMINAL AORTOGRAM  8/6/2013    IR EXTREMITY ANGIOGRAM BILATERAL  8/6/2013             Social History:    reports that he has been smoking cigarettes. He has a 15.00 pack-year smoking history. He has never used smokeless tobacco. He reports that he does not currently use alcohol after a past usage of about 1.0 - 2.0 standard drink of alcohol per week. He reports that he does not use drugs.           Family History:     Family History   Problem Relation Age of Onset    Heart Failure Mother     No Known Problems Father     No Known Problems Brother               Allergies:     Allergies   Allergen Reactions    Penicillins Swelling              Medications:     Prior to Admission medications    Medication Sig Start Date End Date Taking? Authorizing Provider   albuterol (PROAIR  HFA/PROVENTIL HFA/VENTOLIN HFA) 108 (90 Base) MCG/ACT inhaler Inhale 2 puffs into the lungs every 6 hours as needed for shortness of breath / dyspnea or wheezing 8/31/22  Yes Hima Boyle MD   aspirin (ASA) 81 MG EC tablet Take 1 tablet (81 mg) by mouth daily 6/23/22  Yes Gregory Carter MD   atorvastatin (LIPITOR) 80 MG tablet Take 1 tablet (80 mg) by mouth every evening 7/13/23  Yes Gregory Carter MD   empagliflozin (JARDIANCE) 10 MG TABS tablet Take 1 tablet (10 mg) by mouth daily 11/25/22  Yes Hima Boyle MD   furosemide (LASIX) 40 MG tablet Take 1 tablet (40 mg) by mouth daily 7/13/23  Yes Gregory Carter MD   hydrALAZINE (APRESOLINE) 10 MG tablet Take 3 tablets (30 mg) by mouth 3 times daily 2/10/23  Yes Gregory Carter MD   losartan (COZAAR) 100 MG tablet Take 1 tablet (100 mg) by mouth daily 11/25/22  Yes Hima Boyle MD   magnesium oxide (MAG-OX) 400 MG tablet Take 1 tablet (400 mg) by mouth daily 5/15/22  Yes Elisabeth Nava MD   metoprolol succinate ER (TOPROL XL) 100 MG 24 hr tablet Take 1 tablet (100 mg) by mouth daily 7/13/23  Yes Gregory Carter MD   rivaroxaban ANTICOAGULANT (XARELTO) 20 MG TABS tablet Take 1 tablet (20 mg) by mouth daily (with dinner) 6/23/22  Yes Gregory Carter MD   spironolactone (ALDACTONE) 25 MG tablet Take 1 tablet (25 mg) by mouth daily 7/13/23  Yes Gregory Carter MD              Review of Systems:   The Review of Systems is negative other than noted in the HPI            Physical Exam:   Patient Vitals for the past 24 hrs:   BP Temp Temp src Pulse Resp SpO2   10/03/23 0809 (!) 130/97 98.2  F (36.8  C) Oral 107 18 96 %   10/03/23 0700 -- -- -- 105 17 94 %   10/03/23 0600 (!) 154/98 -- -- 104 26 93 %   10/03/23 0500 -- -- -- 88 15 96 %   10/03/23 0400 -- -- -- 80 11 96 %   10/03/23 0350 (!) 126/98 97.8  F (36.6  C) Axillary 92 15 98 %   10/03/23 0300 -- -- -- 76 16 95 %   10/03/23 0200 (!) 138/105 -- -- 75 20 96 %   10/03/23 0100  -- -- -- 80 14 --   10/03/23 0023 (!) 152/107 98.8  F (37.1  C) Oral 84 19 96 %   10/02/23 2230 (!) 160/95 -- -- 81 18 --   10/02/23 2006 (!) 143/93 -- -- 84 17 96 %   10/02/23 1936 124/79 -- -- 95 23 97 %   10/02/23 1900 137/80 -- -- 95 18 97 %   10/02/23 1830 137/80 -- -- 97 12 96 %   10/02/23 1802 (!) 134/90 -- -- 98 19 99 %   10/02/23 1752 -- -- -- 94 21 99 %   10/02/23 1742 -- -- -- 105 (!) 35 95 %   10/02/23 1732 (!) 169/109 -- -- 95 10 98 %   10/02/23 1602 (!) 175/90 -- -- 99 10 96 %   10/02/23 1500 (!) 173/96 -- -- 100 25 97 %   10/02/23 1445 (!) 190/107 -- -- 100 18 97 %   10/02/23 1400 (!) 184/87 -- -- 98 15 98 %   10/02/23 1321 (!) 147/103 98.7  F (37.1  C) Oral 101 20 100 %          Intake/Output Summary (Last 24 hours) at 10/3/2023 0948  Last data filed at 10/3/2023 0600  Gross per 24 hour   Intake 1120 ml   Output 150 ml   Net 970 ml      Constitutional:   awake, alert, cooperative, no apparent distress, and appears stated age       Eyes:   PERRL, conjunctiva/corneas clear, EOM's intact; no scleral edema or icterus noted        ENT:   Normocephalic, without obvious abnormality, atraumatic, Lips, mucosa, and tongue normal        Hematologic / Lymphatic:   No lymphadenopathy       Lungs:   N upper rhonchi without crackles laterally.       Cardiovascular:   Irregular irregular rhythm-after drinking orange juice his pulse jumped from  and then came back down a little back to 100 after 2 minutes oxygen saturations also dropped dramatically from 96% to 72% and then slowly climbed up back to 94% after 2 minutes.       Abdomen:   -Soft and nontender       Musculoskeletal:   No obvious swelling, bruising or deformity       Skin:   Skin color and texture normal for patient, no rashes or lesions              Data:         All imaging studies reviewed by me.    Results for orders placed or performed during the hospital encounter of 10/02/23 (from the past 24 hour(s))   CBC with platelets   Result Value Ref  Range    WBC Count 9.6 4.0 - 11.0 10e3/uL    RBC Count 4.10 (L) 4.40 - 5.90 10e6/uL    Hemoglobin 12.8 (L) 13.3 - 17.7 g/dL    Hematocrit 37.7 (L) 40.0 - 53.0 %    MCV 92 78 - 100 fL    MCH 31.2 26.5 - 33.0 pg    MCHC 34.0 31.5 - 36.5 g/dL    RDW 12.8 10.0 - 15.0 %    Platelet Count 278 150 - 450 10e3/uL   Basic metabolic panel   Result Value Ref Range    Sodium 138 135 - 145 mmol/L    Potassium 4.4 3.4 - 5.3 mmol/L    Chloride 101 98 - 107 mmol/L    Carbon Dioxide (CO2) 22 22 - 29 mmol/L    Anion Gap 15 7 - 15 mmol/L    Urea Nitrogen 13.8 8.0 - 23.0 mg/dL    Creatinine 0.99 0.67 - 1.17 mg/dL    GFR Estimate 85 >60 mL/min/1.73m2    Calcium 9.7 8.8 - 10.2 mg/dL    Glucose 90 70 - 99 mg/dL   CT Head w/o Contrast    Narrative    EXAM: CT HEAD W/O CONTRAST  LOCATION: [facility]  DATE: 10/2/2023    INDICATION: head injury  COMPARISON: None.  TECHNIQUE: Routine CT Head without IV contrast. Multiplanar reformats. Dose reduction techniques were used.    FINDINGS:  INTRACRANIAL CONTENTS: No intracranial hemorrhage, extraaxial collection, or mass effect.  No CT evidence of acute infarct. Mild presumed chronic small vessel ischemic changes. Mild generalized volume loss. No hydrocephalus.     VISUALIZED ORBITS/SINUSES/MASTOIDS: No intraorbital abnormality. No paranasal sinus mucosal disease. No middle ear or mastoid effusion.    BONES/SOFT TISSUES: No scalp hematoma. No skull fracture. Multifocal untreated dental disease is incidentally noted and incompletely evaluated.      Impression    IMPRESSION:  1.  No CT evidence for acute intracranial process.  2.  Brain atrophy and presumed chronic microvascular ischemic changes as above.  3.  Multifocal untreated dental disease, incompletely evaluated.   CT Chest Pulmonary Embolism w Contrast    Narrative    EXAM: CT CHEST PULMONARY EMBOLISM W CONTRAST  LOCATION: [facility]  DATE: 10/2/2023    INDICATION: chest pain  COMPARISON:  09/20/2023   TECHNIQUE: CT chest pulmonary angiogram during arterial phase injection of IV contrast. Multiplanar reformats and MIP reconstructions were performed. Dose reduction techniques were used.   CONTRAST: IsoVue 370 90mL    FINDINGS:  ANGIOGRAM CHEST: The pulmonary arteries are normal in caliber. No central, lobar, or segmental pulmonary emboli. The subsegmental vessels in the left lower lobe are not well assessed due to motion.  Thoracic aorta is negative for dissection.    LUNGS AND PLEURA: Emphysema. A small volume left pleural fluid has decreased from the comparison study. Mild residual atelectasis noted.     MEDIASTINUM/AXILLAE: Calcified mediastinal lymph nodes. Wall thickening of the left ventricle.     CORONARY ARTERY CALCIFICATION: Severe.    UPPER ABDOMEN: Normal.    MUSCULOSKELETAL: Again seen are multiple fractures of the left fifth through 12th ribs.  Many of these are comminuted and displaced. There is a small muscle hematoma near the rib fractures.      Impression    IMPRESSION:  1.  No significant PE.  2.  Multiple left rib fractures are again seen. Many of these are displaced. Consider surgical repair.  3.  A small left pleural effusion has decreased from the comparison study.  4.  Possible left ventricular hypertrophy.   5.  Severe coronary artery disease.     XR Chest Port 1 View    Narrative    EXAM: XR CHEST PORT 1 VIEW  LOCATION: [facility]  DATE: 10/3/2023    INDICATION: Trauma Patient with Rib Fracture(s)  COMPARISON: CT on 10/02/2023      Impression    IMPRESSION: Multiple significantly displaced fractures are seen in the left fifth through 11th ribs, many with 2 part fractures. A small left pleural effusion is present. No right pleural effusion. No airspace consolidation or pneumothorax. Heart size   and pulmonary vascularity are within normal limits.   CBC with platelets differential    Narrative    The following orders were created for panel order CBC with  platelets differential.  Procedure                               Abnormality         Status                     ---------                               -----------         ------                     CBC with platelets and d...[[id number]]  Abnormal            Final result                 Please view results for these tests on the individual orders.   Basic metabolic panel   Result Value Ref Range    Sodium 137 135 - 145 mmol/L    Potassium 4.5 3.4 - 5.3 mmol/L    Chloride 101 98 - 107 mmol/L    Carbon Dioxide (CO2) 26 22 - 29 mmol/L    Anion Gap 10 7 - 15 mmol/L    Urea Nitrogen 11.0 8.0 - 23.0 mg/dL    Creatinine 0.93 0.67 - 1.17 mg/dL    GFR Estimate >90 >60 mL/min/1.73m2    Calcium 9.3 8.8 - 10.2 mg/dL    Glucose 98 70 - 99 mg/dL   Magnesium   Result Value Ref Range    Magnesium 1.8 1.7 - 2.3 mg/dL   Phosphorus   Result Value Ref Range    Phosphorus 2.9 2.5 - 4.5 mg/dL   CBC with platelets and differential   Result Value Ref Range    WBC Count 7.2 4.0 - 11.0 10e3/uL    RBC Count 4.10 (L) 4.40 - 5.90 10e6/uL    Hemoglobin 12.8 (L) 13.3 - 17.7 g/dL    Hematocrit 38.4 (L) 40.0 - 53.0 %    MCV 94 78 - 100 fL    MCH 31.2 26.5 - 33.0 pg    MCHC 33.3 31.5 - 36.5 g/dL    RDW 12.6 10.0 - 15.0 %    Platelet Count 256 150 - 450 10e3/uL    % Neutrophils 69 %    % Lymphocytes 18 %    % Monocytes 12 %    Mids % (Monos, Eos, Basos)      % Eosinophils 1 %    % Basophils 0 %    % Immature Granulocytes 0 %    NRBCs per 100 WBC 0 <1 /100    Absolute Neutrophils 4.9 1.6 - 8.3 10e3/uL    Absolute Lymphocytes 1.3 0.8 - 5.3 10e3/uL    Absolute Monocytes 0.8 0.0 - 1.3 10e3/uL    Mids Abs (Monos, Eos, Basos)      Absolute Eosinophils 0.1 0.0 - 0.7 10e3/uL    Absolute Basophils 0.0 0.0 - 0.2 10e3/uL    Absolute Immature Granulocytes 0.0 <=0.4 10e3/uL    Absolute NRBCs 0.0 10e3/uL        HUBER Jay  [facility] General Surgery & Bariatric [facility]  [address] [place] [address]  [address]  Phone- [phone]  Fax- [phone]

## 2023-10-03 NOTE — LETTER
Eber Jin MRN# [id number]   YOB: 1960 Age: 62 year old     Date of Admission:  ***  Date of Discharge:  {DISCHARGE DATE:119492}  Admitting Physician:  Saroj Morel MD  Discharging Physician: {:[id number]} (Contact: ***)  Discharging Service:  {:[id number]}  Hospitalization Status: {:[id number]}     Primary Care Clinic:  {:[id number]}  Primary Care Provider: Hima Boyle     {   Salutation            :[id number]}            You have been identified as the Primary Care Provider for Eber Jin, who was recently discharged from the [facility].  Thank you for the referral to our hospital.  It is our goal to provide the highest quality of care for our patients, including seamless continuity of care by providing you with timely, accurate and concise information.   You should receive a discharge summary within 24 hours.        If you would like to speak with the attending provider on your patient s service, please call us at   [phone] Monday through Friday, 8am - midnight to arrange.  You may choose to either 1) be connected immediately or 2) schedule a discharge conference call at your convenience.  Ideally, this occurs within 48 hours of discharge.       If you have received this letter in error, please contact Donna Cooper at [phone], or via email at [email or web address] during business hours.  If you need information urgently after hours or on a weekend, please call [phone], and the on-call General Pediatrics Attending Physician can try to assist you.

## 2023-10-03 NOTE — LETTER
Communication to Referring Provider                    Eber Jin MRN# [id number]   YOB: 1960 Age: 62 year old     Date of Admission:  ***  Date of Discharge:  {DISCHARGE DATE:119492}  Admitting Physician:  Saroj Morel MD  Discharging Physician: {:[id number]} (Contact: ***)  Discharging Service:  {:[id number]}  Hospitalization Status: {:5893236}     Primary Care Clinic:  {:[id number]}  Primary Care Provider: Hima Boyle     {   Salutation            :[id number]}            You have been identified as the Primary Care Provider for Eber Jin, who was recently admitted to {[facility]:[id number]}.  Thank you for the referral to our hospital.  It is our goal to provide the highest quality of care for our patients, including planning for seamless continuity of care by providing you with timely, accurate and concise information.  After reviewing the following combined discharge summary and final progress note, please contact us if you have any remaining questions.  The Discharging Physician will be the best informed, with their contact information listed above.  If unable to reach them, or if you have received this letter in error, please call *** and someone will try to help you.

## 2023-10-03 NOTE — CONSULTS
Thank you, Dr. Rosangela Arroyo, for asking the [facility] team to see Eber Jin in consultation at [facility] to evaluate his afib and anticoagulation.      Assessment/Recommendations   Assessment:    1.  Atrial fibrillation: Paroxysmal.   Asymptomatic and on a rate control strategy.  2.  Anticoagulation: High [id number] score of 5 (history of CVA, hypertension, CAD, CHF).  3.  Recent history of pulmonary embolism during hospitalization last month at [facility]  4.  Alcohol abuse: Drinks a large amount of vodka on a daily basis  5.  Tobacco abuse: Half a pack a day for many years  6.  Chronic heart failure with reduced ejection fraction: Currently appears well compensated  7.  Cardiomyopathy likely ischemic in nature with known inferior wall defect on stress testing in the past and wall motion abnormality with LVEF of 35% on outside echocardiogram 9/9/2023  8.  Coronary artery disease noted on CT  9.  Long history of noncompliance  10.  Recurrent falls with rib fractures and recent hemothorax    Plan:  Can further discuss anticoagulation with patient and family.  Given high [id number] score with a CVA risk of 10% and recent PE would be reasonable to continue on anticoagulation.  If no need to continue anticoagulation after 3 to 6 months treatment for PE could offer Watchman device.  2.  Encouraged tobacco and alcohol cessation  3.  No change in cardiac medications       History of Present Illness    Mr. Eber Jin is a 62 year old male with past medical history significant for recurrent falls, HFrEF, chronic PAF, hypertension and hypertensive urgency, MI, CAD, PAD, CVA with residual LLE weakness, COPD, alcohol use disorder.  He was recently admitted to [facility] on 9/20 for fall and found to have left-sided rib fractures 4 through 12, traumatic hemothorax, acute PE and A-fib with RVR and was discharged home on 9/24.  He reportedly experienced several falls at  home and hit his head, and presented for evaluation due to family concern. Eber denies any LOC or syncope preceding his falls and states that he lost his balance and that his legs are weak. He reports sometimes forgetting to take his medications at home. He denies dyspnea, chest pain, palpitations, edema, nausea, vomiting, headache, dizziness, visual changes. Additional history obtained from patient's sister Sherice who reports he lives at home with family. She did not witness any of his falls but reports others did and there did not appear to be any pattern, falling to one side, or loss of consciousness. She reports they have noticed confusion and poor memory recently which she feels is unusual. Per chart review, case management discussed with patient's son John who reports Eber has been drinking alcohol all day and unable to care for himself. He has previously seen Dr. Carter outpatient for management.        Physical Examination Review of Systems   /67 (BP Location: Right arm, Patient Position: Supine, Cuff Size: Adult Regular)   Pulse 103   Temp 98.8  F (37.1  C) (Oral)   Resp 20   SpO2 98%   There is no height or weight on file to calculate BMI.  Wt Readings from Last 3 Encounters:   02/10/23 78 kg (172 lb)   11/25/22 79.7 kg (175 lb 12 oz)   08/31/22 76.9 kg (169 lb 8 oz)     General Appearance:   alert, no apparent distress   HEENT:  no scleral icterus                               Neck: jugular venous pressure WNL   Chest: the spine is straight and the chest is symmetric   Lungs:   respirations unlabored; the lungs are clear to auscultation   Cardiovascular:   Irregular rhythm with normal first and second heart sounds and no murmurs or gallops; tachycardic. Radial pulses intact. DP and posterior tibial pulses are intact, +1   Abdomen:  no organomegaly or masses. Mild diffuse tenderness on palpation.   Extremities: Mild clubbing of fingernails, toes. No cyanosis, or edema   Skin: No xanthelasma,  dry    A 12 point comprehensive review of systems was negative except as noted in the current history.       Medical History  Surgical History Family History Social History   Past Medical History:   Diagnosis Date     Accelerated hypertension      Arthritis      CAD (coronary artery disease)      Cerebrovascular accident (CVA), unspecified mechanism (H)      CHF (congestive heart failure) (H)      Chronic atrial fibrillation (H)     on rivaroxaban     COPD (chronic obstructive pulmonary disease) (H)      Heart failure (H)     ischemic, EF 45 % im 3/2021     HLD (hyperlipidemia)      HTN (hypertension)      Hypertensive urgency      Myocardial infarction (H)      PAD (peripheral artery disease) (H24)      Peripheral vascular disease with claudication (H24)      Prolonged Q-T interval on ECG     Past Surgical History:   Procedure Laterality Date     CARDIAC CATHETERIZATION       IR ABDOMINAL AORTOGRAM  8/6/2013     IR EXTREMITY ANGIOGRAM BILATERAL  8/6/2013    Family History   Problem Relation Age of Onset     Heart Failure Mother      No Known Problems Father      No Known Problems Brother     Social History     Socioeconomic History     Marital status:      Spouse name: Not on file     Number of children: Not on file     Years of education: Not on file     Highest education level: Not on file   Occupational History     Not on file   Tobacco Use     Smoking status: Every Day     Packs/day: 0.50     Years: 30.00     Pack years: 15.00     Types: Cigarettes     Smokeless tobacco: Never   Vaping Use     Vaping Use: Never used   Substance and Sexual Activity     Alcohol use: Not Currently     Alcohol/week: 1.0 - 2.0 standard drink of alcohol     Comment: Alcoholic Drinks/day: rare use     Drug use: No     Sexual activity: Yes     Partners: Female   Other Topics Concern     Not on file   Social History Narrative    Patient is not on supplemental O2 at home. Patient does not use any assistive device for ambulation.  Full code.      Social Determinants of Health     Financial Resource Strain: Not on file   Food Insecurity: Not on file   Transportation Needs: Not on file   Physical Activity: Not on file   Stress: Not on file   Social Connections: Not on file   Interpersonal Safety: Not on file   Housing Stability: Not on file          Medications  Allergies   Scheduled Meds:    acetaminophen  975 mg Oral Q8H JONATHAN     aspirin  81 mg Oral Daily     empagliflozin  10 mg Oral Daily     furosemide  40 mg Oral Daily     gabapentin  100 mg Oral TID     hydrALAZINE  30 mg Oral TID     lidocaine  3 patch Transdermal Q24h     losartan  100 mg Oral Daily     metoprolol succinate ER  100 mg Oral Daily     [Held by provider] rivaroxaban ANTICOAGULANT  20 mg Oral Daily with supper     senna-docusate  1 tablet Oral At Bedtime     spironolactone  25 mg Oral Daily     Continuous Infusions:  PRN Meds:.albuterol, melatonin, methocarbamol, naloxone **OR** naloxone **OR** naloxone **OR** naloxone, ondansetron **OR** ondansetron, oxyCODONE IR Allergies   Allergen Reactions     Penicillins Swelling         Lab Results    Chemistry/lipid CBC Cardiac Enzymes/BNP/TSH/INR   Lab Results   Component Value Date    CHOL 201 (H) 11/25/2022    HDL 38 (L) 11/25/2022    TRIG 96 11/25/2022    BUN 11.0 10/03/2023     10/03/2023    CO2 26 10/03/2023    Lab Results   Component Value Date    WBC 7.2 10/03/2023    HGB 12.8 (L) 10/03/2023    HCT 38.4 (L) 10/03/2023    MCV 94 10/03/2023     10/03/2023    Lab Results   Component Value Date    TROPONINI <0.01 04/30/2022    BNP 2,619 (H) 04/30/2022    TSH 1.83 09/20/2023    INR 1.13 10/03/2023        Personally reviewed:  EKG on 10/2 showing , PACdave Trujillo, MS4  [employer]    Patient seen and examined with medical student.  Agree with findings including HPI, physical exam findings assessment and plan.  Spent 60 minutes.

## 2023-10-03 NOTE — PROGRESS NOTES
10/03/23 1130   Appointment Info   Signing Clinician's Name / Credentials (PT) Mariama Fountain PT   Quick Adds   Quick Adds Certification   Living Environment   People in Home child(eusebia), adult  (son)   Current Living Arrangements house  ([facility])   Home Accessibility stairs to enter home;stairs within home   Number of Stairs, Main Entrance 10   Stair Railings, Main Entrance railings safe and in good condition   Number of Stairs, Within Home, Primary greater than 10 stairs   Stair Railings, Within Home, Primary railings safe and in good condition   Living Environment Comments bedroom upstairs in [facility]   Self-Care   Equipment Currently Used at Home walker, rolling;cane, straight   Activity/Exercise/Self-Care Comment ambulates with cane or RW in [facility]; pt reports independent ADL's/IADL's   General Information   Onset of Illness/Injury or Date of Surgery 10/02/23   Referring Physician Dr. Ramirez   Patient/Family Therapy Goals Statement (PT) none stated   Pertinent History of Current Problem (include personal factors and/or comorbidities that impact the POC) fall with scalp contusion; PMH of CHF, PAF, HTN, recurrent falls, multiple L rib fxs 5-12th   Existing Precautions/Restrictions fall   Cognition   Affect/Mental Status (Cognition) confused   Orientation Status (Cognition) oriented to;person;place;disoriented to;time  (states incorrect hospital name and incorrect date)   Follows Commands (Cognition) WFL   Range of Motion (ROM)   Range of Motion ROM is WFL   Strength (Manual Muscle Testing)   Strength (Manual Muscle Testing) Deficits observed during functional mobility   Strength Comments generalized weakness BUE/LE   Bed Mobility   Bed Mobility supine-sit;sit-supine   Supine-Sit [place] (Bed Mobility) minimum assist (75% patient effort);verbal cues   Sit-Supine [place] (Bed Mobility) contact guard;verbal cues   Transfers   Transfers sit-stand transfer   Sit-Stand Transfer   Sit-Stand [place]  (Transfers) minimum assist (75% patient effort);verbal cues   Assistive Device (Sit-Stand Transfers) walker, front-wheeled   Comment, (Sit-Stand Transfer) -165 bpm with sitting and standing activities   Gait/Stairs (Locomotion)   Comment, (Gait/Stairs) unable to tolerate ambulation due to dizziness and high HR   Balance   Balance Comments initially sba at edge of bed then post. LOB during ADL task requiring mod assist for balance; min assist standing with UE support   Clinical Impression   Criteria for Skilled Therapeutic Intervention Yes, treatment indicated   PT Diagnosis (PT) impaired functional mobility   Influenced by the following impairments decreased balance, strength, activity tolerance, cognition   Functional limitations due to impairments bed mobility, transfers, gait, stairs   Clinical Presentation (PT Evaluation Complexity) Evolving/Changing   Clinical Presentation Rationale pt presents as medically diagnosed   Clinical Decision Making (Complexity) moderate complexity   Planned Therapy Interventions (PT) balance training;bed mobility training;gait training;home exercise program;neuromuscular re-education;patient/family education;stair training;strengthening;transfer training   Anticipated Equipment Needs at Discharge (PT) walker, rolling   Risk & Benefits of therapy have been explained evaluation/treatment results reviewed;patient   PT Total Evaluation Time   PT Eval, Moderate Complexity Minutes (31141) 15   Therapy Certification   Start of care date 10/03/23   Certification date from 10/03/23   Certification date to 10/31/23   Medical Diagnosis scalp contusion; fall   Physical Therapy Goals   PT Frequency Daily   PT Predicted Duration/Target Date for Goal Attainment 10/10/23   PT Goals Bed Mobility;Transfers;Gait   PT: Bed Mobility Supervision/stand-by assist;Supine to/from sit   PT: Transfers Supervision/stand-by assist;Sit to/from stand;Bed to/from chair;Assistive device   PT: Gait Minimal  assist;50 feet;Rolling walker   PT Discharge Planning   PT Plan bed mob., transfers, trial gait with RW chair follow pending tolerance, monitor HR; balance ex/LE ex   PT Discharge Recommendation (DC Rec) Transitional Care Facility   PT Rationale for DC Rec pt needs assist of 1 for bed mob. and transfers at this time and unable to tolerated ambulation; continue PT at TCU for mobility training and strengthening   PT Brief overview of current status min assist bed mob. and transfers with walker   Total Session Time   Total Session Time (sum of timed and untimed services) 15   [facility]  OUTPATIENT PHYSICAL THERAPY EVALUATION  PLAN OF TREATMENT FOR OUTPATIENT REHABILITATION  (COMPLETE FOR INITIAL CLAIMS ONLY)  Patient's Last Name, First Name, M.I.  YOB: 1960  Eber Jin                        Provider's Name  [facility] Medical Record No.  [id number]                             Onset Date:  10/02/23   Start of Care Date:  10/03/23   Type:     _X_PT   ___OT   ___SLP Medical Diagnosis:  scalp contusion; fall              PT Diagnosis:  impaired functional mobility Visits from SOC:  1     See note for plan of treatment, functional goals and certification details    I CERTIFY THE NEED FOR THESE SERVICES FURNISHED UNDER        THIS PLAN OF TREATMENT AND WHILE UNDER MY CARE     (Physician co-signature of this document indicates review and certification of the therapy plan).

## 2023-10-03 NOTE — PROGRESS NOTES
Assumed care for pt at 2200.   Pt is alert and oriented, denies pain at this time. BP elevated but improving, scheduled hydralazine given. Pt denies chest/rib pain, declined lidocaine patch. Bed alarm placed on for safety. Pt received snacks and some juice at bedtime.

## 2023-10-03 NOTE — PROGRESS NOTES
Please order Cangrelor Stat on arrival.     Cangrelor Bolus + Infusion    Cangrelor 30 mcg/kg IV bolus followed immediately by a 4 mcg/kg/min IV infusion.   Continue for at least for 2 hours or longer when able to switch to oral therapy.     Transitioning Patients to Oral P2Y12 Therapy    Ticagrelor: 180 mg at any time during Cangrelor infusion or immediately after  Discontinuation.    OR    Clopidogrel: 600 mg immediately after discontinuation of Cangrelor. Do not  administer clopidogrel prior to discontinuation of Cangrelor

## 2023-10-03 NOTE — DISCHARGE SUMMARY
[facility]    Hospitalist Discharge Summary       Date of Admission:  10/2/2023  Date of Discharge:  10/3/2023  Discharging Provider: Zena Ramirez MD      Discharge Diagnoses     L ICA >90% stenosis and possible L superior division M2 occlusion for thrombectomy    Follow-ups Needed After Discharge   TBD    Unresulted Labs Ordered in the Past 30 Days of this Admission       Date and Time Order Name Status Description    10/3/2023  6:49 AM Phosphatidylethanol (PEth), Whole Blood In process             Hospital Course   Eber Jin is a 63 yo M with PMH of L sided CVA (2022),  PE 09/23, HFrEF 40%, A-fib on Xarelto noncompliant, CAD, PAD, HTN, HLD, COPD, alcohol and tobacco use disorder presented to the emergency department on 10/2 with recurrent falls at home, and hit his head, not taking any anticoagulation, CT head was negative for ICH  Also had recent history of multiple rib fractures and was admitted in [facility].  CT showed multiple left rib fractures.  Patient was admitted for further management.    During exam this morning patient was was not very cooperative, but answering clearly in 2-3 word sentences.  Did not cooperate for full physical exam.  Was seen by PT due to recurrent falls, patient was not cooperative and denied care.  Spoke to wife Dinorah, who stated that patient was not taking care of himself and was not compliant with medication.  Psychiatry was consulted for any underlying conditions.  Cardiology recommended to continue Xarelto for high risk of stroke as well as recent PE.  Surgery recommended pain management and incentive spirometry.  PT recommended patient being discharged to TCU    -Rapid response was called at 3:30 PM on 10/03, was informed that patient was mumbling and not verbal as he did earlier.  Patient was awake and mumbling with yes or no, could not clearly state to simple words.  Last seen normal around 2 PM by the RN.  On exam patient was  following commands, was found to have right upper extremity weakness and decreased sensation.  Code stroke was called, held aspirin and Xarelto , CT and CTA head and neck showed left ICA more than 90% stenosis and possible left superior division M2 occlusion.  Telestroke team was involved.  On further evaluation hyperacute MRI was ordered, patient is being transferred to [facility] for possible thrombectomy.  Patient was intubated in the ER prior to transfer.        Consultations This Hospital Stay   SURGERY GENERAL IP CONSULT  CARE MANAGEMENT / SOCIAL WORK IP CONSULT  PHYSICAL THERAPY ADULT IP CONSULT  OCCUPATIONAL THERAPY ADULT IP CONSULT  ANESTHESIOLOGY IP CONSULT  PAIN MANAGEMENT ADULT IP CONSULT  CARDIOLOGY IP CONSULT  PSYCHIATRY IP CONSULT  NEUROLOGY IP STROKE CONSULT    Code Status   Full Code    Time Spent on this Encounter   I,Zena Ramirez, personally saw the patient today and spent approximately 50 minutes discharging this patient.       Zena Ramirez MD  [facility]  ______________________________________________________________________    Physical Exam   Vital Signs: Temp: 98.8  F (37.1  C) Temp src: Oral BP: 104/68 Pulse: 86   Resp: 22 SpO2: 100 % O2 Device: None (Room air)    Weight: 0 lbs 0 oz    Physical Exam      General: Intubated  Chest: Bilateral air entry  Cardio: S1 S2, Irregular  Adb: soft, non tender, not distended  Ext: no pedal edema  Neuro: RUE weakness 2/5 and decreased sensation. LUE 5/5, LLE 4/5 prior to intubation      Primary Care Physician   Hima Boyle    Discharge Disposition   To ED [facility]    Condition at discharge: Critical - Intubated    Significant Results and Procedures   Most Recent 3 CBC's:  Recent Labs   Lab Test 10/03/23  1646 10/03/23  0735 10/02/23  1333   WBC 7.7 7.2 9.6   HGB 12.5* 12.8* 12.8*   MCV 94 94 92    256 278     Most Recent 3 BMP's:  Recent Labs   Lab Test 10/03/23  1536 10/03/23  0735 10/02/23  1333 09/20/23  1100   NA  --  137  138 133*   POTASSIUM  --  4.5 4.4 3.6   CHLORIDE  --  101 101 95*   CO2  --  26 22 26   BUN  --  11.0 13.8 10.0   CR  --  0.93 0.99 0.98   ANIONGAP  --  10 15 12   LISA  --  9.3 9.7 8.9   * 98 90 87     Most Recent 2 LFT's:  Recent Labs   Lab Test 05/12/22  1604 03/06/21  0248   AST 18 24   ALT 25 17   ALKPHOS 89 65   BILITOTAL 0.5 0.5     Most Recent 3 INR's:  Recent Labs   Lab Test 10/03/23  1646 10/03/23  1103 03/05/21  1137   INR 1.13 1.13 1.81*     Most Recent 3 Troponin's:No lab results found.  Most Recent 3 BNP's:No lab results found.  Most Recent D-dimer:  Recent Labs   Lab Test 04/30/22  0821   DD 2.77*     Most Recent Cholesterol Panel:  Recent Labs   Lab Test 11/25/22  0922   CHOL 201*   *   HDL 38*   TRIG 96       Results for orders placed or performed during the hospital encounter of 10/02/23   CT Head w/o Contrast    Narrative    EXAM: CT HEAD W/O CONTRAST  LOCATION: [facility]  DATE: 10/2/2023    INDICATION: head injury  COMPARISON: None.  TECHNIQUE: Routine CT Head without IV contrast. Multiplanar reformats. Dose reduction techniques were used.    FINDINGS:  INTRACRANIAL CONTENTS: No intracranial hemorrhage, extraaxial collection, or mass effect.  No CT evidence of acute infarct. Mild presumed chronic small vessel ischemic changes. Mild generalized volume loss. No hydrocephalus.     VISUALIZED ORBITS/SINUSES/MASTOIDS: No intraorbital abnormality. No paranasal sinus mucosal disease. No middle ear or mastoid effusion.    BONES/SOFT TISSUES: No scalp hematoma. No skull fracture. Multifocal untreated dental disease is incidentally noted and incompletely evaluated.      Impression    IMPRESSION:  1.  No CT evidence for acute intracranial process.  2.  Brain atrophy and presumed chronic microvascular ischemic changes as above.  3.  Multifocal untreated dental disease, incompletely evaluated.   CT Chest Pulmonary Embolism w Contrast    Narrative    EXAM: CT CHEST  PULMONARY EMBOLISM W CONTRAST  LOCATION: [facility]  DATE: 10/2/2023    INDICATION: chest pain  COMPARISON: 09/20/2023   TECHNIQUE: CT chest pulmonary angiogram during arterial phase injection of IV contrast. Multiplanar reformats and MIP reconstructions were performed. Dose reduction techniques were used.   CONTRAST: IsoVue 370 90mL    FINDINGS:  ANGIOGRAM CHEST: The pulmonary arteries are normal in caliber. No central, lobar, or segmental pulmonary emboli. The subsegmental vessels in the left lower lobe are not well assessed due to motion.  Thoracic aorta is negative for dissection.    LUNGS AND PLEURA: Emphysema. A small volume left pleural fluid has decreased from the comparison study. Mild residual atelectasis noted.     MEDIASTINUM/AXILLAE: Calcified mediastinal lymph nodes. Wall thickening of the left ventricle.     CORONARY ARTERY CALCIFICATION: Severe.    UPPER ABDOMEN: Normal.    MUSCULOSKELETAL: Again seen are multiple fractures of the left fifth through 12th ribs.  Many of these are comminuted and displaced. There is a small muscle hematoma near the rib fractures.      Impression    IMPRESSION:  1.  No significant PE.  2.  Multiple left rib fractures are again seen. Many of these are displaced. Consider surgical repair.  3.  A small left pleural effusion has decreased from the comparison study.  4.  Possible left ventricular hypertrophy.   5.  Severe coronary artery disease.     XR Chest Port 1 View    Narrative    EXAM: XR CHEST PORT 1 VIEW  LOCATION: [facility]  DATE: 10/3/2023    INDICATION: Trauma Patient with Rib Fracture(s)  COMPARISON: CT on 10/02/2023      Impression    IMPRESSION: Multiple significantly displaced fractures are seen in the left fifth through 11th ribs, many with 2 part fractures. A small left pleural effusion is present. No right pleural effusion. No airspace consolidation or pneumothorax. Heart size   and pulmonary vascularity  are within normal limits.   CTA Head Neck with Contrast    Narrative    EXAM: CTA HEAD NECK W CONTRAST  LOCATION: [facility]  DATE: 10/3/2023    INDICATION: Weakness. Stroke evaluation.  COMPARISON: None CT head dated 10/02/2023.  CONTRAST: 75ml isovue 370  TECHNIQUE: Head and neck CT angiogram with IV contrast. Noncontrast head CT followed by axial helical CT images of the head and neck vessels obtained during the arterial phase of intravenous contrast administration. Axial 2D reconstructed images and   multiplanar 3D MIP reconstructed images of the head and neck vessels were performed by the technologist. Dose reduction techniques were used. All stenosis measurements made according to NASCET criteria unless otherwise specified.    FINDINGS:   NONCONTRAST HEAD CT:  INTRACRANIAL CONTENTS: No acute intracranial hemorrhage. No CT evidence of acute infarct. Sequelae of mild chronic microangiopathy. Mild cerebral volume loss without hydrocephalus. No extra-axial fluid collections.  Patent basal cisterns.     VISUALIZED ORBITS/SINUSES/MASTOIDS: The orbits are unremarkable. The visualized paranasal sinuses and temporal bone structures are well-aerated.     BONES/SOFT TISSUES: The calvarium and skull base are unremarkable.     HEAD CTA:  ANTERIOR CIRCULATION: The internal carotid arteries are diminutive opacification to the ICA terminus is and proximal anterior cerebral and middle cerebral arteries with poor visualization of the distal anterior vasculature.     POSTERIOR CIRCULATION: No significant contrast opacification is identified within the intracranial vertebral, basilar, superior cerebellar, or posterior cerebral arteries. The basilar artery is unremarkable and gives rise to patent superior cerebellar   and posterior cerebral arteries.     DURAL VENOUS SINUSES: Nonopacified and incompletely evaluated.    NECK CTA:  RIGHT CAROTID: Normal course and persist caliber of the common carotid  artery. Atherosclerotic disease at the carotid artery bifurcation without stenosis or dissection.    LEFT CAROTID: Normal course and persist caliber of the common carotid artery. Atherosclerotic plaque results in critical, 90% or greater, stenosis of the proximal left ICA. The remaining extracranial internal carotid artery demonstrate persistent caliber   without evidence of dissection.    VERTEBRAL ARTERIES: Right vertebral artery occlusion. The left vertebral artery remains patent throughout its course, however shortly after entering the intracranial compartment both vertebral arteries, basilar artery, and posterior circulation are not   visualized.    AORTIC ARCH: Classic three-vessel arch. The origins of the great vessels are unremarkable without significant stenosis.    NONVASCULAR STRUCTURES: There are no masses or enlarged lymph nodes in the neck. The submandibular, parotid, and thyroid glands are unremarkable. Multilevel degenerative changes without high-grade spinal canal stenosis or neural foraminal narrowing. No   aggressive osseous lesions. The visualized lungs are unremarkable.      Impression    IMPRESSION:    HEAD CT:  1. Senescent changes and sequelae of chronic microangiopathy without acute intracranial abnormality.    HEAD CTA:  1. The internal carotid arteries are diminutive opacification to the ICA terminus is and proximal anterior cerebral and middle cerebral arteries with poor visualization of the distal anterior vasculature.     2. No significant contrast opacification is identified within the posterior circulation.    3. While these findings can be seen in setting of hypoperfusion, injection related artifact could have a similar appearance. MRI brain/MRA head and neck is recommended for further evaluation.    NECK CTA:  1. Atherosclerotic plaque results in critical, 90% or greater, stenosis of the proximal left ICA.     2. Right vertebral artery occlusion.     3. The left vertebral artery  remains patent throughout its course, however shortly after entering the intracranial compartment both vertebral arteries, basilar artery, and posterior circulation are not visualized.    - - - - - - - - - - - - - - - - - - - - - - - - - - - - - - - - - - - - - - - - - - - - - - - - - - - - - - - - - - - - - - - - - - - - - - - - - - - -   The results above were discussed with Dr. Ramirez on 10/3/2023 4:04 PM CDT by Dr. Tucker Lott.  - - - - - - - - - - - - - - - - - - - - - - - - - - - - - - - - - - - - - - - - - - - - - - - - - - - - - - - - - - - - - - - - - - - - - - - - - - - -     MR Brain w/o Contrast    Narrative    EXAM: MR BRAIN W/O CONTRAST  LOCATION: [facility]  DATE: 10/3/2023    INDICATION: hyperacute MRI please start with DWI then FLAIR and push images through as they come  possible thrombectomy candidate, R sided weakness aphasia  COMPARISON: CT 10/03/2023.  TECHNIQUE: Routine multiplanar multisequence head MRI without intravenous contrast.    FINDINGS:  INTRACRANIAL CONTENTS: There are multiple foci of acute to subacute infarction primarily in the linear distribution throughout the left cerebral hemisphere. These are typical of watershed ischemia. Additional ischemic changes noted in the left occipital   lobe. No mass, acute hemorrhage, or extra-axial fluid collections. Scattered nonspecific T2/FLAIR hyperintensities within the cerebral white matter most consistent with mild chronic microvascular ischemic change. Mild to moderate generalized cerebral   atrophy. No hydrocephalus. Normal position of the cerebellar tonsils.     SELLA: No abnormality accounting for technique.    OSSEOUS STRUCTURES/SOFT TISSUES: Normal marrow signal. The major intracranial vascular flow voids are maintained.     ORBITS: No abnormality accounting for technique.     SINUSES/MASTOIDS: No paranasal sinus mucosal disease. No middle ear or mastoid effusion.       Impression    IMPRESSION:  1.   Multiple foci of acute to subacute ischemic change throughout the left cerebral hemisphere, distribution is typical of watershed ischemia.  2.  Additional acute to subacute ischemic change in the left occipital lobe.  3.  Age-related changes as above.       Discharge Orders   No discharge procedures on file.  Discharge Medications   Current Discharge Medication List        CONTINUE these medications which have NOT CHANGED    Details   albuterol (PROAIR HFA/PROVENTIL HFA/VENTOLIN HFA) 108 (90 Base) MCG/ACT inhaler Inhale 2 puffs into the lungs every 6 hours as needed for shortness of breath / dyspnea or wheezing  Qty: 18 g, Refills: 11    Comments: Pharmacy may dispense brand covered by insurance (Proair, or proventil or ventolin or generic albuterol inhaler)  Associated Diagnoses: Chronic obstructive pulmonary disease, unspecified COPD type (H)      atorvastatin (LIPITOR) 80 MG tablet Take 1 tablet (80 mg) by mouth every evening  Qty: 90 tablet, Refills: 1    Associated Diagnoses: Mixed hyperlipidemia      empagliflozin (JARDIANCE) 10 MG TABS tablet Take 1 tablet (10 mg) by mouth daily  Qty: 90 tablet, Refills: 3    Associated Diagnoses: Chronic systolic heart failure (H)      furosemide (LASIX) 40 MG tablet Take 1 tablet (40 mg) by mouth daily  Qty: 90 tablet, Refills: 1    Associated Diagnoses: Chronic systolic heart failure (H)      hydrALAZINE (APRESOLINE) 10 MG tablet Take 3 tablets (30 mg) by mouth 3 times daily  Qty: 800 tablet, Refills: 3    Associated Diagnoses: Essential hypertension, benign; Chronic systolic heart failure (H)      losartan (COZAAR) 100 MG tablet Take 1 tablet (100 mg) by mouth daily  Qty: 90 tablet, Refills: 3    Associated Diagnoses: Essential hypertension, benign      magnesium oxide (MAG-OX) 400 MG tablet Take 1 tablet (400 mg) by mouth daily  Qty: 30 tablet, Refills: 4    Associated Diagnoses: Low magnesium level      metoprolol succinate ER (TOPROL XL) 100 MG 24 hr tablet Take 1  tablet (100 mg) by mouth daily  Qty: 90 tablet, Refills: 1    Associated Diagnoses: Chronic systolic heart failure (H); Acute on chronic systolic congestive heart failure (H); Paroxysmal atrial fibrillation (H); Essential hypertension, benign; Mixed hyperlipidemia; Encounter for medication refill      spironolactone (ALDACTONE) 25 MG tablet Take 1 tablet (25 mg) by mouth daily  Qty: 90 tablet, Refills: 1    Associated Diagnoses: Chronic systolic heart failure (H)           STOP taking these medications       aspirin (ASA) 81 MG EC tablet Comments:   Reason for Stopping:         rivaroxaban ANTICOAGULANT (XARELTO) 20 MG TABS tablet Comments:   Reason for Stopping:             Allergies   Allergies   Allergen Reactions    Penicillins Swelling

## 2023-10-03 NOTE — PROGRESS NOTES
RESPIRATORY CARE NOTE    Pt. remains on RA sats 98%, BS diminished, RR 18-22, no respiratory distress noted at this time. Pt. declined NIF/ VC this morning. NIF -24, VC 1.16 this afternoon (poor effort from the patient) RT following      Emily Cason, RT

## 2023-10-03 NOTE — ED PROVIDER NOTES
EMERGENCY DEPARTMENT ENCOUNTER      NAME: Eber Jin  AGE: 62 year old male  YOB: 1960  MRN: [id number]  EVALUATION DATE & TIME: 10/2/2023  1:13 PM    PCP: Hima Boyle    ED PROVIDER: Ankit Bennett MD    Chief Complaint   Patient presents with    Fall    Generalized Weakness     FINAL IMPRESSION:  Large vessel occlusion stroke    ED COURSE & MEDICAL DECISION MAKIN:29 PM  I was asked to get involved on this boarding emergency department patient.  Patient had been admitted to the hospital approximately 24 hours prior for multiple falls.  Please see initial ED treating clinicians note for further details as well as the admitting clinicians H&P Hospital course and discharge summary documentation.  This patient while boarding in the emergency department pending placement in the hospital developed stroke symptoms and the admitting team initiated an acute stroke code process.  This was concerning for large vessel occlusion per stroke team and the resident service admitting team.  Patient now with altered mental status.  Patient moved from the initial treatment room into our resuscitation room to escalate his care in anticipation of rapid transfer to neuro interventional facility.  Patient needing stabilization and airway placement prior to EMS arrival and transfer secondary to his altered mental status large vessel stroke occlusion and critical state.  I went and assessed the patient and I had a discussion with the resident clinician admitting team I also contacted the SOC and initiated a large vessel occlusion stroke process.  Patient has been accepted to the [facility].  Dr. Gillespie accepting.  Have borderline hypotension.  We subsequently used a lower dose etomidate given the borderline blood pressure and succinylcholine and successfully intubated the patient using the video laryngoscopy on first attempt no complications.  Patient was hypertensive post intubation.   Postintubation end-tidal CO2 positive bilateral breath sounds.  X-ray pending to confirm placement.  EMS was contacted and initiated and of the large vessel occlusion transfer process and we are currently awaiting EMS lights and sirens arrival for emergent transport.       6:13 PM  EMS present and packaging patient.  He has no developed hypotension just prior to transport.  Currently had received VAC.  Low-dose propofol.  Also received fentanyl via EMS as they were ready to leave the patient.  We will start an epi to maintain his BP given large vessel occlusion stroke.  Anticipate transfer momentarily.  Chest x-ray confirming tube placement.    MEDICATIONS GIVEN IN THE EMERGENCY:  Medications   albuterol (PROVENTIL HFA/VENTOLIN HFA) inhaler (has no administration in time range)   aspirin EC tablet 81 mg ( Oral Automatically Held 10/6/23 0800)   empagliflozin (JARDIANCE) tablet 10 mg (10 mg Oral $Given 10/3/23 0819)   furosemide (LASIX) tablet 40 mg (40 mg Oral $Given 10/3/23 0818)   hydrALAZINE (APRESOLINE) tablet 30 mg (30 mg Oral $Given 10/3/23 1525)   losartan (COZAAR) tablet 100 mg (100 mg Oral $Given 10/3/23 0819)   metoprolol succinate ER (TOPROL XL) 24 hr tablet 100 mg (100 mg Oral $Given 10/3/23 0818)   rivaroxaban ANTICOAGULANT (XARELTO) tablet 20 mg ( Oral Automatically Held 10/8/23 1700)   spironolactone (ALDACTONE) tablet 25 mg (25 mg Oral $Given 10/3/23 0819)   melatonin tablet 1 mg (has no administration in time range)   ondansetron (ZOFRAN ODT) ODT tab 4 mg (has no administration in time range)     Or   ondansetron (ZOFRAN) injection 4 mg (has no administration in time range)   oxyCODONE IR (ROXICODONE) half-tab 2.5 mg (has no administration in time range)   gabapentin (NEURONTIN) capsule 100 mg (100 mg Oral $Given 10/3/23 1526)   methocarbamol (ROBAXIN) tablet 500 mg (has no administration in time range)   acetaminophen (TYLENOL) tablet 975 mg (975 mg Oral $Given 10/3/23 1525)   Lidocaine (LIDOCARE)  4 % Patch 3 patch (has no administration in time range)   senna-docusate (SENOKOT-S/PERICOLACE) 8.6-50 MG per tablet 1 tablet (has no administration in time range)   naloxone (NARCAN) injection 0.2 mg (has no administration in time range)     Or   naloxone (NARCAN) injection 0.4 mg (has no administration in time range)     Or   naloxone (NARCAN) injection 0.2 mg (has no administration in time range)     Or   naloxone (NARCAN) injection 0.4 mg (has no administration in time range)   norepinephrine (LEVOPHED) 4 mg in  mL infusion PREMIX (0.03 mcg/kg/min × 78 kg (Order-Specific) Intravenous $New Bag 10/3/23 1813)   sodium chloride 0.9% BOLUS 1,000 mL (0 mLs Intravenous Stopped 10/2/23 1504)   iopamidol (ISOVUE-370) solution 90 mL (90 mLs Intravenous $Given 10/2/23 1521)   iopamidol (ISOVUE-370) solution 75 mL (75 mLs Intravenous $Given 10/3/23 1559)   etomidate (AMIDATE) injection 10 mg (10 mg Intravenous $Given 10/3/23 1733)   vecuronium (NORCURON) injection 10 mg (10 mg Intravenous $Given 10/3/23 1745)   propofol (DIPRIVAN) injection 10 mg/mL vial (10 mg Intravenous $Given 10/3/23 1735)   succinylcholine (ANECTINE) injection 100 mg (100 mg Intravenous $Given 10/3/23 1734)       NEW PRESCRIPTIONS STARTED AT TODAY'S ER VISIT  Current Discharge Medication List             =================================================================    HPI  Eber Jin is a 62 year old male with a prior history of stroke who presents to the emergency department today after having several falls at home.  Per EMS, family stated that he has had 5 falls in the last 2 days.  He has hit his head.  They state that he does take a blood thinner medication.  His chart shows that he is on Xarelto.  He denies any pain at this time and only states that he feels dizzy.  No other current complaints.     Additional history obtained from the resident service physician.  Patient has had fluctuating mental status today that acutely  escalated this evening necessitating institution of a stroke code demonstrating concern for large vessel occlusion and subsequently transfer process for large vessel occlusion was initiated.  Please see initial treating clinicians admission physicians admission discharge summary notes for further details.    REVIEW OF SYSTEMS   Review of Systems   Unable to obtain due to altered mental status.    PAST MEDICAL HISTORY:  Past Medical History:   Diagnosis Date    Accelerated hypertension     Arthritis     CAD (coronary artery disease)     Cerebrovascular accident (CVA), unspecified mechanism (H)     CHF (congestive heart failure) (H)     Chronic atrial fibrillation (H)     on rivaroxaban    COPD (chronic obstructive pulmonary disease) (H)     Heart failure (H)     ischemic, EF 45 % im 3/2021    HLD (hyperlipidemia)     HTN (hypertension)     Hypertensive urgency     Myocardial infarction (H)     PAD (peripheral artery disease) (H24)     Peripheral vascular disease with claudication (H24)     Prolonged Q-T interval on ECG        PAST SURGICAL HISTORY:  Past Surgical History:   Procedure Laterality Date    CARDIAC CATHETERIZATION      IR ABDOMINAL AORTOGRAM  8/6/2013    IR EXTREMITY ANGIOGRAM BILATERAL  8/6/2013           CURRENT MEDICATIONS:    albuterol (PROAIR HFA/PROVENTIL HFA/VENTOLIN HFA) 108 (90 Base) MCG/ACT inhaler  atorvastatin (LIPITOR) 80 MG tablet  empagliflozin (JARDIANCE) 10 MG TABS tablet  furosemide (LASIX) 40 MG tablet  hydrALAZINE (APRESOLINE) 10 MG tablet  losartan (COZAAR) 100 MG tablet  magnesium oxide (MAG-OX) 400 MG tablet  metoprolol succinate ER (TOPROL XL) 100 MG 24 hr tablet  spironolactone (ALDACTONE) 25 MG tablet        ALLERGIES:  Allergies   Allergen Reactions    Penicillins Swelling       FAMILY HISTORY:  Family History   Problem Relation Age of Onset    Heart Failure Mother     No Known Problems Father     No Known Problems Brother        SOCIAL HISTORY:   Social History      Socioeconomic History    Marital status:      Spouse name: None    Number of children: None    Years of education: None    Highest education level: None   Tobacco Use    Smoking status: Every Day     Packs/day: 0.50     Years: 30.00     Pack years: 15.00     Types: Cigarettes    Smokeless tobacco: Never   Vaping Use    Vaping Use: Never used   Substance and Sexual Activity    Alcohol use: Not Currently     Alcohol/week: 1.0 - 2.0 standard drink of alcohol     Comment: Alcoholic Drinks/day: rare use    Drug use: No    Sexual activity: Yes     Partners: Female   Social History Narrative    Patient is not on supplemental O2 at home. Patient does not use any assistive device for ambulation. Full code.        VITALS:  /75   Pulse (!) 122   Temp 98.8  F (37.1  C) (Oral)   Resp 18   SpO2 100%     PHYSICAL EXAM    PHYSICAL EXAM    Constitutional: Patient is altered, not answering questions.  HENT: Poor dentition noted.  Eyes: PERRL, EOMI, Conjunctiva normal, No discharge.   Respiratory: Normal breath sounds, No respiratory distress, No wheezing, Speaks full sentences easily. No cough.   Cardiovascular: Normal heart rate, Regular rhythm, No murmurs Chest wall nontender.    GI:  Soft, No tenderness, No masses, No flank tenderness. No rebound or guarding.  : No cva tenderness    Musculoskeletal: 2+ DP pulses. No edema. No cyanosis. Good range of motion in all major joints. No tenderness to palpation. No tenderness of the CTLS spine.   Integument: Warm, Dry, No erythema, No rash. No petechiae.   Neurologic: Cannot reliably assess due to patient's current altered mental status  Psychiatric: Cannot assess       LAB:  All pertinent labs reviewed and interpreted.  Results for orders placed or performed during the hospital encounter of 10/02/23   CT Head w/o Contrast    Impression    IMPRESSION:  1.  No CT evidence for acute intracranial process.  2.  Brain atrophy and presumed chronic microvascular ischemic  changes as above.  3.  Multifocal untreated dental disease, incompletely evaluated.   CT Chest Pulmonary Embolism w Contrast    Impression    IMPRESSION:  1.  No significant PE.  2.  Multiple left rib fractures are again seen. Many of these are displaced. Consider surgical repair.  3.  A small left pleural effusion has decreased from the comparison study.  4.  Possible left ventricular hypertrophy.   5.  Severe coronary artery disease.     XR Chest Port 1 View    Impression    IMPRESSION: Multiple significantly displaced fractures are seen in the left fifth through 11th ribs, many with 2 part fractures. A small left pleural effusion is present. No right pleural effusion. No airspace consolidation or pneumothorax. Heart size   and pulmonary vascularity are within normal limits.   CTA Head Neck with Contrast    Impression    IMPRESSION:    HEAD CT:  1. Senescent changes and sequelae of chronic microangiopathy without acute intracranial abnormality.    HEAD CTA:  1. The internal carotid arteries are diminutive opacification to the ICA terminus is and proximal anterior cerebral and middle cerebral arteries with poor visualization of the distal anterior vasculature.     2. No significant contrast opacification is identified within the posterior circulation.    3. While these findings can be seen in setting of hypoperfusion, injection related artifact could have a similar appearance. MRI brain/MRA head and neck is recommended for further evaluation.    NECK CTA:  1. Atherosclerotic plaque results in critical, 90% or greater, stenosis of the proximal left ICA.     2. Right vertebral artery occlusion.     3. The left vertebral artery remains patent throughout its course, however shortly after entering the intracranial compartment both vertebral arteries, basilar artery, and posterior circulation are not visualized.    - - - - - - - - - - - - - - - - - - - - - - - - - - - - - - - - - - - - - - - - - - - - - - - - - - - - - -  - - - - - - - - - - - - - - - - - - - - - -   The results above were discussed with Dr. Ramirez on 10/3/2023 4:04 PM CDT by Dr. Tucker Lott.  - - - - - - - - - - - - - - - - - - - - - - - - - - - - - - - - - - - - - - - - - - - - - - - - - - - - - - - - - - - - - - - - - - - - - - - - - - - -     MR Brain w/o Contrast    Impression    IMPRESSION:  1.  Multiple foci of acute to subacute ischemic change throughout the left cerebral hemisphere, distribution is typical of watershed ischemia.  2.  Additional acute to subacute ischemic change in the left occipital lobe.  3.  Age-related changes as above.   CBC with platelets   Result Value Ref Range    WBC Count 9.6 4.0 - 11.0 10e3/uL    RBC Count 4.10 (L) 4.40 - 5.90 10e6/uL    Hemoglobin 12.8 (L) 13.3 - 17.7 g/dL    Hematocrit 37.7 (L) 40.0 - 53.0 %    MCV 92 78 - 100 fL    MCH 31.2 26.5 - 33.0 pg    MCHC 34.0 31.5 - 36.5 g/dL    RDW 12.8 10.0 - 15.0 %    Platelet Count 278 150 - 450 10e3/uL   Basic metabolic panel   Result Value Ref Range    Sodium 138 135 - 145 mmol/L    Potassium 4.4 3.4 - 5.3 mmol/L    Chloride 101 98 - 107 mmol/L    Carbon Dioxide (CO2) 22 22 - 29 mmol/L    Anion Gap 15 7 - 15 mmol/L    Urea Nitrogen 13.8 8.0 - 23.0 mg/dL    Creatinine 0.99 0.67 - 1.17 mg/dL    GFR Estimate 85 >60 mL/min/1.73m2    Calcium 9.7 8.8 - 10.2 mg/dL    Glucose 90 70 - 99 mg/dL   Basic metabolic panel   Result Value Ref Range    Sodium 137 135 - 145 mmol/L    Potassium 4.5 3.4 - 5.3 mmol/L    Chloride 101 98 - 107 mmol/L    Carbon Dioxide (CO2) 26 22 - 29 mmol/L    Anion Gap 10 7 - 15 mmol/L    Urea Nitrogen 11.0 8.0 - 23.0 mg/dL    Creatinine 0.93 0.67 - 1.17 mg/dL    GFR Estimate >90 >60 mL/min/1.73m2    Calcium 9.3 8.8 - 10.2 mg/dL    Glucose 98 70 - 99 mg/dL   Result Value Ref Range    Magnesium 1.8 1.7 - 2.3 mg/dL   Result Value Ref Range    Phosphorus 2.9 2.5 - 4.5 mg/dL   CBC with platelets and differential   Result Value Ref Range    WBC Count 7.2 4.0 - 11.0  10e3/uL    RBC Count 4.10 (L) 4.40 - 5.90 10e6/uL    Hemoglobin 12.8 (L) 13.3 - 17.7 g/dL    Hematocrit 38.4 (L) 40.0 - 53.0 %    MCV 94 78 - 100 fL    MCH 31.2 26.5 - 33.0 pg    MCHC 33.3 31.5 - 36.5 g/dL    RDW 12.6 10.0 - 15.0 %    Platelet Count 256 150 - 450 10e3/uL    % Neutrophils 69 %    % Lymphocytes 18 %    % Monocytes 12 %    Mids % (Monos, Eos, Basos)      % Eosinophils 1 %    % Basophils 0 %    % Immature Granulocytes 0 %    NRBCs per 100 WBC 0 <1 /100    Absolute Neutrophils 4.9 1.6 - 8.3 10e3/uL    Absolute Lymphocytes 1.3 0.8 - 5.3 10e3/uL    Absolute Monocytes 0.8 0.0 - 1.3 10e3/uL    Mids Abs (Monos, Eos, Basos)      Absolute Eosinophils 0.1 0.0 - 0.7 10e3/uL    Absolute Basophils 0.0 0.0 - 0.2 10e3/uL    Absolute Immature Granulocytes 0.0 <=0.4 10e3/uL    Absolute NRBCs 0.0 10e3/uL   Result Value Ref Range    INR 1.13 0.85 - 1.15   Partial thromboplastin time   Result Value Ref Range    aPTT 30 22 - 38 Seconds   Drug Abuse Screen Qual Urine   Result Value Ref Range    Amphetamines Urine Screen Negative Screen Negative    Barbituates Urine Screen Negative Screen Negative    Benzodiazepine Urine Screen Negative Screen Negative    Cannabinoids Urine Screen Positive (A) Screen Negative    Cocaine Urine Screen Negative Screen Negative    Fentanyl Qual Urine Screen Negative Screen Negative    Opiates Urine Screen Negative Screen Negative    PCP Urine Screen Negative Screen Negative   Glucose by meter   Result Value Ref Range    GLUCOSE BY METER POCT 100 (H) 70 - 99 mg/dL   CBC with platelets   Result Value Ref Range    WBC Count 7.7 4.0 - 11.0 10e3/uL    RBC Count 4.03 (L) 4.40 - 5.90 10e6/uL    Hemoglobin 12.5 (L) 13.3 - 17.7 g/dL    Hematocrit 37.9 (L) 40.0 - 53.0 %    MCV 94 78 - 100 fL    MCH 31.0 26.5 - 33.0 pg    MCHC 33.0 31.5 - 36.5 g/dL    RDW 12.5 10.0 - 15.0 %    Platelet Count 258 150 - 450 10e3/uL   Partial thromboplastin time   Result Value Ref Range    aPTT 32 22 - 38 Seconds   Result  Value Ref Range    INR 1.13 0.85 - 1.15   Result Value Ref Range    Troponin T, High Sensitivity 39 (H) <=22 ng/L   Extra Red Top Tube   Result Value Ref Range    Hold Specimen [facility]        RADIOLOGY:  Reviewed all pertinent imaging. Please see official radiology report.  MR Brain w/o Contrast   Final Result   IMPRESSION:   1.  Multiple foci of acute to subacute ischemic change throughout the left cerebral hemisphere, distribution is typical of watershed ischemia.   2.  Additional acute to subacute ischemic change in the left occipital lobe.   3.  Age-related changes as above.      CTA Head Neck with Contrast   Final Result   IMPRESSION:      HEAD CT:   1. Senescent changes and sequelae of chronic microangiopathy without acute intracranial abnormality.      HEAD CTA:   1. The internal carotid arteries are diminutive opacification to the ICA terminus is and proximal anterior cerebral and middle cerebral arteries with poor visualization of the distal anterior vasculature.       2. No significant contrast opacification is identified within the posterior circulation.      3. While these findings can be seen in setting of hypoperfusion, injection related artifact could have a similar appearance. MRI brain/MRA head and neck is recommended for further evaluation.      NECK CTA:   1. Atherosclerotic plaque results in critical, 90% or greater, stenosis of the proximal left ICA.       2. Right vertebral artery occlusion.       3. The left vertebral artery remains patent throughout its course, however shortly after entering the intracranial compartment both vertebral arteries, basilar artery, and posterior circulation are not visualized.      - - - - - - - - - - - - - - - - - - - - - - - - - - - - - - - - - - - - - - - - - - - - - - - - - - - - - - - - - - - - - - - - - - - - - - - - - - - -    The results above were discussed with Dr. Ramirez on 10/3/2023 4:04 PM CDT by Dr. Tucker Lott.   - - - - - - - - - - - - - - - - -  - - - - - - - - - - - - - - - - - - - - - - - - - - - - - - - - - - - - - - - - - - - - - - - - - - - - - - - - - - -         XR Chest Port 1 View   Final Result   IMPRESSION: Multiple significantly displaced fractures are seen in the left fifth through 11th ribs, many with 2 part fractures. A small left pleural effusion is present. No right pleural effusion. No airspace consolidation or pneumothorax. Heart size    and pulmonary vascularity are within normal limits.      CT Chest Pulmonary Embolism w Contrast   Final Result   IMPRESSION:   1.  No significant PE.   2.  Multiple left rib fractures are again seen. Many of these are displaced. Consider surgical repair.   3.  A small left pleural effusion has decreased from the comparison study.   4.  Possible left ventricular hypertrophy.    5.  Severe coronary artery disease.         CT Head w/o Contrast   Final Result   IMPRESSION:   1.  No CT evidence for acute intracranial process.   2.  Brain atrophy and presumed chronic microvascular ischemic changes as above.   3.  Multifocal untreated dental disease, incompletely evaluated.      XR Chest Port 1 View    (Results Pending)       PROCEDURES:   -Intubation    Date/Time: 10/3/2023 5:53 PM    Performed by: Ankit Bennett MD  Authorized by: Ankit Bennett MD    Emergent condition/consent implied    ED EVALUATION:      Assessment Time: 10/3/2023 5:44 PM      I have performed an Emergency Department Evaluation including taking a history and physical examination, this evaluation will be documented in the electronic medical record for this ED encounter.     Indication: Large vessel stroke, altered mental status    ASA Class: Class 3- Severe systemic disease, definite functional limitations    Mallampati: Grade 2- soft palate, base of uvula, tonsillar pillars, and portion of posterior pharyngeal wall visible    UNIVERSAL PROTOCOL   Site Marked: NA  Prior Images Obtained and Reviewed:  NA  Required items: Required  blood products, implants, devices and special equipment available    Patient identity confirmed:  Verbally with patient  Patient was reevaluated immediately before administering moderate or deep sedation or anesthesia  Confirmation Checklist:  Patient's identity using two indicators  Time out: Immediately prior to the procedure a time out was called    Universal Protocol: the Joint Commission Universal Protocol was followed    Preparation: Patient was prepped and draped in usual sterile fashion      SEDATION  Patient Sedated: Yes    Sedation Type:  Deep  Sedation:  Propofol  Vital signs: Vital signs monitored during sedation    Universal protocol:     Required blood products, implants, devices, and special equipment available: yes      Immediately prior to procedure, a time out was called: yes      Patient identity confirmed:  Verbally with patient  Pre-procedure details:     Indications: airway protection      Patient status:  Altered mental status    Look externally: no concerns      Pharmacologic strategy: RSI      Induction agents:  Etomidate    Paralytics:  Succinylcholine  Procedure details:     Preoxygenation:  Nasal cannula    CPR in progress: no      Number of attempts:  1  Successful intubation attempt details:     Intubation method:  Oral    Intubation technique: video assisted      Tube size (mm):  7.5    Tube type:  Cuffed    Tube visualized through cords: yes    Placement assessment:     ETT at teeth/gumline (cm):  24    Tube secured with:  ETT blas and adhesive tape    Breath sounds:  Equal    Placement verification: CXR verification      CXR findings:  Appropriate position  Post-procedure details:     Procedure completion:  Patient tolerated the procedure well with no immediate complications    PROCEDURE    Patient Tolerance:  Patient tolerated the procedure well with no immediate complications  Length of time physician/provider present for 1:1 monitoring during sedation: 10      Critical Care      Performed by: Dr. Bennett  Total critical care time: 30 minutes  Critical care was necessary to treat or prevent imminent or life-threatening deterioration of the following conditions: Altered mental status cva, double protection intubation large vessel stroke  Critical care was time spent personally by me on the following activities: development of treatment plan with patient or surrogate, discussions with consultants, examination of patient, evaluation of patient's response to treatment, obtaining history from patient or surrogate, ordering and performing treatments and interventions, ordering and review of laboratory studies, ordering and review of radiographic studies, re-evaluation of patient's condition and monitoring for potential decompensation.  Critical care time was exclusive of separately billable procedures and treating other patients.      Ankit Bennett MD  [facility] EMERGENCY DEPARTMENT  [facility] [facility] [id number] [phone]      Ankit Bennett MD  10/03/23 0022

## 2023-10-03 NOTE — ED NOTES
Pt had significant gross aphasia at 1540 which was significant change from his baseline this morning. RRT and stroke codes were initiated. CTA and MRI were done. Pt was transferred to [facility].

## 2023-10-03 NOTE — PLAN OF CARE
PRIMARY DIAGNOSIS: ACUTE PAIN  OUTPATIENT/OBSERVATION GOALS TO BE MET BEFORE DISCHARGE:  1. Pain Status: Improved-controlled with oral pain medications.    2. Return to near baseline physical activity: Yes    3. Cleared for discharge by consultants (if involved): No    Discharge Planner Nurse   Safe discharge environment identified: No  Barriers to discharge: Yes       Entered by: Jamie Felix RN 10/03/2023 12:30 PM     Please review provider order for any additional goals.   Nurse to notify provider when observation goals have been met and patient is ready for discharge.Goal Outcome Evaluation:

## 2023-10-03 NOTE — H&P
[facility]    History and Physical - Hospitalist Service       Date of Admission:  10/2/2023    Assessment & Plan      Eber Jin is a 62 year old male admitted on 10/2/2023. He has a h/o HFmrEF, PAF, HTN, recurrent falls, rib # [facility]. Patient had 5 falls in last 2 days. He stated hitting his head. Denied dizziness, loss of consciousness. In the ED, CT-head was negative for ICH. Chest CT showed multiple left rib fractures with many of them displaced. ED spoke with Dr. Beyer (Surgery) and they agreed with admitting the patient and they will see pt in am.     Recurrent falls  - fall precautions  - PT/OT  - orthostatic vitals    Multiple displaced rib #  - rib # protocol  - Surgery consulted, appreciate recommendations  - pain management    HFmrEF  - Not in exacerbation  - c/w PTA lasix, losartan, metoprolol, Jardiance, spironolactone, hydralazin    Essential hypertension  - c/w PTA as above  - monitor vital signs per protocol    PAF  - On xarelto- hold for now. Consult cardiology in light of recurrent falls  - c/w metoprolol    CAD/HLD  - c/w ASA, lipitor    Anemia  - monitor cbc         Diet: Combination Diet No Caffeine Diet    DVT Prophylaxis: SCD  Huggins Catheter: Not present  Lines: None     Cardiac Monitoring: None  Code Status: Full Code    Clinically Significant Risk Factors Present on Admission                 # Drug Induced Coagulation Defect: home medication list includes an anticoagulant medication    # Drug Induced Platelet Defect: home medication list includes an antiplatelet medication     # Hypertension: Noted on problem list                 Disposition Plan      Expected Discharge Date: 10/03/2023                  Rosangela Arroyo MD  Hospitalist Service  [facility]  Securely message with [employer] (more info)  Text page via [employer] Paging/Directory      ______________________________________________________________________    Chief Complaint   Recurrent falls    History is obtained from the patient    History of Present Illness   Eber Jin is a 62 year old male who has a h/o HFmrEF, PAF, HTN, recurrent falls, rib # [facility] #. Patient had 5 falls in last 2 days. He stated hitting his head. Denied dizziness, loss of consciousness. In the ED, CT-head was negative for ICH. Chest CT showed multiple left rib fractures with many of them displaced. ED spoke with Dr. Beyer (Surgery) and they agreed with admitting the patient.       Past Medical History    Past Medical History:   Diagnosis Date    Accelerated hypertension     Arthritis     CAD (coronary artery disease)     Cerebrovascular accident (CVA), unspecified mechanism (H)     CHF (congestive heart failure) (H)     Chronic atrial fibrillation (H)     on rivaroxaban    COPD (chronic obstructive pulmonary disease) (H)     Heart failure (H)     ischemic, EF 45 % im 3/2021    HLD (hyperlipidemia)     HTN (hypertension)     Hypertensive urgency     Myocardial infarction (H)     PAD (peripheral artery disease) (H24)     Peripheral vascular disease with claudication (H24)     Prolonged Q-T interval on ECG        Past Surgical History   Past Surgical History:   Procedure Laterality Date    CARDIAC CATHETERIZATION      IR ABDOMINAL AORTOGRAM  8/6/2013    IR EXTREMITY ANGIOGRAM BILATERAL  8/6/2013       Prior to Admission Medications   Prior to Admission Medications   Prescriptions Last Dose Informant Patient Reported? Taking?   albuterol (PROAIR HFA/PROVENTIL HFA/VENTOLIN HFA) 108 (90 Base) MCG/ACT inhaler   No Yes   Sig: Inhale 2 puffs into the lungs every 6 hours as needed for shortness of breath / dyspnea or wheezing   aspirin (ASA) 81 MG EC tablet Past Week  No Yes   Sig: Take 1 tablet (81 mg) by mouth daily   atorvastatin (LIPITOR) 80 MG tablet Past Week  No  Yes   Sig: Take 1 tablet (80 mg) by mouth every evening   empagliflozin (JARDIANCE) 10 MG TABS tablet Past Month  No Yes   Sig: Take 1 tablet (10 mg) by mouth daily   furosemide (LASIX) 40 MG tablet Past Week  No Yes   Sig: Take 1 tablet (40 mg) by mouth daily   hydrALAZINE (APRESOLINE) 10 MG tablet Past Week  No Yes   Sig: Take 3 tablets (30 mg) by mouth 3 times daily   losartan (COZAAR) 100 MG tablet Past Week  No Yes   Sig: Take 1 tablet (100 mg) by mouth daily   magnesium oxide (MAG-OX) 400 MG tablet Past Week  No Yes   Sig: Take 1 tablet (400 mg) by mouth daily   metoprolol succinate ER (TOPROL XL) 100 MG 24 hr tablet Past Week  No Yes   Sig: Take 1 tablet (100 mg) by mouth daily   rivaroxaban ANTICOAGULANT (XARELTO) 20 MG TABS tablet Past Week  No Yes   Sig: Take 1 tablet (20 mg) by mouth daily (with dinner)   spironolactone (ALDACTONE) 25 MG tablet Past Week  No Yes   Sig: Take 1 tablet (25 mg) by mouth daily      Facility-Administered Medications: None           Physical Exam   Vital Signs: Temp: 98.7  F (37.1  C) Temp src: Oral BP: (!) 143/93 Pulse: 84   Resp: 17 SpO2: 96 % O2 Device: None (Room air)    Weight: 0 lbs 0 oz  GEN: Alert and oriented. Not in acute distress.  HEENT: Atraumatic, mucous membrane- moist and pink.  Chest: Bilateral air entry. Left chest tenderness  CVS: S1S2 regular.   Abdomen: Soft. Non-tender, non-distended. No organomegaly. No guarding or rigidity. Bowel sounds active.   Extremities: No pedal edema.  CNS: No involuntary movements.  Skin: no cyanosis or clubbing.       Medical Decision Making             Data

## 2023-10-03 NOTE — PLAN OF CARE
Goal Outcome Evaluation:PRIMARY DIAGNOSIS: ACUTE PAIN  OUTPATIENT/OBSERVATION GOALS TO BE MET BEFORE DISCHARGE:  1. Pain Status: Improved-controlled with oral pain medications.    2. Return to near baseline physical activity: No    3. Cleared for discharge by consultants (if involved): No    Discharge Planner Nurse   Safe discharge environment identified: No  Barriers to discharge: Yes       Entered by: Jamie Felix RN 10/03/2023 9:52 AM     Please review provider order for any additional goals.   Nurse to notify provider when observation goals have been met and patient is ready for discharge.

## 2023-10-03 NOTE — ED NOTES
Pt intubated at 1735 with a 7.5 ET tube, 23 @ the teeth. Pt was given Etomidate @ 1733 and succinylcholine @ 1734 for intubation.

## 2023-10-03 NOTE — PROGRESS NOTES
Patient is alert/oriented and performed NIF/VC well. NIF -34 cmH2O, VC 1.47 L. Deep breath and coughing encouraged. Pt tolerated flutter valve well.    [place], RT

## 2023-10-04 ENCOUNTER — APPOINTMENT (OUTPATIENT)
Dept: CARDIOLOGY | Facility: CLINIC | Age: 63
DRG: 034 | End: 2023-10-04
Payer: COMMERCIAL

## 2023-10-04 ENCOUNTER — APPOINTMENT (OUTPATIENT)
Dept: CT IMAGING | Facility: CLINIC | Age: 63
DRG: 034 | End: 2023-10-04
Payer: COMMERCIAL

## 2023-10-04 ENCOUNTER — APPOINTMENT (OUTPATIENT)
Dept: GENERAL RADIOLOGY | Facility: CLINIC | Age: 63
DRG: 034 | End: 2023-10-04
Attending: NURSE PRACTITIONER
Payer: COMMERCIAL

## 2023-10-04 ENCOUNTER — APPOINTMENT (OUTPATIENT)
Dept: SPEECH THERAPY | Facility: CLINIC | Age: 63
DRG: 034 | End: 2023-10-04
Payer: COMMERCIAL

## 2023-10-04 LAB
ANION GAP SERPL CALCULATED.3IONS-SCNC: 13 MMOL/L (ref 7–15)
ATRIAL RATE - MUSE: 100 BPM
ATRIAL RATE - MUSE: 95 BPM
ATRIAL RATE - MUSE: NORMAL BPM
BASE EXCESS BLDV CALC-SCNC: -0.9 MMOL/L (ref -7.7–1.9)
BUN SERPL-MCNC: 9.5 MG/DL (ref 8–23)
CALCIUM SERPL-MCNC: 8.5 MG/DL (ref 8.8–10.2)
CHLORIDE SERPL-SCNC: 105 MMOL/L (ref 98–107)
CREAT SERPL-MCNC: 0.87 MG/DL (ref 0.67–1.17)
DEPRECATED HCO3 PLAS-SCNC: 19 MMOL/L (ref 22–29)
DIASTOLIC BLOOD PRESSURE - MUSE: 103 MMHG
DIASTOLIC BLOOD PRESSURE - MUSE: 96 MMHG
DIASTOLIC BLOOD PRESSURE - MUSE: NORMAL MMHG
EGFRCR SERPLBLD CKD-EPI 2021: >90 ML/MIN/1.73M2
ERYTHROCYTE [DISTWIDTH] IN BLOOD BY AUTOMATED COUNT: 13 % (ref 10–15)
GLUCOSE BLDC GLUCOMTR-MCNC: 70 MG/DL (ref 70–99)
GLUCOSE BLDC GLUCOMTR-MCNC: 82 MG/DL (ref 70–99)
GLUCOSE BLDC GLUCOMTR-MCNC: 93 MG/DL (ref 70–99)
GLUCOSE BLDC GLUCOMTR-MCNC: 96 MG/DL (ref 70–99)
GLUCOSE SERPL-MCNC: 96 MG/DL (ref 70–99)
HCO3 BLDV-SCNC: 24 MMOL/L (ref 21–28)
HCT VFR BLD AUTO: 35.7 % (ref 40–53)
HGB BLD-MCNC: 11.8 G/DL (ref 13.3–17.7)
INTERPRETATION ECG - MUSE: NORMAL
LDLC SERPL DIRECT ASSAY-MCNC: 87 MG/DL
LVEF ECHO: NORMAL
MAGNESIUM SERPL-MCNC: 1.7 MG/DL (ref 1.7–2.3)
MCH RBC QN AUTO: 31 PG (ref 26.5–33)
MCHC RBC AUTO-ENTMCNC: 33.1 G/DL (ref 31.5–36.5)
MCV RBC AUTO: 94 FL (ref 78–100)
O2/TOTAL GAS SETTING VFR VENT: 60 %
P AXIS - MUSE: 63 DEGREES
P AXIS - MUSE: 65 DEGREES
P AXIS - MUSE: NORMAL DEGREES
PA ADP BLD-ACNC: 49 PRU
PCO2 BLDV: 41 MM HG (ref 40–50)
PH BLDV: 7.38 [PH] (ref 7.32–7.43)
PHOSPHATE SERPL-MCNC: 3.8 MG/DL (ref 2.5–4.5)
PLATELET # BLD AUTO: 242 10E3/UL (ref 150–450)
PO2 BLDV: 33 MM HG (ref 25–47)
POTASSIUM SERPL-SCNC: 3.7 MMOL/L (ref 3.4–5.3)
PR INTERVAL - MUSE: 154 MS
PR INTERVAL - MUSE: 160 MS
PR INTERVAL - MUSE: NORMAL MS
QRS DURATION - MUSE: 96 MS
QRS DURATION - MUSE: 96 MS
QRS DURATION - MUSE: 98 MS
QT - MUSE: 346 MS
QT - MUSE: 376 MS
QT - MUSE: 384 MS
QTC - MUSE: 482 MS
QTC - MUSE: 485 MS
QTC - MUSE: 526 MS
R AXIS - MUSE: -11 DEGREES
R AXIS - MUSE: -4 DEGREES
R AXIS - MUSE: 4 DEGREES
RBC # BLD AUTO: 3.81 10E6/UL (ref 4.4–5.9)
SODIUM SERPL-SCNC: 137 MMOL/L (ref 135–145)
SYSTOLIC BLOOD PRESSURE - MUSE: 147 MMHG
SYSTOLIC BLOOD PRESSURE - MUSE: 173 MMHG
SYSTOLIC BLOOD PRESSURE - MUSE: NORMAL MMHG
T AXIS - MUSE: 251 DEGREES
T AXIS - MUSE: 81 DEGREES
T AXIS - MUSE: 87 DEGREES
TROPONIN T SERPL HS-MCNC: 36 NG/L
VENTRICULAR RATE- MUSE: 100 BPM
VENTRICULAR RATE- MUSE: 139 BPM
VENTRICULAR RATE- MUSE: 95 BPM
WBC # BLD AUTO: 12.4 10E3/UL (ref 4–11)

## 2023-10-04 PROCEDURE — 92610 EVALUATE SWALLOWING FUNCTION: CPT | Mod: GN

## 2023-10-04 PROCEDURE — 94799 UNLISTED PULMONARY SVC/PX: CPT

## 2023-10-04 PROCEDURE — 250N000011 HC RX IP 250 OP 636: Performed by: STUDENT IN AN ORGANIZED HEALTH CARE EDUCATION/TRAINING PROGRAM

## 2023-10-04 PROCEDURE — C9460 INJECTION, CANGRELOR: HCPCS | Mod: JZ | Performed by: NURSE PRACTITIONER

## 2023-10-04 PROCEDURE — 70450 CT HEAD/BRAIN W/O DYE: CPT

## 2023-10-04 PROCEDURE — C9460 INJECTION, CANGRELOR: HCPCS | Mod: JZ | Performed by: PSYCHIATRY & NEUROLOGY

## 2023-10-04 PROCEDURE — 250N000011 HC RX IP 250 OP 636: Mod: JZ | Performed by: PSYCHIATRY & NEUROLOGY

## 2023-10-04 PROCEDURE — 94640 AIRWAY INHALATION TREATMENT: CPT | Mod: 76

## 2023-10-04 PROCEDURE — 250N000013 HC RX MED GY IP 250 OP 250 PS 637: Performed by: NURSE PRACTITIONER

## 2023-10-04 PROCEDURE — 999N000157 HC STATISTIC RCP TIME EA 10 MIN

## 2023-10-04 PROCEDURE — 70450 CT HEAD/BRAIN W/O DYE: CPT | Mod: 26 | Performed by: RADIOLOGY

## 2023-10-04 PROCEDURE — 250N000009 HC RX 250: Performed by: NURSE PRACTITIONER

## 2023-10-04 PROCEDURE — 36415 COLL VENOUS BLD VENIPUNCTURE: CPT

## 2023-10-04 PROCEDURE — 87205 SMEAR GRAM STAIN: CPT | Performed by: NURSE PRACTITIONER

## 2023-10-04 PROCEDURE — 87185 SC STD ENZYME DETCJ PER NZM: CPT | Performed by: NURSE PRACTITIONER

## 2023-10-04 PROCEDURE — 94660 CPAP INITIATION&MGMT: CPT

## 2023-10-04 PROCEDURE — 75635 CT ANGIO ABDOMINAL ARTERIES: CPT | Mod: 26 | Performed by: RADIOLOGY

## 2023-10-04 PROCEDURE — 84484 ASSAY OF TROPONIN QUANT: CPT | Performed by: NURSE PRACTITIONER

## 2023-10-04 PROCEDURE — 83721 ASSAY OF BLOOD LIPOPROTEIN: CPT | Performed by: NURSE PRACTITIONER

## 2023-10-04 PROCEDURE — 75635 CT ANGIO ABDOMINAL ARTERIES: CPT

## 2023-10-04 PROCEDURE — C9460 INJECTION, CANGRELOR: HCPCS | Performed by: STUDENT IN AN ORGANIZED HEALTH CARE EDUCATION/TRAINING PROGRAM

## 2023-10-04 PROCEDURE — 84100 ASSAY OF PHOSPHORUS: CPT

## 2023-10-04 PROCEDURE — 999N000259 HC STATISTIC EXTUBATION

## 2023-10-04 PROCEDURE — 71045 X-RAY EXAM CHEST 1 VIEW: CPT

## 2023-10-04 PROCEDURE — 80048 BASIC METABOLIC PNL TOTAL CA: CPT

## 2023-10-04 PROCEDURE — 93005 ELECTROCARDIOGRAM TRACING: CPT

## 2023-10-04 PROCEDURE — 93306 TTE W/DOPPLER COMPLETE: CPT | Mod: 26 | Performed by: INTERNAL MEDICINE

## 2023-10-04 PROCEDURE — 258N000003 HC RX IP 258 OP 636

## 2023-10-04 PROCEDURE — 258N000003 HC RX IP 258 OP 636: Performed by: PSYCHIATRY & NEUROLOGY

## 2023-10-04 PROCEDURE — 99291 CRITICAL CARE FIRST HOUR: CPT | Mod: GC | Performed by: PSYCHIATRY & NEUROLOGY

## 2023-10-04 PROCEDURE — 250N000011 HC RX IP 250 OP 636: Performed by: PSYCHIATRY & NEUROLOGY

## 2023-10-04 PROCEDURE — 2894A VOIDCORRECT: CPT | Mod: FS | Performed by: NURSE PRACTITIONER

## 2023-10-04 PROCEDURE — 999N000185 HC STATISTIC TRANSPORT TIME EA 15 MIN

## 2023-10-04 PROCEDURE — 255N000002 HC RX 255 OP 636: Performed by: INTERNAL MEDICINE

## 2023-10-04 PROCEDURE — 999N000208 ECHOCARDIOGRAM COMPLETE

## 2023-10-04 PROCEDURE — 94640 AIRWAY INHALATION TREATMENT: CPT

## 2023-10-04 PROCEDURE — 258N000003 HC RX IP 258 OP 636: Performed by: NURSE PRACTITIONER

## 2023-10-04 PROCEDURE — 250N000011 HC RX IP 250 OP 636: Mod: JZ | Performed by: NURSE PRACTITIONER

## 2023-10-04 PROCEDURE — 272N000270 HC CIRCUIT, METANEB

## 2023-10-04 PROCEDURE — 85027 COMPLETE CBC AUTOMATED: CPT

## 2023-10-04 PROCEDURE — 83735 ASSAY OF MAGNESIUM: CPT

## 2023-10-04 PROCEDURE — C9113 INJ PANTOPRAZOLE SODIUM, VIA: HCPCS | Mod: JZ

## 2023-10-04 PROCEDURE — 250N000011 HC RX IP 250 OP 636: Mod: JZ

## 2023-10-04 PROCEDURE — 71045 X-RAY EXAM CHEST 1 VIEW: CPT | Mod: 26 | Performed by: RADIOLOGY

## 2023-10-04 PROCEDURE — 200N000002 HC R&B ICU UMMC

## 2023-10-04 PROCEDURE — 999N000253 HC STATISTIC WEANING TRIALS

## 2023-10-04 PROCEDURE — 94003 VENT MGMT INPAT SUBQ DAY: CPT

## 2023-10-04 PROCEDURE — 82803 BLOOD GASES ANY COMBINATION: CPT

## 2023-10-04 PROCEDURE — 250N000011 HC RX IP 250 OP 636: Performed by: NURSE PRACTITIONER

## 2023-10-04 PROCEDURE — 250N000013 HC RX MED GY IP 250 OP 250 PS 637

## 2023-10-04 RX ORDER — HYDROMORPHONE HCL IN WATER/PF 6 MG/30 ML
0.2 PATIENT CONTROLLED ANALGESIA SYRINGE INTRAVENOUS ONCE
Status: DISCONTINUED | OUTPATIENT
Start: 2023-10-04 | End: 2023-10-04

## 2023-10-04 RX ORDER — ACETAMINOPHEN 500 MG
1000 TABLET ORAL EVERY 8 HOURS PRN
Status: DISCONTINUED | OUTPATIENT
Start: 2023-10-04 | End: 2023-10-04

## 2023-10-04 RX ORDER — ASPIRIN 81 MG/1
81 TABLET, CHEWABLE ORAL DAILY
Status: DISCONTINUED | OUTPATIENT
Start: 2023-10-04 | End: 2023-10-18 | Stop reason: HOSPADM

## 2023-10-04 RX ORDER — FOLIC ACID 1 MG/1
1 TABLET ORAL DAILY
Status: DISCONTINUED | OUTPATIENT
Start: 2023-10-04 | End: 2023-10-04

## 2023-10-04 RX ORDER — MULTIPLE VITAMINS W/ MINERALS TAB 9MG-400MCG
1 TAB ORAL DAILY
Status: DISCONTINUED | OUTPATIENT
Start: 2023-10-04 | End: 2023-10-04

## 2023-10-04 RX ORDER — ASPIRIN 81 MG/1
81 TABLET, CHEWABLE ORAL ONCE
Status: COMPLETED | OUTPATIENT
Start: 2023-10-04 | End: 2023-10-04

## 2023-10-04 RX ORDER — POLYETHYLENE GLYCOL 3350 17 G/17G
17 POWDER, FOR SOLUTION ORAL DAILY
Status: DISCONTINUED | OUTPATIENT
Start: 2023-10-04 | End: 2023-10-18 | Stop reason: HOSPADM

## 2023-10-04 RX ORDER — ACETAMINOPHEN 500 MG
1000 TABLET ORAL EVERY 8 HOURS
Status: DISCONTINUED | OUTPATIENT
Start: 2023-10-04 | End: 2023-10-18 | Stop reason: HOSPADM

## 2023-10-04 RX ORDER — ATORVASTATIN CALCIUM 80 MG/1
80 TABLET, FILM COATED ORAL EVERY EVENING
Status: DISCONTINUED | OUTPATIENT
Start: 2023-10-04 | End: 2023-10-18 | Stop reason: HOSPADM

## 2023-10-04 RX ORDER — THIAMINE HYDROCHLORIDE 100 MG/ML
200 INJECTION, SOLUTION INTRAMUSCULAR; INTRAVENOUS 3 TIMES DAILY
Status: COMPLETED | OUTPATIENT
Start: 2023-10-04 | End: 2023-10-05

## 2023-10-04 RX ORDER — LIDOCAINE 4 G/G
1-2 PATCH TOPICAL EVERY 24 HOURS
Status: DISCONTINUED | OUTPATIENT
Start: 2023-10-04 | End: 2023-10-18 | Stop reason: HOSPADM

## 2023-10-04 RX ORDER — IPRATROPIUM BROMIDE AND ALBUTEROL SULFATE 2.5; .5 MG/3ML; MG/3ML
3 SOLUTION RESPIRATORY (INHALATION)
Status: DISCONTINUED | OUTPATIENT
Start: 2023-10-04 | End: 2023-10-05

## 2023-10-04 RX ORDER — ACETAMINOPHEN 500 MG
1000 TABLET ORAL EVERY 6 HOURS PRN
Status: DISCONTINUED | OUTPATIENT
Start: 2023-10-04 | End: 2023-10-18 | Stop reason: HOSPADM

## 2023-10-04 RX ORDER — FOLIC ACID 5 MG/ML
1 INJECTION, SOLUTION INTRAMUSCULAR; INTRAVENOUS; SUBCUTANEOUS DAILY
Status: COMPLETED | OUTPATIENT
Start: 2023-10-05 | End: 2023-10-06

## 2023-10-04 RX ORDER — IOPAMIDOL 755 MG/ML
135 INJECTION, SOLUTION INTRAVASCULAR ONCE
Status: COMPLETED | OUTPATIENT
Start: 2023-10-04 | End: 2023-10-04

## 2023-10-04 RX ORDER — MULTIPLE VITAMINS W/ MINERALS TAB 9MG-400MCG
1 TAB ORAL DAILY
Status: DISCONTINUED | OUTPATIENT
Start: 2023-10-04 | End: 2023-10-18 | Stop reason: HOSPADM

## 2023-10-04 RX ORDER — FOLIC ACID 1 MG/1
1 TABLET ORAL DAILY
Status: DISCONTINUED | OUTPATIENT
Start: 2023-10-07 | End: 2023-10-18 | Stop reason: HOSPADM

## 2023-10-04 RX ORDER — FOLIC ACID 5 MG/ML
1 INJECTION, SOLUTION INTRAMUSCULAR; INTRAVENOUS; SUBCUTANEOUS ONCE
Status: COMPLETED | OUTPATIENT
Start: 2023-10-04 | End: 2023-10-04

## 2023-10-04 RX ORDER — ASPIRIN 300 MG/1
300 SUPPOSITORY RECTAL ONCE
Status: COMPLETED | OUTPATIENT
Start: 2023-10-04 | End: 2023-10-04

## 2023-10-04 RX ADMIN — THIAMINE HYDROCHLORIDE 200 MG: 100 INJECTION, SOLUTION INTRAMUSCULAR; INTRAVENOUS at 14:23

## 2023-10-04 RX ADMIN — SENNOSIDES AND DOCUSATE SODIUM 1 TABLET: 50; 8.6 TABLET ORAL at 20:29

## 2023-10-04 RX ADMIN — CANGRELOR 2 MCG/KG/MIN: 50 INJECTION, POWDER, LYOPHILIZED, FOR SOLUTION INTRAVENOUS at 05:21

## 2023-10-04 RX ADMIN — CHLORHEXIDINE GLUCONATE 15 ML: 1.2 RINSE ORAL at 09:44

## 2023-10-04 RX ADMIN — LIDOCAINE PATCH 4% 2 PATCH: 40 PATCH TOPICAL at 09:45

## 2023-10-04 RX ADMIN — THIAMINE HYDROCHLORIDE 200 MG: 100 INJECTION, SOLUTION INTRAMUSCULAR; INTRAVENOUS at 09:45

## 2023-10-04 RX ADMIN — METOCLOPRAMIDE 10 MG: 5 INJECTION, SOLUTION INTRAMUSCULAR; INTRAVENOUS at 00:56

## 2023-10-04 RX ADMIN — Medication 2 MCG/KG/MIN: at 00:31

## 2023-10-04 RX ADMIN — ASPIRIN 81 MG CHEWABLE TABLET 81 MG: 81 TABLET CHEWABLE at 16:14

## 2023-10-04 RX ADMIN — PANTOPRAZOLE SODIUM 40 MG: 40 INJECTION, POWDER, FOR SOLUTION INTRAVENOUS at 09:44

## 2023-10-04 RX ADMIN — IPRATROPIUM BROMIDE AND ALBUTEROL SULFATE 3 ML: 2.5; .5 SOLUTION RESPIRATORY (INHALATION) at 15:53

## 2023-10-04 RX ADMIN — TICAGRELOR 180 MG: 90 TABLET ORAL at 16:14

## 2023-10-04 RX ADMIN — ATORVASTATIN CALCIUM 80 MG: 80 TABLET, FILM COATED ORAL at 20:29

## 2023-10-04 RX ADMIN — SODIUM CHLORIDE: 9 INJECTION, SOLUTION INTRAVENOUS at 17:54

## 2023-10-04 RX ADMIN — IOPAMIDOL 135 ML: 755 INJECTION, SOLUTION INTRAVENOUS at 08:32

## 2023-10-04 RX ADMIN — CANGRELOR 1.5 MCG/KG/MIN: 50 INJECTION, POWDER, LYOPHILIZED, FOR SOLUTION INTRAVENOUS at 14:23

## 2023-10-04 RX ADMIN — HUMAN ALBUMIN MICROSPHERES AND PERFLUTREN 5 ML: 10; .22 INJECTION, SOLUTION INTRAVENOUS at 10:24

## 2023-10-04 RX ADMIN — IPRATROPIUM BROMIDE AND ALBUTEROL SULFATE 3 ML: 2.5; .5 SOLUTION RESPIRATORY (INHALATION) at 09:55

## 2023-10-04 RX ADMIN — ACETAMINOPHEN 1000 MG: 500 TABLET ORAL at 17:54

## 2023-10-04 RX ADMIN — FOLIC ACID 1 MG: 5 INJECTION, SOLUTION INTRAMUSCULAR; INTRAVENOUS; SUBCUTANEOUS at 10:07

## 2023-10-04 RX ADMIN — THIAMINE HYDROCHLORIDE 200 MG: 100 INJECTION, SOLUTION INTRAMUSCULAR; INTRAVENOUS at 20:29

## 2023-10-04 RX ADMIN — IPRATROPIUM BROMIDE AND ALBUTEROL SULFATE 3 ML: 2.5; .5 SOLUTION RESPIRATORY (INHALATION) at 20:00

## 2023-10-04 ASSESSMENT — ACTIVITIES OF DAILY LIVING (ADL)
TRANSFERRING: 1-->ASSISTANCE (EQUIPMENT/PERSON) NEEDED (NOT DEVELOPMENTALLY APPROPRIATE)
WALKING_OR_CLIMBING_STAIRS: AMBULATION DIFFICULTY, REQUIRES EQUIPMENT
DIFFICULTY_EATING/SWALLOWING: NO
TRANSFERRING: 1-->ASSISTANCE (EQUIPMENT/PERSON) NEEDED
DRESSING/BATHING_DIFFICULTY: NO
ADLS_ACUITY_SCORE: 49
CONCENTRATING,_REMEMBERING_OR_MAKING_DECISIONS_DIFFICULTY: NO
WALKING_OR_CLIMBING_STAIRS_DIFFICULTY: YES
WEAR_GLASSES_OR_BLIND: NO
CHANGE_IN_FUNCTIONAL_STATUS_SINCE_ONSET_OF_CURRENT_ILLNESS/INJURY: YES
ADLS_ACUITY_SCORE: 49
ADLS_ACUITY_SCORE: 36
ADLS_ACUITY_SCORE: 47
ADLS_ACUITY_SCORE: 49
ADLS_ACUITY_SCORE: 36
ADLS_ACUITY_SCORE: 49
DOING_ERRANDS_INDEPENDENTLY_DIFFICULTY: YES
TOILETING_ISSUES: NO
EQUIPMENT_CURRENTLY_USED_AT_HOME: WALKER, ROLLING;CANE, STRAIGHT
ADLS_ACUITY_SCORE: 49
ADLS_ACUITY_SCORE: 36
ADLS_ACUITY_SCORE: 36
FALL_HISTORY_WITHIN_LAST_SIX_MONTHS: YES

## 2023-10-04 ASSESSMENT — VISUAL ACUITY
OU: OTHER (SEE COMMENT)
OU: NORMAL ACUITY;BASELINE
OU: OTHER (SEE COMMENT)
OU: NORMAL ACUITY;BASELINE
OU: OTHER (SEE COMMENT)
OU: NORMAL ACUITY;BASELINE
OU: NORMAL ACUITY;BASELINE
OU: OTHER (SEE COMMENT)
OU: OTHER (SEE COMMENT)
OU: NORMAL ACUITY;BASELINE
OU: OTHER (SEE COMMENT)
OU: NORMAL ACUITY;BASELINE
OU: OTHER (SEE COMMENT)
OU: NORMAL ACUITY;BASELINE
OU: OTHER (SEE COMMENT)
OU: NORMAL ACUITY;BASELINE
OU: OTHER (SEE COMMENT)

## 2023-10-04 NOTE — PROGRESS NOTES
CLINICAL NUTRITION SERVICES - ASSESSMENT NOTE     Nutrition Prescription    RECOMMENDATIONS FOR MDs/PROVIDERS TO ORDER:  - Total daily fluids/adjustments per MD   - Extubation and diet adv vs enteral access/TFs. Discussed on rounds. Please consult Nutrition Services Adult IP Consult with reason Registered Dietitian to Order TF per Medical Nutrition Therapy Guidelines if EN becomes POC vs consult for ppFT placement with Cortrak as indicated.     Malnutrition Status:   - Severe malnutrition in the context of acute on chronic illness    Recommendations already ordered by Registered Dietitian (RD):  - None currently while nutrition POC pending     Future/Additional Recommendations:  - EXT and diet vs enteral access (Cortrak vs rads) and EN    - IF EN, once enteral access placed and verified for use: recommend: Pivot 1.5 Stuart (or equivalent) @ goal of  50ml/hr  (1200ml/day) provides: 1800 kcals (27 kcals/kg), 112 g PRO (1.7 gm/kg), 900 ml free H20, 206 g CHO, and 9 g fiber daily.   - Initiate @ 10 ml/hr and advance by 10 ml q8hr as tolerated  - Do not start or advance until lytes (Mg++,K+) WNL and phos>2.0  - Recommend 30-60 ml q4hr fluid flushes for tube patency. Additional fluids and/or adjustments per MD.    - Order multivitamin/mineral (15 ml/day via FT) to help ensure micronutrient needs being met with suspected hypermetabolic demands and potential interruptions to TF infusions.  - Elevated HOB if gastric feeds   - Thiamine administration prior to feeds (underway)        REASON FOR ASSESSMENT  Eber Jin is a/an 62 year old male assessed by the dietitian for Nutrition Risk Monitoring    Medical History: history of tobacco use, HTN, HLD, CAD, HFrEF 35%, paroxysmal a-fib, recent PE, R pontine infarct 03/2021 with baseline mild left leg weakness c/b recurrent falls and gait imbalance, COPD, recent admission to [facility] for rib fractures and PE who was initially admitted to [facility] ED 10/2/23 for surgical  "workup of multiple traumatic L displaced rib fractures in setting of recurrent falls, transferred to [facility] for consideration of thrombectomy after stroke code called for aphasia, found to have acute L MCA/OMER watershed infarcts and possible L M2 superior division occlusion. He underwent L carotid stenting and angioplasty. Post CT head with L frontal SAH that was stable on repeat scan.     NUTRITION HISTORY  Unable - pt vented     CURRENT NUTRITION ORDERS  Diet: NPO  Enteral access: NG/OG unsuccessful x 6     LABS  Labs reviewed  Lytes WNL  Glucose: 93; 72    MEDICATIONS  Medications reviewed  Folic acid  Thiamine   Thera-vit-M  Miralax  Senokot     ANTHROPOMETRICS  Height: 0 cm (Data Unavailable)   Ht Readings from Last 2 Encounters:   02/10/23 1.803 m (5' 11\")   11/25/22 1.803 m (5' 11\")   71\"   Most Recent Weight: 65.5 kg (144 lb 6.4 oz)    IBW: 78 kg  BMI: Normal BMI  Weight History:   Wt Readings from Last 9 Encounters:   10/04/23 65.5 kg (144 lb 6.4 oz)   02/10/23 78 kg (172 lb)   11/25/22 79.7 kg (175 lb 12 oz)   08/31/22 76.9 kg (169 lb 8 oz)   06/23/22 79.4 kg (175 lb)   05/23/22 77.6 kg (171 lb)   05/12/22 78 kg (172 lb)   05/04/22 74.5 kg (164 lb 3.2 oz)   04/30/22 80.7 kg (178 lb)   16% weight loss over ~ 8 months     Dosing Weight: 66 kg (current weight)    ASSESSED NUTRITION NEEDS  Estimated Energy Needs: 4000-7353 kcals/day (25 - 30 kcals/kg)  Justification: Maintenance  Estimated Protein Needs:  grams protein/day (1.5 - 2 grams of pro/kg)  Justification: Increased needs  Estimated Fluid Needs: (1 mL/kcal)   Justification: Maintenance and Per provider pending fluid status    PHYSICAL FINDINGS  See malnutrition section below.  Skin: Dry, flaky (possible nutrition-related causes: increased or decreased vitamin A; fluid; EFA). Ecchymotic   Hair: NA  Nails: clubbed (likely medically related with COPD). Otherwise lackluster  Mouth/lips: swollen but otherwise not obvious s/s of micronutrient " deficiency but unable to fully view.   Frankl muscle wasting; LE especially     MALNUTRITION  % Intake: Unable to assess  % Weight Loss: 20% in 1 year (moderate)  Subcutaneous Fat Loss: Upper arm and Thoracic/intercostal: mild; unclear baseline but did have weight loss over 8 months   Muscle Loss: Scapular bone: moderate, Thoracic region (clavicle, acromium bone, deltoid, trapezius, pectoral), Upper arm (bicep, tricep), Lower arm  (forearm), Dorsal hand: all moderate, Upper leg (quadricep, hamstring), Patellar region, and Posterior calf: all severe   Fluid Accumulation/Edema: None noted  Malnutrition Diagnosis: Severe malnutrition in the context of acute on chronic illness    NUTRITION DIAGNOSIS  Inadequate oral intake related to inability to take oral PO/vented as evidenced by NPO and currently meeting 0% nutrition needs       INTERVENTIONS  Implementation  Nutrition Education: Provided education on role of RD; NFPE -- pt awake on vented, responded to name    Enteral Nutrition - recs made, pending nutrition POC      Goals  Diet adv v nutrition support within 24-48 hours      Monitoring/Evaluation  Progress toward goals will be monitored and evaluated per protocol.    Lauren Greer RD, LD, [facility]  Neuro ICU  Pager: [phone]

## 2023-10-04 NOTE — PLAN OF CARE
Problem: Malnutrition  Goal: Improved Nutritional Intake  Outcome: Unable to Meet  Intervention: Promote and Optimize Oral Intake  Recent Flowsheet Documentation  Taken 10/4/2023 0700 by Lauren Greer RD  Nutrition Interventions: other (see comments)   Goal Outcome Evaluation: Extubation and diet vs enteral access and EN. See RD note for details

## 2023-10-04 NOTE — CONSULTS
Urology Quick Consult:    Per discussion with the RN, Eber Jin transferred from the [facility] today (where he had a Huggins) to the [place] (where Huggins removal is preferred). .     Today, the RN took down the Huggins balloon and was able to move the Huggins out a small amount, but then was not able to either fully remove it or fully advance it back into the bladder.  Urology was called for assistance.     O:  CIrcumcised phallus.  Meatus patent with Huggins in place - no evidence of trauma.   Huggins draining clear yellow urine.     PROCEDURE:  Using a 10cc syringe, I aspirated via the Huggins balloon port and was able to remove no fluid  Attempted Huggins removal gently but it wouldn't budge  I then transected the Huggins proximally and there was a rush of several cc's of fluid from the catheter, although it was difficult to know whether this was urine Vs. fluid from the Huggins balloon.    At this point, I then removed the Huggins without difficulty   Inspection of the Huggins - there was a smear of blood (from the prostate or urethra) on the tip of the Huggins but the balloon had been fully decompressed.    Patient tolerated without difficulty.  At conclusion there was no urethral bleeding or other signs of trauma.     Assessment:  - Suspect the Huggins balloon was not able to be fully decompressed using the 10cc syringe.  Cutting the Huggins did allow for full decompression of the balloon    PLAN:  Urinary tract mgmt per primary team    [facility]

## 2023-10-04 NOTE — PROGRESS NOTES
Neuro Interventional Progress Note    10/04/2023    Eber Jin is a 62 year old year old male patient admitted on 10/3/2023 with acute left ICA stenosis and right sided weakness, he is now status post stent placement  and angioplasty into the left ICA.       Problem List  1. Acute L MCA watershed stroke- now status post ICA stent and angioplasty due to severe symptomatic stenosis in setting of acute stroke.     24 hour events:  Unable to get dopplerable pulses- has a history of occlusions of the left common iliac, external iliac, proximal left internal iliac, left common femoral and left superficial femoral back in .     24 Hour Vital Signs Summary:  Temperatures:  Current - Temp: 99.7  F (37.6  C); Max - Temp  Av.9  F (37.2  C)  Min: 96.4  F (35.8  C)  Max: 99.9  F (37.7  C)  Respiration range: Resp  Av.9  Min: 11  Max: 29  Pulse range: Pulse  Av.2  Min: 65  Max: 143  Blood pressure range: Systolic (24hrs), Av , Min:85 , Max:168   ; Diastolic (24hrs), Av, Min:67, Max:120    Pulse oximetry range: SpO2  Av.7 %  Min: 72 %  Max: 100 %    Current Medications:   aspirin  81 mg Oral Daily    chlorhexidine  15 mL Mouth/Throat Q12H    famotidine  20 mg Intravenous Q12H    pantoprazole  40 mg Per Feeding Tube QAM AC    Or    pantoprazole  40 mg Intravenous QAM AC    senna-docusate  1-2 tablet Oral or NG Tube BID    sodium chloride (PF)  3 mL Intracatheter Q8H       PRN Medications:  - MEDICATION INSTRUCTIONS -, glucose **OR** dextrose **OR** glucagon, hydrALAZINE, labetalol, lidocaine 4%, lidocaine (buffered or not buffered), - MEDICATION INSTRUCTIONS -, - MEDICATION INSTRUCTIONS -, propofol **AND** propofol **AND** CK total **AND** Triglycerides **AND** - MEDICATION INSTRUCTIONS - **AND** Notify Physician, metoprolol, sodium chloride (PF)    Infusions:   cangrelor (KENGREAL) 50 mg in sodium chloride 0.9 % 250 mL infusion 2 mcg/kg/min (10/04/23 0600)    - MEDICATION INSTRUCTIONS  -      - MEDICATION INSTRUCTIONS -      - MEDICATION INSTRUCTIONS -      propofol Stopped (10/03/23 2139)    And    - MEDICATION INSTRUCTIONS -      norepinephrine Stopped (10/03/23 2056)    sodium chloride 50 mL/hr at 10/04/23 0700       Allergies   Allergen Reactions    Penicillins Swelling       Physical Examination:  Puncture site was clean, dry and intact without evidence of hematoma or pain to palpation.    Mental:Awake, alert, attentive, oriented to self, month, place via yes and no questions, follows commands,   Cranial Nerves: VFF, PERRL,EOMI,  face symmetric, left sided face swelling  Motor: 4/5 strength in right upper and lower, 5/5 strength in left upper and lower   Sensory: sensation intact   Cerebellar: unable to test   Gait: deferred    Labs/Studies:  Recent Labs   Lab Test 10/04/23  0439 10/04/23  0058 10/03/23  2204 10/03/23  2138 10/03/23  2125 10/03/23  1646 10/03/23  1536 10/03/23  0735 10/02/23  1333   NA  --   --  135  --   --   --   --  137 138   POTASSIUM  --   --  4.3  --   --   --   --  4.5 4.4   CHLORIDE  --   --  101  --   --   --   --  101 101   CO2  --   --  18*  --   --   --   --  26 22   ANIONGAP  --   --  16*  --   --   --   --  10 15   GLC 96 82 78  --    < >  --    < > 98 90   BUN  --   --  13.2  --   --   --   --  11.0 13.8   CR  --   --  1.04  --   --   --   --  0.93 0.99   LISA  --   --  8.9  --   --   --   --  9.3 9.7   WBC  --   --   --  9.8  --  7.7  --  7.2 9.6   RBC  --   --   --  4.17*  --  4.03*  --  4.10* 4.10*   HGB  --   --   --  12.8*  --  12.5*  --  12.8* 12.8*   PLT  --   --   --  234  --  258  --  256 278    < > = values in this interval not displayed.       Recent Labs   Lab Test 10/03/23  2137 10/03/23  1646 10/03/23  1103   INR 1.10 1.13 1.13   PTT 29 32 30         Recent Labs   Lab 10/04/23  0142   O2PER 60       Imaging:  Head Ct: 10/4: IMPRESSION:  Unchanged left frontal subarachnoid hemorrhage compared to prior CT.    Assessment/Plan  Acute ischemic watershed  strokes from symptomatic left ICA stenosis. Now status post stent placement and angioplasty.   Plan:  -recommend CTA pelvis with run off to assess lower limb perfusion- history of left sided occlusions  -stroke work-up per critical care team  -Cangrelor infusion until able to have OG/NG tube placed and be placed on Brilinta  -Brilinta 180mg loading dose- then after 2 hours stop Cangrelor gtt  -Stat head CT for any neuro changes  -    Shari Blanton MD  Endovascular Surgical Neuroradiology Fellow  [facility]  [phone]    Endovascular Surgical Neuroradiology staff is Dr. Perry

## 2023-10-04 NOTE — PHARMACY-ADMISSION MEDICATION HISTORY
Admission medication history completed at [facility]. Please see Pharmacist Admission Medication History note from 10/2/2023, copied below.    Yolie Hale PharmD  Pharmacy Resident      Pharmacist Admission Medication History     Admission medication history is complete. The information provided in this note is only as accurate as the sources available at the time of the update.     Medication reconciliation/reorder completed by provider prior to medication history? No     Information Source(s): Patient, Clinic records, Hospital records, and CareEverywhere/SureScripts via in-person     Pertinent Information: unable to see [facility] discharge summary from two weeks ago. Home care visit from today notes medication non compliance      Changes made to PTA medication list:  Added: none  Deleted: Am-Lac  Changed: None     Medication Affordability:        Allergies reviewed with patient and updates made in EHR: no     Medication History Completed By: Jv Spence [facility] 10/2/2023 2:46 PM              Prior to Admission medications    Medication Sig Last Dose Taking? Auth Provider Long Term End Date   albuterol (PROAIR HFA/PROVENTIL HFA/VENTOLIN HFA) 108 (90 Base) MCG/ACT inhaler Inhale 2 puffs into the lungs every 6 hours as needed for shortness of breath / dyspnea or wheezing   Yes Hima Boyle MD Yes     aspirin (ASA) 81 MG EC tablet Take 1 tablet (81 mg) by mouth daily Past Week Yes Gregory Carter MD       atorvastatin (LIPITOR) 80 MG tablet Take 1 tablet (80 mg) by mouth every evening Past Week Yes Gregory Carter MD Yes     empagliflozin (JARDIANCE) 10 MG TABS tablet Take 1 tablet (10 mg) by mouth daily Past Month Yes Hima Boyle MD       furosemide (LASIX) 40 MG tablet Take 1 tablet (40 mg) by mouth daily Past Week Yes Gregory Carter MD Yes     hydrALAZINE (APRESOLINE) 10 MG tablet Take 3 tablets (30 mg) by mouth 3 times daily Past Week Yes Raul  MD Gregory Yes     losartan (COZAAR) 100 MG tablet Take 1 tablet (100 mg) by mouth daily Past Week Yes Hima Boyle MD Yes     magnesium oxide (MAG-OX) 400 MG tablet Take 1 tablet (400 mg) by mouth daily Past Week Yes Elisabeth Nava MD       metoprolol succinate ER (TOPROL XL) 100 MG 24 hr tablet Take 1 tablet (100 mg) by mouth daily Past Week Yes Gregory Carter MD Yes     rivaroxaban ANTICOAGULANT (XARELTO) 20 MG TABS tablet Take 1 tablet (20 mg) by mouth daily (with dinner) Past Week Yes Gregory Carter MD Yes     spironolactone (ALDACTONE) 25 MG tablet Take 1 tablet (25 mg) by mouth daily Past Week Yes Gregory Carter MD Yes

## 2023-10-04 NOTE — PROGRESS NOTES
..Patient suctioned and electively extubated per physician order. Placed on LFNC @ 2 LPM, breath sounds were diminished, labs pending. Patient tolerated procedure well without any immediate complications.    Radha Camp, RT, RT  10/4/2023 2:57 PM

## 2023-10-04 NOTE — PLAN OF CARE
Occupational Therapy Discharge Summary    Reason for therapy discharge:    Transferred to another hospital    Progress towards therapy goal(s). See goals on Care Plan in [facility] electronic health record for goal details.  Goals partially met.  Barriers to achieving goals:    .    Therapy recommendation(s):    Continued therapy as indicated at new facility

## 2023-10-04 NOTE — PROGRESS NOTES
[facility]     Endovascular Surgical Neuroradiology Post-Procedure Note    Pre-Procedure Diagnosis:  L ICA stenosis  Post-Procedure Diagnosis:  L ICA stenosis    Procedure(s):   Diagnostic cervicocerebral angiography    Findings:  left ICA severe stenosis, now status post left ICA stent wingspan. Cangrelor infusion was started with 30mcg/kg bolus and then transitioned to infusion at 2mcg/kg/min    Plan:  admit to NCCU  Stat head CT  SBP goal < 150  Cangrelor continue infusion 2mcg/kg/ min  Continue for at least for 2 hours or longer when able to switch to oral therapy.     Transitioning Patients to Oral P2Y12 Therapy    Ticagrelor: 180 mg at any time during Cangrelor infusion or immediately after  Discontinuation.    OR    Clopidogrel: 600 mg immediately after discontinuation of Cangrelor. Do not  administer clopidogrel prior to discontinuation of Cangrelor         Primary Surgeon:  Dr. Rojas Perry  Secondary Surgeon:  Not applicable  Secondary Surgeon Review:  None  Fellow:  Maico Raines MD  Additional Assistants:  None    Prior to the start of the procedure and with procedural staff participation, I verbally confirmed: the patient s identity using two indicators, relevant allergies, that the procedure was appropriate and matched the consent or emergent situation, and that the correct equipment/implants were available. Immediately prior to starting the procedure I conducted the Time Out with the procedural staff and re-confirmed the patient s name, procedure, and site/side. (The Joint Commission universal protocol was followed.)  Yes    PRU value: Not applicable    Anesthesia:  Conscious Sedation  Medications:  50 mcg IV Fentanyl, 1g IV Midazolam, Propofol 30 mcg/kg/min  Puncture site:  Right Femoral Artery    Fluoroscopy time (minutes):  39.5  Radiation dose (mGy):   1148     Contrast amount (mL):   100      Estimated blood loss (mL):   50    Closure:  Device- Starclose    Disposition:  Will be followed in hospital by the Neuro Critical Care/Stroke team.        Sedation Post-Procedure Summary    Sedatives: Fentanyl, Midazolam (Versed), and Propofol    Vital signs and pulse oximetry were monitored and remained stable throughout the procedure, and sedation was maintained until the procedure was complete.  The patient was monitored by staff until sedation discharge criteria were met.    Patient tolerance:  Patient tolerated the procedure well with no immediate complications.    Time of sedation in minutes:  35 minutes from beginning to end of physician one to one monitoring.  Shari Blanton MD  Endovascular Surgical Neuroradiology Fellow  [facility]  [phone]    Endovascular Surgical Neuroradiology staff is Dr. Perry

## 2023-10-04 NOTE — CONSULTS
VASCULAR SURGERY INPATIENT CONSULTATION / Initial In-Patient visit    VASCULAR SURGEON: Dr. Grijalva    LOCATION: [facility]    Eber Jin  Medical Record #:  [id number]  YOB: 1960  Age:  62 year old     Date of Service: 10/3/2023    PRIMARY CARE PROVIDER: Hima Boyle    Reason for consultation: AAA 3.2 cm with possibly new intramural thrombus    IMPRESSION / RECOMMENDATION:   Eber Jin is a 62-year-old gentleman recently admitted for multiple falls, developed stroke symptoms of aphasia in the ED, found to have L MCA/OMER watershed infarcts and possible L M2 superior division occlusion, now status post L carotid stenting and angioplasty with neuro IR 10/3/23. CTA with runoffs completed this morning for concern of pulseless RLE, found to have mural thrombus, thrombus occludes/extends through left iliac arteries and AAA 3.2 cm. CTA abdomen pelvis from 9/20/2023 at outside hospital describes the unchanged infrarenal AAA 3.2cm. He was also found to have chronic occlusions of left common iliac, proximal internal iliac, external iliac common femoral and visualized SFAs. Patient has a history of extensive aortoiliac atherosclerosis with right common and internal iliac fusiform dilation, present on prior CTAs dating 6/2018. Patient with a history of Afib, prior MI and unprovoked PE for which was anticoagulated with Xarelto. He remains compliant with atorvastatin. During this hospitalization he was started on Cangrelor now being transitioned to Ticagrelor. Upon further discussion, patient denies numbness or tingling in any extremity, he is found to have a doppler DP on RLE and doppler PT on LLE, he has bilateral popliteal doppler signals. Declined smoking cessation counseling and declines surgical interventions for his PAD. From a vascular surgery perspective, there are no indications for vascular intervention. He has claudication, denies rest pain and there  are no signs of acute limb ischemia.     We recommend:  - No indications for anticoagulation in setting of mural thrombus from a vascular surgery perspective.   - Continue best medical therapy: asa, statin and normotension    Discussed pt history, exam, assessment and plan with Dr. Burgess who discussed with Dr. Grijalva.     Thank you for involving vascular surgery in the care of Eber Jin. Should any questions or concerns arise, please don't hesitate to contact us.    Nela Fuchs, CNP  Vascular Surgery  Pager: [phone]  [email or web address]  Send message or 10 digit call back number Securely via [employer] with the Vocera Web Console (learn more here)      HPI:  Eber Jin is a 62 year old male who was seen today in consultation for infrarenal AAA 3.2 and mural thrombus. Patient's PMH includes tobacco use, HTN, HLD, CAD, HFrEF 35%, paroxysmal a-fib, recent unprovoked PE on xarelto and aspirin, R pontine infarct 03/2021 with baseline mild left leg weakness c/b recurrent falls and gait imbalance, COPD, recent admission 9/20-24/23 to [facility] for rib fractures and PE who was initially admitted to [facility] ED 10/2/23 for surgical workup of multiple traumatic L displaced rib fractures in setting of recurrent falls, transferred to [facility] for consideration of thrombectomy after stroke code called 10/3/23 for aphasia, found to have acute L MCA/OMER watershed infarcts and possible L M2 superior division occlusion. Initial NIHSS 17. He underwent L carotid stenting and angioplasty. Has had PAD since the 1990's and declined smoking cessation. Notes he utilizes the motorized cart for ambulation at [facility], able to walk about 2 blocks before requiring rest. Claudication not lifestyle limiting. He has no pain at rest, no overt discoloration, no open wounds on bilateral lower extremities and feet are warm on assessment.     PHH:    Past Medical History:   Diagnosis Date     Accelerated  hypertension      Arthritis      CAD (coronary artery disease)      Cerebrovascular accident (CVA), unspecified mechanism (H)      CHF (congestive heart failure) (H)      Chronic atrial fibrillation (H)     on rivaroxaban     COPD (chronic obstructive pulmonary disease) (H)      Heart failure (H)     ischemic, EF 45 % im 3/2021     HLD (hyperlipidemia)      HTN (hypertension)      Hypertensive urgency      Myocardial infarction (H)      PAD (peripheral artery disease) (H24)      Peripheral vascular disease with claudication (H24)      Prolonged Q-T interval on ECG          Past Surgical History:   Procedure Laterality Date     CARDIAC CATHETERIZATION       IR ABDOMINAL AORTOGRAM  8/6/2013     IR EXTREMITY ANGIOGRAM BILATERAL  8/6/2013        ALLERGIES:     Allergies   Allergen Reactions     Penicillins Swelling        MEDS:    Current Facility-Administered Medications:      acetaminophen (TYLENOL) tablet 1,000 mg, 1,000 mg, Oral or Feeding Tube, Q8H, Mckayla Holden CNP     acetaminophen (TYLENOL) tablet 1,000 mg, 1,000 mg, Oral, Q6H PRN, Evelia Medina NP     aspirin (ASA) chewable tablet 81 mg, 81 mg, Oral or Feeding Tube, Daily, Saroj Morel MD     aspirin (ASA) Suppository 300 mg, 300 mg, Rectal, Once, Saroj Morel MD     cangrelor (KENGREAL) 50 mg in sodium chloride 0.9 % 250 mL infusion, 1.5 mcg/kg/min (Dosing Weight), Intravenous, Continuous, Mckayla Holden CNP, Last Rate: 29.5 mL/hr at 10/04/23 1300, 1.5 mcg/kg/min at 10/04/23 1300     chlorhexidine (PERIDEX) 0.12 % solution 15 mL, 15 mL, Mouth/Throat, Q12H, Dereje Dickerson MD, 15 mL at 10/04/23 0944     glucose gel 15-30 g, 15-30 g, Oral, Q15 Min PRN **OR** dextrose 50 % injection 25-50 mL, 25-50 mL, Intravenous, Q15 Min PRN **OR** glucagon injection 1 mg, 1 mg, Subcutaneous, Q15 Min PRN, Dereje Dickerson MD     [START ON 10/7/2023] folic acid (FOLVITE) tablet 1 mg, 1 mg, Oral or Feeding Tube, Daily, Evelia Medina, NP     [START  ON 10/5/2023] folic acid injection 1 mg, 1 mg, Intravenous, Daily, Evelia Medina NP     hydrALAZINE (APRESOLINE) injection 10-20 mg, 10-20 mg, Intravenous, Q30 Min PRN, Dereje Dickerson MD     ipratropium - albuterol 0.5 mg/2.5 mg/3 mL (DUONEB) neb solution 3 mL, 3 mL, Nebulization, Q6H, Mckayla Holden CNP, 3 mL at 10/04/23 0955     labetalol (NORMODYNE/TRANDATE) injection 10-20 mg, 10-20 mg, Intravenous, Q10 Min PRN, Dereje Dickerson MD     Lidocaine (LIDOCARE) 4 % Patch 1-2 patch, 1-2 patch, Transdermal, Q24H, Mckayla Holden CNP, 2 patch at 10/04/23 0945     lidocaine (LMX4) cream, , Topical, Q1H PRN, Dereje Dickerson MD     lidocaine 1 % 0.1-1 mL, 0.1-1 mL, Other, Q1H PRN, Dereje Dickerson MD     Medication Considerations - To maintain platelet inhibition after discontinuation of cangrelor (KENGREAL) [see admin instructions], , Does not apply, Continuous PRN, Maico Zaldivar MD     Medication Instruction - Avoid dextrose in IV solutions, , Intravenous, Continuous PRN, Dereje Dickerson MD     metoprolol (LOPRESSOR) injection 2.5 mg, 2.5 mg, Intravenous, Q5 Min PRN, Mckayla Holden CNP     multivitamin w/minerals (THERA-VIT-M) tablet 1 tablet, 1 tablet, Oral or Feeding Tube, Daily, Evelia Medina, NP     pantoprazole (PROTONIX) 2 mg/mL suspension 40 mg, 40 mg, Per Feeding Tube, QAM AC **OR** pantoprazole (PROTONIX) IV push injection 40 mg, 40 mg, Intravenous, QAM AC, Dereje Dickerson MD, 40 mg at 10/04/23 0944     polyethylene glycol (MIRALAX) Packet 17 g, 17 g, Oral or Feeding Tube, Daily, Mckayla Holden, VICTOR MANUEL     senna-docusate (SENOKOT-S/PERICOLACE) 8.6-50 MG per tablet 1-2 tablet, 1-2 tablet, Oral or NG Tube, BID, Dereje Dickerson MD     sodium chloride (PF) 0.9% PF flush 3 mL, 3 mL, Intracatheter, Q8H, Dereje Dickreson MD, 3 mL at 10/04/23 0546     sodium chloride (PF) 0.9% PF flush 3 mL, 3 mL, Intracatheter, q1 min prn, Dereje Dickerson MD, 1,000 mL at  "10/03/23 2123     sodium chloride 0.9% infusion, , Intravenous, Continuous, Dereje Dickerson MD, Last Rate: 50 mL/hr at 10/04/23 1300, Rate Verify at 10/04/23 1300     thiamine (B-1) injection 200 mg, 200 mg, Intravenous, TID, Evelia Medina NP, 200 mg at 10/04/23 0945     [START ON 10/6/2023] thiamine (B-1) tablet 100 mg, 100 mg, Oral or Feeding Tube, TID, Evelia Medina NP     [START ON 10/11/2023] thiamine (B-1) tablet 100 mg, 100 mg, Oral, Daily, Evelia Medina NP     SOCIAL HABITS:    Tobacco Use      Smoking status: Every Day        Packs/day: 0.50        Years: 30.00        Pack years: 15        Types: Cigarettes      Smokeless tobacco: Never     Social History    Substance and Sexual Activity      Alcohol use: Not Currently        Alcohol/week: 1.0 - 2.0 standard drink of alcohol        Comment: Alcoholic Drinks/day: rare use       History   Drug Use No        FAMILY HISTORY:    Family History   Problem Relation Age of Onset     Heart Failure Mother      No Known Problems Father      No Known Problems Brother        REVIEW OF SYSTEMS:    A 12 point ROS was reviewed and except for what is listed in the HPI above, all others are negative    PE:    Vital signs:  Temp: 99.9  F (37.7  C) Temp src: Esophageal BP: (!) 147/88 Pulse: 87   Resp: 27 SpO2: 100 % (Simultaneous filing. User may not have seen previous data.) O2 Device: Mechanical Ventilator     Weight: 65.5 kg (144 lb 6.4 oz)  Estimated body mass index is 20.14 kg/m  as calculated from the following:    Height as of 2/10/23: 1.803 m (5' 11\").    Weight as of this encounter: 65.5 kg (144 lb 6.4 oz).       Wt Readings from Last 1 Encounters:   10/04/23 65.5 kg (144 lb 6.4 oz)     Body mass index is 20.14 kg/m .    EXAM:  GENERAL: This is a well-developed 62 year old male who appears his stated age  CARDIAC:  irregular on tele  CHEST/LUNG:  Nonlabored, no supplemental oxygen required  GASTROINTESINAL (ABDOMEN):Soft, non-tender, no pulsatile " mass   MUSCULOSKELETAL: Grossly normal and both lower extremities are intact.  HEME/LYMPH: No lymphedema  NEUROLOGIC: Focally intact, Alert and oriented x 3. No aphasia noted, strength 5/5 in all extremities   PSYCH: appropriate affect  INTEGUMENT: No open lesions or ulcers  VASCULAR: denies numbness or tingling in any extremity, doppler DP on RLE and doppler PT on LLE, he has bilateral popliteal doppler signals.  Warm, with appropriate dorsiflexion and plantarflexion.    DIAGNOSTIC STUDIES:     Images:  CTA Abdomen Pelvis Bilat Leg Runoff w Contr    Result Date: 10/4/2023  CTA ABDOMEN, PELVIS, AND LOWER EXTREMITY 10/4/2023 8:57 AM CLINICAL HISTORY: CTA pelvis with run off through legs rule out ischemia 62 yr with L MCA stroke s/p L carotid stenting, known peripheral vascular disease, bilaterally absent dorsalis pedis, posterior tibial pulses, weak popliteal pulses, rule out acute limb ischemia. COMPARISONS: None available. REFERRING PROVIDER: MARA MOYA TECHNIQUE: Unenhanced CT performed through the abdomen and pelvis. After the uneventful administration of intravenous contrast, CTA performed from the diaphragm through the feet. CT repeated from the knees through the feet. Coronal and sagittal reconstructions were produced. Lung MIP produced. 3D and multiplanar reconstructions were unavailable at the time of dictation. CONTRAST: 135 mL Isovue 370 DOSE (DLP): 669 mGy*cm FINDINGS: CTA: AORTOILIAC: Infrarenal abdominal aortic aneurysm measures 32 mm in AP diameter at L4. Mural thrombus causes 50-69% diameter luminal narrowing and extends through the left iliac arteries. 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 Aorta measures 28 mm in diameter at the celiac origin. Celiac artery patent. Accessory left hepatic artery to segments 2 and 3 originates from the left gastric artery. Superior mesenteric artery patent. Two right and single left renal arteries patent. Inferior mesenteric artery  patent. Aneurysmal right common iliac artery with mural thrombus causing less than 50% diameter narrowing. Right external iliac artery measures 10 mm in diameter and is patent. Right internal iliac artery occluded with thrombus. Distal branches reconstituted by collaterals. Left common, internal, and external iliac arteries occluded. Distal left internal iliac artery branches reconstituted by collaterals. RIGHT LEG: Right common femoral artery measures 12 mm in diameter. Right common and profundus femoral arteries patent. Occlusion from the superficial femoral artery in the proximal thigh to above knee popliteal artery. Popliteal artery reconstituted by collaterals at the femoral condyles. Thin anterior tibial origin and proximal segment. Tibioperoneal trunk occlusion. Multifocal anterior tibial, peroneal, and posterior tibial artery occlusions through the calf.  Arteries above the ankle and in the foot not opacified, occlusions versus slow flow. LEFT LEG: Common, profundus, and superficial femoral arteries occluded. Profundus branches reconstituted by pelvic collaterals. Thin popliteal artery reconstituted distal to the adductor canal. 50-69% diameter stenosis above the patella. Anterior tibial origin patent. Thin anterior tibial artery patent to the distal shin. Tibioperoneal trunk occlusion. Posterior tibial artery reconstituted in the proximal calf. Peroneal artery occluded. Thin posterior tibial artery patent to the ankle. Arteries in the foot not opacified, occlusion versus slow flow. CT: Left effusion and left lower lobe consolidation. Left rib fractures. Excreted contrast in the gallbladder. Diverticulosis. Spine degenerative changes.     IMPRESSION: 1. Infrarenal abdominal aortic aneurysm measures up to 32 mm in diameter at L4. Mural thrombus causes up to 50-69% diameter luminal narrowing. Thrombus occludes/extends through left iliac arteries. 2. RIGHT LEG:      A. Common iliac 20 mm aneurysm with mural  thrombus.      B. Internal iliac artery occlusion. Distal branches reconstituted by pelvic collaterals.      C. Patent external iliac artery.      D. Proximal superficial femoral through above knee popliteal occlusion.      E. Above knee popliteal artery reconstituted at the femoral condyles.      F. No continuous arterial flow below the knee.      G. Arteries above the ankle and in the foot are unopacified, occlusions versus slow flow. 3. LEFT LEG:      A. Left common iliac to above knee popliteal artery occlusion.      B. Internal iliac artery occluded. Distal branches reconstituted by pelvic collaterals.      C. Profundus femoral artery occlusion. Branches reconstituted by pelvic collaterals.      D. Thin popliteal artery reconstituted by collaterals distal to the adductor canal. 50-69% diameter stenosis above the patella.      E. No continuous arterial flow below the knee.      F. Posterior tibial artery reconstituted in the proximal calf and patent to the ankle.      G. Arteries in the foot are unopacified, occlusions versus slow flow. 4. Left lower lobe consolidation and effusion. Correlate for pneumonia. 5. Left rib fractures. AYLA LAY MD   SYSTEM ID:  [id number]    IR Carotid Cerebral Angiogram Bilateral    Result Date: 10/4/2023  Cerebral Angiography Report [id number] DANIELLE SUAZO 1960 10/3/2023 7:57 PM Brief History: 62-year-old male with significant cardiovascular risk factors including congestive heart failure presenting with acute onset left MCA syndrome with NIH stroke scale of 17. The CTA head and neck demonstrated an area of severe stenosis in the left carotid and probable large vessel occlusion. Therefore, patient was transferred for an emergent stroke thrombectomy and stent placement. Indication for the procedure: Thrombectomy/stent placement Attending/s: Rojas Perry MD Fellow/s: Maico Zaldivar MD, Shari Blanton MD Anesthesia: Intubated and sedated. Fluoro time: 39.5 minutes  "Fluoro dose: 1148 mGy Contrast used: 100 mL of Visi-320 Sedation time: 60 minutes of 1:1 sedation monitoring by the physician Sedatives: IV Fentanyl 50 mcg, IV Midazolam 1 mg, propofol 30 mcg/kg/m Other Medications: Subcutaneous 1% lidocaine, Cangrelor 30 mcg/kg bolus followed by 2 mcg/kg/m infusion. Procedures: 1.  Diagnostic cervicocerebral angiography and interpretation of the images. 2.  Right common femoral arteriotomy 3.  Diagnostic angiography of the right common femoral artery. 4.  Selective catheterization and diagnostic cerebral angiography of the left internal carotid artery 5.  Placement of left internal carotid stent 6.  Balloon angioplasty of the left internal carotid artery 7.  Percutaneous closure of the right femoral arteriotomy by using StarClose device.. Devices and/or Implants Used: 1.  8 Gibraltarian short sheath 2.  8 Gibraltarian POKKT balloon guide catheter 95 cm 3.  6 Gibraltarian Eun 131 cm 4.  Wingspan stent 4.5 mm x 20 mm 5.  Aviator 4.5 mm x 30 mm 6.  Exchange length Synchro standard microwire 0.014\" x 300 cm Consent: The risks and benefits of a conventional diagnostic cerebral angiography and stroke thrombectomy were discussed with the patient and/or patient's family at the bedside who agreed to proceed. The risks, which were discussed included risk of stroke, arterial dissection, groin hematoma, arteriovenous fistula in the groin and pseudoaneurysm of the femoral artery. If and when radial artery approach is used, the risks discussed included radial artery occlusion, hand ischemia. The risks associated with stroke thrombectomy includes risk of worsening of stroke, hemorrhagic conversion of ischemic stroke, arterial perforation. Verbal and written informed consent was obtained. Description of Procedure: Patient was brought to the angiography suite and placed in supine position. Medications were administered by the radiology nursing staff, and the vital signs were monitored throughout procedure under " "1:1 physician monitoring. The patient was prepped and draped in a sterile fashion. A timeout was performed prior to start of the procedure. The right common femoral artery was palpated using standard anatomic landmarks. 1% of lidocaine was injected locally and a small incision was made on the skin overlying the femoral artery using a 11-blade scalpel. A 21-gauge needle was placed into the right femoral artery, which was then exchanged for a 4-Polish dilator over a microwire. The 4-Polish dilator was then exchanged for a 8-Polish sheath over a J-wire using the modified Seldinger's technique. The sheath was connected to a continuous flush of heparinized saline. We then advanced the balloon guide catheter over a beacon tip LeadPoint 2 catheter and catheterized the left common carotid artery. The balloon guide catheter was placed in the left mid common carotid artery. Diagnostic cervical and intracranial runs were performed. There was an area of significant stenosis noted in the left internal carotid artery. This lesion was crossed using a 0.035\" glidewire and a Amarilys intermediate catheter. Diagnostic intracranial runs were performed, there was no intracranial occlusion noted. We then turned our attention to the left mid internal carotid artery which demonstrated nearly 80% stenosis. We then advanced an exchange length Synchro standard microwire. Over this microwire, we then advanced a wingspan 4.5 mm x 20 mm stent and deployed across the stenosed left internal carotid artery. We then performed tandem angioplasty through the stent using an Aviator balloon 4.5 mm x 30 mm. Diagnostic cervical and intracranial runs were then performed, the stent remains patent. The intracranial vasculature is preserved. Cangrelor bolus 30 mcg/kg was administered approximately 5 minutes prior to the deployment of the stent. Upon completion of the procedure, the catheter was withdrawn and subsequently the sheath was removed. Hemostasis was " obtained by using StarClose device patient tolerated the procedure well and completed without any immediate complications. Patient was then transferred to post-operative monitoring unit. Interpretation of images: Series #1-3 demonstrates left common carotid artery injection and cervical and intracranial views. The carotid bifurcation is located at the level of C3-C4 vertebral body, there is atherosclerotic calcification noted on the posterior wall of the carotid bifurcation. Approximately 2 cm from the carotid bifurcation in the left internal carotid artery, there is significant stenosis which measures approximately 85% based on NASCET criteria. Series number 3 demonstrates intracranial views of the left internal carotid artery injection. The first intradural branch of the left internal carotid artery is the left ophthalmic artery, the left posterior communicating artery is fetal in configuration. The left internal carotid artery terminates in the left middle cerebral artery and left anterior cerebral artery. The proximal and distal segments of the intracranial vasculature appears patent. The capillary phase and venous phases appear normal. There is no capillary filling defect noted. The visible branches of the left external carotid artery appears normal. Series #4 is left common carotid injection and cervical view, the balloon guide catheter can be seen in the distal left common carotid artery. There is a synchronous standard microwire traversing through the left internal carotid artery. The distal end of the guidewire can be noted in the petrous portion of the left internal carotid artery. Series number 5 demonstrates advancing of the wingspan stent over the microwire. The proximal and distal landing zones of the wingspan stent were noted. The stent was then deployed across the stenosed left internal carotid artery. Following the stent placement, there was significant reduction in the stenosis. However, we then  performed angioplasty. Series #8 demonstrates advancement of the balloon over the microwire. The proximal and distal markers of the balloon can be noted across the stent. Series #9, 10, 11 demonstrates angioplasty. Series number 12 demonstrates left common carotid artery injection-cervical view in anterior posterior and lateral projection. Demonstrating near complete resolution of the stenosis that was previously noted.    Series #14 Right internal carotid artery injection: Cranial view Intracranial view of the right internal carotid artery injection in the anteroposterior, lateral projections demonstrates a normal cervical, petrous, laceral, cavernous, clinoid, ophthalmic, and communicating segments of the right internal carotid artery. The right internal carotid artery bifurcates into the right anterior cerebral artery and right middle cerebral artery. The proximal segments and distal branches of the right anterior cerebral artery and right middle cerebral artery are normal. The right posterior communicating artery is visualized. The capillary and venous phases are normal. Series #14 is left vertebral artery injection cranial view Intracranial view of the left vertebral artery injection demonstrates a normal course of left V3 and V4 segments of the left vertebral artery. The left vertebral artery essentially ends in left posterior inferior cerebellar artery. Right common femoral artery: Pelvic view Through the 8-Lithuanian sheath angiography was performed over the right groin. Pelvic view of the right common femoral artery in the MATTHEWS projection demonstrates a normal right common femoral artery, superficial femoral artery, and deep femoral artery. The sheath is located above the bifurcation.  There is mild atherosclerotic calcification noted along the course of the common femoral artery, external iliac artery and the profunda femoral artery. Dr. Rojas Perry present for the entire procedure.     Impression: *   Severe stenosis of the left internal carotid artery approximately 2 cm from the carotid bifurcation measuring 85% based on NASCET criteria. *  Successful stenting and angioplasty of the left internal carotid artery stenosis using a Wingspan stent 4.5 mm x 20 mm. Plan: *  Continue Cangrelor infusion 2 mcg/kg/m. *  Transition to oral P2Y12 inhibitor as noted. Maico Zaldivar MD, MPH Neuroendovascular Surgery Fellow Pager: [phone]      LABS:      Sodium   Date Value Ref Range Status   10/04/2023 137 135 - 145 mmol/L Final     Comment:     Reference intervals for this test were updated on 09/26/2023 to more accurately reflect our healthy population. There may be differences in the flagging of prior results with similar values performed with this method. Interpretation of those prior results can be made in the context of the updated reference intervals.    10/03/2023 135 135 - 145 mmol/L Final     Comment:     Reference intervals for this test were updated on 09/26/2023 to more accurately reflect our healthy population. There may be differences in the flagging of prior results with similar values performed with this method. Interpretation of those prior results can be made in the context of the updated reference intervals.    10/03/2023 137 135 - 145 mmol/L Final     Comment:     Reference intervals for this test were updated on 09/26/2023 to more accurately reflect our healthy population. There may be differences in the flagging of prior results with similar values performed with this method. Interpretation of those prior results can be made in the context of the updated reference intervals.    03/22/2021 139 133 - 144 mmol/L Final   03/15/2021 137 133 - 144 mmol/L Final   03/10/2021 137 133 - 144 mmol/L Final     Urea Nitrogen   Date Value Ref Range Status   10/04/2023 9.5 8.0 - 23.0 mg/dL Final   10/03/2023 13.2 8.0 - 23.0 mg/dL Final   10/03/2023 11.0 8.0 - 23.0 mg/dL Final   05/12/2022 12 8 - 22 mg/dL Final    05/04/2022 22 7 - 30 mg/dL Final   05/03/2022 18 7 - 30 mg/dL Final   03/22/2021 18 7 - 30 mg/dL Final   03/15/2021 16 7 - 30 mg/dL Final   03/10/2021 17 7 - 30 mg/dL Final     Hemoglobin   Date Value Ref Range Status   10/04/2023 11.8 (L) 13.3 - 17.7 g/dL Final   10/03/2023 12.8 (L) 13.3 - 17.7 g/dL Final   10/03/2023 12.5 (L) 13.3 - 17.7 g/dL Final   03/22/2021 12.5 (L) 13.3 - 17.7 g/dL Final   03/15/2021 13.5 13.3 - 17.7 g/dL Final   03/10/2021 13.2 (L) 13.3 - 17.7 g/dL Final     Platelet Count   Date Value Ref Range Status   10/04/2023 242 150 - 450 10e3/uL Final   10/03/2023 234 150 - 450 10e3/uL Final   10/03/2023 258 150 - 450 10e3/uL Final   03/22/2021 293 150 - 450 10e9/L Final   03/15/2021 286 150 - 450 10e9/L Final   03/10/2021 220 150 - 450 10e9/L Final     BNP   Date Value Ref Range Status   04/30/2022 2,619 (H) 0 - 56 pg/mL Final   01/02/2021 1,936 (H) 0 - 55 pg/mL Final   12/31/2020 705 (H) 0 - 55 pg/mL Final     INR   Date Value Ref Range Status   10/03/2023 1.10 0.85 - 1.15 Final   10/03/2023 1.13 0.85 - 1.15 Final   10/03/2023 1.13 0.85 - 1.15 Final

## 2023-10-04 NOTE — PHARMACY-CONSULT NOTE
Pharmacy Consult to evaluate for medication related stroke core measures    Eber Jin, 62 year old male admitted for acute ischemic stroke on 10/3/2023.    Thrombolytic was not given because of Clinical contraindications    VTE Prophylaxis SCDs /PCDs placed on 10/4/23, as appropriate prior to end of hospital day 2.    Antithrombotic: aspirin and cangrelor  started on 10/4/23, as appropriate by end of hospital day 2. Plan to transition cangrelor to ticagrelor when patient is able to swallow per SLP evaluation. Continue antithrombotic therapy on discharge to meet quality measures, unless contraindicated.    Anticoagulation if history of A-fib/flutter: Patient on rivaroxaban (Xarelto) at home, holding off on resuming in setting of subarachnoid hemorrhage; continue anticoagulation on discharge to meet quality measures, unless contraindicated.    LDL Cholesterol Calculated   Date Value Ref Range Status   10/03/2023 103 (H) <=100 mg/dL Final     LDL Cholesterol Direct   Date Value Ref Range Status   02/16/2015 139 (H) 0 - 129 mg/dl Final       Patient's home statin, Lipitor (atorvastatin) restarted; continue statin on discharge to meet quality measures, unless contraindicated.     Recommendations:  Consider starting subcutaneous heparin when safe from subarachnoid hemorrhage  standpoint.    Thank you for the consult.    Yolie Hale, PharmD 10/4/2023 3:34 PM

## 2023-10-04 NOTE — H&P
Neurocritical Care History & Physical    Reason for critical care admission: Acute ischemic stroke  Admitting Team: PHYLLIS  Date of Service:  10/04/2023  Date of Admission:  10/3/2023  Hospital Day: 2    Assessment/Plan  Eber Jin is a 62 year old man with history of tobacco use, HTN, HLD, CAD, HFrEF 35%, paroxysmal a-fib, recent PE on xarelto and aspirin, R pontine infarct 03/2021 with baseline mild left leg weakness c/b recurrent falls and gait imbalance, COPD, recent admission 9/20-24/23 to [facility] for rib fractures and PE who was initially admitted to [facility] ED 10/2/23 for surgical workup of multiple traumatic L displaced rib fractures in setting of recurrent falls, transferred [facility] for consideration of thrombectomy after stroke code called 10/3/23 for aphasia, found to have acute L MCA/OMER watershed infarcts and possible L M2 superior division occlusion. Initial NIHSS 17. He underwent L carotid stenting and angioplasty. Post CT head with L frontal SAH that was stable on repeat scan. Of note, the patient was on xarelto prior to previous stroke in 03/2021.     Neuro  #Acute L MCA/OEMR watershed infarcts   #Possible L M2 occlusion  #L ICA stenosis > 90% s/p stenting and angioplasty  #Acute L frontal SAH  #R pontine stroke 03/2021  -Neurochecks every 1 hrs  -SBP goal < 150 mmHg  -HOB > 30   - Avoid hypotonic IV fluids  - Cangrelor infusion @2 mcg/kg/min   - OG/NG x 6 attempts failed, plan via radiology in AM  - Once has oral access will load with ticagrelor 180 mg and continue aspirin  - Stop cangrelor 2 hours after ticagrelor load  - Statin: -PTA Lipitor 80 mg daily when cleared for oral diet, check Lipid panel (titrate to goal LDL 40-70), <40 increases risk of ICH  - TTE with Bubble Study  - Telemetry, EKG  - Bedside Glucose Monitoring  - A1c (5.3), Lipid Panel (, ), Troponin x 3  - PT/OT/SLP  - PM&R  - Stroke Education  - Depression Screen  - Apnea Screen  - Euthermia,  Euglycemia       #Alcohol use disorder  -We will monitor for signs of withdrawal.  -CIWA protocol    #Tobacco use disorder  -Nicotine prn.    #Analgesics & sedation  RASS goal:  -Propofol @ 30, off now    CV  #Hypertension  #HFrEF (EF of 35% on 9/9/2023)  #Paroxysmal A-fib ([id number] of 5)  #CAD prior MI  #PVD  H/o Thrombotic occlusion of the left common iliac, external iliac, proximal left internal iliac, left common femoral, and left superficial femoral arteries 09/07/23  Weak popliteal pulses, absent dorsalis pedis and posterior tibials bilaterally. Has extensive PVD, low EF, able to move ankle and toes, suspect chronic findings and will hold off on urgent imaging for now and continue to monitor.  -Hold PTA Lasix 40 mg, metoprolol  mg, spironolactone 25 mg  -Hold PTA xarelto 20 mg   - PRN metoprolol for rate above 140 bpm, can start diltiazem infusion if has sustained RVR  -Cardiac monitoring  -TTE  -SBP goal < 150 mmHg  -PRN Labetalol and Hydralazine  -Was previously on ASA for PVD, Anti platelets above    Resp  #Unprovoked PE (LLL segmental and subsegmental) 09/07/23  #COPD  #Multiple rib fracture L rib 4 -12 fx  09/07/23  Vent CMV 5/60/580/14  - CXR  - ABG failed, VBG  -Continuous pulse ox  -Maintain O2 saturations greater than 92%  -Previously recommended xarelto 20 mg on hold    #Hx of recent traumatic hemothorax  In setting of recurrent falls, discovered during admission to [facility]    GI  #TUNDE  - OG with multiple attempts passed at outside hospital was reportedly in R mainstem bronchus- was not present on arrival  - OG/NG failed multiple attempts  - Consider via radiology in AM  Diet:NPO  Last BM:Unknown  GI prophylaxis:Pantoprazole  -Bowel regimen: scheduled senna-docusate and Miralax    Renal/  #TUNDE  -Daily BMP  -IV fluids: NS@50  -Electrolyte replacement protocol    Endo  #TUNDE  -Hgb A1c  -Monitor glucose levels    Heme  #h/o thrombocytopenia  -Daily CBC  -Hgb goal >7, plt goal  >50k  -Transfuse to meet Hgb and plt goals    ID  #TUNDE  -Daily CBC  -Follow temperature curve    Musculoskeletal   #Multiple displaced rib fractures   #Recent traumatic hemothorax   he had acute PE (LLL segmental and subsegmental) and L rib 4 -12 fx concerning for flail chest on 09/07, 900 ml dark bloody output removed with chest tube that stayed until 09/23, noted at that time to have extensive atherosclerotic dz of the aorta and iliac vessels. He was discharged on rivaroxaban.    ICU Checklist  Lines/tubes/drains:PIV  FEN:NPO  PPX: DVT - SCDs ; GI - Pantoprazole.  Code: Full Code- needs to be confirmed with family  Dispo: ICU - NCC    Clinically Significant Risk Factors Present on Admission           # Hypercalcemia: corrected calcium is >10.1, will monitor as appropriate    # Hypoalbuminemia: Lowest albumin = 3 g/dL at 10/3/2023 10:04 PM, will monitor as appropriate       # Hypertension: Noted on problem list   # Circulatory Shock: currently requiring pressors for blood pressure support  # Acute Respiratory Failure: Documented O2 saturation < 91%.  Continue supplemental oxygen as needed               The patient was discussed with the NCC attending, Dr. Morel.    Dereje Dickerson MD  Neurology PGY-3    History of Present Illness:  Eber Jin is a 62 year old male with history of alcohol & tobacco use disorder, HTN, CAD, HFrEF 35% (9/9/23), HLD, COPD, PVD, paroxysmal atrial fibrillation on xarelto and aspirin daily, R pontine stroke 03/2021 with baseline mild left leg weakness c/b recurrent falls and gait imbalance when drinking, recent hospitalization at [facility] after a fall with rib fractures and PE who presented to [facility] ED 10/2/23 due to multiple falls at home (5 falls within the two days prior) who was initially admitted for surgical workup given multiple L displaced rib fractures. On 10/3/23 1540, Tele Stroke code was called due to the patient not being able to talk (mumbling), with concerns  "for possible L M2 superior division occlusion, transferred to [facility] for consideration of thrombectomy. Hyperacute MRI with evidence of acute L MCA/OMER watershed infarcts. Prior to transfer, patient was intubated per neuro-IR request.      Initial NIHSS 17 - most pertinent for global aphasia with severe dysarthria and R hemibody weakness (no movement in RUE, mild drift in RLE). He also had no blink to threat on the left, mild nasolabial fold flattening, mild drift in L hemibody, and severe / total sensory loss in the R arm.     Per Stroke Code Consult note conducted by provider HUBER Tellez (some words paraphrased. Please see initial note for more details, dated 10/2/23):     \"Last known normal very unclear as patient does not have baseline R sided weakness after prior stroke per wife. However, he was complaining of having difficulty eating noodles using his Right hand.     It is unclear if patient has recently been taking his anticoagulation medications. Wife is currently hospitalized so she has not been home in the past couple of days. Per wife, his son, John, had seen him yesterday and was the one who called EMS.     John states that, since Sunday, patient has fallen multiple times - did not appear to be particularly weaker on one side compared to the other. Was drinking a lot on Sunday and is usually very unsteady when he drinks. Son states that his speech was slowed but was able to speak in full sentences at that time.\"    She was admitted 03/09-03/23/21 for L sided weakness, reported blurry vision, imbalance, MRI brain with  1.5 cm acute infarct in right mata. MRA neck with 60% stenosis of left ICA.  MRA head with multifocal age-indeterminate vessel occlusion or flow-limiting stenoses.  Considered CTA for further evaluation; however deferred given patient out of window for acute intervention and low NIHSS score.  Determined to be outside window for tPA.  Patient on Xarelto PTA for afib, ASA started 3/5.  " Echo with EF 45% and wall motion abnormalities consistent with prior MI in right coronary artery or left circumflex arteries.  Noted to have ongoing left hemiparesis, incoordination, mild dysphagia, mild dysarthria.     Per chart review he had acute PE (LLL segmental and subsegmental) and L rib 4 -12 fx concerning for flail chest on 09/07, 900 ml dark bloody output removed with chest tube that stayed until 09/23, noted at that time to have extensive atherosclerotic dz of the aorta and iliac vessels. He was discharged on rivaroxaban. Per chart review discharge summary 09/10/23 Smokes 0.5 ppd Drinks socially but according to patient's wife, he is a heavy alcohol drinker     Intravenous Thrombolysis  Not given due to:   - unclear or unfavorable risk-benefit profile for extended window thrombolysis beyond the conventional 4.5 hour time window     Endovascular Treatment  - s/p L carotid stenting and angioplasty     Imaging  10/3/23   CT/CTA  HEAD CT:  1. Senescent changes and sequelae of chronic microangiopathy without acute intracranial abnormality.    HEAD CTA:  1. The internal carotid arteries are diminutive opacification to the ICA terminus is and proximal anterior cerebral and middle cerebral arteries with poor visualization of the distal anterior vasculature.      2. No significant contrast opacification is identified within the posterior circulation.     3. While these findings can be seen in setting of hypoperfusion, injection related artifact could have a similar appearance. MRI brain/MRA head and neck is recommended for further evaluation.    NECK CTA:  1. Atherosclerotic plaque results in critical, 90% or greater, stenosis of the proximal left ICA.      2. Right vertebral artery occlusion.      3. The left vertebral artery remains patent throughout its course, however shortly after entering the intracranial compartment both vertebral arteries, basilar artery, and posterior circulation are not visualized.      MRI Brain: 10/3/23  IMPRESSION:  1.  Multiple foci of acute to subacute ischemic change throughout the left cerebral hemisphere, distribution is typical of watershed ischemia.  2.  Additional acute to subacute ischemic change in the left occipital lobe.  3.  Age-related changes as above.    Allergies   Allergen Reactions    Penicillins Swelling       Current Medications:   aspirin  81 mg Oral Daily    chlorhexidine  15 mL Mouth/Throat Q12H    famotidine  20 mg Intravenous Q12H    pantoprazole  40 mg Per Feeding Tube QAM AC    Or    pantoprazole  40 mg Intravenous QAM AC    senna-docusate  1-2 tablet Oral or NG Tube BID    sodium chloride (PF)  3 mL Intracatheter Q8H       PRN Medications:  - MEDICATION INSTRUCTIONS -, glucose **OR** dextrose **OR** glucagon, hydrALAZINE, labetalol, lidocaine 4%, lidocaine (buffered or not buffered), - MEDICATION INSTRUCTIONS -, - MEDICATION INSTRUCTIONS -, propofol **AND** propofol **AND** CK total **AND** Triglycerides **AND** - MEDICATION INSTRUCTIONS - **AND** Notify Physician, metoprolol, sodium chloride (PF)    Infusions:   cangrelor 2 mcg/kg/min (10/04/23 0031)    - MEDICATION INSTRUCTIONS -      - MEDICATION INSTRUCTIONS -      - MEDICATION INSTRUCTIONS -      propofol Stopped (10/03/23 2139)    And    - MEDICATION INSTRUCTIONS -      norepinephrine Stopped (10/03/23 2056)    sodium chloride 50 mL/hr at 10/04/23 0100       Physical Examination:  Vitals: /73   Pulse 106   Temp 99.5  F (37.5  C)   Resp 20   SpO2 100%   General: Adult male patient, lying in bed, critically-ill  HEENT: Normocephalic, atraumatic, no icterus, oral cavity/oropharynx pink and moist  Cardiac: RRR, s1/s2 auscultated without murmur  Pulm: basilar crackles bilaterally  Abdomen: Soft, non-distended, bowel sounds present  Extremities: Warm, no edema, weak popliteal pulses, absent posterior tibial and dorsalis pedis bilaterally  Skin: Bruises on skin  Neuro:  Mental status: Intubated, off  sedation, follows 1 step command  Speech: unable to assess language as the patient is intubated, able to understand speech low concern for receptive aphasia   Cranial nerves: R pupil 1 mm, L 2 mm round and equally reactive, loss of blink to threat on the R, blinks to threat L, EOMI, cannot formally assess for facial asymmetry  Motor: RUE 2/5, can lift elbow anti gravity when arm is supported but cannot lift arm antigravity, RLE 3/5, LUE and LLE 4/5  Sensory: Intact to light touch x 4 extremities  Coordination: Unable to assess limited by weakness and patient participation   Gait: BILLY, deferred.    NIHSS  Interval     1a. Level of Consciousness 1-->Not alert, but arousable by minor stimulation to obey, answer, or respond   1b. LOC Questions 2-->Answers neither question correctly   1c. LOC Commands 1-->Performs one task correctly   2.   Best Gaze 0-->Normal   3.   Visual 1-->Partial hemianopia   4.   Facial Palsy 1-->Minor paralysis (flattened nasolabial fold, asymmetry on smiling)   5a. Motor Arm, Left 1-->Drift, limb holds 90 (or 45) degrees, but drifts down before full 10 seconds, does not hit bed or other support   5b. Motor Arm, Right 3-->No effort against gravity, limb falls   6a. Motor Leg, Left 1-->Drift, leg falls by the end of the 5-sec period but does not hit bed   6b. Motor Leg, right 2-->Some effort against gravity, leg falls to bed by 5 secs, but has some effort against gravity   7.   Limb Ataxia 0-->Absent   8.   Sensory 0-->Normal, no sensory loss   9.   Best Language 2-->Severe aphasia, all communication is through fragmentary expression, great need for inference, questioning, and guessing by the listener. Range of information that can be exchanged is limited, listener carries burden of. . . (see row details)   10. Dysarthria (UN) Intubated or other physical barrier   11. Extinction and Inattention  0-->No abnormality   Total 15 (10/04/23 0132)       Labs and Imaging:    No results found for: PH,  PHARTERIAL, PO2, [id number], SAT, PCO2, HCO3, BASEEXCESS, JOSE F, BEB       Lab Results   Component Value Date     10/03/2023     03/22/2021    Lab Results   Component Value Date    CHLORIDE 101 10/03/2023    CHLORIDE 102 05/12/2022    CHLORIDE 108 03/22/2021    Lab Results   Component Value Date    BUN 13.2 10/03/2023    BUN 12 05/12/2022    BUN 18 03/22/2021      Lab Results   Component Value Date    POTASSIUM 4.3 10/03/2023    POTASSIUM 4.0 05/12/2022    POTASSIUM 4.7 03/22/2021    Lab Results   Component Value Date    CO2 18 10/03/2023    CO2 28 05/12/2022    CO2 28 03/22/2021    Lab Results   Component Value Date    CR 1.04 10/03/2023    CR 0.88 03/22/2021        Lab Results   Component Value Date    WBC 9.8 10/03/2023    HGB 12.8 (L) 10/03/2023    HCT 39.1 (L) 10/03/2023    MCV 94 10/03/2023     10/03/2023     Lab Results   Component Value Date     10/03/2023    POTASSIUM 4.3 10/03/2023    CHLORIDE 101 10/03/2023    CO2 18 (L) 10/03/2023    GLC 82 10/04/2023     Lab Results   Component Value Date    AST 25 10/03/2023    ALT <5 10/03/2023     (H) 05/12/2022    ALKPHOS 83 10/03/2023    BILITOTAL 0.9 10/03/2023     Lab Results   Component Value Date    INR 1.10 10/03/2023       All relevant imaging and laboratory values personally reviewed.

## 2023-10-04 NOTE — PROGRESS NOTES
Pt transport from IR to CT to 4E via BVM.     Initial settings from previous hospital maintained: CMV 14/580/5/60%.  ETT visualized at 23@lip.   BS's bilaterally rhonchi/coarse.  Coughing frequently with copious, clear oral secretions orally - scant through ETT.

## 2023-10-04 NOTE — PROGRESS NOTES
"   10/04/23 1600   Appointment Info   Signing Clinician's Name / Credentials (SLP) Alisha Harrison MA CCC-SLP   General Information   Onset of Illness/Injury or Date of Surgery 10/02/23   Referring Physician Dereje Dickerson MD   Pertinent History of Current Problem Per MD, \"Pt is a 62 year old man with history of tobacco use, HTN, HLD, CAD, HFrEF 35%, paroxysmal a-fib, recent PE on xarelto and aspirin, R pontine infarct 03/2021 with baseline mild left leg weakness c/b recurrent falls and gait imbalance, COPD, recent admission 9/20-24/23 to [facility] for rib fractures and PE who was initially admitted to [facility] ED 10/2/23 for surgical workup of multiple traumatic L displaced rib fractures in setting of recurrent falls, transferred to [facility] for consideration of thrombectomy after stroke code called 10/3/23 for aphasia, found to have acute L MCA/OMER watershed infarcts and possible L M2 superior division occlusion. Initial NIHSS 17. He underwent L carotid stenting and angioplasty. Post CT head with L frontal SAH that was stable on repeat scan. \"  Swallow eval completed this PM per MD orders.       Present no   Pain Assessment   Patient Currently in Pain No   Type of Evaluation   Type of Evaluation Swallow Evaluation   Oral Motor   Oral Musculature generally intact   Structural Abnormalities none present   Mucosal Quality good   Dentition (Oral Motor)   Comment, Dentition (Oral Motor) Pt reports he eats regular foods at baseline despite missing teeth and no dentures   Dentition (Oral Motor) significant number of missing teeth   Facial Symmetry (Oral Motor)   Facial Symmetry (Oral Motor) WNL   Lip Function (Oral Motor)   Lip Range of Motion (Oral Motor) WNL   Tongue Function (Oral Motor)   Tongue Coordination/Speed (Oral Motor) WNL   Tongue ROM (Oral Motor) WNL   Jaw Function (Oral Motor)   Jaw Function (Oral Motor) WNL   Cough/Swallow/Gag Reflex (Oral Motor)   Soft Palate/Velum " "(Oral Motor) WNL   Volitional Throat Clear/Cough (Oral Motor) WNL   Volitional Swallow (Oral Motor) WNL   Vocal Quality/Secretion Management (Oral Motor)   Vocal Quality (Oral Motor) WNL   Secretion Management (Oral Motor) WNL   General Swallowing Observations   Past History of Dysphagia Yes  (VFSS 3/2021: Recommending regular food textures and thin liquids with use of chin tuck)   Respiratory Support (General Swallowing Observations) none   Current Diet/Method of Nutritional Intake (General Swallowing Observations, NIS) NPO   Swallowing Evaluation Clinical swallow evaluation   Clinical Swallow Evaluation   Feeding Assistance dependent   Clinical Swallow Evaluation Textures Trialed thin liquids;pureed;soft & bite-sized   Clinical Swallow Eval: Thin Liquid Texture Trial   Mode of Presentation, Thin Liquids fed by clinician;cup;straw   Volume of Liquid or Food Presented 2 oz   Oral Phase of Swallow WFL   Pharyngeal Phase of Swallow intact   Diagnostic Statement No s/sx of aspiration   Clinical Swallow Evaluation: Puree Solid Texture Trial   Mode of Presentation, Puree fed by clinician;spoon   Volume of Puree Presented 2 oz   Oral Phase, Puree WFL   Pharyngeal Phase, Puree intact   Diagnostic Statement No s/sx of aspiration   Clinical Swallow Eval: Soft & Bite Sized   Mode of Presentation fed by clinician   Volume Presented 1 samara cracker dipped in puree   Oral Phase WFL   Pharyngeal Phase intact   Clinical Swallow Evaluation: Solid Food Texture Trial   Diagnostic Statement Pt declined samara cracker saying \"it would be too hard\" given missing teeth   Esophageal Phase of Swallow   Patient reports or presents with symptoms of esophageal dysphagia No   Swallowing Recommendations   Diet Consistency Recommendations soft & bite-sized (level 6);thin liquids (level 0)   Supervision Level for Intake 1:1 supervision needed   Mode of Delivery Recommendations bolus size, small   Swallowing Maneuver Recommendations alternate " "food and liquid intake   Medication Administration Recommendations, Swallowing (SLP) Orally   Instrumental Assessment Recommendations instrumental evaluation not recommended at this time   General Therapy Interventions   Planned Therapy Interventions Dysphagia Treatment   Dysphagia treatment Modified diet education;Instruction of safe swallow strategies;Compensatory strategies for swallowing   Clinical Impression   Criteria for Skilled Therapeutic Interventions Met (SLP Eval) Yes, treatment indicated   SLP Diagnosis Mild oropharyngeal dysphagia   Risks & Benefits of therapy have been explained evaluation/treatment results reviewed;care plan/treatment goals reviewed;risks/benefits reviewed;current/potential barriers reviewed;participants voiced agreement with care plan;participants included;patient   Clinical Impression Comments Swallow evaluation completed this PM per MD orders.  Pt's oropharyngeal swallowing abilities appear to be mildly impaired due to missing teeth.  Pt's oral mech appears WNL.  Pt assessed with samara cracker dipped in puree and thin liquids via cup and straw.  Pt tolerated thin liquids without overt s/sx of aspiration and demonstrated mildly prolonged but effective mastication with soft solids.  Pt kindly declined samara crackers saying \"it would be too hard to chew\" but willing to try regular food textures tomororow.  Recommend soft & bite sized food textures (IDDSI 6) and thin liquids (IDDSI 0) with 1:1 assist.  Recommend pills orally.  Speech therapy to follow for dysphagia therapy.   SLP Discharge Planning   SLP Plan Assess diet tolerance, advance diet as tolerated   SLP Discharge Recommendation home with assist   SLP Rationale for DC Rec Pt demonstrates mildly impaired oropharyngeal swallowing abilities   SLP Brief overview of current status  Recommend soft & bite sized food textures (IDDSI 6) and thin liquids (IDDSI 0) with 1:1 assist. Recommend pills orally. Speech therapy to follow for " dysphagia therapy.

## 2023-10-04 NOTE — PROGRESS NOTES
Neurocritical Care Progress Note    Reason for critical care admission: AIS  Admitting Team: PHYLLIS   Date of Service:  10/04/2023  Date of Admission:  10/3/2023  Hospital Day: 2    Assessment/Plan  Eber Jin is a 62 year old man with history of tobacco use, HTN, HLD, CAD, HFrEF 35%, paroxysmal a-fib, recent PE on xarelto and aspirin, R pontine infarct 03/2021 with baseline mild left leg weakness c/b recurrent falls and gait imbalance, COPD, recent admission 9/20-24/23 to [facility] for rib fractures and PE who was initially admitted to [facility] ED 10/2/23 for surgical workup of multiple traumatic L displaced rib fractures in setting of recurrent falls, transferred to [facility] for consideration of thrombectomy after stroke code called 10/3/23 for aphasia, found to have acute L MCA/OMER watershed infarcts and possible L M2 superior division occlusion. Initial NIHSS 17. He underwent L carotid stenting and angioplasty. Post-procedure CT head with L frontal SAH that was stable on repeat scan.      24 hour events: No acute events overnight.     Neuro  #L MCA/OMER watershed infarcts   #Possible L M2 occlusion  #L ICA stenosis > 90% s/p stenting and angioplasty, 10/3  #Acute L frontal SAH  #R pontine stroke 03/2021  -Neurochecks every 1 hrs   -SBP goal < 150 mmHg  -HOB > 30   - Avoid hypotonic IV fluids  - Cangrelor infusion @2 mcg/kg/min - decrease to 1.5 mcg/kg/min   - After enteral access will load with ticagrelor 180 mg and continue aspirin  - Stop cangrelor 2 hours after ticagrelor load   - Statin: Continue PTA Lipitor 80 mg daily,    - TTE with Bubble Study  - Telemetry, EKG  - Bedside Glucose Monitoring  - A1c (5.3%), Troponin x 3  - PT/OT/SLP as indicated   - Stroke Education  - Depression Screen  - Apnea Screen  - Euthermia, Euglycemia      #Alcohol use disorder  #Tobacco use disorder  #Marijuana use   -Encourage cessation   -We will monitor for signs of withdrawal.  -[facility] protocol     #Analgesics  & sedation  RASS goal: 0 to -1   -Sedation off this AM  -PRN Tylenol available      CV  #Hypertension  #HFrEF (EF of 35% on 9/9/2023)  #Paroxysmal A-fib (EEG6AC5-VDNt of 5)  #CAD prior MI  -Hold PTA Lasix 40 mg, metoprolol  mg, spironolactone 25 mg  -Hold PTA xarelto 20 mg - will likely hold for a week with SAH  -PRN metoprolol for rate above 120 bpm  -Cardiac monitoring  -TTE - no cardiac source for emoblus, EF 55-60%, LV diastolic function indeterminate, inferior wall akinesis and inferolateral wall akinesis present, RV normal   -SBP goal < 150 mmHg  -PRN Labetalol and Hydralazine    #PAD, chronic left lower extremity occlusion with distal reconstitution   #AAA, infrarenal  -CTA with run off this AM for concern of pulseless RLE  -Evaluated at [facility] by Vascular Surgery and deemed non-operative previously due to likely chronicity of occlusions, however unclear if mural thrombus is new   -Consult to Vascular Surgery, greatly appreciate their input     1. Infrarenal abdominal aortic aneurysm measures up to 32 mm in  diameter at L4. Mural thrombus causes up to 50-69% diameter luminal  narrowing. Thrombus occludes/extends through left iliac arteries.     2. RIGHT LEG:       A. 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       B. Internal iliac artery occlusion. Distal branches reconstituted  by pelvic collaterals.       C. Patent external iliac artery.       D. Proximal superficial femoral through above knee popliteal  occlusion.       E. Above knee popliteal artery reconstituted at the femoral  condyles.       F. No continuous arterial flow below the knee.       G. Arteries above the ankle and in the foot are unopacified,  occlusions versus slow flow.     3. LEFT LEG:       A. Left common iliac to above knee popliteal artery occlusion.       B. Internal iliac artery occluded. Distal branches reconstituted  by pelvic collaterals.       C. Profundus femoral artery occlusion. Branches reconstituted by  pelvic  collaterals.       D. Thin popliteal artery reconstituted by collaterals distal to  the adductor canal. 50-69% diameter stenosis above the patella.       E. No continuous arterial flow below the knee.       F. Posterior tibial artery reconstituted in the proximal calf and  patent to the ankle.       G. Arteries in the foot are unopacified, occlusions versus slow  flow.    Resp  #Unprovoked PE (LLL segmental and subsegmental) 09/07/23  #COPD  #Multiple rib fracture L rib 4 -12 fx  09/07/23  #Hx of recent traumatic hemothorax, 9/7/2023   Vent CMV 5/60/580/14 - PS this AM, extubate this afternoon   -CXR - persistent left base atelectasis, multiple rib fractures again noted   -Continuous pulse ox  -Maintain O2 saturations greater than 92%     GI  #TUNDE  - OG/NG failed multiple attempts  Diet: NPO  Last BM: Unknown  GI prophylaxis:Pantoprazole  -Bowel regimen: scheduled senna-docusate and Miralax     Renal/  #Huggins removed  -Huggins unable to be easily removed by nursing, Urology quick consult with Huggins easily removed after Huggins cut   -Daily BMP  -IV fluids: NS@50  -Electrolyte replacement protocol     Endo  #TUNDE  -Hgb A1c 5.3%  -Monitor glucose levels     Heme  #Anemia, likely of chronic disease   -Daily CBC  -Hgb goal >7, plt goal >50k  -Transfuse to meet Hgb and plt goals     ID  #Leukocytosis, likely reactive   -Daily CBC  -Follow temperature curve     ICU Checklist  Lines/tubes/drains:PIV, ETT, Remove Huggins   FEN: NS 50 ml/hour, repletion protocols, NPO   PPX: DVT - SCDs ; GI - Pantoprazole.  Code: Full Code- needs to be confirmed with family  Dispo: ICU - NCC    TIME SPENT ON THIS ENCOUNTER   I spent 50 minutes of critical care time on the unit/floor managing the care of Eber Jin excluding time performing procedures. Upon evaluation, this patient had a high probability of imminent or life-threatening deterioration due to AIS, which required my direct attention, intervention, and personal management.  Greater than 50% of my time was spent at the bedside counseling the patient and/or coordinating care including chart review, history, exam, documentation, and further activities per this note. I have personally reviewed the following data/imaging over the past 24 hours.     The patient was seen and discussed with the NCC attending, Dr. Morel.    Evelia Medina, APRN, CNP  Neurocritical Care *[id number]    24 Hour Vital Signs Summary:  Temp: 99.7  F (37.6  C) Temp  Min: 96.4  F (35.8  C)  Max: 99.9  F (37.7  C)  Resp: 19 Resp  Min: 11  Max: 29  SpO2: 100 % SpO2  Min: 72 %  Max: 100 %  Pulse: 100 Pulse  Min: 65  Max: 143    No data recorded  BP: (!) 115/96 Systolic (24hrs), Av , Min:85 , Max:168   Diastolic (24hrs), Av, Min:67, Max:120      Respiratory monitoring:   Vent Mode: CMV/AC  (Continuous Mandatory Ventilation/ Assist Control)  FiO2 (%): 40 %  Resp Rate (Set): 14 breaths/min  Tidal Volume (Set, mL): 580 mL  PEEP (cm H2O): 5 cmH2O  Resp: 19      I/O last 3 completed shifts:  In: 995.25 [I.V.:995.25]  Out: 950 [Urine:950]    Current Medications:   aspirin  81 mg Oral Daily    chlorhexidine  15 mL Mouth/Throat Q12H    famotidine  20 mg Intravenous Q12H    pantoprazole  40 mg Per Feeding Tube QAM AC    Or    pantoprazole  40 mg Intravenous QAM AC    senna-docusate  1-2 tablet Oral or NG Tube BID    sodium chloride (PF)  3 mL Intracatheter Q8H       PRN Medications:  - MEDICATION INSTRUCTIONS -, glucose **OR** dextrose **OR** glucagon, hydrALAZINE, labetalol, lidocaine 4%, lidocaine (buffered or not buffered), - MEDICATION INSTRUCTIONS -, - MEDICATION INSTRUCTIONS -, propofol **AND** propofol **AND** CK total **AND** Triglycerides **AND** - MEDICATION INSTRUCTIONS - **AND** Notify Physician, metoprolol, sodium chloride (PF)    Infusions:   cangrelor (KENGREAL) 50 mg in sodium chloride 0.9 % 250 mL infusion 2 mcg/kg/min (10/04/23 0600)    - MEDICATION INSTRUCTIONS -      - MEDICATION INSTRUCTIONS -      -  MEDICATION INSTRUCTIONS -      propofol Stopped (10/03/23 2139)    And    - MEDICATION INSTRUCTIONS -      norepinephrine Stopped (10/03/23 2056)    sodium chloride 50 mL/hr at 10/04/23 0700       Allergies   Allergen Reactions    Penicillins Swelling       Physical Examination:  Vitals: BP (!) 115/96   Pulse 100   Temp 99.7  F (37.6  C)   Resp 19   Wt 65.5 kg (144 lb 6.4 oz)   SpO2 100%   BMI 20.14 kg/m    General: Adult male patient, lying in bed, critically-ill.  HEENT: Normocephalic, atraumatic.  Cardiac: AF, appears adequately perfused.   Pulm: Synchronous with ventilator.   Abdomen: Non-distended.   Extremities: Warm, bilateral lower extremities do not appear acutely threatened. Right AT and DP signals present.   Skin: No obvious rashes or lesions on exposed skin.   Psych: Calm and cooperative.  Neuro:  Mental status: Exam limited by ETT. Awake, alert, robustly following commands.   Cranial nerves: Conjugate gaze, EOMI, face symmetric though exam limited by ETT.  Motor: Normal bulk and tone. No abnormal movements. 5/5 strength in 4/4 extremities.   Sensory: Intact to light touch x 4 extremities  Coordination: Deferred.  Gait: Deferred.    Labs and Imaging:    Results for orders placed or performed during the hospital encounter of 10/03/23 (from the past 24 hour(s))   IR Carotid Cerebral Angiogram Bilateral    Narrative    Cerebral Angiography Report   [id number]  DANIELLE SUAZO  1960  10/3/2023 7:57 PM    Brief History: 62-year-old male with significant cardiovascular risk  factors including congestive heart failure presenting with acute onset  left MCA syndrome with NIH stroke scale of 17. The CTA head and neck  demonstrated an area of severe stenosis in the left carotid and  probable large vessel occlusion. Therefore, patient was transferred  for an emergent stroke thrombectomy and stent placement.    Indication for the procedure: Thrombectomy/stent placement    Attending/s: Rojas  "MD Orlando  Fellow/s: Maico Zaldivar MD, Shari Blanton MD    Anesthesia: Intubated and sedated.  Fluoro time: 39.5 minutes  Fluoro dose: 1148 mGy  Contrast used: 100 mL of Visi-320  Sedation time: 60 minutes of 1:1 sedation monitoring by the physician  Sedatives: IV Fentanyl 50 mcg, IV Midazolam 1 mg, propofol 30 mcg/kg/m  Other Medications: Subcutaneous 1% lidocaine, Cangrelor 30 mcg/kg  bolus followed by 2 mcg/kg/m infusion.    Procedures:  1.  Diagnostic cervicocerebral angiography and interpretation of the  images.  2.  Right common femoral arteriotomy  3.  Diagnostic angiography of the right common femoral artery.  4.  Selective catheterization and diagnostic cerebral angiography of  the left internal carotid artery  5.  Placement of left internal carotid stent  6.  Balloon angioplasty of the left internal carotid artery  7.  Percutaneous closure of the right femoral arteriotomy by using  StarClose device..    Devices and/or Implants Used:  1.  8 Lao short sheath  2.  8 Lao Gato balloon guide catheter 95 cm  3.  6 Lao Eun 131 cm  4.  Wingspan stent 4.5 mm x 20 mm  5.  Aviator 4.5 mm x 30 mm  6.  Exchange length Synchro standard microwire 0.014\" x 300 cm    Consent:   The risks and benefits of a conventional diagnostic cerebral  angiography and stroke thrombectomy were discussed with the patient  and/or patient's family at the bedside who agreed to proceed. The  risks, which were discussed included risk of stroke, arterial  dissection, groin hematoma, arteriovenous fistula in the groin and  pseudoaneurysm of the femoral artery. If and when radial artery  approach is used, the risks discussed included radial artery  occlusion, hand ischemia. The risks associated with stroke  thrombectomy includes risk of worsening of stroke, hemorrhagic  conversion of ischemic stroke, arterial perforation. Verbal and  written informed consent was obtained.    Description of Procedure:  Patient was brought to the " "angiography suite and placed in supine  position. Medications were administered by the radiology nursing  staff, and the vital signs were monitored throughout procedure under  1:1 physician monitoring. The patient was prepped and draped in a  sterile fashion. A timeout was performed prior to start of the  procedure.     The right common femoral artery was palpated using standard anatomic  landmarks. 1% of lidocaine was injected locally and a small incision  was made on the skin overlying the femoral artery using a 11-blade  scalpel. A 21-gauge needle was placed into the right femoral artery,  which was then exchanged for a 4-Portuguese dilator over a microwire. The  4-Portuguese dilator was then exchanged for a 8-Portuguese sheath over a  J-wire using the modified Seldinger's technique. The sheath was  connected to a continuous flush of heparinized saline. We then  advanced the balloon guide catheter over a beacon tip [employer] 2  catheter and catheterized the left common carotid artery. The balloon  guide catheter was placed in the left mid common carotid artery.  Diagnostic cervical and intracranial runs were performed. There was an  area of significant stenosis noted in the left internal carotid  artery. This lesion was crossed using a 0.035\" glidewire and a Amarilys  intermediate catheter. Diagnostic intracranial runs were performed,  there was no intracranial occlusion noted. We then turned our  attention to the left mid internal carotid artery which demonstrated  nearly 80% stenosis. We then advanced an exchange length Synchro  standard microwire. Over this microwire, we then advanced a wingspan  4.5 mm x 20 mm stent and deployed across the stenosed left internal  carotid artery. We then performed tandem angioplasty through the stent  using an Aviator balloon 4.5 mm x 30 mm. Diagnostic cervical and  intracranial runs were then performed, the stent remains patent. The  intracranial vasculature is preserved.    Cangrelor bolus " 30 mcg/kg was administered approximately 5 minutes  prior to the deployment of the stent.    Upon completion of the procedure, the catheter was withdrawn and  subsequently the sheath was removed. Hemostasis was obtained by using  StarClose device patient tolerated the procedure well and completed  without any immediate complications. Patient was then transferred to  post-operative monitoring unit.     Interpretation of images:    Series #1-3 demonstrates left common carotid artery injection and  cervical and intracranial views. The carotid bifurcation is located at  the level of C3-C4 vertebral body, there is atherosclerotic  calcification noted on the posterior wall of the carotid bifurcation.  Approximately 2 cm from the carotid bifurcation in the left internal  carotid artery, there is significant stenosis which measures  approximately 85% based on NASCET criteria. Series number 3  demonstrates intracranial views of the left internal carotid artery  injection. The first intradural branch of the left internal carotid  artery is the left ophthalmic artery, the left posterior communicating  artery is fetal in configuration. The left internal carotid artery  terminates in the left middle cerebral artery and left anterior  cerebral artery. The proximal and distal segments of the intracranial  vasculature appears patent. The capillary phase and venous phases  appear normal. There is no capillary filling defect noted. The visible  branches of the left external carotid artery appears normal.    Series #4 is left common carotid injection and cervical view, the  balloon guide catheter can be seen in the distal left common carotid  artery. There is a synchronous standard microwire traversing through  the left internal carotid artery. The distal end of the guidewire can  be noted in the petrous portion of the left internal carotid artery.  Series number 5 demonstrates advancing of the wingspan stent over the  microwire. The  proximal and distal landing zones of the wingspan stent  were noted. The stent was then deployed across the stenosed left  internal carotid artery. Following the stent placement, there was  significant reduction in the stenosis. However, we then performed  angioplasty. Series #8 demonstrates advancement of the balloon over  the microwire. The proximal and distal markers of the balloon can be  noted across the stent. Series #9, 10, 11 demonstrates angioplasty.    Series number 12 demonstrates left common carotid artery  injection-cervical view in anterior posterior and lateral projection.  Demonstrating near complete resolution of the stenosis that was  previously noted.       Series #14 Right internal carotid artery injection: Cranial view   Intracranial view of the right internal carotid artery injection in  the anteroposterior, lateral projections demonstrates a normal  cervical, petrous, laceral, cavernous, clinoid, ophthalmic, and  communicating segments of the right internal carotid artery. The right  internal carotid artery bifurcates into the right anterior cerebral  artery and right middle cerebral artery. The proximal segments and  distal branches of the right anterior cerebral artery and right middle  cerebral artery are normal. The right posterior communicating artery  is visualized. The capillary and venous phases are normal.     Series #14 is left vertebral artery injection cranial view  Intracranial view of the left vertebral artery injection demonstrates  a normal course of left V3 and V4 segments of the left vertebral  artery. The left vertebral artery essentially ends in left posterior  inferior cerebellar artery.     Right common femoral artery: Pelvic view  Through the 8-Tajik sheath angiography was performed over the right  groin. Pelvic view of the right common femoral artery in the MATTHEWS  projection demonstrates a normal right common femoral artery,  superficial femoral artery, and deep femoral  artery. The sheath is  located above the bifurcation.  There is mild atherosclerotic  calcification noted along the course of the common femoral artery,  external iliac artery and the profunda femoral artery.    Dr. Rojas Perry present for the entire procedure.      Impression    Impression:    *  Severe stenosis of the left internal carotid artery approximately 2  cm from the carotid bifurcation measuring 85% based on NASCET  criteria.  *  Successful stenting and angioplasty of the left internal carotid  artery stenosis using a Wingspan stent 4.5 mm x 20 mm.    Plan:    *  Continue Cangrelor infusion 2 mcg/kg/m.  *  Transition to oral P2Y12 inhibitor as noted.    Maico Zaldivar MD, MPH  Neuroendovascular Surgery Fellow  Pager: [phone]   CT Head w/o Contrast   Result Value Ref Range    Radiologist flags acute left frontal subarachnoid hemorrhage (AA)     Narrative    CT HEAD W/O CONTRAST 10/3/2023 8:28 PM    History: [employer] to r/o bleed; Headache; r/o Subarachnoid  hemorrhage; Sudden severe headache; Age >= 40 years   ICD-10:    Comparison: Same day CT and MR brain    Technique: Using multidetector thin collimation helical acquisition  technique, axial, coronal and sagittal CT images from the skull base  to the vertex were obtained without intravenous contrast.   (topogram) image(s) also obtained and reviewed.    Findings:   Acute subarachnoid hemorrhage in the left frontal lobe. No other acute  intracranial hemorrhage identified.    No acute loss of gray-white differentiation. Ventricles are  proportionate to the sulci. Basal cisterns are clear. Diffuse cerebral  and cerebellar volume loss. Patchy periventricular and subcortical  white matter hypodensities, likely chronic small vessel ischemic  disease. Chronic infarction in the posterior left parietal  periventricular white matter    The bony calvaria and the bones of the skull base are normal. Mucosal  thickening, layering fluid, and debris  throughout the paranasal  sinuses. Mastoid air cells are clear.      Impression    Impression:  Acute left frontal subarachnoid hemorrhage.     [Critical Result: acute left frontal subarachnoid hemorrhage ]    Finding was identified on 10/3/2023 8:35 PM.     Dr. Dickerson was contacted by Dr. Valentino on 10/3/2023 8:40 PM and  verbalized understanding of the critical result.      I have personally reviewed the examination and initial interpretation  and I agree with the findings.    ALEXIS IRIZARRY MD         SYSTEM ID:  [id number]   XR Chest Port 1 View    Narrative    EXAM: XR CHEST PORT 1 VIEW  10/3/2023 9:18 PM     HISTORY:  Endotracheal tube positioning       COMPARISON:  Same-day chest radiograph and CT, report only    TECHNIQUE: Portable AP supine view of the chest    FINDINGS:   Endotracheal tube tip is in the midthoracic trachea..    The trachea is midline. The cardiomediastinal silhouette is within  normal limits. Atherosclerotic calcifications of the aortic arch. No  pneumothorax. No right effusion. The left costophrenic angle is  collimated out of the field-of-view. Hazy opacity at the left lung  base and silhouetting of the medial aspect of the left hemidiaphragm.  Right lung is clear. Multiple fractures of the ribs. The same ribs are  fractured posteriorly and laterally. This involves the left 5th  through 10th ribs.       Impression    IMPRESSION:  1. Endotracheal tube tip is in the mid thoracic trachea.  2. Continued left lower lobe atelectasis with likely small effusion.  3. Flail chest with multiple fractures as described.    I have personally reviewed the examination and initial interpretation  and I agree with the findings.    MARIXA JONES MD         SYSTEM ID:  [id number]   Glucose by meter   Result Value Ref Range    GLUCOSE BY METER POCT 88 70 - 99 mg/dL   Asymptomatic COVID-19 Virus (Coronavirus) by PCR Nasopharyngeal    Specimen: Nasopharyngeal; Swab   Result Value Ref Range    SARS CoV2 PCR  Negative Negative    Narrative    Testing was performed using the Xpert Xpress SARS-CoV-2 Assay on the Cepheid Gene-Xpert Instrument Systems. Additional information about this Emergency Use Authorization (EUA) assay can be found via the Lab Guide. This test should be ordered for the detection of SARS-CoV-2 in individuals who meet SARS-CoV-2 clinical and/or epidemiological criteria as well as from individuals without symptoms or other reasons to suspect COVID-19. Test performance for asymptomatic patients has only been established in anterior nasal swab specimens. This test is for in vitro diagnostic use under the FDA EUA for laboratories certified under CLIA to perform high complexity testing. This test has not been FDA cleared or approved. A negative result does not rule out the presence of PCR inhibitors in the specimen or target RNA concentration below the limit of detection for the assay. The possibility of a false negative should be considered if the patient's recent exposure or clinical presentation suggests COVID-19. This test was validated by [facility]. These Laboratories are certified under the Clinical Laboratory Improvement Amendments (CLIA) as qualified to perform high complexity testing.     Partial thromboplastin time   Result Value Ref Range    aPTT 29 22 - 38 Seconds   INR   Result Value Ref Range    INR 1.10 0.85 - 1.15   Hemoglobin A1c   Result Value Ref Range    Hemoglobin A1C 5.3 <5.7 %   CBC with platelets   Result Value Ref Range    WBC Count 9.8 4.0 - 11.0 10e3/uL    RBC Count 4.17 (L) 4.40 - 5.90 10e6/uL    Hemoglobin 12.8 (L) 13.3 - 17.7 g/dL    Hematocrit 39.1 (L) 40.0 - 53.0 %    MCV 94 78 - 100 fL    MCH 30.7 26.5 - 33.0 pg    MCHC 32.7 31.5 - 36.5 g/dL    RDW 13.1 10.0 - 15.0 %    Platelet Count 234 150 - 450 10e3/uL   Lipid panel reflex to direct LDL   Result Value Ref Range    Cholesterol 160 <200 mg/dL    Triglycerides 107 <150 mg/dL    Direct Measure HDL 36 (L)  >=40 mg/dL    LDL Cholesterol Calculated 103 (H) <=100 mg/dL    Non HDL Cholesterol 124 <130 mg/dL    Narrative    Cholesterol  Desirable:  <200 mg/dL    Triglycerides  Normal:  Less than 150 mg/dL  Borderline High:  150-199 mg/dL  High:  200-499 mg/dL  Very High:  Greater than or equal to 500 mg/dL    Direct Measure HDL  Female:  Greater than or equal to 50 mg/dL   Male:  Greater than or equal to 40 mg/dL    LDL Cholesterol  Desirable:  <100mg/dL  Above Desirable:  100-129 mg/dL   Borderline High:  130-159 mg/dL   High:  160-189 mg/dL   Very High:  >= 190 mg/dL    Non HDL Cholesterol  Desirable:  130 mg/dL  Above Desirable:  130-159 mg/dL  Borderline High:  160-189 mg/dL  High:  190-219 mg/dL  Very High:  Greater than or equal to 220 mg/dL   Troponin T, High Sensitivity   Result Value Ref Range    Troponin T, High Sensitivity 35 (H) <=22 ng/L   Hepatic panel   Result Value Ref Range    Protein Total 6.4 6.4 - 8.3 g/dL    Albumin 3.0 (L) 3.5 - 5.2 g/dL    Bilirubin Total 0.9 <=1.2 mg/dL    Alkaline Phosphatase 83 40 - 129 U/L    AST 25 0 - 45 U/L    ALT <5 0 - 70 U/L    Bilirubin Direct 0.24 0.00 - 0.30 mg/dL   Basic metabolic panel   Result Value Ref Range    Sodium 135 135 - 145 mmol/L    Potassium 4.3 3.4 - 5.3 mmol/L    Chloride 101 98 - 107 mmol/L    Carbon Dioxide (CO2) 18 (L) 22 - 29 mmol/L    Anion Gap 16 (H) 7 - 15 mmol/L    Urea Nitrogen 13.2 8.0 - 23.0 mg/dL    Creatinine 1.04 0.67 - 1.17 mg/dL    GFR Estimate 80 >60 mL/min/1.73m2    Calcium 8.9 8.8 - 10.2 mg/dL    Glucose 78 70 - 99 mg/dL   Platelet Function P2Y12   Result Value Ref Range    Platelet Function P2Y12 49 PRU    Narrative    Reference range for individuals NOT receiving a P2Y12 inhibitor is  180-376 P2Y12 Reaction Units (PRU). PRU levels less than 180 are  specific evidence of an anti-platelet effect.  Literature suggests that a PRU value less than 208 is an appropriate  response to anti-platelet therapy for cardiology patients.  Optimal  therapeutic and pre-surgical PRU targets have not been established.  Assay performance is affected by hematocrit values less than 33% or greater than 52% and platelet counts less than 119 x 10^3/uL or greater than 502 x 10^3/uL.   Extra Tube ([place] Draw)    Narrative    The following orders were created for panel order Extra Tube ([place] Draw).  Procedure                               Abnormality         Status                     ---------                               -----------         ------                     Extra Purple Top Tube[[id number]]                            Final result                 Please view results for these tests on the individual orders.   Extra Purple Top Tube   Result Value Ref Range    Hold Specimen JIC    Extra Tube ([place] Draw)    Narrative    The following orders were created for panel order Extra Tube ([place] Draw).  Procedure                               Abnormality         Status                     ---------                               -----------         ------                     Extra Green Top (Lithium...[[id number]]                      Final result                 Please view results for these tests on the individual orders.   Extra Green Top (Lithium Heparin) Tube   Result Value Ref Range    Hold Specimen JIC    CT Head w/o Contrast    Narrative    CT HEAD W/O CONTRAST 10/3/2023 10:47 PM    History: hemorrhage     Comparison: Same day CT head    Technique: Using multidetector thin collimation helical acquisition  technique, axial, coronal and sagittal CT images from the skull base  to the vertex were obtained without intravenous contrast.   (topogram) image(s) also obtained and reviewed.    Findings:   Stable subarachnoid hemorrhage in the left frontal lobe. No new  intracranial hemorrhage. No acute loss of gray-white differentiation.  Ventricles are proportionate to the cerebral sulci. The basal cisterns  are clear. Diffuse cerebral and cerebellar volume  loss. Patchy  periventricular and subcortical white matter hypodensities, likely  chronic small vessel ischemic disease. Chronic infarction in the  posterior left parietal periventricular white matter.    The bony calvaria and the bones of the skull base are normal. Mucosal  thickening, layering fluid, and debris throughout the paranasal  sinuses. Mastoid air cells are clear.      Impression    Impression:  Stable left frontal subarachnoid hemorrhage. No new intracranial  pathology.     I have personally reviewed the examination and initial interpretation  and I agree with the findings.    KARLA LEO MD         SYSTEM ID:  [id number]   EKG 12-lead, complete   Result Value Ref Range    Systolic Blood Pressure  mmHg    Diastolic Blood Pressure  mmHg    Ventricular Rate 139 BPM    Atrial Rate  BPM    ME Interval  ms    QRS Duration 98 ms     ms    QTc 526 ms    P Axis  degrees    R AXIS -11 degrees    T Axis 251 degrees    Interpretation ECG       Atrial fibrillation with rapid ventricular response  Cannot rule out Inferior infarct , age undetermined  Abnormal ECG  When compared with ECG of 02-OCT-2023 15:00, (unconfirmed)  Atrial fibrillation has replaced Sinus rhythm  Nonspecific T wave abnormality now evident in Inferior leads  Confirmed by MD OLEKSANDR, GUERO ([id number]) on 10/4/2023 1:24:54 PM     Glucose by meter   Result Value Ref Range    GLUCOSE BY METER POCT 82 70 - 99 mg/dL   Blood gas venous   Result Value Ref Range    pH Venous 7.38 7.32 - 7.43    pCO2 Venous 41 40 - 50 mm Hg    pO2 Venous 33 25 - 47 mm Hg    Bicarbonate Venous 24 21 - 28 mmol/L    Base Excess/Deficit -0.9 -7.7 - 1.9 mmol/L    FIO2 60    CT Head w/o Contrast    Narrative    EXAM: CT HEAD W/O CONTRAST  10/4/2023 4:36 AM     HISTORY:  Dual energy repeat CT head for stability L frontal SAH,  Acute L MCA stroke s/p L carotid stenting,       COMPARISON:  CT head 10/3/2022, 2240. Multiple priors.    TECHNIQUE: Using multidetector thin  collimation helical acquisition  technique, axial, coronal and sagittal CT images from the skull base  to the vertex were obtained without intravenous contrast.   (topogram) image(s) also obtained and reviewed.    FINDINGS:  Unchanged subarachnoid hemorrhage along the left frontal lobe. No  evidence for new acute intracranial hemorrhage. There is no mass  effect, or midline shift. No acute loss of gray-white matter  differentiation in the cerebral hemispheres. Ventricles are  proportionate to the cerebral sulci, within the context of diffuse  cerebral atrophy. Patchy periventricular and subcortical white matter  hypoattenuation. Clear basal cisterns.    The bony calvaria and the bones of the skull base are normal. The  orbits are grossly normal. Mastoid air cells are clear.  Similar-appearing layering fluid and debris throughout the paranasal  sinuses.      Impression    IMPRESSION:  Unchanged left frontal subarachnoid hemorrhage compared to prior CT.    I have personally reviewed the examination and initial interpretation  and I agree with the findings.    KARLA LEO MD         SYSTEM ID:  [id number]   Glucose by meter   Result Value Ref Range    GLUCOSE BY METER POCT 96 70 - 99 mg/dL   CBC with platelets   Result Value Ref Range    WBC Count 12.4 (H) 4.0 - 11.0 10e3/uL    RBC Count 3.81 (L) 4.40 - 5.90 10e6/uL    Hemoglobin 11.8 (L) 13.3 - 17.7 g/dL    Hematocrit 35.7 (L) 40.0 - 53.0 %    MCV 94 78 - 100 fL    MCH 31.0 26.5 - 33.0 pg    MCHC 33.1 31.5 - 36.5 g/dL    RDW 13.0 10.0 - 15.0 %    Platelet Count 242 150 - 450 10e3/uL   Basic metabolic panel   Result Value Ref Range    Sodium 137 135 - 145 mmol/L    Potassium 3.7 3.4 - 5.3 mmol/L    Chloride 105 98 - 107 mmol/L    Carbon Dioxide (CO2) 19 (L) 22 - 29 mmol/L    Anion Gap 13 7 - 15 mmol/L    Urea Nitrogen 9.5 8.0 - 23.0 mg/dL    Creatinine 0.87 0.67 - 1.17 mg/dL    GFR Estimate >90 >60 mL/min/1.73m2    Calcium 8.5 (L) 8.8 - 10.2 mg/dL    Glucose 96  70 - 99 mg/dL   Glucose by meter   Result Value Ref Range    GLUCOSE BY METER POCT 93 70 - 99 mg/dL   CTA Abdomen Pelvis Bilat Leg Runoff w Contr    Narrative    CTA ABDOMEN, PELVIS, AND LOWER EXTREMITY 10/4/2023 8:57 AM    CLINICAL HISTORY: CTA pelvis with run off through legs rule out  ischemia 62 yr with L MCA stroke s/p L carotid stenting, known  peripheral vascular disease, bilaterally absent dorsalis pedis,  posterior tibial pulses, weak popliteal pulses, rule out acute limb  ischemia.     COMPARISONS: None available.    REFERRING PROVIDER: MARA MOYA    TECHNIQUE: Unenhanced CT performed through the abdomen and pelvis.  After the uneventful administration of intravenous contrast, CTA  performed from the diaphragm through the feet. CT repeated from the  knees through the feet. Coronal and sagittal reconstructions were  produced. Lung MIP produced.    3D and multiplanar reconstructions were unavailable at the time of  dictation.    CONTRAST: 135 mL Isovue 370    DOSE (DLP): 669 mGy*cm    FINDINGS: CTA: AORTOILIAC: Infrarenal abdominal aortic aneurysm  measures 32 mm in AP diameter at L4. Mural thrombus causes 50-69%  diameter luminal narrowing and extends through the left iliac  arteries.    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    Aorta measures 28 mm in diameter at the celiac origin.     Celiac artery patent. Accessory left hepatic artery to segments 2 and  3 originates from the left gastric artery. Superior mesenteric artery  patent. Two right and single left renal arteries patent. Inferior  mesenteric artery patent.    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 Right external iliac artery measures 10  mm in diameter and is patent.     Right internal iliac artery occluded with thrombus. Distal branches  reconstituted by collaterals.    Left common, internal, and external iliac arteries occluded. Distal  left internal  iliac artery branches reconstituted by collaterals.    RIGHT LEG: Right common femoral artery measures 12 mm in diameter.  Right common and profundus femoral arteries patent.    Occlusion from the superficial femoral artery in the proximal thigh to  above knee popliteal artery.     Popliteal artery reconstituted by collaterals at the femoral condyles.    Thin anterior tibial origin and proximal segment. Tibioperoneal trunk  occlusion. Multifocal anterior tibial, peroneal, and posterior tibial  artery occlusions through the calf.  Arteries above the ankle and in  the foot not opacified, occlusions versus slow flow.    LEFT LEG: Common, profundus, and superficial femoral arteries  occluded.    Profundus branches reconstituted by pelvic collaterals.    Thin popliteal artery reconstituted distal to the adductor canal.  50-69% diameter stenosis above the patella.    Anterior tibial origin patent. Thin anterior tibial artery patent to  the distal shin.     Tibioperoneal trunk occlusion. Posterior tibial artery reconstituted  in the proximal calf. Peroneal artery occluded. Thin posterior tibial  artery patent to the ankle. Arteries in the foot not opacified,  occlusion versus slow flow.    CT: Left effusion and left lower lobe consolidation.    Left rib fractures.    Excreted contrast in the gallbladder.    Diverticulosis.    Spine degenerative changes.      Impression    IMPRESSION:  1. Infrarenal abdominal aortic aneurysm measures up to 32 mm in  diameter at L4. Mural thrombus causes up to 50-69% diameter luminal  narrowing. Thrombus occludes/extends through left iliac arteries.    2. RIGHT LEG:       A. 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       B. Internal iliac artery occlusion. Distal branches reconstituted  by pelvic collaterals.       C. Patent external iliac artery.       D. Proximal superficial femoral through above knee popliteal  occlusion.       E. Above knee popliteal artery reconstituted at the  femoral  condyles.       F. No continuous arterial flow below the knee.       G. Arteries above the ankle and in the foot are unopacified,  occlusions versus slow flow.    3. LEFT LEG:       A. Left common iliac to above knee popliteal artery occlusion.       B. Internal iliac artery occluded. Distal branches reconstituted  by pelvic collaterals.       C. Profundus femoral artery occlusion. Branches reconstituted by  pelvic collaterals.       D. Thin popliteal artery reconstituted by collaterals distal to  the adductor canal. 50-69% diameter stenosis above the patella.       E. No continuous arterial flow below the knee.       F. Posterior tibial artery reconstituted in the proximal calf and  patent to the ankle.       G. Arteries in the foot are unopacified, occlusions versus slow  flow.    4. Left lower lobe consolidation and effusion. Correlate for  pneumonia.    5. Left rib fractures.    AYLA LAY MD         SYSTEM ID:  [id number]   Respiratory Aerobic Bacterial Culture with Gram Stain    Specimen: Endotracheal; Sputum   Result Value Ref Range    Gram Stain Result >25 PMNs/low power field     Gram Stain Result 4+ Mixed sherry    Extra Tube *Canceled*    Narrative    The following orders were created for panel order Extra Tube.  Procedure                               Abnormality         Status                     ---------                               -----------         ------                     Extra Green Top (Lithium...[[id number]]                                                   Please view results for these tests on the individual orders.   Echocardiogram Complete - For age > 60 yrs   Result Value Ref Range    LVEF  55-60%     Narrative    [id number]  [id number]  [id number]  [id number]^NITA^MARA^UL MECHELLE     [facility],[place]  Echocardiography Laboratory  [address] [place] [id number]     Name: DANIELLE SUAZO  MRN: [id number]  : 1960  Study Date: 10/04/2023 10:10  AM  Age: 62 yrs  Gender: Male  Patient Location: [facility]  Reason For Study: Cerebrovascular Incident  Ordering Physician: MARA MOYA MECHELLE  Performed By: Corin Baig RDCS     BSA: 1.8 m2  Height: 71 in  Weight: 144 lb  HR: 100  BP: 118/85 mmHg  ______________________________________________________________________________  Procedure  Complete Portable Echo Adult. Contrast Optison. Optison (NDC #3306-9619-61)  given intravenously. Patient was given 5 ml mixture of 3 ml Optison and 6 ml  saline. 4 ml wasted.  ______________________________________________________________________________  Interpretation Summary  No cardiac source for embolus identified.  ______________________________________________________________________________  Left Ventricle  Left ventricular size is normal. Left ventricular wall thickness is normal.  The visual ejection fraction is 55-60%. Left ventricular diastolic function is  indeterminate. Inferior wall akinesis is present. Inferolateral (posterior)  wall akinesis is present.     Right Ventricle  Right ventricular function, chamber size, wall motion, and thickness are  normal.     Atria  The right atria appears normal. Mild left atrial enlargement is present.     Mitral Valve  The mitral valve is normal. Trace mitral insufficiency is present.     Aortic Valve  Aortic valve sclerosis is present. Trace aortic insufficiency is present.     Tricuspid Valve  The tricuspid valve is normal. Pulmonary artery systolic pressure cannot be  assessed.     Pulmonic Valve  The pulmonic valve is normal.     Vessels  The thoracic aorta is normal. The pulmonary artery and bifurcation cannot be  assessed. The inferior vena cava is normal.     Pericardium  No pericardial effusion is present.     Compared to Previous Study  There is no prior study for direct comparison.  ______________________________________________________________________________  MMode/2D Measurements & Calculations     IVSd: 1.2  cm  LVIDd: 4.8 cm  LVIDs: 4.1 cm  LVPWd: 0.56 cm  FS: 13.9 %  LV mass(C)d: 143.7 grams  LV mass(C)dI: 78.3 grams/m2  asc Aorta Diam: 3.5 cm  LVOT diam: 2.3 cm  LVOT area: 4.3 cm2  Asc Ao diam index BSA (cm/m2): 1.9  Asc Ao diam index Ht(cm/m): 1.9  LA Volume Index (BP): 40.4 ml/m2  RWT: 0.23     TAPSE: 1.8 cm     Doppler Measurements & Calculations  MV E max norm: 49.1 cm/sec  MV A max norm: 82.3 cm/sec  MV E/A: 0.60  MV dec slope: 235.7 cm/sec2  MV dec time: 0.21 sec  E/E' av.5  Lateral E/e': 8.5  Medial E/e': 10.4     ______________________________________________________________________________  Report approved by: Arturo Lyles 10/04/2023 11:14 AM         XR Chest Port 1 View    Narrative    EXAMINATION: XR CHEST PORT 1 VIEW, 10/4/2023 11:01 AM    COMPARISON: 10/3/2023    HISTORY: hypoxia    FINDINGS: Endotracheal tube tip in upper thoracic trachea. Heart size  is normal. Left base atelectasis persists. Multiple rib fractures  again seen.      Impression    IMPRESSION: No change in left base atelectasis.     MALINA KAPLAN MD         SYSTEM ID:  [id number]       All relevant imaging and laboratory values personally reviewed.

## 2023-10-04 NOTE — PROGRESS NOTES
Admitted/transferred from: IR   Reason for admission/transfer: Hemodynamic and neurological monitoring   Patient status upon admission/transfer: Sedated. Localizes in LUE and LLE. No response to stimuli in RUE and RLE. Ventilated. R pupil > L pupil. No Pulses in BLE. Norepinephrine, propofol, and cangrelor infusing.   Interventions: Hook up to monitor. Chlorhexidine bath. CT scan.   Plan: Monitor neurological status, wean ventilator as tolerated. Notify team for changes or concerns.   2 RN skin assessment: completed by Marija HASSAN RN, Fouzia LOMBARDO RN   Result of skin assessment and interventions/actions: Abrasions to L shin/knee. Periorbital swelling and bruising to R eye. Peeling and cracked skin to R and L foot/heel. Sacral mepilex in place.   Height, weight, drug calc weight: done  Patient belongings: shoes and clothing   MDRO education (if applicable):  NA

## 2023-10-04 NOTE — IR NOTE
Neuro IR Nursing Procedure Note      Patient Name: Eber Jin  Medical Record Number: [id number]  Today's Date: 10/3/2023    Procedure: Cerebral angiogram, stent in L ICA,   Proceduralist: Dr. Perry, Dr. Zaldivar, Dr. Blanton  Pathology present: na    Procedure Start: 1853  Procedure end: 1958  Sedation medications administered:   Fentanyl 50 mcg  Versed 1 mg  Sedation time: 35 minutes (1923 - 1958)     Propofol drip at 30 mcg/kg/min  Cangrelor drip at 2 mcg/kg/min at 1923 with bolus prior to starting.    Report given to: Orville  : amanda    Other Notes: Pt arrived to IR room 3 from [facility] at 1840 via ambulance. Patient arrived on 20 mcg/kg/min of propofol. Patient was given rocoronium prior to transfer when intubated. Unable to complete a neuro exam. Pupils pinpoint but reactive.     Consent reviewed and signed via phone consent. With son.  Pt denies unable to ask any questions or concerns regarding procedure. Pt positioned supine and monitored per protocol.     Pt tolerated procedure without any noted complications.     Right Groin Access:  Starclose deployed at 1955.  Flat Bedrest x 2 hours.  Ambulation time: 2155    Slight oozing from groin site.   No hematoma. Dr. Blanton aware.  Also right pupil is slightly bigger than left which is a change from his neuro exam prior to procedure. Dr. Zaldivar aware.     Pt transferred to .        
- - -

## 2023-10-04 NOTE — PLAN OF CARE
SLP: Clinical swallow eval orders received. Pt currently intubated and not appropriate for participation. RN reported possible plans for extubation later this afternoon. Will follow-up as appropriate.

## 2023-10-04 NOTE — PROGRESS NOTES
Brief Clinical Note:    Repeat head Ct showed stable area of hemorrhage, will switch from Cangrelor to Brilinta.       Plan:  Start Aspirin 81mg  Give Loading dose 180mg Brilinta and turn off Cangrelor gtt 2 hours after giving loading dose.   Can consider CTA pelvis with runoff through legs to assess for limb ischemia.   Insert OG tube      Shari Blanton MD  Endovascular Surgical Neuroradiology Fellow  [facility]  [phone]    Endovascular Surgical Neuroradiology staff is Dr. Perry

## 2023-10-04 NOTE — PLAN OF CARE
Major Shift Events:  Arouses to voice/gentle touch. R pupil > L pupil. Follows commands. Weaker in RUE and RLE. R pronator drift. Bilateral soft wrist restraints in place for pulling at lines. CIWA scale. TLOW 96.4, brian hugger applied on arrival, currently off. A fib. HR 90s-140s. Occasional PVCs. Norepinephrine shut off shortly after arrival. Maintaining MAP goal > 65 and SBP < 150. Unable to find pulses in lower extremities, see flowsheets. Able to palpate/doppler bilateral popliteal pulse. CMV 40%/16/450/5, VBG done. Attempted OG and NG. Huggins in place, adequate output. NS @ 50ml/hr. Cangrelor @ 2mcg/kg/min.     Plan: Continue to monitor neurological status, notify team for changes. Extubate today.     For vital signs and complete assessments, please see documentation flowsheets.

## 2023-10-04 NOTE — PLAN OF CARE
Major Shift Events:  Extubated at 1400. Neuros unchanged - pupils unequal but reactive. Moves all - rt weaker than lt. Follows commands. A-fib with controlled rates. SBP <150 w/out intervention. No pulses in LLE, DVT present. Rt pulses dopplerable. RA, LLL diminished. Needs encouragement for coughing. Primofit - good UO. Soft bite size diet & thin liquids.     Plan: Continue Qh neuro checks. Continue current plan of care.    For vital signs and complete assessments, please see documentation flowsheets.

## 2023-10-05 ENCOUNTER — APPOINTMENT (OUTPATIENT)
Dept: SPEECH THERAPY | Facility: CLINIC | Age: 63
DRG: 034 | End: 2023-10-05
Payer: COMMERCIAL

## 2023-10-05 ENCOUNTER — APPOINTMENT (OUTPATIENT)
Dept: PHYSICAL THERAPY | Facility: CLINIC | Age: 63
DRG: 034 | End: 2023-10-05
Payer: COMMERCIAL

## 2023-10-05 ENCOUNTER — APPOINTMENT (OUTPATIENT)
Dept: OCCUPATIONAL THERAPY | Facility: CLINIC | Age: 63
DRG: 034 | End: 2023-10-05
Payer: COMMERCIAL

## 2023-10-05 ENCOUNTER — MEDICAL CORRESPONDENCE (OUTPATIENT)
Dept: HEALTH INFORMATION MANAGEMENT | Facility: CLINIC | Age: 63
End: 2023-10-05

## 2023-10-05 LAB
ANION GAP SERPL CALCULATED.3IONS-SCNC: 10 MMOL/L (ref 7–15)
BUN SERPL-MCNC: 6.8 MG/DL (ref 8–23)
CALCIUM SERPL-MCNC: 8.6 MG/DL (ref 8.8–10.2)
CHLORIDE SERPL-SCNC: 105 MMOL/L (ref 98–107)
CREAT SERPL-MCNC: 0.87 MG/DL (ref 0.67–1.17)
DEPRECATED HCO3 PLAS-SCNC: 23 MMOL/L (ref 22–29)
EGFRCR SERPLBLD CKD-EPI 2021: >90 ML/MIN/1.73M2
ERYTHROCYTE [DISTWIDTH] IN BLOOD BY AUTOMATED COUNT: 12.9 % (ref 10–15)
GLUCOSE SERPL-MCNC: 88 MG/DL (ref 70–99)
HCT VFR BLD AUTO: 33.2 % (ref 40–53)
HGB BLD-MCNC: 10.8 G/DL (ref 13.3–17.7)
LABORATORY REPORT: NORMAL
MAGNESIUM SERPL-MCNC: 1.7 MG/DL (ref 1.7–2.3)
MCH RBC QN AUTO: 30.4 PG (ref 26.5–33)
MCHC RBC AUTO-ENTMCNC: 32.5 G/DL (ref 31.5–36.5)
MCV RBC AUTO: 94 FL (ref 78–100)
PHOSPHATE SERPL-MCNC: 3 MG/DL (ref 2.5–4.5)
PLATELET # BLD AUTO: 237 10E3/UL (ref 150–450)
PLPETH BLD-MCNC: 293 NG/ML
POPETH BLD-MCNC: 448 NG/ML
POTASSIUM SERPL-SCNC: 3.7 MMOL/L (ref 3.4–5.3)
RBC # BLD AUTO: 3.55 10E6/UL (ref 4.4–5.9)
SODIUM SERPL-SCNC: 138 MMOL/L (ref 135–145)
WBC # BLD AUTO: 12.3 10E3/UL (ref 4–11)

## 2023-10-05 PROCEDURE — 250N000013 HC RX MED GY IP 250 OP 250 PS 637

## 2023-10-05 PROCEDURE — 83735 ASSAY OF MAGNESIUM: CPT | Performed by: NURSE PRACTITIONER

## 2023-10-05 PROCEDURE — 250N000013 HC RX MED GY IP 250 OP 250 PS 637: Performed by: PSYCHIATRY & NEUROLOGY

## 2023-10-05 PROCEDURE — 250N000013 HC RX MED GY IP 250 OP 250 PS 637: Performed by: NURSE PRACTITIONER

## 2023-10-05 PROCEDURE — 97535 SELF CARE MNGMENT TRAINING: CPT | Mod: GO

## 2023-10-05 PROCEDURE — 97162 PT EVAL MOD COMPLEX 30 MIN: CPT | Mod: GP | Performed by: PHYSICAL THERAPIST

## 2023-10-05 PROCEDURE — 258N000003 HC RX IP 258 OP 636

## 2023-10-05 PROCEDURE — 97116 GAIT TRAINING THERAPY: CPT | Mod: GP | Performed by: PHYSICAL THERAPIST

## 2023-10-05 PROCEDURE — 84100 ASSAY OF PHOSPHORUS: CPT | Performed by: NURSE PRACTITIONER

## 2023-10-05 PROCEDURE — 97165 OT EVAL LOW COMPLEX 30 MIN: CPT | Mod: GO

## 2023-10-05 PROCEDURE — 80048 BASIC METABOLIC PNL TOTAL CA: CPT

## 2023-10-05 PROCEDURE — 85027 COMPLETE CBC AUTOMATED: CPT

## 2023-10-05 PROCEDURE — 97530 THERAPEUTIC ACTIVITIES: CPT | Mod: GP | Performed by: PHYSICAL THERAPIST

## 2023-10-05 PROCEDURE — 250N000011 HC RX IP 250 OP 636: Performed by: NURSE PRACTITIONER

## 2023-10-05 PROCEDURE — 36415 COLL VENOUS BLD VENIPUNCTURE: CPT

## 2023-10-05 PROCEDURE — 97112 NEUROMUSCULAR REEDUCATION: CPT | Mod: GO

## 2023-10-05 PROCEDURE — 250N000011 HC RX IP 250 OP 636

## 2023-10-05 PROCEDURE — 99232 SBSQ HOSP IP/OBS MODERATE 35: CPT | Performed by: NURSE PRACTITIONER

## 2023-10-05 PROCEDURE — 120N000002 HC R&B MED SURG/OB UMMC

## 2023-10-05 PROCEDURE — 92526 ORAL FUNCTION THERAPY: CPT | Mod: GN

## 2023-10-05 RX ORDER — SPIRONOLACTONE 25 MG/1
25 TABLET ORAL DAILY
Status: DISCONTINUED | OUTPATIENT
Start: 2023-10-05 | End: 2023-10-18 | Stop reason: HOSPADM

## 2023-10-05 RX ORDER — POTASSIUM CHLORIDE 750 MG/1
20 TABLET, EXTENDED RELEASE ORAL ONCE
Status: COMPLETED | OUTPATIENT
Start: 2023-10-05 | End: 2023-10-05

## 2023-10-05 RX ORDER — METOPROLOL SUCCINATE 50 MG/1
50 TABLET, EXTENDED RELEASE ORAL DAILY
Status: DISCONTINUED | OUTPATIENT
Start: 2023-10-05 | End: 2023-10-06

## 2023-10-05 RX ORDER — HEPARIN SODIUM 5000 [USP'U]/.5ML
5000 INJECTION, SOLUTION INTRAVENOUS; SUBCUTANEOUS EVERY 8 HOURS
Status: DISCONTINUED | OUTPATIENT
Start: 2023-10-05 | End: 2023-10-18 | Stop reason: HOSPADM

## 2023-10-05 RX ORDER — MAGNESIUM OXIDE 400 MG/1
400 TABLET ORAL EVERY 4 HOURS
Status: COMPLETED | OUTPATIENT
Start: 2023-10-05 | End: 2023-10-05

## 2023-10-05 RX ORDER — LOSARTAN POTASSIUM 50 MG/1
50 TABLET ORAL DAILY
Status: DISCONTINUED | OUTPATIENT
Start: 2023-10-05 | End: 2023-10-06

## 2023-10-05 RX ADMIN — HEPARIN SODIUM 5000 UNITS: 5000 INJECTION, SOLUTION INTRAVENOUS; SUBCUTANEOUS at 14:25

## 2023-10-05 RX ADMIN — ASPIRIN 81 MG CHEWABLE TABLET 81 MG: 81 TABLET CHEWABLE at 08:53

## 2023-10-05 RX ADMIN — MAGNESIUM OXIDE TAB 400 MG (241.3 MG ELEMENTAL MG) 400 MG: 400 (241.3 MG) TAB at 06:45

## 2023-10-05 RX ADMIN — SENNOSIDES AND DOCUSATE SODIUM 2 TABLET: 50; 8.6 TABLET ORAL at 08:53

## 2023-10-05 RX ADMIN — TICAGRELOR 90 MG: 90 TABLET ORAL at 08:53

## 2023-10-05 RX ADMIN — ACETAMINOPHEN 1000 MG: 500 TABLET ORAL at 02:58

## 2023-10-05 RX ADMIN — THIAMINE HYDROCHLORIDE 200 MG: 100 INJECTION, SOLUTION INTRAMUSCULAR; INTRAVENOUS at 08:53

## 2023-10-05 RX ADMIN — THIAMINE HYDROCHLORIDE 200 MG: 100 INJECTION, SOLUTION INTRAMUSCULAR; INTRAVENOUS at 20:58

## 2023-10-05 RX ADMIN — SPIRONOLACTONE 25 MG: 25 TABLET ORAL at 12:13

## 2023-10-05 RX ADMIN — SODIUM CHLORIDE: 9 INJECTION, SOLUTION INTRAVENOUS at 12:04

## 2023-10-05 RX ADMIN — POLYETHYLENE GLYCOL 3350 17 G: 17 POWDER, FOR SOLUTION ORAL at 08:53

## 2023-10-05 RX ADMIN — MAGNESIUM OXIDE TAB 400 MG (241.3 MG ELEMENTAL MG) 400 MG: 400 (241.3 MG) TAB at 11:05

## 2023-10-05 RX ADMIN — METOPROLOL SUCCINATE 50 MG: 50 TABLET, EXTENDED RELEASE ORAL at 12:13

## 2023-10-05 RX ADMIN — ACETAMINOPHEN 1000 MG: 500 TABLET ORAL at 11:05

## 2023-10-05 RX ADMIN — Medication 1 TABLET: at 08:53

## 2023-10-05 RX ADMIN — FOLIC ACID 1 MG: 5 INJECTION, SOLUTION INTRAMUSCULAR; INTRAVENOUS; SUBCUTANEOUS at 08:53

## 2023-10-05 RX ADMIN — ATORVASTATIN CALCIUM 80 MG: 80 TABLET, FILM COATED ORAL at 20:58

## 2023-10-05 RX ADMIN — HEPARIN SODIUM 5000 UNITS: 5000 INJECTION, SOLUTION INTRAVENOUS; SUBCUTANEOUS at 21:02

## 2023-10-05 RX ADMIN — TICAGRELOR 90 MG: 90 TABLET ORAL at 20:58

## 2023-10-05 RX ADMIN — POTASSIUM CHLORIDE 20 MEQ: 750 TABLET, EXTENDED RELEASE ORAL at 06:45

## 2023-10-05 RX ADMIN — LIDOCAINE PATCH 4% 2 PATCH: 40 PATCH TOPICAL at 11:05

## 2023-10-05 RX ADMIN — LABETALOL HYDROCHLORIDE 10 MG: 5 INJECTION, SOLUTION INTRAVENOUS at 23:55

## 2023-10-05 RX ADMIN — THIAMINE HYDROCHLORIDE 200 MG: 100 INJECTION, SOLUTION INTRAMUSCULAR; INTRAVENOUS at 14:22

## 2023-10-05 RX ADMIN — LOSARTAN POTASSIUM 50 MG: 50 TABLET, FILM COATED ORAL at 12:13

## 2023-10-05 ASSESSMENT — VISUAL ACUITY
OU: NORMAL ACUITY;BASELINE
OU: BASELINE
OU: NORMAL ACUITY;BASELINE
OU: NORMAL ACUITY;BASELINE

## 2023-10-05 ASSESSMENT — ACTIVITIES OF DAILY LIVING (ADL)
ADLS_ACUITY_SCORE: 36
PREVIOUS_RESPONSIBILITIES: MEAL PREP;LAUNDRY;SHOPPING;MEDICATION MANAGEMENT;FINANCES
ADLS_ACUITY_SCORE: 40
ADLS_ACUITY_SCORE: 40
ADLS_ACUITY_SCORE: 36
ADLS_ACUITY_SCORE: 40
ADLS_ACUITY_SCORE: 40
ADLS_ACUITY_SCORE: 36
ADLS_ACUITY_SCORE: 40
ADLS_ACUITY_SCORE: 36
DEPENDENT_IADLS:: MEAL PREPARATION

## 2023-10-05 NOTE — PLAN OF CARE
Goal Outcome Evaluation:    Plan of Care Reviewed With: patient    Overall Patient Progress: no change    Outcome Evaluation: Pt would like to return home at discharge pending medical readiness (rehabilitation stay was not discussed this visit). PT/OT consults pending.

## 2023-10-05 NOTE — PROGRESS NOTES
10/05/23 1400   Appointment Info   Signing Clinician's Name / Credentials (OT) Indu Alvarado, OTR/L       Present no   Language english   Living Environment   People in Home child(eusebia), adult;spouse   Current Living Arrangements house   Home Accessibility stairs to enter home;stairs within home   Number of Stairs, Main Entrance 10   Stair Railings, Main Entrance railings on both sides of stairs   Number of Stairs, Within Home, Primary greater than 10 stairs   Stair Railings, Within Home, Primary railings on both sides of stairs   Transportation Anticipated car, drives self   Living Environment Comments Pt lives in a house with his wife and adult children. Per chart review, pt wife has been in a TCU for the past 2 months.   Self-Care   Usual Activity Tolerance moderate   Current Activity Tolerance fair   Regular Exercise No   Equipment Currently Used at Home walker, rolling;cane, straight   Fall history within last six months yes   Number of times patient has fallen within last six months   (multiple)   Activity/Exercise/Self-Care Comment Pt was previously independent with ADL completion. Pt uses a FWW at home and SPC for community mobility   Instrumental Activities of Daily Living (IADL)   Previous Responsibilities meal prep;laundry;shopping;medication management;finances   IADL Comments Pt and family share IADL responsibilities   General Information   Onset of Illness/Injury or Date of Surgery 10/03/23   Referring Physician Sumeet Dickerson MD   Patient/Family Therapy Goal Statement (OT) To get stronger   Additional Occupational Profile Info/Pertinent History of Current Problem Eber Jin is a 62 year old man with history of tobacco use, HTN, HLD, CAD, HFrEF 35%, paroxysmal a-fib, recent PE on xarelto and aspirin, R pontine infarct 03/2021 with baseline mild left leg weakness c/b recurrent falls and gait imbalance, COPD, recent admission 9/20-24/23 to [facility] for rib fractures  and PE who was initially admitted to [facility] ED 10/2/23 for surgical workup of multiple traumatic L displaced rib fractures in setting of recurrent falls, transferred to [facility] for consideration of thrombectomy after stroke code called 10/3/23 for aphasia, found to have acute L MCA/OMER watershed infarcts and possible L M2 superior division occlusion. Initial NIHSS 17. He underwent L carotid stenting and angioplasty. Post-procedure CT head with L frontal SAH that was stable on repeat scan.   Existing Precautions/Restrictions fall   Limitations/Impairments safety/cognitive   Left Upper Extremity (Weight-bearing Status) full weight-bearing (FWB)   Right Upper Extremity (Weight-bearing Status) full weight-bearing (FWB)   Left Lower Extremity (Weight-bearing Status) full weight-bearing (FWB)   Right Lower Extremity (Weight-bearing Status) full weight-bearing (FWB)   General Observations and Info Activity: up with assist   Cognitive Status Examination   Orientation Status person;place   Cognitive Status Comments Pt is alert and oriented to person, place and month only. Pt presents with significant cognitive deficits, will continue to monitor   Visual Perception   Visual Impairment/Limitations WFL   Sensory   Sensory Quick Adds sensation intact   Range of Motion Comprehensive   General Range of Motion no range of motion deficits identified   Comment, General Range of Motion BUE ROM WFL   Strength Comprehensive (MMT)   Comment, General Manual Muscle Testing (MMT) Assessment LUE grossly 5/5, RUE 3+/5   Transfers   Transfers sit-stand transfer   Sit-Stand Transfer   Sit-Stand [place] (Transfers) contact guard   Assistive Device (Sit-Stand Transfers) walker, front-wheeled   Activities of Daily Living   BADL Assessment/Intervention bathing;upper body dressing;lower body dressing;grooming;toileting   Bathing Assessment/Intervention   [place] Level (Bathing) moderate assist (50% patient effort)   Comment,  (Bathing) Per clinical judgement   Upper Body Dressing Assessment/Training   Comment, (Upper Body Dressing) Per clinical judgement   Reading Level (Upper Body Dressing) minimum assist (75% patient effort)   Lower Body Dressing Assessment/Training   Reading Level (Lower Body Dressing) moderate assist (50% patient effort)   Comment, (Lower Body Dressing) Per clinical judgement   Grooming Assessment/Training   Reading Level (Grooming) minimum assist (75% patient effort)   Comment, (Grooming) Per clinical judgement   Toileting   Comment, (Toileting) Per clinical judgement   Reading Level (Toileting) minimum assist (75% patient effort)   Clinical Impression   Criteria for Skilled Therapeutic Interventions Met (OT) Yes, treatment indicated   OT Diagnosis decreased activity tolerance and independence with ADL completion   Influenced by the following impairments weakness , fatigue, deconditioning, cognition   OT Problem List-Impairments impacting ADL problems related to;activity tolerance impaired;balance;cognition   Assessment of Occupational Performance 5 or more Performance Deficits   Identified Performance Deficits g/h, toileting, bathing, dressing, functional mobility and cognition   Planned Therapy Interventions (OT) ADL retraining;IADL retraining;cognition;strengthening;home program guidelines;progressive activity/exercise   Clinical Decision Making Complexity (OT) low complexity   Anticipated Equipment Needs Upon Discharge (OT) other (see comments)  (TBD)   Risk & Benefits of therapy have been explained evaluation/treatment results reviewed;care plan/treatment goals reviewed;risks/benefits reviewed;current/potential barriers reviewed;participants voiced agreement with care plan;participants included;patient   OT Total Evaluation Time   OT Eval, Low Complexity Minutes (07890) 10

## 2023-10-05 NOTE — CONSULTS
Care Management Initial Consult    General Information  Assessment completed with: Srikanth Eber  Type of CM/SW Visit: Initial Assessment  Primary Care Provider verified and updated as needed: Yes   Readmission within the last 30 days: Admit to [facility] 9/20/23-9/24/23  Reason for Consult: discharge planning  Advance Care Planning: Advance Care Planning Reviewed: other (see comments) (Not reviewed this visit)        Communication Assessment  Patient's communication style: spoken language (English)    Hearing Difficulty or Deaf: no   Wear Glasses or Blind: no    Cognitive  Cognitive/Neuro/Behavioral: .WDL except, orientation  Level of Consciousness: alert  Arousal Level: opens eyes spontaneously  Orientation: disoriented to, place, time  Mood/Behavior: calm, cooperative  Best Language: 0 - No aphasia  Speech: clear, spontaneous    Living Environment:   People in home: grandchildren, adult son, spouse     Current living Arrangements: house ([place])      Able to return to prior arrangements: yes     Family/Social Support:  Care provided by: self  Provides care for: no one  Marital Status:   Description of Support System: Supportive - Son John looks after pt although John works night shift. Adult grandson Chester helps care for pt although also works outside the home during the day.     Current Resources:   Patient receiving home care services: Yes  Skilled Home Care Services: Skilled Nursing - Recently dropped from [facility] (per chart review) due to not taking his medications  Community Resources: Meals on Wheels - 1 meal/day  Equipment currently used at home: walker, rolling; cane, straight  Supplies currently used at home: None    Employment/Financial:  Employment Status: retired     Employment/ Comments: building maintenance at [employer]  Financial Concerns: Did not ask  Referral to Financial Worker: No  Does the patient's insurance plan have a 3 day qualifying hospital stay waiver?   No    Lifestyle & Psychosocial Needs:  Social Determinants of Health     Food Insecurity: Not on file   Depression: Not at risk (8/31/2022)    PHQ-2     PHQ-2 Score: 0   Housing Stability: Not on file   Tobacco Use: High Risk (10/2/2023)    Patient History     Smoking Tobacco Use: Every Day     Smokeless Tobacco Use: Never     Passive Exposure: Not on file   Financial Resource Strain: Not on file   Alcohol Use: Not on file   Transportation Needs: Not on file   Physical Activity: Not on file   Interpersonal Safety: Not on file   Stress: Not on file   Social Connections: Not on file     Functional Status:  Prior to admission patient needed assistance:   Dependent IADLs: Meal Preparation, Cleaning     Mental Health Status:  Mental Health Status: No Current Concerns (pt denied)      Chemical Dependency Status:  Chemical Dependency Status: Alcohol use (pt didn't         Values/Beliefs:  Spiritual, Cultural Beliefs, Restorationism Practices, Values that affect care: no       Values/Beliefs Comment: Judaism mychal    Additional Information: SW met with pt at bedside to complete initial assessment.    Per discussion at rounds, pt is alert and oriented x2. Called son, John, to get more info for the assessment. No answer; VM left. Called wife, Dinorah, who answered and reported she has been at the [facility] transitional care unit for about two months now. She reported that during this time pt has had frequent falls and frequent hospital stays. She reported she is concerned about Eber not having any hope or motivation ever since he retired a couple years ago. He has lost a lot of weight since retiring. He doesn't do anything to help take care of the household. He is a smoker of cigarettes and a drinker of alcohol.    Dinorah reported pt is close with his 6 brothers and 1 sister, but they all live in [place]. He also has a close friend, Ulises. She thinks he would be more likely to listen to family members (regarding  changing his behavior) than to hear it from a professional. She has asked for a sibling to come up to talk with Eber, but so far no one has been able to.    SW re-met with pt as there is a recommendation of acute rehab stay. Pt presented the recommendation and stated he would likely qualify pending finding a facility that accepts his insurance and has an open bed. Pt shared that he is interested in living with his brothers in their home in the near future. He shared that they could all help each other. SW asked if before he moves would he consider a rehab stay. Pt shared he would think about it.    Fouzia Masters [facility]   Pager: [phone]  Covering for Iva Reed (Phone: [phone])

## 2023-10-05 NOTE — PROGRESS NOTES
10/05/23 1100   Appointment Info   Signing Clinician's Name / Credentials (PT) wendi causey,pt   Living Environment   People in Home child(eusebia), adult  (son)   Current Living Arrangements house   Home Accessibility stairs to enter home;stairs within home   Number of Stairs, Main Entrance 10   Stair Railings, Main Entrance railings on both sides of stairs   Number of Stairs, Within Home, Primary greater than 10 stairs   Stair Railings, Within Home, Primary railings on both sides of stairs   Transportation Anticipated car, drives self  (son unable to drive)   Living Environment Comments adult son lives with him, typically does not need any help at baseline   Self-Care   Usual Activity Tolerance moderate   Current Activity Tolerance fair   Regular Exercise No   Equipment Currently Used at Home walker, rolling;cane, straight   Fall history within last six months yes   Number of times patient has fallen within last six months   (patient reports 3 however per chart appears to be much more)   Activity/Exercise/Self-Care Comment mod IND household ambulator with FWW, uses cane for community distances.  IND with ADLs.   General Information   Onset of Illness/Injury or Date of Surgery 10/03/23   Referring Physician Dereje Dickerson MD   Patient/Family Therapy Goals Statement (PT) return home   Pertinent History of Current Problem (include personal factors and/or comorbidities that impact the POC) Eber Jin is a 62 year old man with history of tobacco use, HTN, HLD, CAD, HFrEF 35%, paroxysmal a-fib, recent PE on xarelto and aspirin, R pontine infarct 03/2021 with baseline mild left leg weakness c/b recurrent falls and gait imbalance, COPD, recent admission 9/20-24/23 to [facility] for rib fractures and PE who was initially admitted to [facility] ED 10/2/23 for surgical workup of multiple traumatic L displaced rib fractures in setting of recurrent falls, transferred to [facility] for consideration of thrombectomy  after stroke code called 10/3/23 for aphasia, found to have acute L MCA/OMER watershed infarcts and possible L M2 superior division occlusion. Initial NIHSS 17. He underwent L carotid stenting and angioplasty. Post-procedure CT head with L frontal SAH that was stable on repeat scan.   Cognition   Affect/Mental Status (Cognition) confused   Orientation Status (Cognition) disoriented to;place;situation;time   Follows Commands (Cognition) WFL   Posture    Posture Forward head position   Range of Motion (ROM)   ROM Comment B LE grossly WFL   Strength (Manual Muscle Testing)   Strength Comments B LE grossly at least 4/5   Transfers   Comment, (Transfers) sit > stand with FWW and min assist   Gait/Stairs (Locomotion)   Comment, (Gait/Stairs) a few steps in room with FWW and min assist   Balance   Balance Comments required B UE support to maintain standing balance   Sensory Examination   Sensory Perception Comments light touch /proprioception intact B LE   Coordination   Coordination Comments heel > shin impaired B   Muscle Tone   Muscle Tone no deficits were identified   Clinical Impression   Criteria for Skilled Therapeutic Intervention Yes, treatment indicated   PT Diagnosis (PT) impaired functional mobility in setting of stroke   Influenced by the following impairments confusion, impaired balance, impaired coordination, decreased activity tolerance   Functional limitations due to impairments impaired bed mobility, impaired transfers, impaired gait   Clinical Presentation (PT Evaluation Complexity) Evolving/Changing   Clinical Presentation Rationale comorbidities   Clinical Decision Making (Complexity) moderate complexity   Planned Therapy Interventions (PT) balance training;bed mobility training;gait training;neuromuscular re-education;stair training;transfer training;progressive activity/exercise   Risk & Benefits of therapy have been explained evaluation/treatment results reviewed;care plan/treatment goals reviewed    PT Total Evaluation Time   PT [place], Moderate Complexity Minutes (50663) 8   Physical Therapy Goals   PT Frequency 6x/week   PT Predicted Duration/Target Date for Goal Attainment 10/13/23   PT Goals Bed Mobility;Transfers;Gait;Stairs   PT: Bed Mobility Independent;Supine to/from sit   PT: Transfers Modified independent;Sit to/from stand;Assistive device  (FWW)   PT: Gait Modified independent;Rolling walker;150 feet   PT: Stairs Modified independent;Greater than 10 stairs;Rail on both sides   PT Discharge Planning   PT Plan progress gait with FWW   PT Discharge Recommendation (DC Rec) Acute Rehab Center-Motivated patient will benefit from intensive, interdisciplinary therapy.  Anticipate will be able to tolerate 3 hours of therapy per day   PT Rationale for DC Rec dependence with functional mobility, will benefit from intensive rehab setting to allow return to community based living   PT Brief overview of current status sit > stand min assist.  ambulated short distance in room with FWW and min assist.   Total Session Time   Total Session Time (sum of timed and untimed services) 8

## 2023-10-05 NOTE — PROGRESS NOTES
Neurocritical Care Progress Note    Reason for critical care admission: AIS  Admitting Team: PHYLLIS   Date of Service:  10/05/2023  Date of Admission:  10/3/2023  Hospital Day: 3    Assessment/Plan  Eber Jin is a 62 year old man with history of tobacco use, HTN, HLD, CAD, HFrEF 35%, paroxysmal a-fib, recent PE on xarelto and aspirin, R pontine infarct 03/2021 with baseline mild left leg weakness c/b recurrent falls and gait imbalance, COPD, recent admission 9/20-24/23 to [facility] for rib fractures and PE who was initially admitted to [facility] ED 10/2/23 for surgical workup of multiple traumatic L displaced rib fractures in setting of recurrent falls, transferred to [facility] for consideration of thrombectomy after stroke code called 10/3/23 for aphasia, found to have acute L MCA/OMER watershed infarcts and possible L M2 superior division occlusion. Initial NIHSS 17. He underwent L carotid stenting and angioplasty. Post-procedure CT head with L frontal SAH that was stable on repeat scan.      24 hour events: No acute events overnight. Extubabed 10/4/2023.     Neuro  #L MCA/OMER watershed infarcts   #Possible L M2 occlusion  #L ICA stenosis > 90% s/p stenting and angioplasty, 10/3  #Acute L frontal SAH  #R pontine stroke 03/2021  -Neurochecks every 4 hours  -SBP goal < 150 mmHg  -HOB > 30   - Avoid hypotonic IV fluids  - Continue ticagrelor and aspirin  - Statin: Continue PTA Lipitor 80 mg daily,    - Telemetry, EKG  - Bedside Glucose Monitoring  - A1c (5.3%), Troponin x 3  - PT/OT/SLP as indicated   - Stroke Education  - Depression Screen  - Apnea Screen  - Euthermia, Euglycemia      #Alcohol use disorder  #Tobacco use disorder  #Marijuana use   -Encourage cessation   -CIWA protocol     #Analgesics & sedation  RASS goal: 0 to -1   -PRN Tylenol available      CV  #Hypertension  #HFrEF (EF of 35% on 9/9/2023)  #Paroxysmal A-fib ([id number] of 5)  #CAD, history of MI  -Hold PTA Lasix 40 mg,    -Initiate Toprol XL 50 mg daily and hold metoprolol  mg  -Initiate Losartan 50 mg daily and hold Losartan 100 mg daily   -Resume home spironolactone 25 mg  -Hold PTA xarelto 20 mg - will likely hold for a week with SAH  -PRN metoprolol for rate above 120 bpm  -Cardiac monitoring  -TTE - no cardiac source for emoblus, EF 55-60%, LV diastolic function indeterminate, inferior wall akinesis and inferolateral wall akinesis present, RV normal   -SBP goal < 150 mmHg  -PRN Labetalol and Hydralazine    #PAD, bilateral   #AAA, infrarenal, 3.2 cm  #Iliac artery aneurysm, 20 mm right   -CTA with run off 10/4/2023 - all chronic findings   -Consult to Vascular Surgery, greatly appreciate their input - continue surveillance for AAA, no acute interventions deemed necessary     Resp  #Unprovoked PE (LLL segmental and subsegmental) 09/07/23  #COPD  #Multiple rib fracture L rib 4 -12 fx  09/07/23  #Hx of recent traumatic hemothorax, 9/7/2023   -Extubated 10/4  -Continuous pulse ox  -Maintain O2 saturations greater than 92%  -Holding Xarelto (see above)     GI  #TUNDE  Diet: Soft and bite sized with thin liquids   Last BM: Unknown  GI prophylaxis: Not indicated   -Bowel regimen: Scheduled senna-docusate and Miralax     Renal/  #TUNDE   -Huggins unable to be easily removed by nursing 10/4, Urology quick consult with Huggins easily removed after Huggins cut   -Daily BMP  -IV fluids: Saline lock   -Electrolyte replacement protocol     Endo  #TUNDE  -Hgb A1c 5.3%  -Monitor glucose levels     Heme  #Anemia, likely of chronic disease   -Daily CBC  -Hgb goal >7, plt goal >50k  -Transfuse to meet Hgb and plt goals     ID  #Leukocytosis, likely reactive   -Daily CBC  -Follow temperature curve     ICU Checklist  Lines/tubes/drains:PIV  FEN: NS 50 ml/hour, repletion protocols, Regular diet   PPX: DVT - SCDs, SQH; GI - not indicated  Code: Full Code  Dispo: ICU - NCC    TIME SPENT ON THIS ENCOUNTER   I spent 40 minutes of critical care time on the  unit/floor managing the care of Eber Jin excluding time performing procedures. Upon evaluation, this patient had a high probability of imminent or life-threatening deterioration due to AIS, which required my direct attention, intervention, and personal management. Greater than 50% of my time was spent at the bedside counseling the patient and/or coordinating care including chart review, history, exam, documentation, and further activities per this note. I have personally reviewed the following data/imaging over the past 24 hours.     The patient was seen and discussed with the NCC attending, Dr. Christensen.     Evelia Medina, APRN, CNP  Neurocritical Care *[id number]    24 Hour Vital Signs Summary:  Temp: 98.2  F (36.8  C) Temp  Min: 97.5  F (36.4  C)  Max: 99.9  F (37.7  C)  Resp: 10 Resp  Min: 10  Max: 29  SpO2: 100 % SpO2  Min: 97 %  Max: 100 %  Pulse: 72 Pulse  Min: 66  Max: 117    No data recorded  BP: (!) 146/105 Systolic (24hrs), Av , Min:105 , Max:148   Diastolic (24hrs), Av, Min:71, Max:122      Respiratory monitoring:   Vent Mode: CPAP/PS  (Continuous positive airway pressure with Pressure Support)  FiO2 (%): 30 %  Resp Rate (Set): 14 breaths/min  Tidal Volume (Set, mL): 580 mL  PEEP (cm H2O): 5 cmH2O  Pressure Support (cm H2O): 5 cmH2O  Resp: 10      I/O last 3 completed shifts:  In: 2164.27 [P.O.:765; I.V.:1399.27]  Out: 1700 [Urine:1700]    Current Medications:   acetaminophen  1,000 mg Oral or Feeding Tube Q8H    aspirin  81 mg Oral or Feeding Tube Daily    atorvastatin  80 mg Oral QPM    [START ON 10/7/2023] folic acid  1 mg Oral or Feeding Tube Daily    folic acid  1 mg Intravenous Daily    ipratropium - albuterol 0.5 mg/2.5 mg/3 mL  3 mL Nebulization Q6H    lidocaine  1-2 patch Transdermal Q24H    magnesium oxide  400 mg Oral Q4H    multivitamin w/minerals  1 tablet Oral or Feeding Tube Daily    polyethylene glycol  17 g Oral or Feeding Tube Daily    senna-docusate  1-2 tablet Oral or NG  Tube BID    sodium chloride (PF)  3 mL Intracatheter Q8H    thiamine  200 mg Intravenous TID    [START ON 10/6/2023] thiamine  100 mg Oral or Feeding Tube TID    [START ON 10/11/2023] thiamine  100 mg Oral Daily    ticagrelor  90 mg Oral BID       PRN Medications:  acetaminophen, glucose **OR** dextrose **OR** glucagon, hydrALAZINE, labetalol, lidocaine 4%, lidocaine (buffered or not buffered), - MEDICATION INSTRUCTIONS -, metoprolol, sodium chloride (PF)    Infusions:   - MEDICATION INSTRUCTIONS -      sodium chloride 50 mL/hr at 10/05/23 0400       Allergies   Allergen Reactions    Penicillins Swelling       Physical Examination:  Vitals: BP (!) 146/105 (BP Location: Right arm)   Pulse 72   Temp 98.2  F (36.8  C) (Axillary)   Resp 10   Wt 66.5 kg (146 lb 9.7 oz)   SpO2 100%   BMI 20.45 kg/m    General: Adult male patient, sitting in chair.   HEENT: Normocephalic, atraumatic.  Cardiac: AF, appears adequately perfused.   Pulm: Room air, no increased work of breathing.   Abdomen: Non-distended.   Extremities: Warm, bilateral lower extremities do not appear acutely threatened.   Skin: No obvious rashes or lesions on exposed skin.   Psych: Calm and cooperative.  Neuro:  Mental status: Awake, alert, oriented x 4.   Cranial nerves: Conjugate gaze, EOMI, face symmetric, slight dysarthria with missing teeth.   Motor: Normal bulk and tone. No abnormal movements. Right upper and lower extremity weakness. Right upper and lower do not fall against gravity.   Sensory: Intact to light touch x 4 extremities.  Coordination: Feeding self smoothly with left arm, right too weak to feed self.   Gait: Deferred.    Labs and Imaging:    Results for orders placed or performed during the hospital encounter of 10/03/23 (from the past 24 hour(s))   Glucose by meter   Result Value Ref Range    GLUCOSE BY METER POCT 93 70 - 99 mg/dL   CTA Abdomen Pelvis Bilat Leg Runoff w Contr    Narrative    CTA ABDOMEN, PELVIS, AND LOWER EXTREMITY  10/4/2023 8:57 AM    CLINICAL HISTORY: CTA pelvis with run off through legs rule out  ischemia 62 yr with L MCA stroke s/p L carotid stenting, known  peripheral vascular disease, bilaterally absent dorsalis pedis,  posterior tibial pulses, weak popliteal pulses, rule out acute limb  ischemia.     COMPARISONS: None available.    REFERRING PROVIDER: MARA MOYA    TECHNIQUE: Unenhanced CT performed through the abdomen and pelvis.  After the uneventful administration of intravenous contrast, CTA  performed from the diaphragm through the feet. CT repeated from the  knees through the feet. Coronal and sagittal reconstructions were  produced. Lung MIP produced.    3D and multiplanar reconstructions were unavailable at the time of  dictation.    CONTRAST: 135 mL Isovue 370    DOSE (DLP): 669 mGy*cm    FINDINGS: CTA: AORTOILIAC: Infrarenal abdominal aortic aneurysm  measures 32 mm in AP diameter at L4. Mural thrombus causes 50-69%  diameter luminal narrowing and extends through the left iliac  arteries.    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    Aorta measures 28 mm in diameter at the celiac origin.     Celiac artery patent. Accessory left hepatic artery to segments 2 and  3 originates from the left gastric artery. Superior mesenteric artery  patent. Two right and single left renal arteries patent. Inferior  mesenteric artery patent.    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 Right external iliac artery measures 10  mm in diameter and is patent.     Right internal iliac artery occluded with thrombus. Distal branches  reconstituted by collaterals.    Left common, internal, and external iliac arteries occluded. Distal  left internal iliac artery branches reconstituted by collaterals.    RIGHT LEG: Right common femoral artery measures 12 mm in diameter.  Right common and profundus femoral arteries patent.    Occlusion from the  superficial femoral artery in the proximal thigh to  above knee popliteal artery.     Popliteal artery reconstituted by collaterals at the femoral condyles.    Thin anterior tibial origin and proximal segment. Tibioperoneal trunk  occlusion. Multifocal anterior tibial, peroneal, and posterior tibial  artery occlusions through the calf.  Arteries above the ankle and in  the foot not opacified, occlusions versus slow flow.    LEFT LEG: Common, profundus, and superficial femoral arteries  occluded.    Profundus branches reconstituted by pelvic collaterals.    Thin popliteal artery reconstituted distal to the adductor canal.  50-69% diameter stenosis above the patella.    Anterior tibial origin patent. Thin anterior tibial artery patent to  the distal shin.     Tibioperoneal trunk occlusion. Posterior tibial artery reconstituted  in the proximal calf. Peroneal artery occluded. Thin posterior tibial  artery patent to the ankle. Arteries in the foot not opacified,  occlusion versus slow flow.    CT: Left effusion and left lower lobe consolidation.    Left rib fractures.    Excreted contrast in the gallbladder.    Diverticulosis.    Spine degenerative changes.      Impression    IMPRESSION:  1. Infrarenal abdominal aortic aneurysm measures up to 32 mm in  diameter at L4. Mural thrombus causes up to 50-69% diameter luminal  narrowing. Thrombus occludes/extends through left iliac arteries.    2. RIGHT LEG:       A. 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       B. Internal iliac artery occlusion. Distal branches reconstituted  by pelvic collaterals.       C. Patent external iliac artery.       D. Proximal superficial femoral through above knee popliteal  occlusion.       E. Above knee popliteal artery reconstituted at the femoral  condyles.       F. No continuous arterial flow below the knee.       G. Arteries above the ankle and in the foot are unopacified,  occlusions versus slow flow.    3. LEFT LEG:       A. Left  common iliac to above knee popliteal artery occlusion.       B. Internal iliac artery occluded. Distal branches reconstituted  by pelvic collaterals.       C. Profundus femoral artery occlusion. Branches reconstituted by  pelvic collaterals.       D. Thin popliteal artery reconstituted by collaterals distal to  the adductor canal. 50-69% diameter stenosis above the patella.       E. No continuous arterial flow below the knee.       F. Posterior tibial artery reconstituted in the proximal calf and  patent to the ankle.       G. Arteries in the foot are unopacified, occlusions versus slow  flow.    4. Left lower lobe consolidation and effusion. Correlate for  pneumonia.    5. Left rib fractures.    AYLA LAY MD         SYSTEM ID:  [id number]   Respiratory Aerobic Bacterial Culture with Gram Stain    Specimen: Endotracheal; Sputum   Result Value Ref Range    Gram Stain Result >25 PMNs/low power field     Gram Stain Result 4+ Mixed sherry    Extra Tube *Canceled*    Narrative    The following orders were created for panel order Extra Tube.  Procedure                               Abnormality         Status                     ---------                               -----------         ------                     Extra Green Top (Lithium...[[id number]]                                                   Please view results for these tests on the individual orders.   Echocardiogram Complete - For age > 60 yrs   Result Value Ref Range    LVEF  55-60%     Narrative    [id number]  [id number]  [id number]  [id number]^NITA^MARA^AIME MIN     [facility]  Echocardiography Laboratory  [address] [place] [id number]     Name: DANIELLE SUAZO  MRN: [id number]  : 1960  Study Date: 10/04/2023 10:10 AM  Age: 62 yrs  Gender: Male  Patient Location: [facility]  Reason For Study: Cerebrovascular Incident  Ordering Physician: MARA MOYA  Performed By: Corin Baig RDCS     BSA: 1.8 m2  Height:  71 in  Weight: 144 lb  HR: 100  BP: 118/85 mmHg  ______________________________________________________________________________  Procedure  Complete Portable Echo Adult. Contrast Optison. Optison (NDC #6740-8707-09)  given intravenously. Patient was given 5 ml mixture of 3 ml Optison and 6 ml  saline. 4 ml wasted.  ______________________________________________________________________________  Interpretation Summary  No cardiac source for embolus identified.  ______________________________________________________________________________  Left Ventricle  Left ventricular size is normal. Left ventricular wall thickness is normal.  The visual ejection fraction is 55-60%. Left ventricular diastolic function is  indeterminate. Inferior wall akinesis is present. Inferolateral (posterior)  wall akinesis is present.     Right Ventricle  Right ventricular function, chamber size, wall motion, and thickness are  normal.     Atria  The right atria appears normal. Mild left atrial enlargement is present.     Mitral Valve  The mitral valve is normal. Trace mitral insufficiency is present.     Aortic Valve  Aortic valve sclerosis is present. Trace aortic insufficiency is present.     Tricuspid Valve  The tricuspid valve is normal. Pulmonary artery systolic pressure cannot be  assessed.     Pulmonic Valve  The pulmonic valve is normal.     Vessels  The thoracic aorta is normal. The pulmonary artery and bifurcation cannot be  assessed. The inferior vena cava is normal.     Pericardium  No pericardial effusion is present.     Compared to Previous Study  There is no prior study for direct comparison.  ______________________________________________________________________________  MMode/2D Measurements & Calculations     IVSd: 1.2 cm  LVIDd: 4.8 cm  LVIDs: 4.1 cm  LVPWd: 0.56 cm  FS: 13.9 %  LV mass(C)d: 143.7 grams  LV mass(C)dI: 78.3 grams/m2  asc Aorta Diam: 3.5 cm  LVOT diam: 2.3 cm  LVOT area: 4.3 cm2  Asc Ao diam index BSA  (cm/m2): 1.9  Asc Ao diam index Ht(cm/m): 1.9  LA Volume Index (BP): 40.4 ml/m2  RWT: 0.23     TAPSE: 1.8 cm     Doppler Measurements & Calculations  MV E max norm: 49.1 cm/sec  MV A max norm: 82.3 cm/sec  MV E/A: 0.60  MV dec slope: 235.7 cm/sec2  MV dec time: 0.21 sec  E/E' av.5  Lateral E/e': 8.5  Medial E/e': 10.4     ______________________________________________________________________________  Report approved by: Arturo Lyles 10/04/2023 11:14 AM         XR Chest Port 1 View    Narrative    EXAMINATION: XR CHEST PORT 1 VIEW, 10/4/2023 11:01 AM    COMPARISON: 10/3/2023    HISTORY: hypoxia    FINDINGS: Endotracheal tube tip in upper thoracic trachea. Heart size  is normal. Left base atelectasis persists. Multiple rib fractures  again seen.      Impression    IMPRESSION: No change in left base atelectasis.     MALINA KAPLAN MD         SYSTEM ID:  [id number]   Magnesium   Result Value Ref Range    Magnesium 1.7 1.7 - 2.3 mg/dL   Phosphorus   Result Value Ref Range    Phosphorus 3.0 2.5 - 4.5 mg/dL   CBC with platelets   Result Value Ref Range    WBC Count 12.3 (H) 4.0 - 11.0 10e3/uL    RBC Count 3.55 (L) 4.40 - 5.90 10e6/uL    Hemoglobin 10.8 (L) 13.3 - 17.7 g/dL    Hematocrit 33.2 (L) 40.0 - 53.0 %    MCV 94 78 - 100 fL    MCH 30.4 26.5 - 33.0 pg    MCHC 32.5 31.5 - 36.5 g/dL    RDW 12.9 10.0 - 15.0 %    Platelet Count 237 150 - 450 10e3/uL   Basic metabolic panel   Result Value Ref Range    Sodium 138 135 - 145 mmol/L    Potassium 3.7 3.4 - 5.3 mmol/L    Chloride 105 98 - 107 mmol/L    Carbon Dioxide (CO2) 23 22 - 29 mmol/L    Anion Gap 10 7 - 15 mmol/L    Urea Nitrogen 6.8 (L) 8.0 - 23.0 mg/dL    Creatinine 0.87 0.67 - 1.17 mg/dL    GFR Estimate >90 >60 mL/min/1.73m2    Calcium 8.6 (L) 8.8 - 10.2 mg/dL    Glucose 88 70 - 99 mg/dL       All relevant imaging and laboratory values personally reviewed.

## 2023-10-05 NOTE — PLAN OF CARE
Goal Outcome Evaluation:                      Extubated and need for bilateral upper extremity soft limb restraints for patient safety and continuity of lines and drains no longer necessary.  Will continue to re-evaluate need PRN

## 2023-10-05 NOTE — TREATMENT PLAN
Shift Summary   Q2 neuros. A/O x2. Arouses to voice. L pupil < R pupil. Follows commands. R side extremities weaker than Left side. Afib with occasional PVCs 80s - 120s, with interm SR. SBG <150, maintained without intervention. Absent pulses in LL extremities, R)side dopplerable. R) groin site soft and pulses present. RA. Primofit with adequate UO. Fluids @ 50 ml/hr     Plan: Continue to monitor neurological status and notify team for changes.     For vital signs and complete assessments, please see documentation flowsheets

## 2023-10-06 ENCOUNTER — APPOINTMENT (OUTPATIENT)
Dept: PHYSICAL THERAPY | Facility: CLINIC | Age: 63
DRG: 034 | End: 2023-10-06
Payer: COMMERCIAL

## 2023-10-06 LAB
ANION GAP SERPL CALCULATED.3IONS-SCNC: 9 MMOL/L (ref 7–15)
BASE EXCESS BLDV CALC-SCNC: 1.1 MMOL/L (ref -7.7–1.9)
BUN SERPL-MCNC: 6.7 MG/DL (ref 8–23)
CALCIUM SERPL-MCNC: 8.5 MG/DL (ref 8.8–10.2)
CHLORIDE SERPL-SCNC: 107 MMOL/L (ref 98–107)
CREAT SERPL-MCNC: 0.79 MG/DL (ref 0.67–1.17)
DEPRECATED HCO3 PLAS-SCNC: 22 MMOL/L (ref 22–29)
EGFRCR SERPLBLD CKD-EPI 2021: >90 ML/MIN/1.73M2
ERYTHROCYTE [DISTWIDTH] IN BLOOD BY AUTOMATED COUNT: 13.1 % (ref 10–15)
GLUCOSE SERPL-MCNC: 83 MG/DL (ref 70–99)
HCO3 BLDV-SCNC: 26 MMOL/L (ref 21–28)
HCT VFR BLD AUTO: 32.7 % (ref 40–53)
HGB BLD-MCNC: 10.7 G/DL (ref 13.3–17.7)
MAGNESIUM SERPL-MCNC: 1.8 MG/DL (ref 1.7–2.3)
MCH RBC QN AUTO: 30.3 PG (ref 26.5–33)
MCHC RBC AUTO-ENTMCNC: 32.7 G/DL (ref 31.5–36.5)
MCV RBC AUTO: 93 FL (ref 78–100)
O2/TOTAL GAS SETTING VFR VENT: 0 %
PCO2 BLDV: 41 MM HG (ref 40–50)
PH BLDV: 7.41 [PH] (ref 7.32–7.43)
PHOSPHATE SERPL-MCNC: 3.2 MG/DL (ref 2.5–4.5)
PLATELET # BLD AUTO: 245 10E3/UL (ref 150–450)
PO2 BLDV: 38 MM HG (ref 25–47)
POTASSIUM SERPL-SCNC: 4 MMOL/L (ref 3.4–5.3)
RBC # BLD AUTO: 3.53 10E6/UL (ref 4.4–5.9)
SODIUM SERPL-SCNC: 138 MMOL/L (ref 135–145)
WBC # BLD AUTO: 8.9 10E3/UL (ref 4–11)

## 2023-10-06 PROCEDURE — 250N000011 HC RX IP 250 OP 636

## 2023-10-06 PROCEDURE — 97116 GAIT TRAINING THERAPY: CPT | Mod: GP

## 2023-10-06 PROCEDURE — 80048 BASIC METABOLIC PNL TOTAL CA: CPT

## 2023-10-06 PROCEDURE — 36415 COLL VENOUS BLD VENIPUNCTURE: CPT

## 2023-10-06 PROCEDURE — 250N000011 HC RX IP 250 OP 636: Performed by: NURSE PRACTITIONER

## 2023-10-06 PROCEDURE — 250N000013 HC RX MED GY IP 250 OP 250 PS 637: Performed by: PSYCHIATRY & NEUROLOGY

## 2023-10-06 PROCEDURE — 120N000002 HC R&B MED SURG/OB UMMC

## 2023-10-06 PROCEDURE — 85027 COMPLETE CBC AUTOMATED: CPT

## 2023-10-06 PROCEDURE — 250N000013 HC RX MED GY IP 250 OP 250 PS 637: Performed by: NURSE PRACTITIONER

## 2023-10-06 PROCEDURE — 97530 THERAPEUTIC ACTIVITIES: CPT | Mod: GP

## 2023-10-06 PROCEDURE — 99233 SBSQ HOSP IP/OBS HIGH 50: CPT | Mod: GC | Performed by: PSYCHIATRY & NEUROLOGY

## 2023-10-06 PROCEDURE — 82803 BLOOD GASES ANY COMBINATION: CPT

## 2023-10-06 PROCEDURE — 83735 ASSAY OF MAGNESIUM: CPT | Performed by: NURSE PRACTITIONER

## 2023-10-06 PROCEDURE — 84100 ASSAY OF PHOSPHORUS: CPT | Performed by: NURSE PRACTITIONER

## 2023-10-06 RX ORDER — METOPROLOL SUCCINATE 50 MG/1
50 TABLET, EXTENDED RELEASE ORAL DAILY
Status: DISCONTINUED | OUTPATIENT
Start: 2023-10-07 | End: 2023-10-07

## 2023-10-06 RX ORDER — LOSARTAN POTASSIUM 100 MG/1
100 TABLET ORAL DAILY
Status: DISCONTINUED | OUTPATIENT
Start: 2023-10-07 | End: 2023-10-18 | Stop reason: HOSPADM

## 2023-10-06 RX ORDER — MAGNESIUM OXIDE 400 MG/1
400 TABLET ORAL EVERY 4 HOURS
Status: COMPLETED | OUTPATIENT
Start: 2023-10-06 | End: 2023-10-06

## 2023-10-06 RX ORDER — LANOLIN ALCOHOL/MO/W.PET/CERES
3 CREAM (GRAM) TOPICAL
Status: DISCONTINUED | OUTPATIENT
Start: 2023-10-06 | End: 2023-10-18 | Stop reason: HOSPADM

## 2023-10-06 RX ORDER — METOPROLOL SUCCINATE 100 MG/1
100 TABLET, EXTENDED RELEASE ORAL DAILY
Status: DISCONTINUED | OUTPATIENT
Start: 2023-10-07 | End: 2023-10-06

## 2023-10-06 RX ORDER — MAGNESIUM SULFATE HEPTAHYDRATE 40 MG/ML
2 INJECTION, SOLUTION INTRAVENOUS ONCE
Status: DISCONTINUED | OUTPATIENT
Start: 2023-10-06 | End: 2023-10-06

## 2023-10-06 RX ADMIN — HEPARIN SODIUM 5000 UNITS: 5000 INJECTION, SOLUTION INTRAVENOUS; SUBCUTANEOUS at 14:21

## 2023-10-06 RX ADMIN — TICAGRELOR 90 MG: 90 TABLET ORAL at 20:32

## 2023-10-06 RX ADMIN — ATORVASTATIN CALCIUM 80 MG: 80 TABLET, FILM COATED ORAL at 20:32

## 2023-10-06 RX ADMIN — LABETALOL HYDROCHLORIDE 10 MG: 5 INJECTION, SOLUTION INTRAVENOUS at 20:49

## 2023-10-06 RX ADMIN — LOSARTAN POTASSIUM 50 MG: 50 TABLET, FILM COATED ORAL at 07:42

## 2023-10-06 RX ADMIN — ACETAMINOPHEN 1000 MG: 500 TABLET ORAL at 20:32

## 2023-10-06 RX ADMIN — Medication 1 TABLET: at 07:43

## 2023-10-06 RX ADMIN — MAGNESIUM OXIDE TAB 400 MG (241.3 MG ELEMENTAL MG) 400 MG: 400 (241.3 MG) TAB at 14:21

## 2023-10-06 RX ADMIN — HEPARIN SODIUM 5000 UNITS: 5000 INJECTION, SOLUTION INTRAVENOUS; SUBCUTANEOUS at 21:51

## 2023-10-06 RX ADMIN — ACETAMINOPHEN 1000 MG: 500 TABLET ORAL at 01:44

## 2023-10-06 RX ADMIN — LIDOCAINE PATCH 4% 2 PATCH: 40 PATCH TOPICAL at 10:01

## 2023-10-06 RX ADMIN — MAGNESIUM OXIDE TAB 400 MG (241.3 MG ELEMENTAL MG) 400 MG: 400 (241.3 MG) TAB at 10:00

## 2023-10-06 RX ADMIN — TICAGRELOR 90 MG: 90 TABLET ORAL at 07:42

## 2023-10-06 RX ADMIN — THIAMINE HCL TAB 100 MG 100 MG: 100 TAB at 14:21

## 2023-10-06 RX ADMIN — THIAMINE HCL TAB 100 MG 100 MG: 100 TAB at 20:32

## 2023-10-06 RX ADMIN — METOPROLOL SUCCINATE 50 MG: 50 TABLET, EXTENDED RELEASE ORAL at 07:42

## 2023-10-06 RX ADMIN — SPIRONOLACTONE 25 MG: 25 TABLET ORAL at 07:43

## 2023-10-06 RX ADMIN — LABETALOL HYDROCHLORIDE 20 MG: 5 INJECTION, SOLUTION INTRAVENOUS at 00:52

## 2023-10-06 RX ADMIN — HEPARIN SODIUM 5000 UNITS: 5000 INJECTION, SOLUTION INTRAVENOUS; SUBCUTANEOUS at 05:26

## 2023-10-06 RX ADMIN — THIAMINE HCL TAB 100 MG 100 MG: 100 TAB at 07:42

## 2023-10-06 RX ADMIN — ACETAMINOPHEN 1000 MG: 500 TABLET ORAL at 10:00

## 2023-10-06 RX ADMIN — FOLIC ACID 1 MG: 5 INJECTION, SOLUTION INTRAMUSCULAR; INTRAVENOUS; SUBCUTANEOUS at 10:00

## 2023-10-06 RX ADMIN — ASPIRIN 81 MG CHEWABLE TABLET 81 MG: 81 TABLET CHEWABLE at 07:43

## 2023-10-06 ASSESSMENT — ACTIVITIES OF DAILY LIVING (ADL)
ADLS_ACUITY_SCORE: 42
ADLS_ACUITY_SCORE: 42
ADLS_ACUITY_SCORE: 40
ADLS_ACUITY_SCORE: 42
ADLS_ACUITY_SCORE: 40
ADLS_ACUITY_SCORE: 42
ADLS_ACUITY_SCORE: 40
ADLS_ACUITY_SCORE: 42
ADLS_ACUITY_SCORE: 42
ADLS_ACUITY_SCORE: 40
ADLS_ACUITY_SCORE: 42
ADLS_ACUITY_SCORE: 42

## 2023-10-06 NOTE — PLAN OF CARE
Status: Admitted for acute L MCA/OMER stroke and underwent L carotid stenting and angio. Hx of previous stroke in 2021.   Vitals: HTN, SBP goal <150, no PRNs needed. On cardiac monitoring, afib noted (baseline)  Neuros: Alert, d/o to time and specific place (thinks nursing home). 5/5 TO but RUE slightly weaker than L. R pupil slightly larger than L pupil. Pt reports bilateral blurry vision intermittently, wears glasses at baseline. Mild aphasia. Absent pedal pulses, R side is dopplerable, team aware. Impulsive, bed/chair alarm on at all times  IV: PIV x2 SL   Resp/trach: LSC, Infrequent cough, non-productive. Frequently apneic when sleeping, patient care order placed, okay to silent alarm as long as pt on cont SP02 monitoring. Pt declined CPAP  Diet: Regular diet, good intake  Bowel status: Large loose BM x1, hold bowel meds  : Voiding spontaneously in urinal or bedside commode w/urgency, no incontinence this shift  Labs: Mag 1.8, replaced per protocol, AM redraw  Skin: Generalized bruising and R groin site; soft  Pain: Chronic back pain managed w/scheduled tylenol and lidocaine patches  Activity: Ax1 w/gb and walker, worked w/PT, up in chair x2  Plan/Updates this shift: Continue to monitor BP, encourage activity as tolerated. Therapies recc ARU, no discharge plan at this time    PLC: Not yet scheduled  PCDs: Pt refused; high fall risk

## 2023-10-06 NOTE — PLAN OF CARE
Arrived from: 4E   Belongings/meds: None   2 RN Skin Assessment Completed by: Max RENAE RN and Angélica MOJICA   Non-intact findings documented (yes/no/NA): Yes - Generalized bruising, scab on R shin and knee, cracking to bilateral feet, and R groin site.

## 2023-10-06 NOTE — PLAN OF CARE
Goal Outcome Evaluation:      Plan of Care Reviewed With: patient    Overall Patient Progress: improvingOverall Patient Progress: improving     Status: Admitted for acute L MCA/OMER stroke and underwent L carotid stenting and angio. Hx of previous stroke in 2021.    Vitals: HTN but within parameters of SBP <150. Hx of a-fib with RVR and PVC's. Sleep apnea when asleep.   Neuros:  Alert and oriented to self, place, and situation. Disoriented to time. Strengths R side 4/5 and L side 5/5. R pupil slightly larger than L pupil. Pt reports bilateral blurry vision. Absent pedal pulses, R side is dopplerable  IV: PIV x2 SL  Labs/electrolytes: ABG`s and VBG ordered--pending results.   Resp/trach: LSC, infrequent cough  Diet: regular  Bowel status: LBM 10/05  : voids spontaneously via urinal at bedside.    Skin: Generalized bruising, R groin site  Pain: rated as a 1/10--scheduled Tylenol given  Activity: Ax2, Pivot, GB, voided via urinal x2 this shift--faire output  Plan: continue to monitor  Updates this shift: blood pressure management done to maintain SBP at < 150 per orders. SBP managed with 10&20 mg labetalol this shift. Patient has sleep apnea when asleep. MD made aware by charge to ask for CPAP order. ABG`s should be done per MD before CPAP use. RT paged. Continue to monitor.

## 2023-10-06 NOTE — PROGRESS NOTES
"Care Management Follow Up    Length of Stay (days): 3    Expected Discharge Date: 10/09/2023     Concerns to be Addressed:   Disposition planning    Patient plan of care discussed at interdisciplinary rounds: Yes    Anticipated Discharge Disposition:  OT and Physical Therapy are recommending acute rehab placement     Anticipated Discharge Services:    OT and Physical Therapy are recommending acute rehab placement  Anticipated Discharge DME:    Not applicable at this time    Patient/family educated on Medicare website which has current facility and service quality ratings:   yes, this SW provided a \"Medicare Care Compare\" document  Education Provided on the Discharge Plan: yes  Patient/Family in Agreement with the Plan: yes    Referrals Placed by CM/SW:  None on this date  Private pay costs discussed: Not applicable at this time    Additional Information:  SW is following pt for discharge planning.  OT and Physical Therapy are recommending an acute rehab stay.  Per Dr. Cruz, readiness for discharge is anticipated on 10/7/2023.   Pt will need to be placed in a [facility] contracted ARU and the accepting ARU will need to seek prior auth.  The [employer] contracted ARU's do not accept weekend admits.  This SW will asked the weekend SW to make referrals to Arline Rodarte at [facility], [facility] at [facility] and to [facility] (these are the [employer] contracted ARU's) on 10/8/2023.  SW met with pt and updated.  Pt's participation in the conversation was minimal as pt stated that he was tired.   Pt asked that SW update his wife.  SW phoned pt's wife and left a message for wife to call.    SW will follow for discharge planning.    RAMO Khan  Social Work, 6A  Phone:  [phone]  Pager:  [phone]  10/6/2023       KIERRA Gatica     "

## 2023-10-06 NOTE — PROGRESS NOTES
Status: Admitted for acute L MCA/OMER stroke and underwent L carotid stenting and angio. Hx of previous stroke in 2021.   Vitals: HTN but within parameters of SBP <150. Hx of a-fib with RVR and PVC's  Neuros: Alert and oriented to self, place, and situation. Disoriented to time. Strengths R side 4/5 and L side 5/5. R pupil slightly larger than L pupil. Pt reports bilateral blurry vision. Absent pedal pulses, R side is dopplerable, team aware.   IV: PIV x2 SL   Resp/trach: LSC, Infrequent cough   Diet: Regular diet  Bowel status: LBM 10/5/2023, held BM meds this shift.   : Voiding spontaneously in urinal or bedside commode   Skin: Generalized bruising and R groin site   Pain: Denies   Activity: Ax2, orlandoot, GB   Plan/Updates this shift: Continue to monitor and follow POC.

## 2023-10-07 ENCOUNTER — APPOINTMENT (OUTPATIENT)
Dept: OCCUPATIONAL THERAPY | Facility: CLINIC | Age: 63
DRG: 034 | End: 2023-10-07
Payer: COMMERCIAL

## 2023-10-07 ENCOUNTER — APPOINTMENT (OUTPATIENT)
Dept: SPEECH THERAPY | Facility: CLINIC | Age: 63
DRG: 034 | End: 2023-10-07
Payer: COMMERCIAL

## 2023-10-07 LAB
ANION GAP SERPL CALCULATED.3IONS-SCNC: 10 MMOL/L (ref 7–15)
BUN SERPL-MCNC: 4.7 MG/DL (ref 8–23)
CALCIUM SERPL-MCNC: 8.6 MG/DL (ref 8.8–10.2)
CHLORIDE SERPL-SCNC: 106 MMOL/L (ref 98–107)
CREAT SERPL-MCNC: 0.73 MG/DL (ref 0.67–1.17)
DEPRECATED HCO3 PLAS-SCNC: 20 MMOL/L (ref 22–29)
EGFRCR SERPLBLD CKD-EPI 2021: >90 ML/MIN/1.73M2
ERYTHROCYTE [DISTWIDTH] IN BLOOD BY AUTOMATED COUNT: 13.1 % (ref 10–15)
GLUCOSE BLDC GLUCOMTR-MCNC: 93 MG/DL (ref 70–99)
GLUCOSE SERPL-MCNC: 90 MG/DL (ref 70–99)
HCT VFR BLD AUTO: 32.7 % (ref 40–53)
HGB BLD-MCNC: 10.8 G/DL (ref 13.3–17.7)
MAGNESIUM SERPL-MCNC: 1.9 MG/DL (ref 1.7–2.3)
MCH RBC QN AUTO: 30.9 PG (ref 26.5–33)
MCHC RBC AUTO-ENTMCNC: 33 G/DL (ref 31.5–36.5)
MCV RBC AUTO: 93 FL (ref 78–100)
PHOSPHATE SERPL-MCNC: 2.8 MG/DL (ref 2.5–4.5)
PLATELET # BLD AUTO: 247 10E3/UL (ref 150–450)
POTASSIUM SERPL-SCNC: 3.9 MMOL/L (ref 3.4–5.3)
RBC # BLD AUTO: 3.5 10E6/UL (ref 4.4–5.9)
SODIUM SERPL-SCNC: 136 MMOL/L (ref 135–145)
WBC # BLD AUTO: 6.5 10E3/UL (ref 4–11)

## 2023-10-07 PROCEDURE — 250N000011 HC RX IP 250 OP 636

## 2023-10-07 PROCEDURE — 250N000013 HC RX MED GY IP 250 OP 250 PS 637: Performed by: PSYCHIATRY & NEUROLOGY

## 2023-10-07 PROCEDURE — 250N000013 HC RX MED GY IP 250 OP 250 PS 637

## 2023-10-07 PROCEDURE — 250N000011 HC RX IP 250 OP 636: Performed by: NURSE PRACTITIONER

## 2023-10-07 PROCEDURE — 36415 COLL VENOUS BLD VENIPUNCTURE: CPT

## 2023-10-07 PROCEDURE — 120N000002 HC R&B MED SURG/OB UMMC

## 2023-10-07 PROCEDURE — 250N000013 HC RX MED GY IP 250 OP 250 PS 637: Performed by: NURSE PRACTITIONER

## 2023-10-07 PROCEDURE — 85014 HEMATOCRIT: CPT

## 2023-10-07 PROCEDURE — 84100 ASSAY OF PHOSPHORUS: CPT | Performed by: NURSE PRACTITIONER

## 2023-10-07 PROCEDURE — 83735 ASSAY OF MAGNESIUM: CPT | Performed by: NURSE PRACTITIONER

## 2023-10-07 PROCEDURE — 92526 ORAL FUNCTION THERAPY: CPT | Mod: GN

## 2023-10-07 PROCEDURE — 97535 SELF CARE MNGMENT TRAINING: CPT | Mod: GO | Performed by: OCCUPATIONAL THERAPIST

## 2023-10-07 PROCEDURE — 99233 SBSQ HOSP IP/OBS HIGH 50: CPT | Mod: GC | Performed by: PSYCHIATRY & NEUROLOGY

## 2023-10-07 PROCEDURE — 80048 BASIC METABOLIC PNL TOTAL CA: CPT

## 2023-10-07 RX ORDER — METOPROLOL SUCCINATE 100 MG/1
100 TABLET, EXTENDED RELEASE ORAL DAILY
Status: DISCONTINUED | OUTPATIENT
Start: 2023-10-08 | End: 2023-10-18 | Stop reason: HOSPADM

## 2023-10-07 RX ORDER — METOPROLOL SUCCINATE 50 MG/1
50 TABLET, EXTENDED RELEASE ORAL ONCE
Status: COMPLETED | OUTPATIENT
Start: 2023-10-07 | End: 2023-10-07

## 2023-10-07 RX ORDER — MAGNESIUM OXIDE 400 MG/1
400 TABLET ORAL EVERY 4 HOURS
Status: COMPLETED | OUTPATIENT
Start: 2023-10-07 | End: 2023-10-07

## 2023-10-07 RX ORDER — FUROSEMIDE 40 MG
40 TABLET ORAL DAILY
Status: DISCONTINUED | OUTPATIENT
Start: 2023-10-07 | End: 2023-10-18 | Stop reason: HOSPADM

## 2023-10-07 RX ADMIN — MAGNESIUM OXIDE TAB 400 MG (241.3 MG ELEMENTAL MG) 400 MG: 400 (241.3 MG) TAB at 14:55

## 2023-10-07 RX ADMIN — MAGNESIUM OXIDE TAB 400 MG (241.3 MG ELEMENTAL MG) 400 MG: 400 (241.3 MG) TAB at 09:06

## 2023-10-07 RX ADMIN — LABETALOL HYDROCHLORIDE 10 MG: 5 INJECTION, SOLUTION INTRAVENOUS at 09:05

## 2023-10-07 RX ADMIN — THIAMINE HCL TAB 100 MG 100 MG: 100 TAB at 20:27

## 2023-10-07 RX ADMIN — ACETAMINOPHEN 1000 MG: 500 TABLET ORAL at 03:52

## 2023-10-07 RX ADMIN — FOLIC ACID 1 MG: 1 TABLET ORAL at 07:43

## 2023-10-07 RX ADMIN — LABETALOL HYDROCHLORIDE 20 MG: 5 INJECTION, SOLUTION INTRAVENOUS at 05:06

## 2023-10-07 RX ADMIN — SPIRONOLACTONE 25 MG: 25 TABLET ORAL at 07:44

## 2023-10-07 RX ADMIN — ACETAMINOPHEN 1000 MG: 500 TABLET ORAL at 20:26

## 2023-10-07 RX ADMIN — HEPARIN SODIUM 5000 UNITS: 5000 INJECTION, SOLUTION INTRAVENOUS; SUBCUTANEOUS at 14:55

## 2023-10-07 RX ADMIN — ACETAMINOPHEN 1000 MG: 500 TABLET ORAL at 11:47

## 2023-10-07 RX ADMIN — TICAGRELOR 90 MG: 90 TABLET ORAL at 20:26

## 2023-10-07 RX ADMIN — METOPROLOL SUCCINATE 50 MG: 50 TABLET, EXTENDED RELEASE ORAL at 07:43

## 2023-10-07 RX ADMIN — Medication 1 TABLET: at 07:43

## 2023-10-07 RX ADMIN — TICAGRELOR 90 MG: 90 TABLET ORAL at 07:44

## 2023-10-07 RX ADMIN — ATORVASTATIN CALCIUM 80 MG: 80 TABLET, FILM COATED ORAL at 20:26

## 2023-10-07 RX ADMIN — LOSARTAN POTASSIUM 100 MG: 100 TABLET, FILM COATED ORAL at 07:43

## 2023-10-07 RX ADMIN — HEPARIN SODIUM 5000 UNITS: 5000 INJECTION, SOLUTION INTRAVENOUS; SUBCUTANEOUS at 22:25

## 2023-10-07 RX ADMIN — ASPIRIN 81 MG CHEWABLE TABLET 81 MG: 81 TABLET CHEWABLE at 07:43

## 2023-10-07 RX ADMIN — METOPROLOL SUCCINATE 50 MG: 50 TABLET, EXTENDED RELEASE ORAL at 17:03

## 2023-10-07 RX ADMIN — LABETALOL HYDROCHLORIDE 10 MG: 5 INJECTION, SOLUTION INTRAVENOUS at 04:34

## 2023-10-07 RX ADMIN — FUROSEMIDE 40 MG: 40 TABLET ORAL at 14:55

## 2023-10-07 RX ADMIN — THIAMINE HCL TAB 100 MG 100 MG: 100 TAB at 14:55

## 2023-10-07 RX ADMIN — THIAMINE HCL TAB 100 MG 100 MG: 100 TAB at 07:44

## 2023-10-07 ASSESSMENT — ACTIVITIES OF DAILY LIVING (ADL)
ADLS_ACUITY_SCORE: 42

## 2023-10-07 NOTE — PLAN OF CARE
Speech Language Therapy Discharge Summary    Reason for therapy discharge:    All goals and outcomes met, no further needs identified.    Progress towards therapy goal(s). See goals on Care Plan in [facility] electronic health record for goal details.  Goals met    Therapy recommendation(s):    No further therapy is recommended.    Recommend regular textures and thin liquids. Pt should be fully upright and alert for all PO, take small sips/bites, slow pacing, and alternate between consistencies. Swallowing goals met. Will complete orders.

## 2023-10-07 NOTE — PLAN OF CARE
Status: Admitted for acute L MCA/OMER stroke and underwent L carotid stenting and angio. Hx of previous stroke in 2021.    Vitals: HTN- parameters changed to . Within parameters since change. CCM, known A-fib  Neuros: D/o to situation and time this shift. R pupil slightly bigger than L. Mild aphasia. RUE slightly weaker than LUE, (5-).   IV: PIV SL  Labs/Electrolytes: Magnesium replaced this shift, redraw tomorrow  Resp/trach: infrequent, nonproductive cough.   Diet: Regular   Bowel status: BS+ BM today  : voiding spontaneously in urinal   Skin: generalized bruising   Pain: denies  Activity: A1 with GB/walker  Plan: Plan for ARU 10/9  Updates this shift: PTA meds started this shift    Stroke Patients:   PLC scheduled or completed: ordered, not yet scheduled  Pneumoboots in place: no, patient refused

## 2023-10-07 NOTE — PROGRESS NOTES
[facility]    Stroke Progress Note    Interval Events  - No acute events overnight. No concerns this morning. Seen talking with wife on the phone later in the afternoon.     Changes Today:   -Restart PTA lasix and increase metoprolol back to home dose   -Change SBP goal to < 180 from < 150    HPI Summary  Eber Jin is a 62 year old man with history of tobacco use, HTN, HLD, CAD, HFrEF 35%, paroxysmal a-fib, recent PE on xarelto and aspirin, R pontine infarct 03/2021 with baseline mild left leg weakness c/b recurrent falls and gait imbalance, COPD, recent admission 9/20-24/23 to [facility] for rib fractures and PE who was initially admitted to [facility] ED 10/2/23 for surgical workup of multiple traumatic L displaced rib fractures in setting of recurrent falls, transferred to [facility] for consideration of thrombectomy after stroke code called 10/3/23 for aphasia, found to have acute L MCA/OMER watershed infarcts and possible L M2 superior division occlusion. Initial NIHSS 17. He underwent L carotid stenting and angioplasty. Post CT head with L frontal SAH that was stable on repeat scan     Stroke Evaluation Summarized    MRI/Head CT Post Proedure CT 10/3/23: Acute left frontal subarachnoid hemorrhage.       MRI:  1.  Multiple foci of acute to subacute ischemic change throughout the left cerebral hemisphere, distribution is typical of watershed ischemia.  2.  Additional acute to subacute ischemic change in the left occipital lobe.  3.  Age-related changes as above   Intracranial Vasculature 1. The internal carotid arteries are diminutive opacification to the ICA terminus is and proximal anterior cerebral and middle cerebral arteries with poor visualization of the distal anterior vasculature.      2. No significant contrast opacification is identified within the posterior circulation.     3. While these findings can be seen in setting of hypoperfusion,  injection related artifact could have a similar appearance. MRI brain/MRA head and neck is recommended for further evaluation.   Cervical Vasculature 1. Atherosclerotic plaque results in critical, 90% or greater, stenosis of the proximal left ICA.      2. Right vertebral artery occlusion.      3. The left vertebral artery remains patent throughout its course, however shortly after entering the intracranial compartment both vertebral arteries, basilar artery, and posterior circulation are not visualized.     Echocardiogram No cardiac source for embolus identified   The visual ejection fraction is 55-60%.   The right atria appears normal. Mild left atrial enlargement is present.    EKG/Telemetry Atrial fibrillation with rapid ventricular response      Other Testing Not Applicable      LDL  10/4/2023: 87 mg/dL   A1C  10/3/2023: 5.3 %   Troponin No lab value available in past 48 hrs       Impression   Eber Jin is a 62 year old man with history of tobacco use, HTN, HLD, CAD, HFrEF 35%, paroxysmal a-fib, recent PE on xarelto and aspirin, who was initially admitted to [facility] ED 10/2/23 for surgical workup of multiple traumatic L displaced rib fractures in setting of recurrent falls, transferred to [facility] for consideration of thrombectomy after stroke code called 10/3/23 for aphasia, found to have acute L MCA/OMER watershed infarcts and L M2 superior division occlusion. Patient was extubated 10/4.    Etiology of L M2 occlusion most likely extracranial atherosclerosis. Not a candidate for TNK with his Xarelto and no LVO for thrombectomy. He was taken for left carotid stenting and angioplasty. Post-procedure CT head revealed new L frontal SAH that has been stable on repeat stability scans.     Plan  #Acute L MCA/OMER watershed infarcts   #Possible L M2 occlusion  #L ICA stenosis > 90% s/p stenting and angioplasty  #Acute L frontal SAH  #R pontine stroke 03/2021  - Neurochecks every 4 hours  - SBP goal <  180 mmHg  - Continue dual antiplatelet therapy (DAPT): ticagrelor 90 mg BID and aspirin 81 mg daily   - Per discussion with neuro-IR, continue DAPT x 1 month; afterwards, plan to restart xarelto and continue aspirin 81 mg daily monotherapy    - Repeat CTA head and neck in 1 month to assess for stent patency  - Statin: Continue PTA Lipitor 80 mg daily,    - A1c (5.3%)  - PT/OT/SLP recommending ARU  - Stroke Education  - Depression Screen  - Apnea Screen    #Hypertension  #HFrEF (EF of 35% on 9/9/2023)  #Paroxysmal A-fib ([id number] of 5)  #CAD prior MI  #PVD  PTA losartan halved yesterday due to borderline hypotension outside of goal. Overnight required prn labetalol.   - PTA Losartan 100 mg daily   - PTA spironolactone 25 mg daily  - PTA metoprolol succinate 100 mg  - PTA Lasix 40 mg daily  - Hold PTA xarelto 20 mg per above   - PRN metoprolol for rate above 120 bpm  - Cardiac monitoring  - TTE - no cardiac source for emoblus, EF 55-60%  - SBP goal < 150 mmHg  - PRN Labetalol and Hydralazine    #PAD, bilateral   #AAA, infrarenal, 3.2 cm  #Iliac artery aneurysm, 20 mm right   CTA with run off 10/4/2023 - all chronic findings   - Vascular sugery consulted - continue surveillance for AAA, no acute interventions deemed necessary  - Has follow up with Dr. Talbot of vascular surgery at [facility]    #Multiple displaced rib fractures   -Hold xarelto per above   -Tylenol 1 g q8h  -Incentive spirometry every 2 hours   -Follow up with general surgery recommendations in AM     #COPD  -Maintain O2 saturations greater than 92%    #Unprovoked PE (LLL segmental and subsegmental) 09/07/23  #Multiple rib fracture L rib 4 -12 fx  09/07/23  #Recent traumatic hemothorax   In setting of recurrent falls, discovered during admission to [facility]   -Holding Xarelto (see above)    #Alcohol use disorder   #Tobacco use disorder   -Encourage cessation   -CIWA protocol    Lines/tubes/drains: PIV  FEN: regular diet, thin  liquids  PPX: DVT - SCDs  and SQH; GI - not indicated  Code: Full Code    Patient Follow-up    - final recommendation pending work-up    The patient was discussed with the Stroke Staff, Dr. Grady.    Sunshine Peña MD  PGY-3 Neurology Resident   ______________________________________________________    Clinically Significant Risk Factors              # Hypoalbuminemia: Lowest albumin = 3 g/dL at 10/3/2023 10:04 PM, will monitor as appropriate       # Hypertension: Noted on problem list    # Chronic heart failure with preserved ejection fraction: heart failure noted on problem list and last echo with EF >50%        # Severe Malnutrition: based on nutrition assessment  , PRESENT ON ADMISSION            Medications   Scheduled Meds   acetaminophen  1,000 mg Oral or Feeding Tube Q8H    aspirin  81 mg Oral or Feeding Tube Daily    atorvastatin  80 mg Oral QPM    folic acid  1 mg Oral or Feeding Tube Daily    furosemide  40 mg Oral Daily    heparin ANTICOAGULANT  5,000 Units Subcutaneous Q8H    lidocaine  1-2 patch Transdermal Q24H    losartan  100 mg Oral Daily    [START ON 10/8/2023] metoprolol succinate ER  100 mg Oral Daily    multivitamin w/minerals  1 tablet Oral or Feeding Tube Daily    polyethylene glycol  17 g Oral or Feeding Tube Daily    senna-docusate  1-2 tablet Oral or NG Tube BID    sodium chloride (PF)  3 mL Intracatheter Q8H    spironolactone  25 mg Oral Daily    thiamine  100 mg Oral or Feeding Tube TID    [START ON 10/11/2023] thiamine  100 mg Oral Daily    ticagrelor  90 mg Oral BID       Infusion Meds   - MEDICATION INSTRUCTIONS -         PRN Meds  acetaminophen, glucose **OR** dextrose **OR** glucagon, hydrALAZINE, labetalol, lidocaine 4%, lidocaine (buffered or not buffered), - MEDICATION INSTRUCTIONS -, melatonin, metoprolol, sodium chloride (PF)       PHYSICAL EXAMINATION  Temp:  [97.7  F (36.5  C)-98.5  F (36.9  C)] 97.7  F (36.5  C)  Pulse:  [64-83] 69  Resp:  [13-29] 16  BP: (122-171)/()  "122/93  SpO2:  [97 %-100 %] 99 %      Neurologic  Mental Status:  awake and alert, follows simple commands, speech clear and fluent, naming and repetition normal, oriented to person, month, and age   Cranial Nerves:  Pupils equal, EOMI, incomplete right homonymous hemianopsia (cannot see out of RLQ's of both eyes, blurred vision in RUQ of R eye, can count fingers well with RUQ of L eye), facial sensation intact and symmetric, facial movements symmetric, hearing not formally tested but intact to conversation, palate elevation symmetric and uvula midline, no dysarthria, shoulder shrug strong bilaterally, tongue protrusion midline,   Motor:  able to move all limbs spontaneously, RUE with mild drift but not to bed, otherwise no drift in other extremities. Although LLE without drift to bed, patient does endorse chronic L hip pain that may impede full muscle activation of LLE   Reflexes:  2+ with biceps, triceps, brachioradialis, and patellar bilaterally, but slightly brisker for each on left side, Achilles mute bilaterally  Sensory:  light touch sensation intact and symmetric throughout upper and lower extremities, no extinction to double simultaneous tactile stimulation   Coordination: R FNF with mild ataxia (likely more representative of \"swayed\" movements in setting of weakness), none on the left. Has difficulty completing heel to shin testing in setting of chronic hip pains and fatigue  Station/Gait:  deferred    Stroke Scales    NIHSS  1a. Level of Consciousness 0-->Alert, keenly responsive   1b. LOC Questions 0-->Answers both questions correctly   1c. LOC Commands 0-->Performs both tasks correctly   2.   Best Gaze 0-->Normal   3.   Visual 2-->Complete hemianopia   4.   Facial Palsy 0-->Normal symmetrical movements   5a. Motor Arm, Left 0-->No drift, limb holds 90 (or 45) degrees for full 10 secs   5b. Motor Arm, Right 1-->Drift, limb holds 90 (or 45) degrees, but drifts down before full 10 secs, does not hit bed or " other support   6a. Motor Leg, Left 0-->No drift, leg holds 30 degree position for full 5 secs   6b. Motor Leg, right 0-->No drift, leg holds 30 degree position for full 5 secs   7.   Limb Ataxia 1-->Present in one limb   8.   Sensory 0-->Normal, no sensory loss   9.   Best Language 0-->No aphasia, normal   10. Dysarthria 0-->Normal   11. Extinction and Inattention  0-->No abnormality   Total 4 (10/07/23 1619)           Imaging  I personally reviewed all imaging; relevant findings per HPI.     Lab Results Data   CBC  Recent Labs   Lab 10/07/23  0511 10/06/23  0547 10/05/23  0358   WBC 6.5 8.9 12.3*   RBC 3.50* 3.53* 3.55*   HGB 10.8* 10.7* 10.8*   HCT 32.7* 32.7* 33.2*    245 237       Basic Metabolic Panel    Recent Labs   Lab 10/07/23  0511 10/06/23  0547 10/05/23  0358    138 138   POTASSIUM 3.9 4.0 3.7   CHLORIDE 106 107 105   CO2 20* 22 23   BUN 4.7* 6.7* 6.8*   CR 0.73 0.79 0.87   GLC 90 83 88   LISA 8.6* 8.5* 8.6*       Liver Panel  Recent Labs   Lab 10/03/23  2204   PROTTOTAL 6.4   ALBUMIN 3.0*   BILITOTAL 0.9   ALKPHOS 83   AST 25   ALT <5       INR    Recent Labs   Lab Test 10/03/23  2137 10/03/23  1646 10/03/23  1103   INR 1.10 1.13 1.13        Lipid Profile    Recent Labs   Lab Test 10/04/23  0604 10/03/23  2204 11/25/22  0922 03/07/21  0633   CHOL  --  160 201* 160   HDL  --  36* 38* 32*   LDL 87 103* 144* 106   TRIG  --  107 96 110       A1C    Recent Labs   Lab Test 10/03/23  2138 05/12/22  1604 07/19/18  1033   A1C 5.3 5.8* 5.8       Troponin    Recent Labs   Lab 10/04/23  0604 10/03/23  2204 10/03/23  1646   CTROPT 36* 35* 39*            Data

## 2023-10-07 NOTE — PLAN OF CARE
Status: Admitted for acute L MCA/OMER stroke and underwent L carotid stenting and angio. Hx of previous stroke in 2021.    Vitals: VSS ex Hypertensive <150 Labetalol given x3 this shift. On CCM afib noted basline.   Neuros: Alert, d/o to time and situation. 5/5 throughout but RUE slightly weaker than L. R pupil slightly larger than L pupil. Pt reports bilateral blurry vision intermittently. Wears glasses. Mild aphasia. Impulsive bed/chair alarm  Resp/trach: LSC, infrequent cough, non productive. Frequently apneic when sleeping, patient care order placed, okay to silent alarm as long as pt on cont SP02 monitoring. Pt declined CPAP  Diet: Regular diet  Bowel status: Loose BM 10/6  : voiding spontaneously in urinal or bedside commode with urgency   Skin: Generalized bruising and R groin site; soft   Pain: generalized bruising and R groin   Activity: Ax1 GB walker.  Plan: Continue to monitor BP, encourage activity as tolerated. Therapies recc ARU, no discharge plan at this time

## 2023-10-08 ENCOUNTER — APPOINTMENT (OUTPATIENT)
Dept: EDUCATION SERVICES | Facility: CLINIC | Age: 63
DRG: 034 | End: 2023-10-08
Payer: COMMERCIAL

## 2023-10-08 ENCOUNTER — APPOINTMENT (OUTPATIENT)
Dept: OCCUPATIONAL THERAPY | Facility: CLINIC | Age: 63
DRG: 034 | End: 2023-10-08
Payer: COMMERCIAL

## 2023-10-08 ENCOUNTER — APPOINTMENT (OUTPATIENT)
Dept: PHYSICAL THERAPY | Facility: CLINIC | Age: 63
DRG: 034 | End: 2023-10-08
Payer: COMMERCIAL

## 2023-10-08 LAB
ANION GAP SERPL CALCULATED.3IONS-SCNC: 11 MMOL/L (ref 7–15)
BACTERIA SPT CULT: ABNORMAL
BACTERIA SPT CULT: ABNORMAL
BUN SERPL-MCNC: 6.7 MG/DL (ref 8–23)
CALCIUM SERPL-MCNC: 9.1 MG/DL (ref 8.8–10.2)
CHLORIDE SERPL-SCNC: 103 MMOL/L (ref 98–107)
CREAT SERPL-MCNC: 0.97 MG/DL (ref 0.67–1.17)
DEPRECATED HCO3 PLAS-SCNC: 24 MMOL/L (ref 22–29)
EGFRCR SERPLBLD CKD-EPI 2021: 87 ML/MIN/1.73M2
ERYTHROCYTE [DISTWIDTH] IN BLOOD BY AUTOMATED COUNT: 13.2 % (ref 10–15)
GLUCOSE SERPL-MCNC: 86 MG/DL (ref 70–99)
GRAM STAIN RESULT: ABNORMAL
GRAM STAIN RESULT: ABNORMAL
HCT VFR BLD AUTO: 36.5 % (ref 40–53)
HGB BLD-MCNC: 11.7 G/DL (ref 13.3–17.7)
MAGNESIUM SERPL-MCNC: 2 MG/DL (ref 1.7–2.3)
MCH RBC QN AUTO: 30.5 PG (ref 26.5–33)
MCHC RBC AUTO-ENTMCNC: 32.1 G/DL (ref 31.5–36.5)
MCV RBC AUTO: 95 FL (ref 78–100)
PHOSPHATE SERPL-MCNC: 3.6 MG/DL (ref 2.5–4.5)
PLATELET # BLD AUTO: 296 10E3/UL (ref 150–450)
POTASSIUM SERPL-SCNC: 4 MMOL/L (ref 3.4–5.3)
RBC # BLD AUTO: 3.83 10E6/UL (ref 4.4–5.9)
SODIUM SERPL-SCNC: 138 MMOL/L (ref 135–145)
WBC # BLD AUTO: 6.5 10E3/UL (ref 4–11)

## 2023-10-08 PROCEDURE — 250N000013 HC RX MED GY IP 250 OP 250 PS 637

## 2023-10-08 PROCEDURE — 97116 GAIT TRAINING THERAPY: CPT | Mod: GP

## 2023-10-08 PROCEDURE — 250N000011 HC RX IP 250 OP 636: Performed by: NURSE PRACTITIONER

## 2023-10-08 PROCEDURE — 97535 SELF CARE MNGMENT TRAINING: CPT | Mod: GO | Performed by: OCCUPATIONAL THERAPIST

## 2023-10-08 PROCEDURE — 99233 SBSQ HOSP IP/OBS HIGH 50: CPT | Mod: GC | Performed by: PSYCHIATRY & NEUROLOGY

## 2023-10-08 PROCEDURE — 36415 COLL VENOUS BLD VENIPUNCTURE: CPT

## 2023-10-08 PROCEDURE — 250N000013 HC RX MED GY IP 250 OP 250 PS 637: Performed by: NURSE PRACTITIONER

## 2023-10-08 PROCEDURE — 250N000013 HC RX MED GY IP 250 OP 250 PS 637: Performed by: PSYCHIATRY & NEUROLOGY

## 2023-10-08 PROCEDURE — 120N000002 HC R&B MED SURG/OB UMMC

## 2023-10-08 PROCEDURE — 83735 ASSAY OF MAGNESIUM: CPT | Performed by: NURSE PRACTITIONER

## 2023-10-08 PROCEDURE — 82947 ASSAY GLUCOSE BLOOD QUANT: CPT

## 2023-10-08 PROCEDURE — 85027 COMPLETE CBC AUTOMATED: CPT

## 2023-10-08 PROCEDURE — 97530 THERAPEUTIC ACTIVITIES: CPT | Mod: GP

## 2023-10-08 PROCEDURE — 84100 ASSAY OF PHOSPHORUS: CPT | Performed by: NURSE PRACTITIONER

## 2023-10-08 RX ORDER — MAGNESIUM OXIDE 400 MG/1
400 TABLET ORAL EVERY 4 HOURS
Status: COMPLETED | OUTPATIENT
Start: 2023-10-08 | End: 2023-10-08

## 2023-10-08 RX ADMIN — MAGNESIUM OXIDE TAB 400 MG (241.3 MG ELEMENTAL MG) 400 MG: 400 (241.3 MG) TAB at 16:08

## 2023-10-08 RX ADMIN — SPIRONOLACTONE 25 MG: 25 TABLET ORAL at 07:46

## 2023-10-08 RX ADMIN — HEPARIN SODIUM 5000 UNITS: 5000 INJECTION, SOLUTION INTRAVENOUS; SUBCUTANEOUS at 14:06

## 2023-10-08 RX ADMIN — SENNOSIDES AND DOCUSATE SODIUM 1 TABLET: 50; 8.6 TABLET ORAL at 19:40

## 2023-10-08 RX ADMIN — MAGNESIUM OXIDE TAB 400 MG (241.3 MG ELEMENTAL MG) 400 MG: 400 (241.3 MG) TAB at 12:26

## 2023-10-08 RX ADMIN — THIAMINE HCL TAB 100 MG 100 MG: 100 TAB at 07:46

## 2023-10-08 RX ADMIN — ATORVASTATIN CALCIUM 80 MG: 80 TABLET, FILM COATED ORAL at 19:40

## 2023-10-08 RX ADMIN — ASPIRIN 81 MG CHEWABLE TABLET 81 MG: 81 TABLET CHEWABLE at 07:46

## 2023-10-08 RX ADMIN — Medication 1 TABLET: at 07:46

## 2023-10-08 RX ADMIN — LOSARTAN POTASSIUM 100 MG: 100 TABLET, FILM COATED ORAL at 07:46

## 2023-10-08 RX ADMIN — FUROSEMIDE 40 MG: 40 TABLET ORAL at 07:47

## 2023-10-08 RX ADMIN — ACETAMINOPHEN 1000 MG: 500 TABLET ORAL at 12:26

## 2023-10-08 RX ADMIN — FOLIC ACID 1 MG: 1 TABLET ORAL at 07:46

## 2023-10-08 RX ADMIN — THIAMINE HCL TAB 100 MG 100 MG: 100 TAB at 19:40

## 2023-10-08 RX ADMIN — TICAGRELOR 90 MG: 90 TABLET ORAL at 19:40

## 2023-10-08 RX ADMIN — TICAGRELOR 90 MG: 90 TABLET ORAL at 07:46

## 2023-10-08 RX ADMIN — ACETAMINOPHEN 1000 MG: 500 TABLET ORAL at 03:50

## 2023-10-08 RX ADMIN — METOPROLOL SUCCINATE 100 MG: 100 TABLET, EXTENDED RELEASE ORAL at 07:47

## 2023-10-08 RX ADMIN — HEPARIN SODIUM 5000 UNITS: 5000 INJECTION, SOLUTION INTRAVENOUS; SUBCUTANEOUS at 22:16

## 2023-10-08 RX ADMIN — THIAMINE HCL TAB 100 MG 100 MG: 100 TAB at 14:05

## 2023-10-08 RX ADMIN — ACETAMINOPHEN 1000 MG: 500 TABLET ORAL at 19:40

## 2023-10-08 ASSESSMENT — ACTIVITIES OF DAILY LIVING (ADL)
ADLS_ACUITY_SCORE: 38
ADLS_ACUITY_SCORE: 38
ADLS_ACUITY_SCORE: 40
ADLS_ACUITY_SCORE: 40
ADLS_ACUITY_SCORE: 42
ADLS_ACUITY_SCORE: 38
ADLS_ACUITY_SCORE: 40
ADLS_ACUITY_SCORE: 38
ADLS_ACUITY_SCORE: 38
ADLS_ACUITY_SCORE: 40

## 2023-10-08 NOTE — PLAN OF CARE
BP (!) 126/99 (BP Location: Left arm)   Pulse 71   Temp 98.1  F (36.7  C) (Oral)   Resp 22   Wt 67.4 kg (148 lb 9.4 oz)   SpO2 99%   BMI 20.72 kg/m       Vitals: HTN but within parameters of SBP <150. Hx of a-fib with RVR and PVC's.   Neuros:  Disoriented to time. Strengths overall 5/5. R pupil slightly larger than L pupil.   IV: PIV  SL  Labs/electrolytes: 1 dose of oral mag given, no other replacement needed  Respiratory:WNL on RA LSC, infrequent cough  Diet: regular good appetite, denies nausea  GI/ : voids spontaneously via urinal at bedside. No BM this shift  Skin: Generalized bruising.  Pain: rated as a 1/10--scheduled Tylenol given  Activity: Ax1, Pivot, GB, voided via urinal x3   Plan: Monitor and cont with POC

## 2023-10-08 NOTE — CONSULTS
"Stroke Education Note    The following information has been reviewed with the patient:    1. Warning signs of stroke    2. Calling 911 if having warning signs of stroke    3. All modifiable risk factors: hypertension, CAD, atrial fib, diabetes, hypercholesterolemia, smoking, substance abuse, diet, physical inactivity, obesity, sleep apnea.    4. Patient's risk factors for stroke which include: HTN,HLD,CAD,Afib,recent PE    5. Follow-up plan for after discharge    6. Discharge medications which include: ASA,Lipitor,Cozaar,Toprol XL, aldactone, Brillinta    In addition, the above information was given to the patient in writing as a part of the [facility] Stroke Class Handout.    Learner's response to risk factors / lifestyle modification education: Desire to change Ability to change     Bina Alvarado RN        Pt completed [facility] stroke education at bedside. Reviewed types of stroke and stroke definition. Went over all controllable and uncontrollable stroke risk factors and how to decrease stroke risk. Discussed all s/s stroke, \"BE FAST\" and when to call 911. Reviewed medications, treatments and coping post stroke. All teachback questions answered and patient has a paper copy of the powerpoint.  "

## 2023-10-08 NOTE — PROGRESS NOTES
[facility]    Stroke Progress Note    Interval Events  - No acute events overnight  - No complaints this morning, in good spirits    HPI Summary  Eber Jin is a 62 year old man with history of tobacco use, HTN, HLD, CAD, HFrEF 35%, paroxysmal a-fib, recent PE on xarelto and aspirin, R pontine infarct 03/2021 with baseline mild left leg weakness c/b recurrent falls and gait imbalance, COPD, recent admission 9/20-24/23 to [facility] for rib fractures and PE who was initially admitted to [facility] ED 10/2/23 for surgical workup of multiple traumatic L displaced rib fractures in setting of recurrent falls, transferred to [facility] for consideration of thrombectomy after stroke code called 10/3/23 for aphasia, found to have acute L MCA/OMER watershed infarcts and possible L M2 superior division occlusion. Initial NIHSS 17. He underwent L carotid stenting and angioplasty. Post CT head with L frontal SAH that was stable on repeat scan      Stroke Evaluation Summarized     MRI/Head CT Post Proedure CT 10/3/23: Acute left frontal subarachnoid hemorrhage.         MRI:  1.  Multiple foci of acute to subacute ischemic change throughout the left cerebral hemisphere, distribution is typical of watershed ischemia.  2.  Additional acute to subacute ischemic change in the left occipital lobe.  3.  Age-related changes as above   Intracranial Vasculature 1. The internal carotid arteries are diminutive opacification to the ICA terminus is and proximal anterior cerebral and middle cerebral arteries with poor visualization of the distal anterior vasculature.      2. No significant contrast opacification is identified within the posterior circulation.     3. While these findings can be seen in setting of hypoperfusion, injection related artifact could have a similar appearance. MRI brain/MRA head and neck is recommended for further evaluation.   Cervical Vasculature 1.  Atherosclerotic plaque results in critical, 90% or greater, stenosis of the proximal left ICA.      2. Right vertebral artery occlusion.      3. The left vertebral artery remains patent throughout its course, however shortly after entering the intracranial compartment both vertebral arteries, basilar artery, and posterior circulation are not visualized.      Echocardiogram No cardiac source for embolus identified   The visual ejection fraction is 55-60%.   The right atria appears normal. Mild left atrial enlargement is present.    EKG/Telemetry Atrial fibrillation with rapid ventricular response      Other Testing Not Applicable       LDL  10/4/2023: 87 mg/dL   A1C  10/3/2023: 5.3 %   Troponin No lab value available in past 48 hrs         Impression   Eber Jin is a 62 year old man with history of tobacco use, HTN, HLD, CAD, HFrEF 35%, paroxysmal a-fib, recent PE on xarelto and aspirin, who was initially admitted to [facility] ED 10/2/23 for surgical workup of multiple traumatic L displaced rib fractures in setting of recurrent falls, transferred to [facility] for consideration of thrombectomy after stroke code called 10/3/23 for aphasia, found to have acute L MCA/OMER watershed infarcts and L M2 superior division occlusion. Patient was extubated 10/4.     Etiology of L M2 occlusion most likely extracranial atherosclerosis. Not a candidate for TNK with his Xarelto and no LVO for thrombectomy. He was taken for left carotid stenting and angioplasty. Post-procedure CT head revealed new L frontal SAH that has been stable on repeat stability scans.      Plan  #Acute L MCA/OMER watershed infarcts   #Possible L M2 occlusion  #L ICA stenosis > 90% s/p stenting and angioplasty  #Acute L frontal SAH  #R pontine stroke 03/2021  - Neurochecks every 4 hours  - SBP goal < 180 mmHg  - Continue dual antiplatelet therapy (DAPT): ticagrelor 90 mg BID and aspirin 81 mg daily              - Per discussion with neuro-IR,  continue DAPT x 1 month; afterwards, plan to restart xarelto and continue aspirin 81 mg daily monotherapy               - Repeat CTA head and neck in 1 month to assess for stent patency  - Statin: Continue PTA Lipitor 80 mg daily,    - A1c (5.3%)  - PT/OT/SLP recommending ARU, referrals to be sent out today  - Stroke Education  - Depression Screen  - Apnea Screen     #Hypertension  #HFrEF (EF of 35% on 9/9/2023)  #Paroxysmal A-fib ([id number] of 5)  #CAD prior MI  #PVD  - PTA Losartan 100 mg daily   - PTA spironolactone 25 mg daily  - PTA metoprolol succinate 100 mg  - PTA Lasix 40 mg daily  - Hold PTA xarelto 20 mg per above   - PRN metoprolol for rate above 120 bpm  - Cardiac monitoring  - TTE - no cardiac source for emoblus, EF 55-60%  - SBP goal < 150 mmHg  - PRN Labetalol and Hydralazine     #PAD, bilateral   #AAA, infrarenal, 3.2 cm  #Iliac artery aneurysm, 20 mm right   CTA with run off 10/4/2023 - all chronic findings   - Vascular sugery consulted - continue surveillance for AAA, no acute interventions deemed necessary  - Has follow up with Dr. Talbot of vascular surgery at [facility]     #Multiple displaced rib fractures   -Hold xarelto per above   -Tylenol 1 g q8h  -Incentive spirometry every 2 hours   -Paged general surgery for follow up recommendations, will update when hear back     #COPD  -Maintain O2 saturations greater than 92%     #Unprovoked PE (LLL segmental and subsegmental) 09/07/23  #Multiple rib fracture L rib 4 -12 fx  09/07/23  #Recent traumatic hemothorax   In setting of recurrent falls, discovered during admission to [facility]   -Holding Xarelto (see above)     #Alcohol use disorder   #Tobacco use disorder   -Encourage cessation   -CIWA protocol     Lines/tubes/drains: PIV  FEN: regular diet, thin liquids  PPX: DVT - SCDs  and SQH; GI - not indicated  Code: Full Code     Patient Follow-up    - final recommendation pending work-up     The patient was discussed with the Stroke  Staff, Dr. Grady.    Adrian Cruz MD  Neurology Resident  Ascom: #[id number]  ______________________________________________________    Clinically Significant Risk Factors              # Hypoalbuminemia: Lowest albumin = 3 g/dL at 10/3/2023 10:04 PM, will monitor as appropriate       # Hypertension: Noted on problem list    # Chronic heart failure with preserved ejection fraction: heart failure noted on problem list and last echo with EF >50%        # Severe Malnutrition: based on nutrition assessment             Medications   Scheduled Meds   acetaminophen  1,000 mg Oral or Feeding Tube Q8H    aspirin  81 mg Oral or Feeding Tube Daily    atorvastatin  80 mg Oral QPM    folic acid  1 mg Oral or Feeding Tube Daily    furosemide  40 mg Oral Daily    heparin ANTICOAGULANT  5,000 Units Subcutaneous Q8H    lidocaine  1-2 patch Transdermal Q24H    losartan  100 mg Oral Daily    magnesium oxide  400 mg Oral Q4H    metoprolol succinate ER  100 mg Oral Daily    multivitamin w/minerals  1 tablet Oral or Feeding Tube Daily    polyethylene glycol  17 g Oral or Feeding Tube Daily    senna-docusate  1-2 tablet Oral or NG Tube BID    sodium chloride (PF)  3 mL Intracatheter Q8H    spironolactone  25 mg Oral Daily    thiamine  100 mg Oral or Feeding Tube TID    [START ON 10/11/2023] thiamine  100 mg Oral Daily    ticagrelor  90 mg Oral BID       Infusion Meds   - MEDICATION INSTRUCTIONS -         PRN Meds  acetaminophen, glucose **OR** dextrose **OR** glucagon, hydrALAZINE, labetalol, lidocaine 4%, lidocaine (buffered or not buffered), - MEDICATION INSTRUCTIONS -, melatonin, metoprolol, sodium chloride (PF)       PHYSICAL EXAMINATION  Temp:  [97.7  F (36.5  C)-98.4  F (36.9  C)] 97.9  F (36.6  C)  Pulse:  [66-83] 83  Resp:  [16-26] 24  BP: (122-151)/(92-99) 145/98  SpO2:  [97 %-100 %] 97 %      Neurologic  Mental Status:  alert, oriented x 3, follows commands, speech clear and fluent, naming and repetition normal  Cranial Nerves:   PERRL, EOMI with normal smooth pursuit, facial sensation intact and symmetric, facial movements symmetric, hearing not formally tested but intact to conversation, palate elevation symmetric and uvula midline, no dysarthria, shoulder shrug strong bilaterally, tongue protrusion midline, incomplete right homonomous hemianopsia to RLQ visual field from both eyes  Motor:  normal muscle tone and bulk, no abnormal movements, able to move all limbs spontaneously, RUE with slight drift but not to bed, no drift in LUE, LLE, RLE  Reflexes:  2+ with biceps, triceps, brachioradialis, and patellar bilaterally, but slightly brisker for each on left side, Achilles mute bilaterally   Sensory:  light touch sensation intact and symmetric throughout upper and lower extremities, no extinction on double simultaneous stimulation   Coordination:   Mild ataxia with FNF testing on RUE although likely secondary to weakness, FNF normal on left, heel to shin normal bilaterally  Station/Gait:  deferred    Stroke Scales    NIHSS  1a. Level of Consciousness 0-->Alert, keenly responsive   1b. LOC Questions 0-->Answers both questions correctly   1c. LOC Commands 0-->Performs both tasks correctly   2.   Best Gaze 0-->Normal   3.   Visual 2-->Complete hemianopia   4.   Facial Palsy 0-->Normal symmetrical movements   5a. Motor Arm, Left 0-->No drift, limb holds 90 (or 45) degrees for full 10 secs   5b. Motor Arm, Right 1-->Drift, limb holds 90 (or 45) degrees, but drifts down before full 10 secs, does not hit bed or other support   6a. Motor Leg, Left 0-->No drift, leg holds 30 degree position for full 5 secs   6b. Motor Leg, right 0-->No drift, leg holds 30 degree position for full 5 secs   7.   Limb Ataxia 1-->Present in one limb   8.   Sensory 0-->Normal, no sensory loss   9.   Best Language 0-->No aphasia, normal   10. Dysarthria 0-->Normal   11. Extinction and Inattention  0-->No abnormality   Total 4 (10/08/23 1109)       Imaging  I personally  reviewed all imaging; relevant findings per HPI.     Lab Results Data   CBC  Recent Labs   Lab 10/08/23  0612 10/07/23  0511 10/06/23  0547   WBC 6.5 6.5 8.9   RBC 3.83* 3.50* 3.53*   HGB 11.7* 10.8* 10.7*   HCT 36.5* 32.7* 32.7*    247 245     Basic Metabolic Panel    Recent Labs   Lab 10/08/23  0612 10/07/23  2222 10/07/23  0511 10/06/23  0547     --  136 138   POTASSIUM 4.0  --  3.9 4.0   CHLORIDE 103  --  106 107   CO2 24  --  20* 22   BUN 6.7*  --  4.7* 6.7*   CR 0.97  --  0.73 0.79   GLC 86 93 90 83   LISA 9.1  --  8.6* 8.5*     Liver Panel  Recent Labs   Lab 10/03/23  2204   PROTTOTAL 6.4   ALBUMIN 3.0*   BILITOTAL 0.9   ALKPHOS 83   AST 25   ALT <5     INR    Recent Labs   Lab Test 10/03/23  2137 10/03/23  1646 10/03/23  1103   INR 1.10 1.13 1.13      Lipid Profile    Recent Labs   Lab Test 10/04/23  0604 10/03/23  2204 11/25/22  0922 03/07/21  0633   CHOL  --  160 201* 160   HDL  --  36* 38* 32*   LDL 87 103* 144* 106   TRIG  --  107 96 110     A1C    Recent Labs   Lab Test 10/03/23  2138 05/12/22  1604 07/19/18  1033   A1C 5.3 5.8* 5.8     Troponin    Recent Labs   Lab 10/04/23  0604 10/03/23  2204 10/03/23  1646   CTROPT 36* 35* 39*          Data

## 2023-10-08 NOTE — PLAN OF CARE
Status: admitted for acute L MCA/OMER stroke and underwent L carotid stenting and angio. HX of previous stroke in 2021  Vitals: VSS Hypertensive within parameters <180. CCM, Hx of known A-fib  Neuros: Alert oriented x2-3. Disoriented to place, situation. R pupil slightly bigger than L. Mild aphasia. Strength overall 5/5  IV: PIV SL  Labs/Electrolytes: WNL redraws AM  Resp/trach: LSC, infrequent non-productive cough   Diet: Regular Diet  Bowel status: LBM 10/7  : Voiding spontaneously with urgency. Urinal at bedside.   Skin: Generalized bruising   Pain: Denies pain  Activity: A1 GB/walker  Plan:  Plan ARU 10/9

## 2023-10-08 NOTE — DISCHARGE SUMMARY
[facility]    Neurology Stroke Discharge Summary    Date of Admission: 10/3/2023  Date of Discharge: 10/18/2023    Disposition: Discharged to home with home health care  Primary Care Physician: Hima Boyle      Admission Diagnosis:   - Aphasia  - Right sided weakness  - Acute ischemic stroke of left MCA territory due to M2 occlusion     Discharge Diagnosis:   - Acute ischemic strokes in left middle cerebral artery and anterior cerebral artery territory likely due to hypoperfusion   - Acute ischemic stroke of Left MCA territory due to M2 occlusion, etiology likely large vessel atheroscerosis  - Left internal carotid artery stenosis > 90% status post stenting and angioplasty  - Acute Left frontal subarachnoid hemorrhage  - History of right pontine stroke 03/2021  - Hypertension  - Heart Failure with reduced Ejection Fraction, (EF of 35% on 9/9/2023)  - Paroxysmal Atrial fibrillation ([id number] of 5)  - Coronary Artery Disease with prior myocardial infarction  - Peripheral arterial disease  - Abdominal aortic aneurysm, infra-renal 3.2 cm  - Right iliac artery aneurysm, 20 mm  - Multiple displaced rib fractures, left, 4-12  - Chronic obstructive pulmonary disease  - Unprovoked pulmonary embolus, segmental and subsegmental  - Traumatic hemothorax  - Chronic obstructive pulmonary disease  - Alcohol use disorder  - Tobacco use disorder    Problem Leading to Hospitalization (from HPI):   Eber Jin is a 62 year old man with history of tobacco use, HTN, HLD, CAD, HFrEF 35%, paroxysmal a-fib, recent PE on xarelto and aspirin, R pontine infarct 03/2021 with baseline mild left leg weakness c/b recurrent falls and gait imbalance, COPD, recent admission 9/20-24/23 to [facility] for rib fractures and PE who was initially admitted to [place] ED 10/2/23 for surgical workup of multiple traumatic L displaced rib fractures in setting of recurrent falls, transferred to [facility]  [place] for consideration of thrombectomy after stroke code called 10/3/23 for aphasia, found to have acute L MCA/OMER watershed infarcts and possible L M2 superior division occlusion. Initial NIHSS 17. He underwent L carotid stenting and angioplasty. Post CT head with L frontal SAH that was stable on repeat scan      Please see H&P dated 10/3/2023 for further details about presentation.    Brief Hospital Course:   Patient presented with aphasia.      Found to have left M2 superior divison occlusion as well as watershed infarcts in left OMER/MCA territory.      IV thrombolysis was not administered because he takes Xarelto.      Work-up as stated below under Pertinent Investigations.    Etiology is thought to be large vessel extracranial atherosclerosis.      Rehab evaluation: OT, PT, and SLP.     Smoking Cessation: education provided, patient received smoking cessation counseling from Rehab Services    BP Long-term Goal:  < 130/80    Antithrombotic/Anticoagulant Agent: aspirin 81 mg and Xeralto 20 mg daily.     Statins:  Continue on atorvastatin 80 mg, was not taking consistently PTA        Hgb A1C Goal: < 7.0    Complications: None.     Other problems addressed during this hospitalization:  #Acute L MCA/OMER border zone infarcts   #Possible L M2 occlusion  #L ICA stenosis > 90% s/p stenting and angioplasty  #Acute L frontal SAH, resolved  #Hx R pontine stroke 03/2021  - Repeat MRI brain 10/16/23     - SBP goal long term < 130/80 mmhg   - DAPT x 2 weeks from stent placement (10/17/23)   - Repeat CT head on 10/17 with resolution of SAH  - restart PTA Xarelto 20mg in AM (10/18) & continue aspirin 81mg  - Stop Brilinta in AM (10/18)  - Per discussion with neuro-IR:   - Order carotid US in 1 month at discharge   - Follow up with neuro-IR (Dr. Perry) in 1 month  - Vascular surgery strongly recommends aspirin over cilostazol for lifelong antiplatelet therapy given extent of PAD  - Statin: Continue PTA Lipitor 80 mg  daily,    - A1c (5.3%)  - PT/OT/SLP recommending TCU, trying to arrange to be at same location as wife  - Stroke Education  - Depression (PHQ9 - 3) and Apnea Screens (yes only to fatigue throughout the day) both negative 10/10/23  - Nevertheless, would place referral to assess for sleep apnea if patient is willing to do this as patient has had multiple episodes this admission where nursing is concerned he's apneic when sleeping. 10/14 with documented desat to 65%.     #Unprovoked PE (LLL segmental and subsegmental) 09/07/23  #Multiple rib fracture L rib 4 -12 fx  09/07/23  #Recent traumatic hemothorax   In setting of recurrent falls, discovered during admission to [facility]. No worsening shortness of breath, chest pains during admission and no evidence of right heart strain on initial TTE, however has been over one week without anticoagulation.   - Per general surgery team at [place], patient is cleared to restart anticoagulation as needed  - Hypercoagulability panel (APLS) labs negative 10/10  - PTA Xarelto restarted on 10/18  - Repeat TTE 10/9/23 shows EF 45 - 50%, no PFO or thrombus  - Repeat CTPE 10/09 negative with resolution of prior PE's  - Bilateral Lower extremity Dopplers 9/23 normal     #Paroxysmal A-fib ([id number]-BPVw of 5) with RVR   - S/p 500 ml IVF with resolution of RVR yesterday  - PTA metoprolol succinate 100 mg  - PRN IV metoprolol 2.5 for rate above 120 bpm  - PTA xarelto 20 mg starting 10/18/2023     #Hypertension  #HFrEF (EF of 35% on 9/9/2023)  #CAD prior MI  #PVD  Noted to have run of tachyarrhythmia overnight 10/08 around 0300. Rhythm concerning for atrial flutter. Patient was asymptomatic throughout, denying chest pain, palpitations, lightheadedness. Rhythm abated with metoprolol prn.   - PTA Losartan 100 mg daily   - PTA spironolactone 25 mg daily  - PTA metoprolol succinate 100 mg  - PTA Lasix 40 mg daily  - PTA xarelto 20 mg daily   - PRN IV metoprolol 2.5 for rate above  120 bpm  - Cardiac monitoring  - TTE - no cardiac source for breanneus, EF 55-60%  - PRN Labetalol and Hydralazine     #PAD, bilateral   #AAA, infrarenal, 3.2 cm  #Iliac artery aneurysm, 20 mm right   CTA with run off 10/4/2023 - all chronic findings   - Vascular sugery consulted  - Continue surveillance for AAA, no acute interventions deemed necessary  - Vascular surgery strongly recommends lifelong antiplatelet therapy given extent of his PAD, with strong preference for aspirin over cilostazol  - Has follow up with Dr. Talbot of vascular surgery at [facility]     #Multiple displaced rib fractures   -Tylenol 1 g q8h  -Incentive spirometry every 2 hours   - Per general surgery at [facility]: no follow-up needed post-discharge     #COPD  -Maintain O2 saturations greater than 92%     #Alcohol use disorder   #Tobacco use disorder   -Encourage cessation   -Addiction medicine consulted, appreciate recs               -Naltrexone 50mg PO once daily started 10/16/2023              -Hep A IgG and Hep B serologies: WNL    PERTINENT INVESTIGATIONS    Labs  Lipid Panel:   Recent Labs   Lab Test 10/04/23  0604 10/03/23  2204   CHOL  --  160   HDL  --  36*   LDL 87 103*   TRIG  --  107     A1C:   Lab Results   Component Value Date    A1C 5.3 10/03/2023     INR:   No lab results found in last 7 days.     Coag Panel / Hypercoag Workup:  Negative  Pending test results: n/a    MRI/Head CT Post Proedure CT 10/3/23: Acute left frontal subarachnoid hemorrhage.         MRI:  1.  Multiple foci of acute to subacute ischemic change throughout the left cerebral hemisphere, distribution is typical of watershed ischemia.  2.  Additional acute to subacute ischemic change in the left occipital lobe.  3.  Age-related changes as above   Intracranial Vasculature 1. The internal carotid arteries are diminutive opacification to the ICA terminus is and proximal anterior cerebral and middle cerebral arteries with poor visualization of the distal anterior  vasculature.      2. No significant contrast opacification is identified within the posterior circulation.     3. While these findings can be seen in setting of hypoperfusion, injection related artifact could have a similar appearance. MRI brain/MRA head and neck is recommended for further evaluation.   Cervical Vasculature 1. Atherosclerotic plaque results in critical, 90% or greater, stenosis of the proximal left ICA.      2. Right vertebral artery occlusion.      3. The left vertebral artery remains patent throughout its course, however shortly after entering the intracranial compartment both vertebral arteries, basilar artery, and posterior circulation are not visualized.      Echocardiogram No cardiac source for embolus identified   The visual ejection fraction is 55-60%.   The right atria appears normal. Mild left atrial enlargement is present.     Endovascular procedure:  L ICA angioplasty with stent      Cardiac Monitoring: Patient had > 24 hrs of cardiac monitor while in hospital.    Findings: Patient has history of atrial fibrillation and is on Xarelto at home. Will plan to transition back to Xarelto after completion of DAPT s/p angioplasty and stent.     Sleep Apnea Screen:   Questions/Answers      1. Prior to your stroke, have you been told that you snore? No.    2. Prior to your stroke, have you been told that you struggle to breath while you are sleeping? No.    3. Prior to your stroke, do you feel tired and sleepy even after getting a normal night of sleep? Yes.    Sleep Apnea Screen Findings:  Patient only with 1 positive screening test, so technically negative, but has had documented desaturations to 65% while sleeping. Will send referral for sleep study.    PHQ-9 Depression Screen Score: 3    Education discussed with: patient on blood pressure management, cholesterol management, medical management, smoking cessation plan, diet modification, exercise recommendation, and follow-up  recommendations/plan.    During daily rounds, the plan of care was discussed and developed with patient.  Plan of care includes: anticoagulation althea, blood pressure management, diagnosis, follow up after discharge .    PHYSICAL EXAMINATION  Vital Signs:  B/P: 121/92, T: 98.4, P: 75, R: 22    General:  patient lying in bed without any acute distress     HEENT:  normocephalic/atraumatic  Cardio:  irregularly irregular  Pulmonary:  no respiratory distress  Abdomen:  soft  Extremities:  no edema  Skin:  intact     Neurologic  Mental Status:  follows commands, speech clear and fluent, naming and repetition normal, oriented to person, situation, month, not to place or year (Thought he was in a nursing home and the year was 2022). Knows the last 3 presidents.   Cranial Nerves:  PEERL, EOMI with normal smooth pursuit, facial sensation intact and symmetric, loss of R nasolabial fold but all other facial movements intact, tongue midline, hearing not formally tested but intact to conversation, no dysarthria, RLQ visual field cut with normal visual fields on left.  Motor:  normal muscle tone and bulk, no abnormal movements, able to move all limbs spontaneously,    Reflexes:   deferred  Sensory:  light touch sensation intact and symmetric throughout upper and lower extremities, no extinction on double simultaneous stimulation   Coordination:   mild ataxia RUE on finger-to-nose and in RLE on heel-to-shin; all other limbs show normal finger-to-nose and heel-to-shin without dysmetria ; HTS limited secondary to weakness  Station/Gait:  steady gait using a walker    National Institutes of Health Stroke Scale (on day of discharge)  NIHSS Total Score: 6    Modified [place] Score (Discharge)  3-Moderate disability; requiring some help, but able to walk without assistance    Medications    Current Discharge Medication List        START taking these medications    Details   aspirin (ASA) 81 MG chewable tablet 1 tablet (81 mg) by Oral or  Feeding Tube route daily for 360 days  Qty: 90 tablet, Refills: 3    Associated Diagnoses: Cerebrovascular accident (CVA) due to stenosis of left middle cerebral artery (H)      folic acid (FOLVITE) 1 MG tablet 1 tablet (1 mg) by Oral or Feeding Tube route daily for 360 days  Qty: 90 tablet, Refills: 3    Associated Diagnoses: Cerebrovascular accident (CVA) due to stenosis of left middle cerebral artery (H)      naltrexone (DEPADE/REVIA) 50 MG tablet Take 1 tablet (50 mg) by mouth daily for 90 days  Qty: 30 tablet, Refills: 2    Associated Diagnoses: Alcohol abuse      rivaroxaban ANTICOAGULANT (XARELTO) 20 MG TABS tablet Take 1 tablet (20 mg) by mouth daily (with dinner) for 120 days  Qty: 30 tablet, Refills: 3    Associated Diagnoses: Cerebrovascular accident (CVA) due to stenosis of left middle cerebral artery (H)           CONTINUE these medications which have NOT CHANGED    Details   albuterol (PROAIR HFA/PROVENTIL HFA/VENTOLIN HFA) 108 (90 Base) MCG/ACT inhaler Inhale 2 puffs into the lungs every 6 hours as needed for shortness of breath / dyspnea or wheezing  Qty: 18 g, Refills: 11    Comments: Pharmacy may dispense brand covered by insurance (Proair, or proventil or ventolin or generic albuterol inhaler)  Associated Diagnoses: Chronic obstructive pulmonary disease, unspecified COPD type (H)      atorvastatin (LIPITOR) 80 MG tablet Take 1 tablet (80 mg) by mouth every evening  Qty: 90 tablet, Refills: 1    Associated Diagnoses: Mixed hyperlipidemia      empagliflozin (JARDIANCE) 10 MG TABS tablet Take 1 tablet (10 mg) by mouth daily  Qty: 90 tablet, Refills: 3    Associated Diagnoses: Chronic systolic heart failure (H)      furosemide (LASIX) 40 MG tablet Take 1 tablet (40 mg) by mouth daily  Qty: 90 tablet, Refills: 1    Associated Diagnoses: Chronic systolic heart failure (H)      losartan (COZAAR) 100 MG tablet Take 1 tablet (100 mg) by mouth daily  Qty: 90 tablet, Refills: 3    Associated Diagnoses:  Essential hypertension, benign      magnesium oxide (MAG-OX) 400 MG tablet Take 1 tablet (400 mg) by mouth daily  Qty: 30 tablet, Refills: 4    Associated Diagnoses: Low magnesium level      metoprolol succinate ER (TOPROL XL) 100 MG 24 hr tablet Take 1 tablet (100 mg) by mouth daily  Qty: 90 tablet, Refills: 1    Associated Diagnoses: Chronic systolic heart failure (H); Acute on chronic systolic congestive heart failure (H); Paroxysmal atrial fibrillation (H); Essential hypertension, benign; Mixed hyperlipidemia; Encounter for medication refill      spironolactone (ALDACTONE) 25 MG tablet Take 1 tablet (25 mg) by mouth daily  Qty: 90 tablet, Refills: 1    Associated Diagnoses: Chronic systolic heart failure (H)           STOP taking these medications       hydrALAZINE (APRESOLINE) 10 MG tablet Comments:   Reason for Stopping:         norepinephrine (LEVOPHED) 0.016 mg/mL SOLN Comments:   Reason for Stopping:         propofol (DIPRIVAN) 1000 MG/100ML infusion Comments:   Reason for Stopping:               Additional recommendations and follow up:       CTA Head Neck with Contrast     US Carotid Bilateral     Neurosurgery Referral      Home Care Referral      Primary Care - Care Coordination Referral      Sleep Study Referral      Reason for your hospital stay    You were admitted due to a stroke on the left side of your brain and as part of management you have received a stent in your left carotid artery for attenuating blood flow to the left side of the brain.     Activity    Your activity upon discharge: activity as tolerated     Adult [facility] Follow-up and recommended labs and tests    1. Follow up with primary care provider, Hima Boyle, within 7 days for hospital follow- up.  No follow up labs or test are needed.    2. Follow up with Dr. Orlando MD, at [facility] for follow up of Left carotid stent placement , within 1 month  for hospital follow- up. The following labs/tests are recommended:  Bilateral Carotid Ultrasound.  3. Follow up with Any Stroke provider, at [facility] clinic at [facility] for follow up of Left MCA stroke, within 6-8 weeks  for hospital follow- up. The following labs/tests are recommended: Bilateral Carotid Ultrasound.  4. Follow up with Dr. Talbot from vascular surgery at [facility] as scheduled.     Appointments on [place] and/or [facility] (with [facility] or [facility] provider or service). Call [phone] if you haven't heard regarding these appointments within 7 days of discharge.     Diet    Follow this diet upon discharge: Orders Placed This Encounter      Snacks/Supplements Adult: Ensure Enlive; Between Meals      Calorie Counts      Room Service      Regular Diet Adult Thin Liquids (level 0)     Stroke Hospital Follow Up (for neurologist use only)    [facility] will call you to coordinate care as prescribed by your provider. If you don t hear from a representative within 2 business days, please call [phone].         Patient was seen and discussed with the Attending, SHEELA Waters.    Andrew Koroma MD  Neurology Resident PGY 3

## 2023-10-09 ENCOUNTER — APPOINTMENT (OUTPATIENT)
Dept: CARDIOLOGY | Facility: CLINIC | Age: 63
DRG: 034 | End: 2023-10-09
Payer: COMMERCIAL

## 2023-10-09 ENCOUNTER — APPOINTMENT (OUTPATIENT)
Dept: CT IMAGING | Facility: CLINIC | Age: 63
DRG: 034 | End: 2023-10-09
Payer: COMMERCIAL

## 2023-10-09 ENCOUNTER — APPOINTMENT (OUTPATIENT)
Dept: OCCUPATIONAL THERAPY | Facility: CLINIC | Age: 63
DRG: 034 | End: 2023-10-09
Payer: COMMERCIAL

## 2023-10-09 LAB
ANION GAP SERPL CALCULATED.3IONS-SCNC: 12 MMOL/L (ref 7–15)
ATRIAL RATE - MUSE: 72 BPM
BUN SERPL-MCNC: 8.7 MG/DL (ref 8–23)
CALCIUM SERPL-MCNC: 9 MG/DL (ref 8.8–10.2)
CHLORIDE SERPL-SCNC: 103 MMOL/L (ref 98–107)
CREAT SERPL-MCNC: 0.79 MG/DL (ref 0.67–1.17)
DEPRECATED HCO3 PLAS-SCNC: 20 MMOL/L (ref 22–29)
DIASTOLIC BLOOD PRESSURE - MUSE: NORMAL MMHG
EGFRCR SERPLBLD CKD-EPI 2021: >90 ML/MIN/1.73M2
ERYTHROCYTE [DISTWIDTH] IN BLOOD BY AUTOMATED COUNT: 13.2 % (ref 10–15)
GLUCOSE SERPL-MCNC: 93 MG/DL (ref 70–99)
HCT VFR BLD AUTO: 37.4 % (ref 40–53)
HGB BLD-MCNC: 11.9 G/DL (ref 13.3–17.7)
INTERPRETATION ECG - MUSE: NORMAL
LVEF ECHO: NORMAL
MAGNESIUM SERPL-MCNC: 2.1 MG/DL (ref 1.7–2.3)
MCH RBC QN AUTO: 30.1 PG (ref 26.5–33)
MCHC RBC AUTO-ENTMCNC: 31.8 G/DL (ref 31.5–36.5)
MCV RBC AUTO: 95 FL (ref 78–100)
P AXIS - MUSE: 1 DEGREES
PHOSPHATE SERPL-MCNC: 3.3 MG/DL (ref 2.5–4.5)
PLATELET # BLD AUTO: 316 10E3/UL (ref 150–450)
POTASSIUM SERPL-SCNC: 4.1 MMOL/L (ref 3.4–5.3)
PR INTERVAL - MUSE: 162 MS
QRS DURATION - MUSE: 102 MS
QT - MUSE: 424 MS
QTC - MUSE: 464 MS
R AXIS - MUSE: 28 DEGREES
RBC # BLD AUTO: 3.95 10E6/UL (ref 4.4–5.9)
SODIUM SERPL-SCNC: 135 MMOL/L (ref 135–145)
SYSTOLIC BLOOD PRESSURE - MUSE: NORMAL MMHG
T AXIS - MUSE: 123 DEGREES
VENTRICULAR RATE- MUSE: 72 BPM
WBC # BLD AUTO: 6.9 10E3/UL (ref 4–11)

## 2023-10-09 PROCEDURE — 93308 TTE F-UP OR LMTD: CPT | Mod: 26 | Performed by: INTERNAL MEDICINE

## 2023-10-09 PROCEDURE — 250N000009 HC RX 250

## 2023-10-09 PROCEDURE — 250N000011 HC RX IP 250 OP 636: Performed by: NURSE PRACTITIONER

## 2023-10-09 PROCEDURE — 93321 DOPPLER ECHO F-UP/LMTD STD: CPT

## 2023-10-09 PROCEDURE — 71275 CT ANGIOGRAPHY CHEST: CPT

## 2023-10-09 PROCEDURE — 71275 CT ANGIOGRAPHY CHEST: CPT | Mod: 26 | Performed by: RADIOLOGY

## 2023-10-09 PROCEDURE — 99233 SBSQ HOSP IP/OBS HIGH 50: CPT | Mod: GC | Performed by: PSYCHIATRY & NEUROLOGY

## 2023-10-09 PROCEDURE — 250N000013 HC RX MED GY IP 250 OP 250 PS 637: Performed by: NURSE PRACTITIONER

## 2023-10-09 PROCEDURE — 84520 ASSAY OF UREA NITROGEN: CPT

## 2023-10-09 PROCEDURE — 250N000009 HC RX 250: Performed by: PSYCHIATRY & NEUROLOGY

## 2023-10-09 PROCEDURE — 93325 DOPPLER ECHO COLOR FLOW MAPG: CPT | Mod: 26 | Performed by: INTERNAL MEDICINE

## 2023-10-09 PROCEDURE — 97535 SELF CARE MNGMENT TRAINING: CPT | Mod: GO

## 2023-10-09 PROCEDURE — 83735 ASSAY OF MAGNESIUM: CPT | Performed by: NURSE PRACTITIONER

## 2023-10-09 PROCEDURE — 250N000013 HC RX MED GY IP 250 OP 250 PS 637: Performed by: PSYCHIATRY & NEUROLOGY

## 2023-10-09 PROCEDURE — 93005 ELECTROCARDIOGRAM TRACING: CPT

## 2023-10-09 PROCEDURE — 86147 CARDIOLIPIN ANTIBODY EA IG: CPT

## 2023-10-09 PROCEDURE — 36415 COLL VENOUS BLD VENIPUNCTURE: CPT

## 2023-10-09 PROCEDURE — 120N000002 HC R&B MED SURG/OB UMMC

## 2023-10-09 PROCEDURE — 250N000011 HC RX IP 250 OP 636: Performed by: PSYCHIATRY & NEUROLOGY

## 2023-10-09 PROCEDURE — 85014 HEMATOCRIT: CPT

## 2023-10-09 PROCEDURE — 250N000013 HC RX MED GY IP 250 OP 250 PS 637

## 2023-10-09 PROCEDURE — 93321 DOPPLER ECHO F-UP/LMTD STD: CPT | Mod: 26 | Performed by: INTERNAL MEDICINE

## 2023-10-09 PROCEDURE — 84100 ASSAY OF PHOSPHORUS: CPT | Performed by: NURSE PRACTITIONER

## 2023-10-09 PROCEDURE — 93010 ELECTROCARDIOGRAM REPORT: CPT | Performed by: INTERNAL MEDICINE

## 2023-10-09 RX ORDER — IOPAMIDOL 755 MG/ML
54 INJECTION, SOLUTION INTRAVASCULAR ONCE
Status: COMPLETED | OUTPATIENT
Start: 2023-10-09 | End: 2023-10-09

## 2023-10-09 RX ORDER — METOPROLOL TARTRATE 1 MG/ML
INJECTION, SOLUTION INTRAVENOUS
Status: COMPLETED
Start: 2023-10-09 | End: 2023-10-09

## 2023-10-09 RX ADMIN — ACETAMINOPHEN 1000 MG: 500 TABLET ORAL at 21:19

## 2023-10-09 RX ADMIN — FUROSEMIDE 40 MG: 40 TABLET ORAL at 07:51

## 2023-10-09 RX ADMIN — THIAMINE HCL TAB 100 MG 100 MG: 100 TAB at 15:05

## 2023-10-09 RX ADMIN — FOLIC ACID 1 MG: 1 TABLET ORAL at 07:52

## 2023-10-09 RX ADMIN — SODIUM CHLORIDE, PRESERVATIVE FREE 85 ML: 5 INJECTION INTRAVENOUS at 12:48

## 2023-10-09 RX ADMIN — HEPARIN SODIUM 5000 UNITS: 5000 INJECTION, SOLUTION INTRAVENOUS; SUBCUTANEOUS at 21:18

## 2023-10-09 RX ADMIN — ACETAMINOPHEN 1000 MG: 500 TABLET ORAL at 15:05

## 2023-10-09 RX ADMIN — ATORVASTATIN CALCIUM 80 MG: 80 TABLET, FILM COATED ORAL at 19:42

## 2023-10-09 RX ADMIN — ASPIRIN 81 MG CHEWABLE TABLET 81 MG: 81 TABLET CHEWABLE at 07:51

## 2023-10-09 RX ADMIN — TICAGRELOR 90 MG: 90 TABLET ORAL at 07:51

## 2023-10-09 RX ADMIN — THIAMINE HCL TAB 100 MG 100 MG: 100 TAB at 07:51

## 2023-10-09 RX ADMIN — ACETAMINOPHEN 1000 MG: 500 TABLET ORAL at 21:18

## 2023-10-09 RX ADMIN — METOPROLOL TARTRATE 2.5 MG: 1 INJECTION, SOLUTION INTRAVENOUS at 03:38

## 2023-10-09 RX ADMIN — Medication 1 TABLET: at 07:51

## 2023-10-09 RX ADMIN — TICAGRELOR 90 MG: 90 TABLET ORAL at 19:42

## 2023-10-09 RX ADMIN — IOPAMIDOL 54 ML: 755 INJECTION, SOLUTION INTRAVENOUS at 12:48

## 2023-10-09 RX ADMIN — SPIRONOLACTONE 25 MG: 25 TABLET ORAL at 07:51

## 2023-10-09 RX ADMIN — METOPROLOL TARTRATE 2.5 MG: 5 INJECTION INTRAVENOUS at 03:38

## 2023-10-09 RX ADMIN — METOPROLOL SUCCINATE 100 MG: 100 TABLET, EXTENDED RELEASE ORAL at 07:52

## 2023-10-09 RX ADMIN — THIAMINE HCL TAB 100 MG 100 MG: 100 TAB at 19:42

## 2023-10-09 RX ADMIN — HEPARIN SODIUM 5000 UNITS: 5000 INJECTION, SOLUTION INTRAVENOUS; SUBCUTANEOUS at 15:05

## 2023-10-09 RX ADMIN — LOSARTAN POTASSIUM 100 MG: 100 TABLET, FILM COATED ORAL at 07:52

## 2023-10-09 RX ADMIN — HEPARIN SODIUM 5000 UNITS: 5000 INJECTION, SOLUTION INTRAVENOUS; SUBCUTANEOUS at 06:20

## 2023-10-09 ASSESSMENT — ACTIVITIES OF DAILY LIVING (ADL)
ADLS_ACUITY_SCORE: 40
ADLS_ACUITY_SCORE: 38
ADLS_ACUITY_SCORE: 38
ADLS_ACUITY_SCORE: 40

## 2023-10-09 NOTE — PLAN OF CARE
Status: admitted for acute L MCA/OMER stroke and underwent L carotid stenting and angio. HX of previous stroke in 2021   Vitals: VSS on RA ex/ sustained tachy up to 140's during run of a.flutter managed with PRN IV metoprolol 2.5mg per order. CCM, Hx of known A-fib   Neuros: Oriented to self and stroke consistently, intermittently d/o to date but knows october and 2023 with choices, occasionally thinks at home instead of hospital. R. pupil >L. Mild RUE ataxia. 5/5 throughout with RUE minimally weaker. NIHSS 3-4.   IV: PIV SL  Labs/Electrolytes: Mg replaced yesterday, redraws ordered for AM. EKG completed this shift-NSR with sinus arrhythmia   Resp/trach: LSC/diminished. Occasional cough  Diet: Regular  Bowel status: LBM 10/7, taking scheduled miralax and senna   : Voiding spontaneously with urgency. Urinal at bedside   Skin: Generalized bruising t/o  Pain: Denies, scheduled tylenol effective  Activity: Ax1 GB/walker  Plan: PT/OT/SLP recommending ARU, placement pending. PLC still needs to be completed.     Stroke Patients: Yes  PLC scheduled or completed: Ordered, not scheduled yet  Pneumoboots in place: No

## 2023-10-09 NOTE — PROGRESS NOTES
Message sent to scheduling to schedule with Dr. Perry in 1 month with CTA prior. CTA order entered by Shari Blanton MD.   Lalita Garza RN 10/9/2023 10:30 AM

## 2023-10-09 NOTE — PLAN OF CARE
Status: admitted for acute L MCA/OMER stroke and underwent L carotid stenting and angio. HX of previous stroke in 2021  Vitals: hypotension this afternoon but within parameters. Pt has known Afib but no notice of A-flutter this shift.  Neuros: unchanged: Oriented to self and stroke consistently, intermittently d/o to date but knows october and 2023 with choices, occasionally thinks at home instead of hospital. R. pupil >L. Mild RUE ataxia. 5/5 throughout with RUE minimally weaker. NIHSS 3-4.  IV: PIV SL'd  Labs/Electrolytes: WNL  Resp/trach: WNL  Diet: tolerating regular diet without difficulty.  Bowel status: 2 large BM this shift, loose. Pt is continent  : voiding without difficulty into urinal  Skin: bruising and scabbing scattered t/o  Pain: denies  Activity: up with 1, GB, walker  Social: no visitors or phone calls this shift  Plan: discharging to rehab when medically ready  Updates this shift: pt pleasant and appear withdrawn.

## 2023-10-09 NOTE — PROVIDER NOTIFICATION
"Provider Notification: General neurology     0325: \"Bedside RN notified by tele tech's that patient switched into atrial flutter around 0312.  Heart rate up to 148 and sustained for approx 2 min so far.\"    Thanks, Nicolle [phone]        "

## 2023-10-09 NOTE — PROGRESS NOTES
[facility]    Stroke Progress Note    Interval Events  - Noted to have run of tachycardia with rhythm concerning for atrial flutter overnight  - Was asymptomatic during this episode  - Given IV metoprolol under prn's and returned to sinus rhythm  - Denies chest pain, palpitations, lightheadedness this morning  - No difficulty breathing      HPI Summary  Eber Jin is a 62 year old man with history of tobacco use, HTN, HLD, CAD, HFrEF 35%, paroxysmal a-fib, recent PE on xarelto and aspirin, R pontine infarct 03/2021 with baseline mild left leg weakness c/b recurrent falls and gait imbalance, COPD, recent admission 9/20-24/23 to [facility] for rib fractures and PE who was initially admitted to [facility] ED 10/2/23 for surgical workup of multiple traumatic L displaced rib fractures in setting of recurrent falls, transferred to [facility] for consideration of thrombectomy after stroke code called 10/3/23 for aphasia, found to have acute L MCA/OMER watershed infarcts and possible L M2 superior division occlusion. Initial NIHSS 17. He underwent L carotid stenting and angioplasty. Post CT head with L frontal SAH that was stable on repeat scan      Stroke Evaluation Summarized     MRI/Head CT Post Proedure CT 10/3/23: Acute left frontal subarachnoid hemorrhage.         MRI:  1.  Multiple foci of acute to subacute ischemic change throughout the left cerebral hemisphere, distribution is typical of watershed ischemia.  2.  Additional acute to subacute ischemic change in the left occipital lobe.  3.  Age-related changes as above   Intracranial Vasculature 1. The internal carotid arteries are diminutive opacification to the ICA terminus is and proximal anterior cerebral and middle cerebral arteries with poor visualization of the distal anterior vasculature.      2. No significant contrast opacification is identified within the posterior circulation.     3. While these  findings can be seen in setting of hypoperfusion, injection related artifact could have a similar appearance. MRI brain/MRA head and neck is recommended for further evaluation.   Cervical Vasculature 1. Atherosclerotic plaque results in critical, 90% or greater, stenosis of the proximal left ICA.      2. Right vertebral artery occlusion.      3. The left vertebral artery remains patent throughout its course, however shortly after entering the intracranial compartment both vertebral arteries, basilar artery, and posterior circulation are not visualized.      Echocardiogram No cardiac source for embolus identified   The visual ejection fraction is 55-60%.   The right atria appears normal. Mild left atrial enlargement is present.    EKG/Telemetry Atrial fibrillation with rapid ventricular response      Other Testing Not Applicable       LDL  10/4/2023: 87 mg/dL   A1C  10/3/2023: 5.3 %   Troponin No lab value available in past 48 hrs         Impression   Eber Jin is a 62 year old man with history of tobacco use, HTN, HLD, CAD, HFrEF 35%, paroxysmal a-fib, recent PE on xarelto and aspirin, who was initially admitted to [facility] ED 10/2/23 for surgical workup of multiple traumatic L displaced rib fractures in setting of recurrent falls, transferred to [facility] for consideration of thrombectomy after stroke code called 10/3/23 for aphasia, found to have acute L MCA/OMER border zone infarcts and L M2 superior division occlusion.     Etiology of L M2 occlusion most likely extracranial atherosclerosis. Not a candidate for TNK with his Xarelto and no LVO for thrombectomy. He was taken for left carotid stenting and angioplasty. Post-procedure CT head revealed new L frontal SAH that has been stable on repeat stability scans.     Plan  #Acute L MCA/OMER border zone infarcts   #Possible L M2 occlusion  #L ICA stenosis > 90% s/p stenting and angioplasty  #Acute L frontal SAH  #[facility] pontine stroke 03/2021  [facility]  - SBP goal < 180 mmHg  - Continue dual antiplatelet therapy (DAPT): ticagrelor 90 mg BID and aspirin 81 mg daily              - Per discussion with neuro-IR, would like to continue DAPT x 1 month; afterwards, plan to restart xarelto and continue aspirin 81 mg daily monotherapy               - Repeat CTA head and neck in 1 month to assess for stent patency  - Statin: Continue PTA Lipitor 80 mg daily,    - A1c (5.3%)  - PT/OT/SLP recommending ARU, referrals to be sent out today  - Stroke Education  - Depression Screen  - Apnea Screen     #Hypertension  #HFrEF (EF of 35% on 9/9/2023)  #Paroxysmal A-fib ([id number] of 5)  #CAD prior MI  #PVD  Noted to have run of tachyarrhythmia overnight around 0300. Rhythm concerning for atrial flutter. Patient was asymptomatic throughout, denying chest pain, palpitations, lightheadedness. Rhythm abated with metoprolol prn.   - PTA Losartan 100 mg daily   - PTA spironolactone 25 mg daily  - PTA metoprolol succinate 100 mg  - PTA Lasix 40 mg daily  - Hold PTA xarelto 20 mg per above   - PRN metoprolol for rate above 120 bpm  - Cardiac monitoring  - TTE - no cardiac source for emoblus, EF 55-60%  - SBP goal < 150 mmHg  - PRN Labetalol and Hydralazine     #PAD, bilateral   #AAA, infrarenal, 3.2 cm  #Iliac artery aneurysm, 20 mm right   CTA with run off 10/4/2023 - all chronic findings   - Vascular sugery consulted - continue surveillance for AAA, no acute interventions deemed necessary  - Has follow up with Dr. Talbot of vascular surgery at [facility]     #Multiple displaced rib fractures   -Hold xarelto per above   -Tylenol 1 g q8h  -Incentive spirometry every 2 hours   -Follow up with general surgery from [facility]     #COPD  -Maintain O2 saturations greater than 92%     #Unprovoked PE (LLL segmental and subsegmental) 09/07/23  #Multiple rib fracture L rib 4 -12 fx  09/07/23  #Recent traumatic hemothorax   In setting of recurrent falls, discovered during admission  to [facility]. No worsening shortness of breath, chest pains during admission and no evidence of right heart strain on initial TTE, however has been over one week without anticoagulation.  - Holding Xarelto (see above)  - Reassess heart function with repeat TTE  - Repeat CTA Chest for evaluation of PE off of anticoagulation     #Alcohol use disorder   #Tobacco use disorder   -Encourage cessation   -CIWA protocol     Lines/tubes/drains: PIV  FEN: regular diet, thin liquids  PPX: DVT - SCDs  and SQH; GI - not indicated  Code: Full Code     Patient Follow-up    - final recommendation pending work-up     The patient was discussed with the Stroke Staff, Dr. Patrick.     Adrian Cruz MD  Neurology Resident  Ascom: #[id number]  ______________________________________________________    Clinically Significant Risk Factors              # Hypoalbuminemia: Lowest albumin = 3 g/dL at 10/3/2023 10:04 PM, will monitor as appropriate     # Hypertension: Noted on problem list  # Chronic heart failure with preserved ejection fraction: heart failure noted on problem list and last echo with EF >50%        # Severe Malnutrition: based on nutrition assessment             Medications   Scheduled Meds   acetaminophen  1,000 mg Oral or Feeding Tube Q8H    aspirin  81 mg Oral or Feeding Tube Daily    atorvastatin  80 mg Oral QPM    folic acid  1 mg Oral or Feeding Tube Daily    furosemide  40 mg Oral Daily    heparin ANTICOAGULANT  5,000 Units Subcutaneous Q8H    lidocaine  1-2 patch Transdermal Q24H    losartan  100 mg Oral Daily    metoprolol succinate ER  100 mg Oral Daily    multivitamin w/minerals  1 tablet Oral or Feeding Tube Daily    polyethylene glycol  17 g Oral or Feeding Tube Daily    senna-docusate  1-2 tablet Oral or NG Tube BID    sodium chloride (PF)  3 mL Intracatheter Q8H    spironolactone  25 mg Oral Daily    thiamine  100 mg Oral or Feeding Tube TID    [START ON 10/11/2023] thiamine  100 mg Oral Daily     ticagrelor  90 mg Oral BID       Infusion Meds   - MEDICATION INSTRUCTIONS -         PRN Meds  acetaminophen, hydrALAZINE, labetalol, - MEDICATION INSTRUCTIONS -, melatonin, metoprolol, sodium chloride (PF)       PHYSICAL EXAMINATION  Temp:  [97.7  F (36.5  C)-98.4  F (36.9  C)] 97.7  F (36.5  C)  Pulse:  [] 94  Resp:  [10-26] 24  BP: (100-153)/() 133/89  SpO2:  [77 %-100 %] 77 %      Neurologic  Mental Status:  alert, oriented x 3, follows commands, speech clear and fluent, naming and repetition normal  Cranial Nerves:  PERRL, EOMI with normal smooth pursuit, facial sensation intact and symmetric, facial movements symmetric, hearing not formally tested but intact to conversation, palate elevation symmetric and uvula midline, no dysarthria, shoulder shrug strong bilaterally, tongue protrusion midline, incomplete right homonomous hemianopsia in RLQ from both eyes  Motor:  normal muscle tone and bulk, no abnormal movements, able to move all limbs spontaneously, slight pronator drift in RUE, otherwise no extremities drift to bed. Formal strength 4/5 with shoulder abduction bilaterally, elbow flexion 4/5 on right and 5/5 on left, elbow extension 4/5 on right and 5/5 on left,  strength 5/5 bilaterally, hip flexion, knee flexion/extension 4/5 bilaterally, dorsiflexion/plantar flexion 5/5 bilaterally  Reflexes:   deferred  Sensory:  light touch sensation intact and symmetric throughout upper and lower extremities, no extinction on double simultaneous stimulation   Coordination:  normal finger-to-nose and heel-to-shin bilaterally without dysmetria  Station/Gait:  deferred    Stroke Scales    NIHSS  1a. Level of Consciousness 0-->Alert, keenly responsive   1b. LOC Questions 0-->Answers both questions correctly   1c. LOC Commands 0-->Performs both tasks correctly   2.   Best Gaze 0-->Normal   3.   Visual 1-->Partial hemianopia   4.   Facial Palsy 0-->Normal symmetrical movements   5a. Motor Arm, Left 0-->No  drift, limb holds 90 (or 45) degrees for full 10 secs   5b. Motor Arm, Right 1-->Drift, limb holds 90 (or 45) degrees, but drifts down before full 10 secs, does not hit bed or other support   6a. Motor Leg, Left 0-->No drift, leg holds 30 degree position for full 5 secs   6b. Motor Leg, right 0-->No drift, leg holds 30 degree position for full 5 secs   7.   Limb Ataxia 0-->Absent   8.   Sensory 0-->Normal, no sensory loss   9.   Best Language 0-->No aphasia, normal   10. Dysarthria 0-->Normal   11. Extinction and Inattention  0-->No abnormality   Total 2 (10/09/23 1551)       Imaging  I personally reviewed all imaging; relevant findings per HPI.     Lab Results Data   CBC  Recent Labs   Lab 10/09/23  0608 10/08/23  0612 10/07/23  0511   WBC 6.9 6.5 6.5   RBC 3.95* 3.83* 3.50*   HGB 11.9* 11.7* 10.8*   HCT 37.4* 36.5* 32.7*    296 247     Basic Metabolic Panel    Recent Labs   Lab 10/09/23  0608 10/08/23  0612 10/07/23  2222 10/07/23  0511    138  --  136   POTASSIUM 4.1 4.0  --  3.9   CHLORIDE 103 103  --  106   CO2 20* 24  --  20*   BUN 8.7 6.7*  --  4.7*   CR 0.79 0.97  --  0.73   GLC 93 86 93 90   LISA 9.0 9.1  --  8.6*     Liver Panel  Recent Labs   Lab 10/03/23  2204   PROTTOTAL 6.4   ALBUMIN 3.0*   BILITOTAL 0.9   ALKPHOS 83   AST 25   ALT <5     INR    Recent Labs   Lab Test 10/03/23  2137 10/03/23  1646 10/03/23  1103   INR 1.10 1.13 1.13      Lipid Profile    Recent Labs   Lab Test 10/04/23  0604 10/03/23  2204 11/25/22  0922 03/07/21  0633   CHOL  --  160 201* 160   HDL  --  36* 38* 32*   LDL 87 103* 144* 106   TRIG  --  107 96 110     A1C    Recent Labs   Lab Test 10/03/23  2138 05/12/22  1604 07/19/18  1033   A1C 5.3 5.8* 5.8     Troponin    Recent Labs   Lab 10/04/23  0604 10/03/23  2204 10/03/23  1646   CTROPT 36* 35* 39*          Data

## 2023-10-09 NOTE — PROVIDER NOTIFICATION
Eber Jin-Pt. went into a flutter @0312, PRN metoprolol given 0338 for sustained rhythm in 140's, remains in a.flutter w/ freq PVCs . HR down below 100's intermittently. Asymptomatic. Thanks! Dalia [phone]

## 2023-10-09 NOTE — PROGRESS NOTES
Care Management Follow Up    Length of Stay (days): 6    Expected Discharge Date: 10/10/2023 or 10/11/2023     Concerns to be Addressed:     Disposition planning  Patient plan of care discussed at interdisciplinary rounds: Yes    Anticipated Discharge Disposition:  OT and Physical Therapy are recommending an acute rehab stay     Anticipated Discharge Services:  OT and Physical Therapy are recommending an acute rehab stay  Anticipated Discharge DME:  Not applicable at this time    Patient/family educated on Medicare website which has current facility and service quality ratings: yes  Education Provided on the Discharge Plan: yes  Patient/Family in Agreement with the Plan: yes    Referrals Placed by CM/SW:   Post acute care facility's  Private pay costs discussed: Not applicable    Additional Information:  SW is following pt for discharge planning.  OT and Physical Therapy are recommending an acute rehab stay.  Per Dr. Cruz, pt will have a echo and a CT scan today.  Readiness for discharge is anticipated on 10/10/2023 or 10/11/2023.   The accepting facility will need to seek prior auth from insurance.      As per pt request, MARIELA phoned pt's wife and updated in regards to discharge planning.  Pt's wife is currently staying at [facility] for her rehab stay.  Wife states that upon pt and her return to home, their son and daughter will be available to provide them with assistance in the home.  Per discussion, pt's wife would like pt referred to the following in network ARU's (in order of preference):  - [facility] at [facility] (Phone [phone]), MARIELA faxed referral  - [facility] (Phone [phone]), MARIELA faxed referral  - Arline Rodarte at [facility] (Phone [phone]), MARIELA faxed referral.    In the event that pt cannot be placed at the above ARU's, pt's wife would like pt referred to [facility].    SW will continue to follow for discharge planning.    Ileana Huddleston  RAMO  Social Work, 6A  Phone:  [phone]  Pager:  [phone]  10/9/2023       KIERRA Gatica

## 2023-10-09 NOTE — PLAN OF CARE
Goal Outcome Evaluation:    Overall Patient Progress: improvingOverall Patient Progress: improving    Status: admitted for acute L MCA/OMER stroke and underwent L carotid stenting and angio. HX of previous stroke in 2021   Vitals: VSS Hypertensive within parameters <180. CCM, Hx of known A-fib   Neuros: Alert oriented x2-3. Disoriented to place, situation, R pupil slightly > L, mild aphasia, strengths 5/5 t/o  IV: PIV SL  Labs/Electrolytes: Mg replaced this gabby, redraws ordered for AM  Resp/trach: LSC, denies SOB,   Diet: Regular  Bowel status: [facility] 10/7  : Voiding spontaneously with urgency. Urinal at bedside   Skin: Generalized bruising t/o  Pain: Denies, scheduled tylenol effective  Activity: Ax1 GB/walker  Social: Updating family over the phone  Plan: PLC still needs to be completed before ARU 10/9    Stroke Patients: Yes  PLC scheduled or completed: Ordered, not scheduled yet  Pneumoboots in place: Yes

## 2023-10-10 ENCOUNTER — APPOINTMENT (OUTPATIENT)
Dept: OCCUPATIONAL THERAPY | Facility: CLINIC | Age: 63
DRG: 034 | End: 2023-10-10
Payer: COMMERCIAL

## 2023-10-10 LAB
ANION GAP SERPL CALCULATED.3IONS-SCNC: 9 MMOL/L (ref 7–15)
B2 GLYCOPROT1 IGG SERPL IA-ACNC: 3.1 U/ML
B2 GLYCOPROT1 IGM SERPL IA-ACNC: <2.4 U/ML
BUN SERPL-MCNC: 10.2 MG/DL (ref 8–23)
CALCIUM SERPL-MCNC: 9.1 MG/DL (ref 8.8–10.2)
CARDIOLIPIN IGG SER IA-ACNC: 3 GPL-U/ML
CARDIOLIPIN IGG SER IA-ACNC: NEGATIVE
CARDIOLIPIN IGM SER IA-ACNC: <2 MPL-U/ML
CARDIOLIPIN IGM SER IA-ACNC: NEGATIVE
CHLORIDE SERPL-SCNC: 103 MMOL/L (ref 98–107)
CREAT SERPL-MCNC: 0.99 MG/DL (ref 0.67–1.17)
DEPRECATED HCO3 PLAS-SCNC: 25 MMOL/L (ref 22–29)
DRVVT SCREEN RATIO: 0.85
EGFRCR SERPLBLD CKD-EPI 2021: 85 ML/MIN/1.73M2
ERYTHROCYTE [DISTWIDTH] IN BLOOD BY AUTOMATED COUNT: 13.2 % (ref 10–15)
GLUCOSE SERPL-MCNC: 90 MG/DL (ref 70–99)
HCT VFR BLD AUTO: 38.3 % (ref 40–53)
HGB BLD-MCNC: 12.3 G/DL (ref 13.3–17.7)
INR PPP: 0.98 (ref 0.85–1.15)
LA PPP-IMP: NEGATIVE
LUPUS INTERPRETATION: NORMAL
MAGNESIUM SERPL-MCNC: 2.1 MG/DL (ref 1.7–2.3)
MCH RBC QN AUTO: 31 PG (ref 26.5–33)
MCHC RBC AUTO-ENTMCNC: 32.1 G/DL (ref 31.5–36.5)
MCV RBC AUTO: 97 FL (ref 78–100)
PHOSPHATE SERPL-MCNC: 3.5 MG/DL (ref 2.5–4.5)
PLATELET # BLD AUTO: 369 10E3/UL (ref 150–450)
POTASSIUM SERPL-SCNC: 3.9 MMOL/L (ref 3.4–5.3)
PTT RATIO: 1.17
RBC # BLD AUTO: 3.97 10E6/UL (ref 4.4–5.9)
SODIUM SERPL-SCNC: 137 MMOL/L (ref 135–145)
THROMBIN TIME: 18.7 SECONDS (ref 13–19)
WBC # BLD AUTO: 6.8 10E3/UL (ref 4–11)

## 2023-10-10 PROCEDURE — 86146 BETA-2 GLYCOPROTEIN ANTIBODY: CPT

## 2023-10-10 PROCEDURE — 250N000013 HC RX MED GY IP 250 OP 250 PS 637

## 2023-10-10 PROCEDURE — 250N000013 HC RX MED GY IP 250 OP 250 PS 637: Performed by: NURSE PRACTITIONER

## 2023-10-10 PROCEDURE — 84100 ASSAY OF PHOSPHORUS: CPT | Performed by: NURSE PRACTITIONER

## 2023-10-10 PROCEDURE — 97535 SELF CARE MNGMENT TRAINING: CPT | Mod: GO | Performed by: OCCUPATIONAL THERAPIST

## 2023-10-10 PROCEDURE — 85390 FIBRINOLYSINS SCREEN I&R: CPT | Mod: 26 | Performed by: PATHOLOGY

## 2023-10-10 PROCEDURE — 97112 NEUROMUSCULAR REEDUCATION: CPT | Mod: GO | Performed by: OCCUPATIONAL THERAPIST

## 2023-10-10 PROCEDURE — 36415 COLL VENOUS BLD VENIPUNCTURE: CPT

## 2023-10-10 PROCEDURE — 250N000013 HC RX MED GY IP 250 OP 250 PS 637: Performed by: PSYCHIATRY & NEUROLOGY

## 2023-10-10 PROCEDURE — 250N000011 HC RX IP 250 OP 636: Performed by: NURSE PRACTITIONER

## 2023-10-10 PROCEDURE — 80048 BASIC METABOLIC PNL TOTAL CA: CPT

## 2023-10-10 PROCEDURE — 120N000002 HC R&B MED SURG/OB UMMC

## 2023-10-10 PROCEDURE — 83735 ASSAY OF MAGNESIUM: CPT | Performed by: NURSE PRACTITIONER

## 2023-10-10 PROCEDURE — 85027 COMPLETE CBC AUTOMATED: CPT

## 2023-10-10 PROCEDURE — 85730 THROMBOPLASTIN TIME PARTIAL: CPT

## 2023-10-10 PROCEDURE — 99233 SBSQ HOSP IP/OBS HIGH 50: CPT | Mod: GC | Performed by: PSYCHIATRY & NEUROLOGY

## 2023-10-10 RX ADMIN — THIAMINE HCL TAB 100 MG 100 MG: 100 TAB at 08:40

## 2023-10-10 RX ADMIN — Medication 1 TABLET: at 08:40

## 2023-10-10 RX ADMIN — SPIRONOLACTONE 25 MG: 25 TABLET ORAL at 08:40

## 2023-10-10 RX ADMIN — ACETAMINOPHEN 1000 MG: 500 TABLET ORAL at 05:46

## 2023-10-10 RX ADMIN — ATORVASTATIN CALCIUM 80 MG: 80 TABLET, FILM COATED ORAL at 21:18

## 2023-10-10 RX ADMIN — THIAMINE HCL TAB 100 MG 100 MG: 100 TAB at 15:10

## 2023-10-10 RX ADMIN — HEPARIN SODIUM 5000 UNITS: 5000 INJECTION, SOLUTION INTRAVENOUS; SUBCUTANEOUS at 05:42

## 2023-10-10 RX ADMIN — THIAMINE HCL TAB 100 MG 100 MG: 100 TAB at 21:17

## 2023-10-10 RX ADMIN — LOSARTAN POTASSIUM 100 MG: 100 TABLET, FILM COATED ORAL at 08:40

## 2023-10-10 RX ADMIN — HEPARIN SODIUM 5000 UNITS: 5000 INJECTION, SOLUTION INTRAVENOUS; SUBCUTANEOUS at 21:18

## 2023-10-10 RX ADMIN — ASPIRIN 81 MG CHEWABLE TABLET 81 MG: 81 TABLET CHEWABLE at 08:40

## 2023-10-10 RX ADMIN — ACETAMINOPHEN 1000 MG: 500 TABLET ORAL at 15:10

## 2023-10-10 RX ADMIN — ACETAMINOPHEN 1000 MG: 500 TABLET ORAL at 21:17

## 2023-10-10 RX ADMIN — TICAGRELOR 90 MG: 90 TABLET ORAL at 21:17

## 2023-10-10 RX ADMIN — HEPARIN SODIUM 5000 UNITS: 5000 INJECTION, SOLUTION INTRAVENOUS; SUBCUTANEOUS at 15:10

## 2023-10-10 RX ADMIN — TICAGRELOR 90 MG: 90 TABLET ORAL at 08:40

## 2023-10-10 RX ADMIN — FOLIC ACID 1 MG: 1 TABLET ORAL at 08:40

## 2023-10-10 RX ADMIN — METOPROLOL SUCCINATE 100 MG: 100 TABLET, EXTENDED RELEASE ORAL at 08:40

## 2023-10-10 RX ADMIN — FUROSEMIDE 40 MG: 40 TABLET ORAL at 08:40

## 2023-10-10 ASSESSMENT — ACTIVITIES OF DAILY LIVING (ADL)
ADLS_ACUITY_SCORE: 40

## 2023-10-10 NOTE — PROGRESS NOTES
[facility]    Stroke Progress Note    Interval Events  - Repeat CTPE negative  - Chart review confirms unprovoked PE 9/7/2023 likely secondary to not taking medications regularly (patient was prescribed Xarelto for A Fib)  - Started discharge planning process with care coordination:   - General surgery at [facility]: clearance following rib fractures & hemothorax  - Vascular surgery & neuro-IR: anti-coagulation / anti-platelet plan  - Patient reports no complaints today    HPI Summary  Eber Jin is a 62 year old man with history of tobacco use, HTN, HLD, CAD, HFrEF 35%, paroxysmal a-fib on xarelto and aspirin, recent unprovoked PE (in the setting of medication non-compliance), R pontine infarct 03/2021 with baseline mild left leg weakness c/b recurrent falls and gait imbalance, COPD, recent admission 9/20-24/23 to [facility] for rib fractures and PE who was initially admitted to [facility] ED 10/2/23 for surgical workup of multiple traumatic L displaced rib fractures in setting of recurrent falls, transferred to [facility] for consideration of thrombectomy after stroke code called 10/3/23 for aphasia, found to have acute L MCA/OMER watershed infarcts and possible L M2 superior division occlusion. Initial NIHSS 17. He underwent L carotid stenting and angioplasty. Post CT head with L frontal SAH that was stable on repeat scan.     Stroke Evaluation Summarized    MRI/Head CT Post Proedure CT 10/3/23: Acute left frontal subarachnoid hemorrhage.         MRI:  1.  Multiple foci of acute to subacute ischemic change throughout the left cerebral hemisphere, distribution is typical of watershed ischemia.  2.  Additional acute to subacute ischemic change in the left occipital lobe.  3.  Age-related changes as above   Intracranial Vasculature 1. The internal carotid arteries are diminutive opacification to the ICA terminus is and proximal anterior cerebral and middle  cerebral arteries with poor visualization of the distal anterior vasculature.      2. No significant contrast opacification is identified within the posterior circulation.     3. While these findings can be seen in setting of hypoperfusion, injection related artifact could have a similar appearance. MRI brain/MRA head and neck is recommended for further evaluation.   Cervical Vasculature 1. Atherosclerotic plaque results in critical, 90% or greater, stenosis of the proximal left ICA.      2. Right vertebral artery occlusion.      3. The left vertebral artery remains patent throughout its course, however shortly after entering the intracranial compartment both vertebral arteries, basilar artery, and posterior circulation are not visualized.     Echocardiogram No cardiac source for embolus identified   The visual ejection fraction is 55-60%.   The right atria appears normal. Mild left atrial enlargement is present.    EKG/Telemetry Atrial fibrillation with rapid ventricular response    Other Testing Not Applicable      LDL  10/4/2023: 87 mg/dL   A1C  10/3/2023: 5.3 %   Troponin No lab value available in past 48 hrs       Impression   Eber Jin is a 62 year old man with history of tobacco use, HTN, HLD, CAD, HFrEF 35%, paroxysmal a-fib on xarelto and aspirin, recent unprovoked PE (in the setting of medication non-compliance), who was initially admitted to [facility] ED 10/2/23 for surgical workup of multiple traumatic L displaced rib fractures in setting of recurrent falls, transferred to [facility] for consideration of thrombectomy after stroke code called 10/3/23 for aphasia, found to have acute L MCA/OMER border zone infarcts and L M2 superior division occlusion.     Etiology of L M2 occlusion most likely extracranial atherosclerosis. Not a candidate for TNK with his Xarelto and no LVO for thrombectomy. He was taken for left carotid stenting and angioplasty. Post-procedure CT head revealed new L frontal  SAH that has been stable on repeat stability scans.     Plan  #Acute L MCA/OMER border zone infarcts   #Possible L M2 occlusion  #L ICA stenosis > 90% s/p stenting and angioplasty  #Acute L frontal SAH  #Hx R pontine stroke 03/2021  - Neurochecks every 4 hours  - SBP goal < 180 mmHg  - Continue dual antiplatelet therapy (DAPT): Brilinta 90 mg BID and aspirin 81 mg daily   - Updated plan 10/10:  - Continue DAPT x 2 weeks  - Repeat CT head at that time  - If CT head stable, plan to restart PTA Xarelto & continue Brilinta   - Per discussion with neuro-IR:    - Order carotid US in 1 month at discharge   - Follow up with neuro-IR (Dr. Perry) in 1 month to determine continued need for Brilinta vs Xarelto monotherapy (if cleared with vascular surgery regarding PAD)  - Statin: Continue PTA Lipitor 80 mg daily,    - A1c (5.3%)  - PT/OT/SLP recommending ARU, referrals to be sent out today  - Stroke Education  - Depression (PHQ9 - 3) and Apnea Screens (yes only to fatigue throughout the day) both negative 10/10    #Unprovoked PE (LLL segmental and subsegmental) 09/07/23  #Multiple rib fracture L rib 4 -12 fx  09/07/23  #Recent traumatic hemothorax   In setting of recurrent falls, discovered during admission to [facility]. No worsening shortness of breath, chest pains during admission and no evidence of right heart strain on initial TTE, however has been over one week without anticoagulation.   - Will contact [facility]'s general surgery team for recommendations following recent hospitalization for rib fractures and timing of restarting anticoagulation given hx of hemothorax  - Ordered hypercoag panel (APLS) labs 10/10  - Holding Xarelto (see above)  - Repeat TTE 10/9/23 shows EF 45 - 50%, no PFO or thrombus  - Repeat CTPE 10/09 negative with resolution of prior PE's  - Bilateral Lower extremity Dopplers 9/23 normal     #Hypertension  #HFrEF (EF of 35% on 9/9/2023)  #Paroxysmal A-fib ([id number] of 5)  #CAD  prior MI  #PVD  Noted to have run of tachyarrhythmia overnight 10/08 around 0300. Rhythm concerning for atrial flutter. Patient was asymptomatic throughout, denying chest pain, palpitations, lightheadedness. Rhythm abated with metoprolol prn.   - PTA Losartan 100 mg daily   - PTA spironolactone 25 mg daily  - PTA metoprolol succinate 100 mg  - PTA Lasix 40 mg daily  - Hold PTA xarelto 20 mg per above   - PRN metoprolol for rate above 120 bpm  - Cardiac monitoring  - TTE - no cardiac source for emoblus, EF 55-60%  - SBP goal < 150 mmHg  - PRN Labetalol and Hydralazine     #PAD, bilateral   #AAA, infrarenal, 3.2 cm  #Iliac artery aneurysm, 20 mm right   CTA with run off 10/4/2023 - all chronic findings   - Vascular sugery consulted - continue surveillance for AAA, no acute interventions deemed necessary  - Plan to touch base with vascular surgery 10/10 to ask for recommendations regarding need for antiplatelet therapy long-term  - Has follow up with Dr. Talbot of vascular surgery at [facility]     #Multiple displaced rib fractures   -Hold xarelto per above   -Tylenol 1 g q8h  -Incentive spirometry every 2 hours   -Follow up with general surgery from [facility] for recommendations/clearance for discharge     #COPD  -Maintain O2 saturations greater than 92%     #Alcohol use disorder   #Tobacco use disorder   -Encourage cessation   -[facility] protocol     ICU Checklist  Lines/tubes/drains: PIV  FEN: regular diet, thin liquids  PPX: DVT - SCDs  and SQH; GI - not indicated  Code: Full Code     Patient Follow-up    - final recommendation pending work-up and recommendations from consulting teams    Neda Osborne, MS4  [employer]    Resident/Fellow Attestation   I, Sunshine Peña MD, was present with the medical/YOVANI student who participated in the service and in the documentation of the note.  I have verified the history and personally performed the physical exam and medical decision making.  I agree with the  assessment and plan of care as documented in the note.      Sunshine Peña MD  Neurology PGY3    Patient was seen and discussed with neurology attending, Dr. Patrick  ______________________________________________________    Clinically Significant Risk Factors              # Hypoalbuminemia: Lowest albumin = 3 g/dL at 10/3/2023 10:04 PM, will monitor as appropriate     # Hypertension: Noted on problem list  # Heart failure, NOS: heart failure noted on the problem list and last echo with EF 40-50%        # Severe Malnutrition: based on nutrition assessment             Medications   Scheduled Meds   acetaminophen  1,000 mg Oral or Feeding Tube Q8H    aspirin  81 mg Oral or Feeding Tube Daily    atorvastatin  80 mg Oral QPM    folic acid  1 mg Oral or Feeding Tube Daily    furosemide  40 mg Oral Daily    heparin ANTICOAGULANT  5,000 Units Subcutaneous Q8H    lidocaine  1-2 patch Transdermal Q24H    losartan  100 mg Oral Daily    metoprolol succinate ER  100 mg Oral Daily    multivitamin w/minerals  1 tablet Oral or Feeding Tube Daily    polyethylene glycol  17 g Oral or Feeding Tube Daily    senna-docusate  1-2 tablet Oral or NG Tube BID    sodium chloride (PF)  3 mL Intracatheter Q8H    spironolactone  25 mg Oral Daily    thiamine  100 mg Oral or Feeding Tube TID    [START ON 10/11/2023] thiamine  100 mg Oral Daily    ticagrelor  90 mg Oral BID       Infusion Meds   - MEDICATION INSTRUCTIONS -         PRN Meds  acetaminophen, hydrALAZINE, labetalol, - MEDICATION INSTRUCTIONS -, melatonin, metoprolol, sodium chloride (PF)       PHYSICAL EXAMINATION  Temp:  [97.9  F (36.6  C)-98.2  F (36.8  C)] 98.1  F (36.7  C)  Pulse:  [60-93] 75  Resp:  [16-29] 20  BP: ()/(68-96) 123/92  SpO2:  [91 %-100 %] 98 %      Mental Status:  alert, oriented x 2 (confused about date); follows commands, speech clear and fluent  Cranial Nerves:  EOMI with normal smooth pursuit, incomplete right homonomous hemianopsia - loss of  vision in RLQ from both eyes but also consistently has blurry / compromised vision in RUQ of R eye; facial sensation intact and symmetric, facial movements symmetric; hearing not formally tested but intact to conversation, palate elevation symmetric and uvula midline, no dysarthria, shoulder shrug strong bilaterally, tongue protrusion midline   Motor:  normal muscle bulk, no abnormal movements, able to move all limbs spontaneously. Mild drift in RUE but not to bed. Formal strength 4/5 with shoulder abduction bilaterally (limited by pain), elbow flexion 4/5 on right and 5/5 on left, elbow extension 4/5 on right and 5/5 on left,  strength 5/5 bilaterally, hip flexion, knee flexion/extension 4/5 bilaterally, dorsiflexion/plantar flexion 5/5 bilaterally  Reflexes:   deferred  Sensory:  light touch sensation intact and symmetric throughout upper and lower extremities, no extinction on double simultaneous stimulation   Coordination: mild ataxia RUE on finger-to-nose (likely in setting of weakness); all other limbs show normal finger-to-nose and heel-to-shin bilaterally without dysmetria  Station/Gait: mildly stooped, slow lu, short strides, mildly wide stance - required walker     Stroke Scales    NIHSS  1a. Level of Consciousness 0-->Alert, keenly responsive   1b. LOC Questions 0-->Answers both questions correctly   1c. LOC Commands 0-->Performs both tasks correctly   2.   Best Gaze 0-->Normal   3.   Visual 1-->Partial hemianopia   4.   Facial Palsy 0-->Normal symmetrical movements   5a. Motor Arm, Left 0-->No drift, limb holds 90 (or 45) degrees for full 10 secs   5b. Motor Arm, Right 1-->Drift, limb holds 90 (or 45) degrees, but drifts down before full 10 secs, does not hit bed or other support   6a. Motor Leg, Left 0-->No drift, leg holds 30 degree position for full 5 secs   6b. Motor Leg, right 0-->No drift, leg holds 30 degree position for full 5 secs   7.   Limb Ataxia 0-->Absent   8.   Sensory 0-->Normal,  no sensory loss   9.   Best Language 0-->No aphasia, normal   10. Dysarthria 0-->Normal   11. Extinction and Inattention  0-->No abnormality   Total 2 (10/09/23 1551)       Imaging  I personally reviewed all imaging; relevant findings per HPI.     Lab Results Data   CBC  Recent Labs   Lab 10/10/23  0603 10/09/23  0608 10/08/23  0612   WBC 6.8 6.9 6.5   RBC 3.97* 3.95* 3.83*   HGB 12.3* 11.9* 11.7*   HCT 38.3* 37.4* 36.5*    316 296     Basic Metabolic Panel    Recent Labs   Lab 10/10/23  0603 10/09/23  0608 10/08/23  0612    135 138   POTASSIUM 3.9 4.1 4.0   CHLORIDE 103 103 103   CO2 25 20* 24   BUN 10.2 8.7 6.7*   CR 0.99 0.79 0.97   GLC 90 93 86   LISA 9.1 9.0 9.1     Liver Panel  Recent Labs   Lab 10/03/23  2204   PROTTOTAL 6.4   ALBUMIN 3.0*   BILITOTAL 0.9   ALKPHOS 83   AST 25   ALT <5     INR    Recent Labs   Lab Test 10/03/23  2137 10/03/23  1646 10/03/23  1103   INR 1.10 1.13 1.13      Lipid Profile    Recent Labs   Lab Test 10/04/23  0604 10/03/23  2204 11/25/22  0922 03/07/21  0633   CHOL  --  160 201* 160   HDL  --  36* 38* 32*   LDL 87 103* 144* 106   TRIG  --  107 96 110     A1C    Recent Labs   Lab Test 10/03/23  2138 05/12/22  1604 07/19/18  1033   A1C 5.3 5.8* 5.8     Troponin    Recent Labs   Lab 10/04/23  0604 10/03/23  2204 10/03/23  1646   CTROPT 36* 35* 39*          Data

## 2023-10-10 NOTE — PLAN OF CARE
Status: Admitted for acute L MCA/OMER stroke and underwent L carotid stenting and angio. Hx of previous stroke in 2021.   Vitals: VSS. CCM and continue pulse ox .   Neuros: AOX2-3. Disorient to time and situation. R pupile slightly bigger than L. R field cut. RUE slightly weaker than LUE. RUE ataxia when FNF.  Mild aphasia. Generalized weakness.   IV: PIV SL.  Labs/Electrolytes: WNL.   Resp/trach: RA. Denies SOB.   Diet: Regular and good intake. Need help order.   Bowel status: LBM today and Pt refused bowel meds. BS+.   : Voiding w/ out difficulty to bathroom with encourage. Need toilet scheduled Q3-4 hrs.   Skin: No new deficit finding. Bruising t/o.   Pain: Denies. Scheduled tylenol given.   Activity: Assist of 1/GB and walker. Walked to bathroom X2 with encouragement. Refused to sit on chair or ambulation.   Social: No visitor and talked on phone with wife.   Plan: PT/OT recommending ARU. Placement pending. Continue monitor and follow POC.     Stroke Patients:   PLC scheduled or completed: Ordered, not scheduled yet.   Pneumoboots in place: Refused.

## 2023-10-10 NOTE — PLAN OF CARE
Goal Outcome Evaluation:      Plan of Care Reviewed With: patient    Overall Patient Progress: improvingOverall Patient Progress: improving    Status: Admitted for acute L MCA/OMER stroke and underwent L carotid stenting and angio. Hx of previous stroke in 2021.    Vitals: VSS HTN within parameters, CCM and continue pulse ox .    Neuros:  AOX2-3. Disoriented to time and situation. R pupile slightly bigger than L. R field cut. RUE slightly weaker than LUE. RUE ataxia when FNF.  Mild aphasia. Generalized weakness.   IV: PIV SL  Labs/Electrolytes: WNL   Resp/trach: On RA, denies SOB  Diet: Regular, needs help with ordering meals   Bowel status: LBM 10/09, BS+  : Voiding w/out difficulty with a urinal @ bedside this shift.  Skin: Bruising t/o  Pain: denied. Scheduled Tylenol given  Activity:  Assist of 1/GB and walker. Patient stood up and voided via urinal @ bedside  Plan:  PT/OT recommending ARU. Placement pending. Continue monitor and follow POC.    Updates this shift:  PT/OT recommending ARU. Placement pending. Continue monitor and follow POC.      Stroke Patients:   PLC scheduled or completed: Ordered, not scheduled yet.    Pneumoboots in place:  refused

## 2023-10-10 NOTE — PLAN OF CARE
Status: admitted for acute L MCA/OMER stroke and underwent L carotid stenting and angio. HX of previous stroke in 2021  Vitals: hypotension this afternoon but within parameters. Pt has known Afib but no notice of A-flutter this shift.  Neuros: unchanged: Oriented to self and stroke consistently, intermittently d/o to date but knows october and 2023 with choices, occasionally thinks at home instead of hospital. R. pupil >L. Mild RUE ataxia. 5/5 throughout with RUE minimally weaker. NIHSS 3-4. R field cut.  IV: PIV SL'd  Labs/Electrolytes: WNL  Resp/trach: WNL  Diet: tolerating regular diet without difficulty.  Bowel status: LBM 10/9  : voiding without difficulty into urinal  Skin: bruising and scabbing scattered t/o  Pain: denies  Activity: up with 1, GB, walker  Social: no visitors or phone calls this shift  Plan: discharging to rehab when medically ready  Updates this shift: pt pleasant and appear withdrawn.

## 2023-10-10 NOTE — PROGRESS NOTES
Care Management Follow Up    Length of Stay (days): 7    Expected Discharge Date: 10/11/2023     Concerns to be Addressed:   discharge planning    Patient plan of care discussed at interdisciplinary rounds: Yes    Anticipated Discharge Disposition:  ARU     Anticipated Discharge Services:  rehabilitative services  Anticipated Discharge DME:  TBD    Patient/family educated on Medicare website which has current facility and service quality ratings: Yes  Education Provided on the Discharge Plan: Yes  Patient/Family in Agreement with the Plan: yes    Referrals Placed by CM/SW:    - Arline Rodarte at [place] (Phone [phone])  10/10: MARIELA received VM from Rosalva with  ARU. Rosalva reported that she felt pt is a better candidate for TCU for the following reasons: 1. Pt declined PT yesterday d/t being fatigued, 2. Appears pt will have complex discharge from ARU as pt's wife is at TCU and appears that pt's children work and might not be able to provide 24/7 A at discharge from ARU. Rosalva noted that pt's insurance strictly only pays for one rehab stay (I.e. ARU vs. TCU) and if pt needed TCU after ARU, pt's insurance would not cover this. If pt is able to work with therapies more regularly, they could reevaluate pt. Rosalva left the following phone numbers for MARIELA to call back with questions: [phone] or [phone].    - [facility] (Phone [phone]), MARIELA faxed referral  10/10: CHW (Kenisha) left VM to follow up on referral and requested call back to MARIELA    - Arline Rodarte at [facility] (Phone [phone])  10/10: VM back from Rosalva. See above for her response.    Private pay costs discussed: Not applicable    Additional Information:  MARIELA following pt for discharge planning. PT/OT currently recommending ARU at discharge. ARU referrals have been sent and followed up on as noted above. Per chart review (MARIELA note on 10/9), pt's spouse requesting that if pt unable to go to ARU that referral be made to [facility] as this is currently where pt spouse is receiving TCU services. Pt has one pending ARU referral. SW to follow up with [facility] again tomorrow. Should [facility] not be able to accept pt, [facility].     ODIN Samuels  [facility]   [facility]   [address] ph: [phone]

## 2023-10-11 ENCOUNTER — APPOINTMENT (OUTPATIENT)
Dept: OCCUPATIONAL THERAPY | Facility: CLINIC | Age: 63
DRG: 034 | End: 2023-10-11
Payer: COMMERCIAL

## 2023-10-11 LAB
ANION GAP SERPL CALCULATED.3IONS-SCNC: 10 MMOL/L (ref 7–15)
BUN SERPL-MCNC: 12.1 MG/DL (ref 8–23)
CALCIUM SERPL-MCNC: 8.9 MG/DL (ref 8.8–10.2)
CHLORIDE SERPL-SCNC: 101 MMOL/L (ref 98–107)
CREAT SERPL-MCNC: 0.98 MG/DL (ref 0.67–1.17)
DEPRECATED HCO3 PLAS-SCNC: 26 MMOL/L (ref 22–29)
EGFRCR SERPLBLD CKD-EPI 2021: 86 ML/MIN/1.73M2
ERYTHROCYTE [DISTWIDTH] IN BLOOD BY AUTOMATED COUNT: 13.2 % (ref 10–15)
GLUCOSE SERPL-MCNC: 99 MG/DL (ref 70–99)
HCT VFR BLD AUTO: 36.7 % (ref 40–53)
HGB BLD-MCNC: 11.9 G/DL (ref 13.3–17.7)
MAGNESIUM SERPL-MCNC: 2 MG/DL (ref 1.7–2.3)
MCH RBC QN AUTO: 30.8 PG (ref 26.5–33)
MCHC RBC AUTO-ENTMCNC: 32.4 G/DL (ref 31.5–36.5)
MCV RBC AUTO: 95 FL (ref 78–100)
PHOSPHATE SERPL-MCNC: 3.2 MG/DL (ref 2.5–4.5)
PLATELET # BLD AUTO: 370 10E3/UL (ref 150–450)
POTASSIUM SERPL-SCNC: 4.2 MMOL/L (ref 3.4–5.3)
RBC # BLD AUTO: 3.86 10E6/UL (ref 4.4–5.9)
SODIUM SERPL-SCNC: 137 MMOL/L (ref 135–145)
WBC # BLD AUTO: 6.9 10E3/UL (ref 4–11)

## 2023-10-11 PROCEDURE — 84100 ASSAY OF PHOSPHORUS: CPT | Performed by: NURSE PRACTITIONER

## 2023-10-11 PROCEDURE — 97112 NEUROMUSCULAR REEDUCATION: CPT | Mod: GO | Performed by: OCCUPATIONAL THERAPIST

## 2023-10-11 PROCEDURE — 99232 SBSQ HOSP IP/OBS MODERATE 35: CPT | Mod: GC | Performed by: PSYCHIATRY & NEUROLOGY

## 2023-10-11 PROCEDURE — 250N000013 HC RX MED GY IP 250 OP 250 PS 637

## 2023-10-11 PROCEDURE — 250N000013 HC RX MED GY IP 250 OP 250 PS 637: Performed by: PSYCHIATRY & NEUROLOGY

## 2023-10-11 PROCEDURE — 36415 COLL VENOUS BLD VENIPUNCTURE: CPT

## 2023-10-11 PROCEDURE — 80048 BASIC METABOLIC PNL TOTAL CA: CPT

## 2023-10-11 PROCEDURE — 85014 HEMATOCRIT: CPT

## 2023-10-11 PROCEDURE — 250N000011 HC RX IP 250 OP 636: Performed by: NURSE PRACTITIONER

## 2023-10-11 PROCEDURE — 97535 SELF CARE MNGMENT TRAINING: CPT | Mod: GO | Performed by: OCCUPATIONAL THERAPIST

## 2023-10-11 PROCEDURE — 250N000013 HC RX MED GY IP 250 OP 250 PS 637: Performed by: NURSE PRACTITIONER

## 2023-10-11 PROCEDURE — 120N000002 HC R&B MED SURG/OB UMMC

## 2023-10-11 PROCEDURE — 99233 SBSQ HOSP IP/OBS HIGH 50: CPT | Mod: GC | Performed by: PHYSICAL MEDICINE & REHABILITATION

## 2023-10-11 PROCEDURE — 83735 ASSAY OF MAGNESIUM: CPT | Performed by: NURSE PRACTITIONER

## 2023-10-11 RX ORDER — MAGNESIUM OXIDE 400 MG/1
400 TABLET ORAL EVERY 4 HOURS
Status: COMPLETED | OUTPATIENT
Start: 2023-10-11 | End: 2023-10-11

## 2023-10-11 RX ADMIN — HEPARIN SODIUM 5000 UNITS: 5000 INJECTION, SOLUTION INTRAVENOUS; SUBCUTANEOUS at 05:30

## 2023-10-11 RX ADMIN — FOLIC ACID 1 MG: 1 TABLET ORAL at 08:28

## 2023-10-11 RX ADMIN — ACETAMINOPHEN 1000 MG: 500 TABLET ORAL at 14:06

## 2023-10-11 RX ADMIN — TICAGRELOR 90 MG: 90 TABLET ORAL at 08:25

## 2023-10-11 RX ADMIN — THIAMINE HCL TAB 100 MG 100 MG: 100 TAB at 08:27

## 2023-10-11 RX ADMIN — FUROSEMIDE 40 MG: 40 TABLET ORAL at 08:28

## 2023-10-11 RX ADMIN — SPIRONOLACTONE 25 MG: 25 TABLET ORAL at 08:29

## 2023-10-11 RX ADMIN — MAGNESIUM OXIDE TAB 400 MG (241.3 MG ELEMENTAL MG) 400 MG: 400 (241.3 MG) TAB at 10:37

## 2023-10-11 RX ADMIN — ATORVASTATIN CALCIUM 80 MG: 80 TABLET, FILM COATED ORAL at 21:10

## 2023-10-11 RX ADMIN — METOPROLOL SUCCINATE 100 MG: 100 TABLET, EXTENDED RELEASE ORAL at 08:27

## 2023-10-11 RX ADMIN — Medication 1 TABLET: at 08:27

## 2023-10-11 RX ADMIN — LOSARTAN POTASSIUM 100 MG: 100 TABLET, FILM COATED ORAL at 08:26

## 2023-10-11 RX ADMIN — TICAGRELOR 90 MG: 90 TABLET ORAL at 21:10

## 2023-10-11 RX ADMIN — ACETAMINOPHEN 1000 MG: 500 TABLET ORAL at 23:01

## 2023-10-11 RX ADMIN — SENNOSIDES AND DOCUSATE SODIUM 2 TABLET: 50; 8.6 TABLET ORAL at 21:10

## 2023-10-11 RX ADMIN — MAGNESIUM OXIDE TAB 400 MG (241.3 MG ELEMENTAL MG) 400 MG: 400 (241.3 MG) TAB at 14:06

## 2023-10-11 RX ADMIN — ASPIRIN 81 MG CHEWABLE TABLET 81 MG: 81 TABLET CHEWABLE at 08:26

## 2023-10-11 RX ADMIN — ACETAMINOPHEN 1000 MG: 500 TABLET ORAL at 05:30

## 2023-10-11 RX ADMIN — HEPARIN SODIUM 5000 UNITS: 5000 INJECTION, SOLUTION INTRAVENOUS; SUBCUTANEOUS at 23:00

## 2023-10-11 RX ADMIN — HEPARIN SODIUM 5000 UNITS: 5000 INJECTION, SOLUTION INTRAVENOUS; SUBCUTANEOUS at 14:06

## 2023-10-11 ASSESSMENT — ACTIVITIES OF DAILY LIVING (ADL)
ADLS_ACUITY_SCORE: 40

## 2023-10-11 NOTE — PLAN OF CARE
Status: Admitted for acute L MCA/OMER stroke and underwent L carotid stenting and angio. Hx of previous stroke in 2021.   Vitals: VSS. CCM and continue pulse ox .   Neuros: AOX2-3. Disorient to time and situation. R pupile slightly bigger than L. R field cut. RUE slightly weaker than LUE. RUE ataxia when FNF.  Mild aphasia. Generalized weakness.   IV: PIV SL.  Labs/Electrolytes: WNL.   Resp/trach: RA. Denies SOB.   Diet: Regular and good intake. Need help order and encouragement with intake.   Bowel status: LBM 10/9 and Pt refused bowel meds. BS+.   : Voiding and intermittently incontinent. Encourage to use bathroom and Need toilet scheduled Q3-4 hrs. Pt refused to go bathroom when offered.   Skin: No new deficit finding. Bruising t/o.   Pain: Denies. Scheduled tylenol given.   Activity: Assist of 1/GB and walker. Refused to any activity and going to the bathroom. Refused to sit on chair or ambulation.   Plan: PT/OT recommending ARU. Placement pending. Continue monitor and follow POC.     Stroke Patients:   PLC scheduled or completed: Done Pneumoboots in place: Refused.
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
## 2023-10-11 NOTE — PROGRESS NOTES
CLINICAL NUTRITION SERVICES - REASSESSMENT NOTE     Nutrition Prescription    RECOMMENDATIONS FOR MDs/PROVIDERS TO ORDER:  - Encourage intakes  - If pt unable to consistently consume ~75% estimated nutrition needs (1240 kcals,  75 g protein), consider replacement of nasoenteric tube and resuming EN      Malnutrition Status:    Severe malnutrition in the context of chronic illness/disease    Recommendations already ordered by Registered Dietitian (RD):  Ensure BID, between meals   Calorie counts 10/12 - 10/14    Future/Additional Recommendations:   Continue to monitor nutrition-related findings and follow pt per protocol     EVALUATION OF THE PROGRESS TOWARD GOALS   Diet: Regular  PO Intake: Ordering meals TID - adequate as ordered for kcals, often short on protein (~60 g/day) per RS review; eating 75% meals 1-2x/day per I/Os. Pt confirms he's eating 75% of meals BID, intake is limited by pt's dislike for the menu.      NEW FINDINGS   Agreeable to Ensure BID     Weights:  Most Recent Weight: 67.4 kg (148 lb 9.4 oz)  on 10/06/23 via Bed scale  Body mass index is 20.72 kg/m .    GI:  0-2 unmeasured BMs per day over past week, per I/O.   Last BM: 10/09/23  On scheduled bowel med(s)    Medications:  Folic acid, lasix, magnesium oxide, MVI-M, miralax, senna, aldactone, thiamine     Labs:   Electrolytes  Potassium (mmol/L)   Date Value   10/11/2023 4.2   10/10/2023 3.9   10/09/2023 4.1   05/12/2022 4.0   05/04/2022 4.0   05/03/2022 4.2   03/22/2021 4.7   03/15/2021 4.8   03/10/2021 4.3     Phosphorus (mg/dL)   Date Value   10/11/2023 3.2   10/10/2023 3.5   10/09/2023 3.3   10/08/2023 3.6   10/07/2023 2.8    Blood Glucose  Glucose (mg/dL)   Date Value   10/11/2023 99   10/10/2023 90   10/09/2023 93   10/08/2023 86   10/07/2023 90   05/12/2022 72   05/04/2022 125 (H)   05/03/2022 102 (H)   05/02/2022 86   05/01/2022 82   03/22/2021 83   03/15/2021 83   03/10/2021 84     GLUCOSE BY METER POCT (mg/dL)   Date Value   10/07/2023  93   10/04/2023 93   10/04/2023 96   10/04/2023 82   10/03/2023 88     Hemoglobin A1C (%)   Date Value   10/03/2023 5.3   05/12/2022 5.8 (H)   07/19/2018 5.8    Inflammatory Markers  CRP (mg/dL)   Date Value   09/23/2020 0.6   09/22/2020 0.3   09/21/2020 0.2     WBC (10e9/L)   Date Value   03/22/2021 8.2   03/15/2021 9.6   03/10/2021 8.6     WBC Count (10e3/uL)   Date Value   10/11/2023 6.9   10/10/2023 6.8   10/09/2023 6.9     Albumin (g/dL)   Date Value   10/03/2023 3.0 (L)   05/12/2022 3.1 (L)   03/06/2021 3.5   01/03/2021 3.3 (L)      Magnesium (mg/dL)   Date Value   10/11/2023 2.0   10/10/2023 2.1   10/09/2023 2.1   03/10/2021 1.8     Sodium (mmol/L)   Date Value   10/11/2023 137   10/10/2023 137   10/09/2023 135   03/22/2021 139   03/15/2021 137   03/10/2021 137    Renal  Urea Nitrogen (mg/dL)   Date Value   10/11/2023 12.1   10/10/2023 10.2   10/09/2023 8.7   05/12/2022 12   05/04/2022 22   05/03/2022 18   03/22/2021 18   03/15/2021 16   03/10/2021 17     Creatinine (mg/dL)   Date Value   10/11/2023 0.98   10/10/2023 0.99   10/09/2023 0.79   03/22/2021 0.88   03/15/2021 0.85   03/10/2021 0.77     Additional  Triglycerides (mg/dL)   Date Value   10/03/2023 107   11/25/2022 96   03/07/2021 110     Ketones Urine (no units)   Date Value   06/12/2018 Negative        RESPIRATORY  Room air     SKIN  No pressure injuries documented at this time     MALNUTRITION  % Intake: < 75% for > 7 days (moderate)  % Weight Loss: Up to 10% in 6 months (moderate malnutrition)  Subcutaneous Fat Loss: Upper arm and Thoracic/intercostal: mild; unclear baseline but did have weight loss over 8 months   Muscle Loss: Scapular bone: moderate, Thoracic region (clavicle, acromium bone, deltoid, trapezius, pectoral), Upper arm (bicep, tricep), Lower arm  (forearm), Dorsal hand: all moderate, Upper leg (quadricep, hamstring), Patellar region, and Posterior calf: all severe   Fluid Accumulation/Edema: None noted  Malnutrition Diagnosis: Severe  malnutrition in the context of chronic illness/disease    Previous Goals   Diet adv v nutrition support within 24-48 hours    Evaluation: Met    Previous Nutrition Diagnosis  Inadequate oral intake related to inability to take oral PO/vented as evidenced by NPO and currently meeting 0% nutrition needs    Evaluation: No longer applicable, nutrition diagnosis changed below    CURRENT NUTRITION DIAGNOSIS  Inadequate oral intake related to reduced appetite as evidenced by pt report of dislike of food limiting intake, wt loss, tissue wasting       INTERVENTIONS  Implementation  Medical food supplement therapy     Goals  Patient to consume % of nutritionally adequate meal trays TID, or the equivalent with supplements/snacks.    Monitoring/Evaluation  Progress toward goals will be monitored and evaluated per protocol.    Mariama Nagel, MPH, RDN, LD  4E (Neuro ICU) RD Pager: [phone]   Ascom: [phone]  Weekend/Holiday RD pager [phone]

## 2023-10-11 NOTE — PLAN OF CARE
Vitals: VSS  Neuros:Oriented x 4. R. pupil >L. RUE ataxia. 5/5 throughout with RUE minimally weaker. R field cut.  IV: PIV SL'd  Labs/Electrolytes: Mg replaced   Resp/trach: WNL  Diet: Regular diet - Good po   Bowel status: LBM 10/9  : Voiding spontaneously   Skin: Scabbing to shins   Pain: Tylenol given for mild headache  Activity: up with 1, GB, walker. Sat in recliner x 1  Plan: Rehab - See SW note   Updates this shift: PM & R saw pt this afternoon

## 2023-10-11 NOTE — PROGRESS NOTES
[facility]    Stroke Progress Note    Interval Events  - Cleared care plan following discharge with general surgery at [facility] and neuro-IR (see details below)  - Vascular surgery strongly recommends life-long antiplatelet therapy given extent of PAD  - Hypercoagulability & APLS labs negative   - Patient reports no concerns, agrees to TCU vs ARU stay    HPI Summary  Eber Jin is a 62 year old man with history of tobacco use, HTN, HLD, CAD, HFrEF 35%, paroxysmal a-fib on xarelto and aspirin, recent unprovoked PE (in the setting of medication non-compliance), R pontine infarct 03/2021 with baseline mild left leg weakness c/b recurrent falls and gait imbalance, COPD, recent admission 9/20-24/23 to [facility] for rib fractures and PE who was initially admitted to [facility] ED 10/2/23 for surgical workup of multiple traumatic L displaced rib fractures in setting of recurrent falls, transferred to [facility] for consideration of thrombectomy after stroke code called 10/3/23 for aphasia, found to have acute L MCA/OMER watershed infarcts and possible L M2 superior division occlusion. Initial NIHSS 17. He underwent L carotid stenting and angioplasty. Post CT head with L frontal SAH that was stable on repeat scan.    Stroke Evaluation Summarized    MRI/Head CT Post Proedure CT 10/3/23: Acute left frontal subarachnoid hemorrhage.         MRI:  1.  Multiple foci of acute to subacute ischemic change throughout the left cerebral hemisphere, distribution is typical of watershed ischemia.  2.  Additional acute to subacute ischemic change in the left occipital lobe.  3.  Age-related changes as above   Intracranial Vasculature 1. The internal carotid arteries are diminutive opacification to the ICA terminus is and proximal anterior cerebral and middle cerebral arteries with poor visualization of the distal anterior vasculature.      2. No significant contrast  opacification is identified within the posterior circulation.     3. While these findings can be seen in setting of hypoperfusion, injection related artifact could have a similar appearance. MRI brain/MRA head and neck is recommended for further evaluation.   Cervical Vasculature 1. Atherosclerotic plaque results in critical, 90% or greater, stenosis of the proximal left ICA.      2. Right vertebral artery occlusion.      3. The left vertebral artery remains patent throughout its course, however shortly after entering the intracranial compartment both vertebral arteries, basilar artery, and posterior circulation are not visualized.     Echocardiogram No cardiac source for embolus identified   The visual ejection fraction is 55-60%.   The right atria appears normal. Mild left atrial enlargement is present.    EKG/Telemetry Atrial fibrillation with rapid ventricular response    Other Testing Not Applicable      LDL  10/4/2023: 87 mg/dL   A1C  10/3/2023: 5.3 %   Troponin No lab value available in past 48 hrs       Impression   Eber Jin is a 62 year old man with history of tobacco use, HTN, HLD, CAD, HFrEF 35%, paroxysmal a-fib on xarelto and aspirin, recent unprovoked PE (in the setting of medication non-compliance), who was initially admitted to [facility] ED 10/2/23 for surgical workup of multiple traumatic L displaced rib fractures in setting of recurrent falls, transferred to [facility] for consideration of thrombectomy after stroke code called 10/3/23 for aphasia, found to have acute L MCA/OMER border zone infarcts and L M2 superior division occlusion.     Etiology of L M2 occlusion most likely extracranial atherosclerosis. Not a candidate for TNK with his Xarelto and no LVO for thrombectomy. He was taken for left carotid stenting and angioplasty. Post-procedure CT head revealed new L frontal SAH that has been stable on repeat stability scans.     Plan  #Acute L MCA/OMER border zone infarcts    #Possible L M2 occlusion  #L ICA stenosis > 90% s/p stenting and angioplasty  #Acute L frontal SAH  #Hx R pontine stroke 03/2021  - Neurochecks every 4 hours  - SBP goal < 180 mmHg  - Continue dual antiplatelet therapy (DAPT): Brilinta 90 mg BID and aspirin 81 mg daily  - Upon discharge:   - Continue DAPT x 2 weeks  - Repeat CT head at that time  - If CT head stable, plan to restart PTA Xarelto & continue Brilinta   - Per discussion with neuro-IR:   - Order carotid US in 1 month at discharge   - Follow up with neuro-IR (Dr. Perry) in 1 month  - Vascular surgery strongly recommends lifelong antiplatelet therapy given extent of PAD  - Consider ISMN vs cilostazol to reduce risk of bleeding?  - Statin: Continue PTA Lipitor 80 mg daily,    - A1c (5.3%)  - PT/OT/SLP recommending ARU, referrals to be sent out today  - Stroke Education  - Depression (PHQ9 - 3) and Apnea Screens (yes only to fatigue throughout the day) both negative 10/10     #Unprovoked PE (LLL segmental and subsegmental) 09/07/23  #Multiple rib fracture L rib 4 -12 fx  09/07/23  #Recent traumatic hemothorax   In setting of recurrent falls, discovered during admission to [facility]. No worsening shortness of breath, chest pains during admission and no evidence of right heart strain on initial TTE, however has been over one week without anticoagulation.   - Per general surgery team at [facility], patient is cleared to restart anticoagulation as needed  - Hypercoagulability panel (APLS) labs negative 10/10  - Holding Xarelto (see above)  - Repeat TTE 10/9/23 shows EF 45 - 50%, no PFO or thrombus  - Repeat CTPE 10/09 negative with resolution of prior PE's  - Bilateral Lower extremity Dopplers 9/23 normal     #Hypertension  #HFrEF (EF of 35% on 9/9/2023)  #Paroxysmal A-fib ([id number] of 5)  #CAD prior MI  #PVD  Noted to have run of tachyarrhythmia overnight 10/08 around 0300. Rhythm concerning for atrial flutter. Patient was asymptomatic  throughout, denying chest pain, palpitations, lightheadedness. Rhythm abated with metoprolol prn.   - PTA Losartan 100 mg daily   - PTA spironolactone 25 mg daily  - PTA metoprolol succinate 100 mg  - PTA Lasix 40 mg daily  - Hold PTA xarelto 20 mg per above   - PRN metoprolol for rate above 120 bpm  - Cardiac monitoring  - TTE - no cardiac source for emoblus, EF 55-60%  - SBP goal < 150 mmHg  - PRN Labetalol and Hydralazine     #PAD, bilateral   #AAA, infrarenal, 3.2 cm  #Iliac artery aneurysm, 20 mm right   CTA with run off 10/4/2023 - all chronic findings   - Vascular sugery consulted  - Continue surveillance for AAA, no acute interventions deemed necessary  - Vascular surgery strongly recommends lifelong antiplatelet therapy given extent of his PAD  - Has follow up with Dr. Talbot of vascular surgery at [facility]     #Multiple displaced rib fractures   -Hold xarelto per above   -Tylenol 1 g q8h  -Incentive spirometry every 2 hours   - Per general surgery at [facility]: no follow-up needed post-discharge     #COPD  -Maintain O2 saturations greater than 92%     #Alcohol use disorder   #Tobacco use disorder   -Encourage cessation   -CIWA protocol     ICU Checklist  Lines/tubes/drains: PIV  FEN: regular diet, thin liquids  PPX: DVT - SCDs  and SQH; GI - not indicated  Code: Full Code      Patient Follow-up    - final recommendation pending work-up and recommendations from consulting teams     Neda Osborne, MS4  [employer]       The patient was discussed with Stroke Staff, Dr. Patrick.     Adrian Cruz MD  Neurology Resident  Ascom: #[id number]  ______________________________________________________    Clinically Significant Risk Factors              # Hypoalbuminemia: Lowest albumin = 3 g/dL at 10/3/2023 10:04 PM, will monitor as appropriate       # Hypertension: Noted on problem list    # Heart failure, NOS: heart failure noted on the problem list and last echo with EF 40-50%         # Severe Malnutrition: based on nutrition assessment             Medications   Scheduled Meds   acetaminophen  1,000 mg Oral or Feeding Tube Q8H    aspirin  81 mg Oral or Feeding Tube Daily    atorvastatin  80 mg Oral QPM    folic acid  1 mg Oral or Feeding Tube Daily    furosemide  40 mg Oral Daily    heparin ANTICOAGULANT  5,000 Units Subcutaneous Q8H    lidocaine  1-2 patch Transdermal Q24H    losartan  100 mg Oral Daily    magnesium oxide  400 mg Oral Q4H    metoprolol succinate ER  100 mg Oral Daily    multivitamin w/minerals  1 tablet Oral or Feeding Tube Daily    polyethylene glycol  17 g Oral or Feeding Tube Daily    senna-docusate  1-2 tablet Oral or NG Tube BID    sodium chloride (PF)  3 mL Intracatheter Q8H    spironolactone  25 mg Oral Daily    thiamine  100 mg Oral Daily    ticagrelor  90 mg Oral BID       Infusion Meds   - MEDICATION INSTRUCTIONS -         PRN Meds  acetaminophen, hydrALAZINE, labetalol, - MEDICATION INSTRUCTIONS -, melatonin, metoprolol, sodium chloride (PF)       PHYSICAL EXAMINATION  Temp:  [97.7  F (36.5  C)-98.8  F (37.1  C)] 98.8  F (37.1  C)  Pulse:  [67-91] 79  Resp:  [15-33] 21  BP: (103-136)/() 124/86  SpO2:  [92 %-99 %] 92 %      Mental Status:  alert, oriented x 3, follows commands, speech clear and fluent  Cranial Nerves:  EOMI with normal smooth pursuit, incomplete right homonomous hemianopsia - loss of vision in RLQ from both eyes but also consistently has blurry / compromised vision in RUQ of R eye; facial sensation intact and symmetric, facial movements symmetric; hearing not formally tested but intact to conversation, palate elevation symmetric and uvula midline, no dysarthria, shoulder shrug strong bilaterally, tongue protrusion midline   Motor:  normal muscle bulk, no abnormal movements, able to move all limbs spontaneously. Mild drift in RUE but not to bed.   Reflexes:   deferred  Sensory:  light touch sensation intact and symmetric throughout upper and  "lower extremities, no extinction on double simultaneous stimulation   Coordination: mild ataxia RUE on finger-to-nose (likely in setting of weakness); all other limbs show normal finger-to-nose and heel-to-shin bilaterally without dysmetria  Station/Gait: deferred    Stroke Scales    NIHSS  1a. Level of Consciousness 0-->Alert, keenly responsive   1b. LOC Questions 0-->Answers both questions correctly   1c. LOC Commands 0-->Performs both tasks correctly   2.   Best Gaze 0-->Normal   3.   Visual 1-->Partial hemianopia   4.   Facial Palsy 0-->Normal symmetrical movements   5a. Motor Arm, Left 0-->No drift, limb holds 90 (or 45) degrees for full 10 secs   5b. Motor Arm, Right 1-->Drift, limb holds 90 (or 45) degrees, but drifts down before full 10 secs, does not hit bed or other support   6a. Motor Leg, Left 0-->No drift, leg holds 30 degree position for full 5 secs   6b. Motor Leg, right 0-->No drift, leg holds 30 degree position for full 5 secs   7.   Limb Ataxia 1-->Present in one limb   8.   Sensory 0-->Normal, no sensory loss   9.   Best Language 0-->No aphasia, normal   10. Dysarthria 0-->Normal   11. Extinction and Inattention  0-->No abnormality   Total 3 (10/11/23 1142)       Imaging  I personally reviewed all imaging; relevant findings per HPI.     Lab Results Data   CBC  Recent Labs   Lab 10/11/23  0653 10/10/23  0603 10/09/23  0608   WBC 6.9 6.8 6.9   RBC 3.86* 3.97* 3.95*   HGB 11.9* 12.3* 11.9*   HCT 36.7* 38.3* 37.4*    369 316     Basic Metabolic Panel    Recent Labs   Lab 10/11/23  0653 10/10/23  0603 10/09/23  0608    137 135   POTASSIUM 4.2 3.9 4.1   CHLORIDE 101 103 103   CO2 26 25 20*   BUN 12.1 10.2 8.7   CR 0.98 0.99 0.79   GLC 99 90 93   LISA 8.9 9.1 9.0     Liver Panel  No results for input(s): \"PROTTOTAL\", \"ALBUMIN\", \"BILITOTAL\", \"ALKPHOS\", \"AST\", \"ALT\", \"BILIDIRECT\" in the last 168 hours.  INR    Recent Labs   Lab Test 10/10/23  1057 10/03/23  2137 10/03/23  1646   INR 0.98 1.10 " "1.13      Lipid Profile    Recent Labs   Lab Test 10/04/23  0604 10/03/23  2204 11/25/22  0922 03/07/21  0633   CHOL  --  160 201* 160   HDL  --  36* 38* 32*   LDL 87 103* 144* 106   TRIG  --  107 96 110     A1C    Recent Labs   Lab Test 10/03/23  2138 05/12/22  1604 07/19/18  1033   A1C 5.3 5.8* 5.8     Troponin  No results for input(s): \"CTROPT\", \"TROPONINIS\", \"TROPONINI\", \"GHTROP\" in the last 168 hours.       Data       "

## 2023-10-11 NOTE — PLAN OF CARE
Goal Outcome Evaluation:      Plan of Care Reviewed With: patient    Overall Patient Progress: improvingOverall Patient Progress: improving      Status: Admitted for acute L MCA/OMER stroke and underwent L carotid stenting and angio. Hx of previous stroke in 2021.   Vitals: VSS. CCM and continue pulse ox .   Neuros: AOX2-3. Disorient to time and situation. R pupile slightly bigger than L. R field cut. RUE slightly weaker than LUE. RUE ataxia when FNF.  Mild aphasia. Generalized weakness.   IV: PIV SL.  Labs/Electrolytes: WNL.   Resp/trach: RA. Denies SOB.   Diet: Regular. Need help order and encouragement with intake. Had breakfast this morning. Good intake 75%  Bowel status: LBM 10/9, BS+.   : Voiding and intermittently incontinent. Encourage to use bathroom and Need toilet scheduled Q3-4 hrs. Voided to this bathroom this morning, good output.   Skin: Bruising t/o. Noticed bleeding around on R side of abdomen--1 primapore soaked and a new one in place. Notice a little more bleeding after Heprin shot was given this morning to the L side of his abdomen, pressure applied to the site and primapore in place for more pressure. Charge made aware and MD notified. Platelets counts are within range--please continue to monitor.    Pain: Denies. Scheduled tylenol given.   Activity: Assist of 1/GB and walker.   Plan: PT/OT recommending ARU. Placement pending. Continue monitor and follow POC.   Shift Updates: Bleeding at injection sites this shift. Soaked one primapore and another one in place. Charge aware and MD notified. Patient denied any dizziness or reported any new symptoms. Platelet count are within range. Please continue to monitor.      Stroke Patients:   PLC scheduled or completed: Done Pneumoboots in place: Refused.

## 2023-10-11 NOTE — PROGRESS NOTES
Care Management Follow Up    Length of Stay (days): 8    Expected Discharge Date: 10/11/2023     Concerns to be Addressed:       Patient plan of care discussed at interdisciplinary rounds: Yes    Anticipated Discharge Disposition:  ARU     Anticipated Discharge Services:  rehabilitative services  Anticipated Discharge DME:  TBD    Patient/family educated on Medicare website which has current facility and service quality ratings: no  Education Provided on the Discharge Plan: Yes  Patient/Family in Agreement with the Plan: yes    Referrals Placed by CM/SW:      Declined:   Arline Rodarte ([facility] ARU  PH: [phone]  F: [phone]  10/10: Per Rosalva in Admissions, feel pt is a more appropriate candidate for TCU d/t declining therapy and questionable social support at home pending discharge from ARU.    Arline Rodarte ([facility]) ARU  PH: [phone]  F: [phone]   10/10: Per Rosalva in Admissions, feel pt is a more appropriate candidate for TCU d/t declining therapy and questionable social support at home pending discharge from ARU.    [facility]  (Ph: [phone], F: [phone])  10/11: SW received VM from Sia with Admissions reporting that she reviewed referral with ARU provider and think pt looks more appropriate for TCU d/t social support. Sia left call back phone number of [phone]    Private pay costs discussed: Not applicable    Additional Information:  MARIELA following for discharge planning. PT/OT currently recommending ARU. Per Neuro Stroke, pt remains medically ready for discharge to facility. MARIELA shared feedback from ARU referrals. Neuro Stroke to put in PMR consult. Per chart review (SW note on 10/19), pt's spouse requesting that if pt unable to go to ARU that referral be made to [facility] as pt's spouse is currently receiving TCU services there. SW to wait for PMR consult and work with pt/pt family pending recommendations.     Roberta Albarado,  ODNI Ortega   [facility]   [address] SW Ph: [phone]

## 2023-10-12 ENCOUNTER — APPOINTMENT (OUTPATIENT)
Dept: PHYSICAL THERAPY | Facility: CLINIC | Age: 63
DRG: 034 | End: 2023-10-12
Payer: COMMERCIAL

## 2023-10-12 ENCOUNTER — APPOINTMENT (OUTPATIENT)
Dept: OCCUPATIONAL THERAPY | Facility: CLINIC | Age: 63
DRG: 034 | End: 2023-10-12
Payer: COMMERCIAL

## 2023-10-12 LAB
ANION GAP SERPL CALCULATED.3IONS-SCNC: 10 MMOL/L (ref 7–15)
BUN SERPL-MCNC: 15.2 MG/DL (ref 8–23)
CALCIUM SERPL-MCNC: 9.3 MG/DL (ref 8.8–10.2)
CHLORIDE SERPL-SCNC: 101 MMOL/L (ref 98–107)
CREAT SERPL-MCNC: 0.98 MG/DL (ref 0.67–1.17)
DEPRECATED HCO3 PLAS-SCNC: 26 MMOL/L (ref 22–29)
EGFRCR SERPLBLD CKD-EPI 2021: 86 ML/MIN/1.73M2
ERYTHROCYTE [DISTWIDTH] IN BLOOD BY AUTOMATED COUNT: 13.1 % (ref 10–15)
GLUCOSE SERPL-MCNC: 92 MG/DL (ref 70–99)
HCT VFR BLD AUTO: 37 % (ref 40–53)
HGB BLD-MCNC: 12 G/DL (ref 13.3–17.7)
MAGNESIUM SERPL-MCNC: 2.1 MG/DL (ref 1.7–2.3)
MCH RBC QN AUTO: 30.4 PG (ref 26.5–33)
MCHC RBC AUTO-ENTMCNC: 32.4 G/DL (ref 31.5–36.5)
MCV RBC AUTO: 94 FL (ref 78–100)
PHOSPHATE SERPL-MCNC: 3.1 MG/DL (ref 2.5–4.5)
PLATELET # BLD AUTO: 401 10E3/UL (ref 150–450)
POTASSIUM SERPL-SCNC: 4.3 MMOL/L (ref 3.4–5.3)
RBC # BLD AUTO: 3.95 10E6/UL (ref 4.4–5.9)
SODIUM SERPL-SCNC: 137 MMOL/L (ref 135–145)
WBC # BLD AUTO: 9.2 10E3/UL (ref 4–11)

## 2023-10-12 PROCEDURE — 99232 SBSQ HOSP IP/OBS MODERATE 35: CPT | Mod: GC | Performed by: PSYCHIATRY & NEUROLOGY

## 2023-10-12 PROCEDURE — 84100 ASSAY OF PHOSPHORUS: CPT | Performed by: NURSE PRACTITIONER

## 2023-10-12 PROCEDURE — 250N000013 HC RX MED GY IP 250 OP 250 PS 637

## 2023-10-12 PROCEDURE — 83735 ASSAY OF MAGNESIUM: CPT | Performed by: NURSE PRACTITIONER

## 2023-10-12 PROCEDURE — 250N000013 HC RX MED GY IP 250 OP 250 PS 637: Performed by: NURSE PRACTITIONER

## 2023-10-12 PROCEDURE — 80048 BASIC METABOLIC PNL TOTAL CA: CPT

## 2023-10-12 PROCEDURE — 97535 SELF CARE MNGMENT TRAINING: CPT | Mod: GO | Performed by: OCCUPATIONAL THERAPIST

## 2023-10-12 PROCEDURE — 85027 COMPLETE CBC AUTOMATED: CPT

## 2023-10-12 PROCEDURE — 120N000002 HC R&B MED SURG/OB UMMC

## 2023-10-12 PROCEDURE — 36415 COLL VENOUS BLD VENIPUNCTURE: CPT

## 2023-10-12 PROCEDURE — 250N000011 HC RX IP 250 OP 636: Performed by: NURSE PRACTITIONER

## 2023-10-12 PROCEDURE — 250N000013 HC RX MED GY IP 250 OP 250 PS 637: Performed by: PSYCHIATRY & NEUROLOGY

## 2023-10-12 PROCEDURE — 97116 GAIT TRAINING THERAPY: CPT | Mod: GP

## 2023-10-12 RX ADMIN — THIAMINE HCL TAB 100 MG 100 MG: 100 TAB at 08:08

## 2023-10-12 RX ADMIN — ACETAMINOPHEN 1000 MG: 500 TABLET ORAL at 06:55

## 2023-10-12 RX ADMIN — HEPARIN SODIUM 5000 UNITS: 5000 INJECTION, SOLUTION INTRAVENOUS; SUBCUTANEOUS at 22:47

## 2023-10-12 RX ADMIN — ASPIRIN 81 MG CHEWABLE TABLET 81 MG: 81 TABLET CHEWABLE at 08:08

## 2023-10-12 RX ADMIN — HEPARIN SODIUM 5000 UNITS: 5000 INJECTION, SOLUTION INTRAVENOUS; SUBCUTANEOUS at 14:12

## 2023-10-12 RX ADMIN — SPIRONOLACTONE 25 MG: 25 TABLET ORAL at 08:08

## 2023-10-12 RX ADMIN — ACETAMINOPHEN 1000 MG: 500 TABLET ORAL at 22:47

## 2023-10-12 RX ADMIN — HEPARIN SODIUM 5000 UNITS: 5000 INJECTION, SOLUTION INTRAVENOUS; SUBCUTANEOUS at 06:55

## 2023-10-12 RX ADMIN — Medication 1 TABLET: at 08:08

## 2023-10-12 RX ADMIN — FUROSEMIDE 40 MG: 40 TABLET ORAL at 08:08

## 2023-10-12 RX ADMIN — FOLIC ACID 1 MG: 1 TABLET ORAL at 08:08

## 2023-10-12 RX ADMIN — TICAGRELOR 90 MG: 90 TABLET ORAL at 08:08

## 2023-10-12 RX ADMIN — SENNOSIDES AND DOCUSATE SODIUM 2 TABLET: 50; 8.6 TABLET ORAL at 08:08

## 2023-10-12 RX ADMIN — TICAGRELOR 90 MG: 90 TABLET ORAL at 20:57

## 2023-10-12 RX ADMIN — METOPROLOL SUCCINATE 100 MG: 100 TABLET, EXTENDED RELEASE ORAL at 08:08

## 2023-10-12 RX ADMIN — ACETAMINOPHEN 1000 MG: 500 TABLET ORAL at 14:12

## 2023-10-12 RX ADMIN — LOSARTAN POTASSIUM 100 MG: 100 TABLET, FILM COATED ORAL at 08:08

## 2023-10-12 RX ADMIN — ATORVASTATIN CALCIUM 80 MG: 80 TABLET, FILM COATED ORAL at 20:57

## 2023-10-12 RX ADMIN — POLYETHYLENE GLYCOL 3350 17 G: 17 POWDER, FOR SOLUTION ORAL at 08:08

## 2023-10-12 ASSESSMENT — ACTIVITIES OF DAILY LIVING (ADL)
ADLS_ACUITY_SCORE: 40

## 2023-10-12 NOTE — PLAN OF CARE
Status: Admitted for acute L MCA/OMER stroke and underwent L carotid stenting and angio. Hx of previous stroke in 2021.   Vitals: VSS on CCM and continuous pulse ox.   Neuros: A&Ox3-4, was disoriented to place this shift. R pupil slightly larger than L pupil. RUE weaker than LUE. RUE ataxia. R field cut.   IV: PIV SL   Labs/Electrolytes: WNL   Resp/trach: LSC on RA   Diet: Regular, poor po intake this shift   Bowel status: LBM 10/9, BS+  : Voiding spontaneously to bathroom and urinal   Skin: Scabbing to BLE, bleeding at heparin injection sites x2, covered with primapores, changed this shift, CDI.   Pain: Denies   Activity: Ax1 with GB and walker   Plan/Updates this shift: Medically ready for discharge. Encourage activity.

## 2023-10-12 NOTE — PROGRESS NOTES
[facility]    Stroke Progress Note    Interval Events  - PM&R recommends TCU on discharge  - Vascular surgery strongly recommends aspirin over cilostazol for antiplatelet coverage  - Patient reports no concerns  - Vitals and labs remain stable    HPI Summary  Eber Jin is a 62 year old man with history of tobacco use, HTN, HLD, CAD, HFrEF 35%, paroxysmal a-fib on xarelto and aspirin, recent unprovoked PE (in the setting of medication non-compliance), R pontine infarct 03/2021 with baseline mild left leg weakness c/b recurrent falls and gait imbalance, COPD, recent admission 9/20-24/23 to [facility] for rib fractures and PE who was initially admitted to [facility] ED 10/2/23 for surgical workup of multiple traumatic L displaced rib fractures in setting of recurrent falls, transferred to [facility] for consideration of thrombectomy after stroke code called 10/3/23 for aphasia, found to have acute L MCA/OMER watershed infarcts and possible L M2 superior division occlusion. Initial NIHSS 17. He underwent L carotid stenting and angioplasty. Post CT head with L frontal SAH that was stable on repeat scan.     Stroke Evaluation Summarized     MRI/Head CT Post Proedure CT 10/3/23: Acute left frontal subarachnoid hemorrhage.         MRI:  1.  Multiple foci of acute to subacute ischemic change throughout the left cerebral hemisphere, distribution is typical of watershed ischemia.  2.  Additional acute to subacute ischemic change in the left occipital lobe.  3.  Age-related changes as above   Intracranial Vasculature 1. The internal carotid arteries are diminutive opacification to the ICA terminus is and proximal anterior cerebral and middle cerebral arteries with poor visualization of the distal anterior vasculature.      2. No significant contrast opacification is identified within the posterior circulation.     3. While these findings can be seen in setting of  hypoperfusion, injection related artifact could have a similar appearance. MRI brain/MRA head and neck is recommended for further evaluation.   Cervical Vasculature 1. Atherosclerotic plaque results in critical, 90% or greater, stenosis of the proximal left ICA.      2. Right vertebral artery occlusion.      3. The left vertebral artery remains patent throughout its course, however shortly after entering the intracranial compartment both vertebral arteries, basilar artery, and posterior circulation are not visualized.      Echocardiogram No cardiac source for embolus identified   The visual ejection fraction is 55-60%.   The right atria appears normal. Mild left atrial enlargement is present.    EKG/Telemetry Atrial fibrillation with rapid ventricular response    Other Testing Not Applicable       LDL  10/4/2023: 87 mg/dL   A1C  10/3/2023: 5.3 %   Troponin No lab value available in past 48 hrs        Impression   Eber Jin is a 62 year old man with history of tobacco use, HTN, HLD, CAD, HFrEF 35%, paroxysmal a-fib on xarelto and aspirin, recent unprovoked PE (in the setting of medication non-compliance), who was initially admitted to [facility] ED 10/2/23 for surgical workup of multiple traumatic L displaced rib fractures in setting of recurrent falls, transferred to [facility] for consideration of thrombectomy after stroke code called 10/3/23 for aphasia, found to have acute L MCA/OMER border zone infarcts and L M2 superior division occlusion.     Etiology of L M2 occlusion most likely extracranial atherosclerosis. Not a candidate for TNK with his Xarelto and no LVO for thrombectomy. He was taken for left carotid stenting and angioplasty. Post-procedure CT head revealed new L frontal SAH that has been stable on repeat stability scans.     Plan  #Acute L MCA/OMER border zone infarcts   #Possible L M2 occlusion  #L ICA stenosis > 90% s/p stenting and angioplasty  #Acute L frontal SAH  #Hx R pontine stroke  03/2021  - Neurochecks every 4 hours  - SBP goal < 180 mmHg  - Continue dual antiplatelet therapy (DAPT): Brilinta 90 mg BID and aspirin 81 mg daily  - Upon discharge:   - Continue DAPT x 2 weeks  - Repeat CT head at that time  - If CT head stable, plan to restart PTA Xarelto & continue Brilinta   - Per discussion with neuro-IR:   - Order carotid US in 1 month at discharge   - Follow up with neuro-IR (Dr. Perry) in 1 month  - Vascular surgery strongly recommends aspirin over cilostazol for lifelong antiplatelet therapy given extent of PAD  - Statin: Continue PTA Lipitor 80 mg daily,    - A1c (5.3%)  - PT/OT/SLP recommending ARU, referrals to be sent out today  - Stroke Education  - Depression (PHQ9 - 3) and Apnea Screens (yes only to fatigue throughout the day) both negative 10/10     #Unprovoked PE (LLL segmental and subsegmental) 09/07/23  #Multiple rib fracture L rib 4 -12 fx  09/07/23  #Recent traumatic hemothorax   In setting of recurrent falls, discovered during admission to [facility]. No worsening shortness of breath, chest pains during admission and no evidence of right heart strain on initial TTE, however has been over one week without anticoagulation.   - Per general surgery team at [facility], patient is cleared to restart anticoagulation as needed  - Hypercoagulability panel (APLS) labs negative 10/10  - Holding Xarelto (see above)  - Repeat TTE 10/9/23 shows EF 45 - 50%, no PFO or thrombus  - Repeat CTPE 10/09 negative with resolution of prior PE's  - Bilateral Lower extremity Dopplers 9/23 normal     #Hypertension  #HFrEF (EF of 35% on 9/9/2023)  #Paroxysmal A-fib ([id number] of 5)  #CAD prior MI  #PVD  Noted to have run of tachyarrhythmia overnight 10/08 around 0300. Rhythm concerning for atrial flutter. Patient was asymptomatic throughout, denying chest pain, palpitations, lightheadedness. Rhythm abated with metoprolol prn.   - PTA Losartan 100 mg daily   - PTA spironolactone 25  mg daily  - PTA metoprolol succinate 100 mg  - PTA Lasix 40 mg daily  - Hold PTA xarelto 20 mg per above   - PRN metoprolol for rate above 120 bpm  - Cardiac monitoring  - TTE - no cardiac source for emoblus, EF 55-60%  - SBP goal < 150 mmHg  - PRN Labetalol and Hydralazine     #PAD, bilateral   #AAA, infrarenal, 3.2 cm  #Iliac artery aneurysm, 20 mm right   CTA with run off 10/4/2023 - all chronic findings   - Vascular sugery consulted  - Continue surveillance for AAA, no acute interventions deemed necessary  - Vascular surgery strongly recommends lifelong antiplatelet therapy given extent of his PAD, with strong preference for aspirin over cilostazol  - Has follow up with Dr. Talbot of vascular surgery at [facility]     #Multiple displaced rib fractures   -Hold xarelto per above   -Tylenol 1 g q8h  -Incentive spirometry every 2 hours   - Per general surgery at [facility]: no follow-up needed post-discharge     #COPD  -Maintain O2 saturations greater than 92%     #Alcohol use disorder   #Tobacco use disorder   -Encourage cessation   -CIWA protocol  -Consider addiction medicine consult if patient is open to discussion     ICU Checklist  Lines/tubes/drains: PIV  FEN: regular diet, thin liquids  PPX: DVT - SCDs  and SQH; GI - not indicated  Code: Full Code     Patient Follow-up    - With neuro-IR and vascular surgery as above    Neda Osborne, MS4  [employer]    The patient was discussed with Stroke Staff, Dr. Johnson.     Adrian Cruz MD  Neurology Resident  Ascom: [id number]  ______________________________________________________    Clinically Significant Risk Factors              # Hypoalbuminemia: Lowest albumin = 3 g/dL at 10/3/2023 10:04 PM, will monitor as appropriate       # Hypertension: Noted on problem list    # Heart failure, NOS: heart failure noted on the problem list and last echo with EF 40-50%        # Severe Malnutrition: based on nutrition assessment             Medications    Scheduled Meds   acetaminophen  1,000 mg Oral or Feeding Tube Q8H    aspirin  81 mg Oral or Feeding Tube Daily    atorvastatin  80 mg Oral QPM    folic acid  1 mg Oral or Feeding Tube Daily    furosemide  40 mg Oral Daily    heparin ANTICOAGULANT  5,000 Units Subcutaneous Q8H    lidocaine  1-2 patch Transdermal Q24H    losartan  100 mg Oral Daily    metoprolol succinate ER  100 mg Oral Daily    multivitamin w/minerals  1 tablet Oral or Feeding Tube Daily    polyethylene glycol  17 g Oral or Feeding Tube Daily    senna-docusate  1-2 tablet Oral or NG Tube BID    sodium chloride (PF)  3 mL Intracatheter Q8H    spironolactone  25 mg Oral Daily    thiamine  100 mg Oral Daily    ticagrelor  90 mg Oral BID       Infusion Meds   - MEDICATION INSTRUCTIONS -         PRN Meds  acetaminophen, hydrALAZINE, labetalol, - MEDICATION INSTRUCTIONS -, melatonin, metoprolol, sodium chloride (PF)       PHYSICAL EXAMINATION  Temp:  [97.4  F (36.3  C)-98.4  F (36.9  C)] 97.6  F (36.4  C)  Pulse:  [65-83] 73  Resp:  [16-23] 16  BP: (102-132)/(80-95) 118/83  SpO2:  [88 %-100 %] 98 %      Neurologic Exam  Mental Status:  alert, oriented x 2 (mistakes year), follows commands, speech clear and fluent  Cranial Nerves:  EOMI with normal smooth pursuit, incomplete right homonomous hemianopsia - loss of vision in RLQ from both eyes but also consistently has blurry / compromised vision in RUQ of R eye; facial sensation intact and symmetric, facial movements symmetric; hearing not formally tested but intact to conversation, palate elevation symmetric and uvula midline, no dysarthria, shoulder shrug strong bilaterally, tongue protrusion midline   Motor:  normal muscle bulk, no abnormal movements, able to move all limbs spontaneously. Mild pronator drift in RUE but not to bed.   Reflexes:   deferred  Sensory:  light touch sensation intact and symmetric throughout upper and lower extremities, no extinction on double simultaneous stimulation  "  Coordination: mild ataxia RUE on finger-to-nose (likely in setting of weakness); all other limbs show normal finger-to-nose and heel-to-shin bilaterally without dysmetria  Station/Gait: deferred    Stroke Scales    NIHSS  1a. Level of Consciousness 0-->Alert, keenly responsive   1b. LOC Questions 0-->Answers both questions correctly   1c. LOC Commands 0-->Performs both tasks correctly   2.   Best Gaze 0-->Normal   3.   Visual 1-->Partial hemianopia   4.   Facial Palsy 0-->Normal symmetrical movements   5a. Motor Arm, Left 0-->No drift, limb holds 90 (or 45) degrees for full 10 secs   5b. Motor Arm, Right 1-->Drift, limb holds 90 (or 45) degrees, but drifts down before full 10 secs, does not hit bed or other support   6a. Motor Leg, Left 0-->No drift, leg holds 30 degree position for full 5 secs   6b. Motor Leg, right 0-->No drift, leg holds 30 degree position for full 5 secs   7.   Limb Ataxia 1-->Present in one limb   8.   Sensory 0-->Normal, no sensory loss   9.   Best Language 0-->No aphasia, normal   10. Dysarthria 0-->Normal   11. Extinction and Inattention  0-->No abnormality   Total 3 (10/12/23 0859)       Imaging  I personally reviewed all imaging; relevant findings per HPI.     Lab Results Data   CBC  Recent Labs   Lab 10/12/23  0545 10/11/23  0653 10/10/23  0603   WBC 9.2 6.9 6.8   RBC 3.95* 3.86* 3.97*   HGB 12.0* 11.9* 12.3*   HCT 37.0* 36.7* 38.3*    370 369     Basic Metabolic Panel    Recent Labs   Lab 10/12/23  0545 10/11/23  0653 10/10/23  0603    137 137   POTASSIUM 4.3 4.2 3.9   CHLORIDE 101 101 103   CO2 26 26 25   BUN 15.2 12.1 10.2   CR 0.98 0.98 0.99   GLC 92 99 90   LISA 9.3 8.9 9.1     Liver Panel  No results for input(s): \"PROTTOTAL\", \"ALBUMIN\", \"BILITOTAL\", \"ALKPHOS\", \"AST\", \"ALT\", \"BILIDIRECT\" in the last 168 hours.  INR    Recent Labs   Lab Test 10/10/23  1057 10/03/23  2137 10/03/23  1646   INR 0.98 1.10 1.13      Lipid Profile    Recent Labs   Lab Test 10/04/23  0604 " "10/03/23  2204 11/25/22  0922 03/07/21  0633   CHOL  --  160 201* 160   HDL  --  36* 38* 32*   LDL 87 103* 144* 106   TRIG  --  107 96 110     A1C    Recent Labs   Lab Test 10/03/23  2138 05/12/22  1604 07/19/18  1033   A1C 5.3 5.8* 5.8     Troponin  No results for input(s): \"CTROPT\", \"TROPONINIS\", \"TROPONINI\", \"GHTROP\" in the last 168 hours.       Data       "

## 2023-10-12 NOTE — PLAN OF CARE
Status: Admitted for acute L MCA/OMER stroke and underwent L carotid stenting and angio. Hx of previous stroke in 2021.    Vitals: VSS. CCM and continuous pulse ox   Neuros: disoriented to time this shift. R pupil > L pupil. RUE 4/5, AOE 5/5. RUE ataxic. R field cut.   IV: PIV SL  Labs/Electrolytes: WNL  Resp/trach: WNL on RA  Diet: regular. Stuart counts day 1.   Bowel status: BM 10/9. BS+  : voiding spont via urinal   Skin: Heparin injection sites x2, covered with primapores and CDI. Scabbing to BLE  Pain: denies  Activity: A1/GB/walker  Plan: medically ready for discharge. Continue with current poc      Goal Outcome Evaluation:      Plan of Care Reviewed With: patient    Overall Patient Progress: improving    Outcome Evaluation: Awaiting placement, SW following.

## 2023-10-12 NOTE — PROGRESS NOTES
"Care Management Follow Up    Length of Stay (days): 9    Expected Discharge Date:  10/13/23     Concerns to be Addressed:     Discharge Planning  Patient plan of care discussed at interdisciplinary rounds: Yes    Anticipated Discharge Disposition:  TCU     Anticipated Discharge Services:  therapy    Education Provided on the Discharge Plan: Yes  Patient/Family in Agreement with the Plan: yes    Referrals Placed by CM/SW:  yes  Private pay costs discussed: Not applicable    Additional Information:     covering for 6A, completed chart review and attended rounds for patient.     ARU referrals previously made have been declined and the reason stated is \"pt is a more appropriate candidate for TCU due to declining therapy and questionable social support at home pending discharge.\" Pt's spouse (Dinorah Baeza) is requesting that a referral be made to [facility], as that is where spouse is currently receiving TCU services.     MARIELA contacted [facility] admissions via phone and spoke with Nickie. Nickie stated that she would like to speak with the TCU  to see if that would be acceptable. Nickie also asked MARIELA to send referral via epic so that they can review pt. MARIELA sent referral via epic to [facility].     [facility], [place], [place]  # [phone] (admissions)  10/12: referral sent via epic    MARIELA received a call back from Maki () helping in admissions at [facility]. Maki stated that they would be able to accept patient, but only with a shared agreement due to [employer] insurance policy and coverage.  MARIELA contacted supervisor who stated that they could write the shared agreement and fax over to the unit SW, and the MARIELA would then forward to Ping Michele to have signed/approved by CNBREA.  MARIELA left VM at admissions # above to let them know to write shared agreement and gave them fax to send # [phone].    Addendum: Shared agreement will be " emailed to Ping Michele not faxed (per Yo)    Also note, pt must comply with the facilitiy's non-smoking policy in the facility and on the property.     SW/RNCC to follow and assist with any other discharge needs that may arise.    JOYCE Conway, [facility]  Coverage   [facility]  [email or web address]

## 2023-10-12 NOTE — PLAN OF CARE
Status: Pt admitted for acute L MCA/OMER stroke, s/p left carotid stenting and angioplasty on 10/3.   PMHx: Falls at home, recent multiple rib fractures, L sided CVA (2022), PE (09/23), A-fib on Xarelto noncompliant, CAD, PAD< HTN, HLD, COPD, alcohol, and tobacco abuse diorder  Vitals: VSS ex intermittent tachycardia but not sustained, with continuous pulse ox. On CCM - A-fib noted around 10:29 AM.    Neuros: Disoriented to time. R pupil > L pupil. Strength RUE 4/5, AOE 5/5. RUE ataxic. R field cut.   IV: PIV SL  Labs/Electrolytes: K+, Mag, & Phos WNL  Resp/trach: WNL on RA  Diet: regular. Needs help/encourage to order meals. Stuart counts day 1.   Bowel status: Loose BM today  : voiding spont via urinal   Skin: Scabbing to BLE  Pain: denies  Activity: A1, GB, and walker. On chair for meals, refused walk around the unit. Walks to bathroom and back with staff  Plan: medically ready for discharge, pending TCU placement. Continue with current poc

## 2023-10-13 ENCOUNTER — APPOINTMENT (OUTPATIENT)
Dept: PHYSICAL THERAPY | Facility: CLINIC | Age: 63
DRG: 034 | End: 2023-10-13
Payer: COMMERCIAL

## 2023-10-13 LAB
ANION GAP SERPL CALCULATED.3IONS-SCNC: 9 MMOL/L (ref 7–15)
BUN SERPL-MCNC: 13.8 MG/DL (ref 8–23)
CALCIUM SERPL-MCNC: 9.4 MG/DL (ref 8.8–10.2)
CHLORIDE SERPL-SCNC: 101 MMOL/L (ref 98–107)
CREAT SERPL-MCNC: 0.91 MG/DL (ref 0.67–1.17)
DEPRECATED HCO3 PLAS-SCNC: 25 MMOL/L (ref 22–29)
EGFRCR SERPLBLD CKD-EPI 2021: >90 ML/MIN/1.73M2
ERYTHROCYTE [DISTWIDTH] IN BLOOD BY AUTOMATED COUNT: 13.2 % (ref 10–15)
GLUCOSE SERPL-MCNC: 119 MG/DL (ref 70–99)
HCT VFR BLD AUTO: 36.8 % (ref 40–53)
HGB BLD-MCNC: 11.7 G/DL (ref 13.3–17.7)
MAGNESIUM SERPL-MCNC: 2 MG/DL (ref 1.7–2.3)
MCH RBC QN AUTO: 30.4 PG (ref 26.5–33)
MCHC RBC AUTO-ENTMCNC: 31.8 G/DL (ref 31.5–36.5)
MCV RBC AUTO: 96 FL (ref 78–100)
PHOSPHATE SERPL-MCNC: 2.5 MG/DL (ref 2.5–4.5)
PLATELET # BLD AUTO: 386 10E3/UL (ref 150–450)
POTASSIUM SERPL-SCNC: 3.7 MMOL/L (ref 3.4–5.3)
RBC # BLD AUTO: 3.85 10E6/UL (ref 4.4–5.9)
SODIUM SERPL-SCNC: 135 MMOL/L (ref 135–145)
WBC # BLD AUTO: 7.3 10E3/UL (ref 4–11)

## 2023-10-13 PROCEDURE — 99232 SBSQ HOSP IP/OBS MODERATE 35: CPT | Mod: GC | Performed by: PSYCHIATRY & NEUROLOGY

## 2023-10-13 PROCEDURE — 84100 ASSAY OF PHOSPHORUS: CPT | Performed by: NURSE PRACTITIONER

## 2023-10-13 PROCEDURE — 250N000013 HC RX MED GY IP 250 OP 250 PS 637: Performed by: NURSE PRACTITIONER

## 2023-10-13 PROCEDURE — 250N000013 HC RX MED GY IP 250 OP 250 PS 637

## 2023-10-13 PROCEDURE — 80048 BASIC METABOLIC PNL TOTAL CA: CPT

## 2023-10-13 PROCEDURE — 36415 COLL VENOUS BLD VENIPUNCTURE: CPT

## 2023-10-13 PROCEDURE — 250N000013 HC RX MED GY IP 250 OP 250 PS 637: Performed by: PSYCHIATRY & NEUROLOGY

## 2023-10-13 PROCEDURE — 97116 GAIT TRAINING THERAPY: CPT | Mod: GP

## 2023-10-13 PROCEDURE — 83735 ASSAY OF MAGNESIUM: CPT | Performed by: NURSE PRACTITIONER

## 2023-10-13 PROCEDURE — 85027 COMPLETE CBC AUTOMATED: CPT

## 2023-10-13 PROCEDURE — 120N000002 HC R&B MED SURG/OB UMMC

## 2023-10-13 PROCEDURE — 97530 THERAPEUTIC ACTIVITIES: CPT | Mod: GP

## 2023-10-13 PROCEDURE — 250N000011 HC RX IP 250 OP 636: Performed by: NURSE PRACTITIONER

## 2023-10-13 RX ORDER — POTASSIUM CHLORIDE 750 MG/1
20 TABLET, EXTENDED RELEASE ORAL ONCE
Status: COMPLETED | OUTPATIENT
Start: 2023-10-13 | End: 2023-10-13

## 2023-10-13 RX ORDER — MAGNESIUM OXIDE 400 MG/1
400 TABLET ORAL EVERY 4 HOURS
Status: COMPLETED | OUTPATIENT
Start: 2023-10-13 | End: 2023-10-13

## 2023-10-13 RX ADMIN — ACETAMINOPHEN 1000 MG: 500 TABLET ORAL at 13:02

## 2023-10-13 RX ADMIN — POTASSIUM & SODIUM PHOSPHATES POWDER PACK 280-160-250 MG 1 PACKET: 280-160-250 PACK at 17:27

## 2023-10-13 RX ADMIN — ATORVASTATIN CALCIUM 80 MG: 80 TABLET, FILM COATED ORAL at 19:56

## 2023-10-13 RX ADMIN — LOSARTAN POTASSIUM 100 MG: 100 TABLET, FILM COATED ORAL at 08:46

## 2023-10-13 RX ADMIN — FOLIC ACID 1 MG: 1 TABLET ORAL at 08:46

## 2023-10-13 RX ADMIN — LIDOCAINE PATCH 4% 2 PATCH: 40 PATCH TOPICAL at 13:02

## 2023-10-13 RX ADMIN — HEPARIN SODIUM 5000 UNITS: 5000 INJECTION, SOLUTION INTRAVENOUS; SUBCUTANEOUS at 06:59

## 2023-10-13 RX ADMIN — THIAMINE HCL TAB 100 MG 100 MG: 100 TAB at 08:46

## 2023-10-13 RX ADMIN — FUROSEMIDE 40 MG: 40 TABLET ORAL at 08:46

## 2023-10-13 RX ADMIN — MAGNESIUM OXIDE TAB 400 MG (241.3 MG ELEMENTAL MG) 400 MG: 400 (241.3 MG) TAB at 17:28

## 2023-10-13 RX ADMIN — TICAGRELOR 90 MG: 90 TABLET ORAL at 19:56

## 2023-10-13 RX ADMIN — MAGNESIUM OXIDE TAB 400 MG (241.3 MG ELEMENTAL MG) 400 MG: 400 (241.3 MG) TAB at 21:58

## 2023-10-13 RX ADMIN — HEPARIN SODIUM 5000 UNITS: 5000 INJECTION, SOLUTION INTRAVENOUS; SUBCUTANEOUS at 13:02

## 2023-10-13 RX ADMIN — ASPIRIN 81 MG CHEWABLE TABLET 81 MG: 81 TABLET CHEWABLE at 08:46

## 2023-10-13 RX ADMIN — METOPROLOL SUCCINATE 100 MG: 100 TABLET, EXTENDED RELEASE ORAL at 08:47

## 2023-10-13 RX ADMIN — POTASSIUM CHLORIDE 20 MEQ: 750 TABLET, EXTENDED RELEASE ORAL at 17:28

## 2023-10-13 RX ADMIN — SPIRONOLACTONE 25 MG: 25 TABLET ORAL at 08:46

## 2023-10-13 RX ADMIN — HEPARIN SODIUM 5000 UNITS: 5000 INJECTION, SOLUTION INTRAVENOUS; SUBCUTANEOUS at 21:59

## 2023-10-13 RX ADMIN — ACETAMINOPHEN 1000 MG: 500 TABLET ORAL at 06:59

## 2023-10-13 RX ADMIN — POTASSIUM & SODIUM PHOSPHATES POWDER PACK 280-160-250 MG 1 PACKET: 280-160-250 PACK at 21:59

## 2023-10-13 RX ADMIN — TICAGRELOR 90 MG: 90 TABLET ORAL at 08:46

## 2023-10-13 RX ADMIN — Medication 1 TABLET: at 08:46

## 2023-10-13 ASSESSMENT — ACTIVITIES OF DAILY LIVING (ADL)
ADLS_ACUITY_SCORE: 41
ADLS_ACUITY_SCORE: 40
ADLS_ACUITY_SCORE: 41
ADLS_ACUITY_SCORE: 40
ADLS_ACUITY_SCORE: 40
ADLS_ACUITY_SCORE: 41
ADLS_ACUITY_SCORE: 40
ADLS_ACUITY_SCORE: 40
ADLS_ACUITY_SCORE: 41
ADLS_ACUITY_SCORE: 41

## 2023-10-13 ASSESSMENT — VISUAL ACUITY: OU: NORMAL ACUITY

## 2023-10-13 NOTE — PROGRESS NOTES
Calorie Count  Intake recorded for: 10/12  Total Kcals: 1858 Total Protein: 69g  Kcals from Hospital Food: 1858   Protein: 69g  Kcals from Outside Food (average):0 Protein: 0g  # Meals Ordered from Kitchen: 4 meals  # Meals Recorded: 3 meals  (First - 100% 1 sausage ambrose, 1 Greenlandic toast w/ syrup and butter, 50% 1 8oz orange juice)  (Second - 100% 2 Pepsi)  (Third - 100% 1 Pepsi, 50%  salad w/ Greenlandic dressing)  # Supplements Recorded: 100% 2 Ensure Enlive

## 2023-10-13 NOTE — PROVIDER NOTIFICATION
"Tele tech notified bedside RN that pt was in afib. Charge nurse paged stroke team resident \"[phone] Jin- per tele tech, pt switched to afib around 11:18am.\"   "

## 2023-10-13 NOTE — PROGRESS NOTES
"Care Management Follow Up    Length of Stay (days): 10    Expected Discharge Date: Patient is medically ready     Concerns to be Addressed: Discharge planning       Patient plan of care discussed at interdisciplinary rounds: Yes    Anticipated Discharge Disposition: TCU     Anticipated Discharge Services: Therapy services  Anticipated Discharge DME: TBD    Patient/family educated on Medicare website which has current facility and service quality ratings: Patient was given list of [employer] in-network SNFs to review and select choices to make referrals to. List was obtained from [employer]'s website  Education Provided on the Discharge Plan: Yes  Patient/Family in Agreement with the Plan: Yes    Referrals Placed by CM/SW:  Pt has [employer] TCU list in room.     Private pay costs discussed: Nothing was discussed w/pt today.     Additional Information:    CHW was tasked by Ping Michele to obtain choices from pt for facilities that are in-network with [employer] (pt's insurance). CHW provided a list to pt of in-network facilities printed from the [employer] website and explained that we need to make referrals to facilities that are in-network with [employer] and that [facility] is not in-network with [employer]. CHW left the list with the pt and informed pt that CHW will come by later to obtain choices from pt.     13:12 CHW visited pt to see if pt had a chance to select any TCU choices. Pt was in the process of eating and said he had not had a chance to look at the list yet.     14:10 CHW went to see pt again but pt had the bed sheet over his head and was laying down. CHW knocked and informed pt that CHW would be back at 3 to get some choices and that we needed to start sending referrals by the end of the day. Pt continued to lay with the bed sheet covering his face but verbally acknowledged CHW. CHW asked pt if he would like this writer to contact his wife and he responded \"not right now\".     15:38 CHW met w/pt at " bedside and explained that we need some choices to send referrals to TCUs that are in-network with [employer]. CHW informed pt that someone would come by to get some choices from pt tomorrow otherwise we might have to send referrals to in-network facilities to see if any TCUs would be able to accept. Pt demonstrated understanding.     Kenisha Hester

## 2023-10-13 NOTE — PROGRESS NOTES
[facility]    Stroke Progress Note    Interval Events  - No acute events over night  - No complaints this morning, in good spirits  - Open to discussion with addiction medicine regarding alcohol use  - Working on TCU placement at same location as wife    HPI Summary  Eber Jin is a 62 year old man with history of tobacco use, HTN, HLD, CAD, HFrEF 35%, paroxysmal a-fib on xarelto and aspirin, recent unprovoked PE (in the setting of medication non-compliance), R pontine infarct 03/2021 with baseline mild left leg weakness c/b recurrent falls and gait imbalance, COPD, recent admission 9/20-24/23 to [facility] for rib fractures and PE who was initially admitted to [facility] ED 10/2/23 for surgical workup of multiple traumatic L displaced rib fractures in setting of recurrent falls, transferred to [facility] for consideration of thrombectomy after stroke code called 10/3/23 for aphasia, found to have acute L MCA/OMER watershed infarcts and possible L M2 superior division occlusion. Initial NIHSS 17. He underwent L carotid stenting and angioplasty. Post CT head with L frontal SAH that was stable on repeat scan.      Stroke Evaluation Summarized     MRI/Head CT Post Proedure CT 10/3/23: Acute left frontal subarachnoid hemorrhage.         MRI:  1.  Multiple foci of acute to subacute ischemic change throughout the left cerebral hemisphere, distribution is typical of watershed ischemia.  2.  Additional acute to subacute ischemic change in the left occipital lobe.  3.  Age-related changes as above   Intracranial Vasculature 1. The internal carotid arteries are diminutive opacification to the ICA terminus is and proximal anterior cerebral and middle cerebral arteries with poor visualization of the distal anterior vasculature.      2. No significant contrast opacification is identified within the posterior circulation.     3. While these findings can be seen in  setting of hypoperfusion, injection related artifact could have a similar appearance. MRI brain/MRA head and neck is recommended for further evaluation.   Cervical Vasculature 1. Atherosclerotic plaque results in critical, 90% or greater, stenosis of the proximal left ICA.      2. Right vertebral artery occlusion.      3. The left vertebral artery remains patent throughout its course, however shortly after entering the intracranial compartment both vertebral arteries, basilar artery, and posterior circulation are not visualized.      Echocardiogram No cardiac source for embolus identified   The visual ejection fraction is 55-60%.   The right atria appears normal. Mild left atrial enlargement is present.    EKG/Telemetry Atrial fibrillation with rapid ventricular response    Other Testing Not Applicable       LDL  10/4/2023: 87 mg/dL   A1C  10/3/2023: 5.3 %   Troponin No lab value available in past 48 hrs         Impression   Eber Jin is a 62 year old man with history of tobacco use, HTN, HLD, CAD, HFrEF 35%, paroxysmal a-fib on xarelto and aspirin, recent unprovoked PE (in the setting of medication non-compliance), who was initially admitted to [facility] ED 10/2/23 for surgical workup of multiple traumatic L displaced rib fractures in setting of recurrent falls, transferred to [facility] for consideration of thrombectomy after stroke code called 10/3/23 for aphasia, found to have acute L MCA/OMER border zone infarcts and L M2 superior division occlusion.     Etiology of L M2 occlusion most likely extracranial atherosclerosis. Not a candidate for TNK with his Xarelto and no LVO for thrombectomy. He was taken for left carotid stenting and angioplasty. Post-procedure CT head revealed new L frontal SAH that has been stable on repeat stability scans.      Plan  #Acute L MCA/OMER border zone infarcts   #Possible L M2 occlusion  #L ICA stenosis > 90% s/p stenting and angioplasty  #Acute L frontal SAH  #Hx R  pontine stroke 03/2021  - Neurochecks every 4 hours  - SBP goal < 180 mmHg  - Continue dual antiplatelet therapy (DAPT): Brilinta 90 mg BID and aspirin 81 mg daily  - Upon discharge:   - Continue DAPT x 2 weeks  - Repeat CT head at that time  - If CT head stable, plan to restart PTA Xarelto & continue Brilinta   - Per discussion with neuro-IR:   - Order carotid US in 1 month at discharge   - Follow up with neuro-IR (Dr. Perry) in 1 month  - Vascular surgery strongly recommends aspirin over cilostazol for lifelong antiplatelet therapy given extent of PAD  - Statin: Continue PTA Lipitor 80 mg daily,    - A1c (5.3%)  - PT/OT/SLP recommending TCU, trying to arrange to be at same location as wife  - Stroke Education  - Depression (PHQ9 - 3) and Apnea Screens (yes only to fatigue throughout the day) both negative 10/10     #Unprovoked PE (LLL segmental and subsegmental) 09/07/23  #Multiple rib fracture L rib 4 -12 fx  09/07/23  #Recent traumatic hemothorax   In setting of recurrent falls, discovered during admission to [facility]. No worsening shortness of breath, chest pains during admission and no evidence of right heart strain on initial TTE, however has been over one week without anticoagulation.   - Per general surgery team at [facility], patient is cleared to restart anticoagulation as needed  - Hypercoagulability panel (APLS) labs negative 10/10  - Holding Xarelto (see above)  - Repeat TTE 10/9/23 shows EF 45 - 50%, no PFO or thrombus  - Repeat CTPE 10/09 negative with resolution of prior PE's  - Bilateral Lower extremity Dopplers 9/23 normal     #Hypertension  #HFrEF (EF of 35% on 9/9/2023)  #Paroxysmal A-fib ([id number] of 5)  #CAD prior MI  #PVD  Noted to have run of tachyarrhythmia overnight 10/08 around 0300. Rhythm concerning for atrial flutter. Patient was asymptomatic throughout, denying chest pain, palpitations, lightheadedness. Rhythm abated with metoprolol prn.   - PTA Losartan 100 mg  daily   - PTA spironolactone 25 mg daily  - PTA metoprolol succinate 100 mg  - PTA Lasix 40 mg daily  - Hold PTA xarelto 20 mg per above   - PRN metoprolol for rate above 120 bpm  - Cardiac monitoring  - TTE - no cardiac source for emoblus, EF 55-60%  - PRN Labetalol and Hydralazine     #PAD, bilateral   #AAA, infrarenal, 3.2 cm  #Iliac artery aneurysm, 20 mm right   CTA with run off 10/4/2023 - all chronic findings   - Vascular sugery consulted  - Continue surveillance for AAA, no acute interventions deemed necessary  - Vascular surgery strongly recommends lifelong antiplatelet therapy given extent of his PAD, with strong preference for aspirin over cilostazol  - Has follow up with Dr. Talbot of vascular surgery at [facility]     #Multiple displaced rib fractures   -Hold xarelto per above   -Tylenol 1 g q8h  -Incentive spirometry every 2 hours   - Per general surgery at [facility]: no follow-up needed post-discharge     #COPD  -Maintain O2 saturations greater than 92%     #Alcohol use disorder   #Tobacco use disorder   -Encourage cessation   -Addiction medicine consult     ICU Checklist  Lines/tubes/drains: PIV  FEN: regular diet, thin liquids  PPX: DVT - SCDs  and SQH; GI - not indicated  Code: Full Code      Patient Follow-up    - With neuro-IR and vascular surgery as above     The patient was discussed with Stroke Staff, Dr. Johnson.      Adrian Cruz MD  Neurology Resident  Ascom: #[id number]  ______________________________________________________    Clinically Significant Risk Factors              # Hypoalbuminemia: Lowest albumin = 3 g/dL at 10/3/2023 10:04 PM, will monitor as appropriate       # Hypertension: Noted on problem list    # Heart failure, NOS: heart failure noted on the problem list and last echo with EF 40-50%        # Severe Malnutrition: based on nutrition assessment             Medications   Scheduled Meds   acetaminophen  1,000 mg Oral or Feeding Tube Q8H    aspirin  81 mg Oral or Feeding Tube  Daily    atorvastatin  80 mg Oral QPM    folic acid  1 mg Oral or Feeding Tube Daily    furosemide  40 mg Oral Daily    heparin ANTICOAGULANT  5,000 Units Subcutaneous Q8H    lidocaine  1-2 patch Transdermal Q24H    losartan  100 mg Oral Daily    metoprolol succinate ER  100 mg Oral Daily    multivitamin w/minerals  1 tablet Oral or Feeding Tube Daily    polyethylene glycol  17 g Oral or Feeding Tube Daily    senna-docusate  1-2 tablet Oral or NG Tube BID    sodium chloride (PF)  3 mL Intracatheter Q8H    spironolactone  25 mg Oral Daily    thiamine  100 mg Oral Daily    ticagrelor  90 mg Oral BID       Infusion Meds   - MEDICATION INSTRUCTIONS -         PRN Meds  acetaminophen, hydrALAZINE, labetalol, - MEDICATION INSTRUCTIONS -, melatonin, metoprolol, sodium chloride (PF)       PHYSICAL EXAMINATION  Temp:  [97.1  F (36.2  C)-98.4  F (36.9  C)] 97.7  F (36.5  C)  Pulse:  [73-89] 79  Resp:  [14-18] 18  BP: ()/(74-92) 96/74  SpO2:  [94 %-100 %] 100 %      Neurologic  Mental Status:  alert, oriented x 2 (mistakes year), follows commands, speech clear and fluent  Cranial Nerves:  EOMI with normal smooth pursuit, incomplete right homonomous hemianopsia - loss of vision in RLQ from both eyes but also consistently has blurry / compromised vision in RUQ of R eye; facial sensation intact and symmetric, facial movements symmetric; hearing not formally tested but intact to conversation, palate elevation symmetric and uvula midline, no dysarthria, shoulder shrug strong bilaterally, tongue protrusion midline   Motor:  normal muscle bulk, no abnormal movements, able to move all limbs spontaneously. Mild pronator drift in RUE but not to bed.   Reflexes:   deferred  Sensory:  light touch sensation intact and symmetric throughout upper and lower extremities, no extinction on double simultaneous stimulation   Coordination: mild ataxia RUE on finger-to-nose (likely in setting of weakness); all other limbs show normal  "finger-to-nose and heel-to-shin bilaterally without dysmetria  Station/Gait: deferred    Stroke Scales    NIHSS  1a. Level of Consciousness 0-->Alert, keenly responsive   1b. LOC Questions 0-->Answers both questions correctly   1c. LOC Commands 0-->Performs both tasks correctly   2.   Best Gaze 0-->Normal   3.   Visual 1-->Partial hemianopia   4.   Facial Palsy 0-->Normal symmetrical movements   5a. Motor Arm, Left 0-->No drift, limb holds 90 (or 45) degrees for full 10 secs   5b. Motor Arm, Right 1-->Drift, limb holds 90 (or 45) degrees, but drifts down before full 10 secs, does not hit bed or other support   6a. Motor Leg, Left 0-->No drift, leg holds 30 degree position for full 5 secs   6b. Motor Leg, right 0-->No drift, leg holds 30 degree position for full 5 secs   7.   Limb Ataxia 1-->Present in one limb   8.   Sensory 0-->Normal, no sensory loss   9.   Best Language 0-->No aphasia, normal   10. Dysarthria 0-->Normal   11. Extinction and Inattention  0-->No abnormality   Total 3 (10/13/23 1153)       Imaging  I personally reviewed all imaging; relevant findings per HPI.     Lab Results Data   CBC  Recent Labs   Lab 10/13/23  0547 10/12/23  0545 10/11/23  0653   WBC 7.3 9.2 6.9   RBC 3.85* 3.95* 3.86*   HGB 11.7* 12.0* 11.9*   HCT 36.8* 37.0* 36.7*    401 370     Basic Metabolic Panel    Recent Labs   Lab 10/13/23  0547 10/12/23  0545 10/11/23  0653    137 137   POTASSIUM 3.7 4.3 4.2   CHLORIDE 101 101 101   CO2 25 26 26   BUN 13.8 15.2 12.1   CR 0.91 0.98 0.98   * 92 99   LISA 9.4 9.3 8.9     Liver Panel  No results for input(s): \"PROTTOTAL\", \"ALBUMIN\", \"BILITOTAL\", \"ALKPHOS\", \"AST\", \"ALT\", \"BILIDIRECT\" in the last 168 hours.  INR    Recent Labs   Lab Test 10/10/23  1057 10/03/23  2137 10/03/23  1646   INR 0.98 1.10 1.13      Lipid Profile    Recent Labs   Lab Test 10/04/23  0604 10/03/23  2204 11/25/22  0922 03/07/21  0633   CHOL  --  160 201* 160   HDL  --  36* 38* 32*   LDL 87 103* 144* " "106   TRIG  --  107 96 110     A1C    Recent Labs   Lab Test 10/03/23  2138 05/12/22  1604 07/19/18  1033   A1C 5.3 5.8* 5.8     Troponin  No results for input(s): \"CTROPT\", \"TROPONINIS\", \"TROPONINI\", \"GHTROP\" in the last 168 hours.       Data     "

## 2023-10-13 NOTE — PLAN OF CARE
Status: Admitted for acute L MCA/OMER stroke and underwent L carotid stenting and angio. Hx of previous stroke in 2021.    Vitals: VSS. CCM and continuous pulse ox. INT tachycardia. Known afib.   Neuros: disoriented to time this shift. R pupil > L pupil. RUE 4/5, AOE 5/5. RUE ataxic. R field cut.   IV: PIV SL  Labs/Electrolytes: WNL  Resp/trach: WNL on RA  Diet: regular. Stuart counts day 2  Bowel status: loose BM 10/12  : voiding spont via urinal   Skin: Scabbing to BLE  Pain: denies  Activity: A1/GB/walker. Needs encouragement to get OOB.   Plan: SW following for discharge. Continue with current poc    Goal Outcome Evaluation:      Plan of Care Reviewed With: patient    Overall Patient Progress: improving    Outcome Evaluation: SW following for placement

## 2023-10-13 NOTE — PLAN OF CARE
Status: Pt admitted for acute L MCA/OMER stroke, s/p left carotid stenting and angioplasty on 10/3.   PMHx: Falls at home, recent multiple rib fractures, L sided CVA (2022), PE (09/23), A-fib on Xarelto noncompliant, CAD, PAD< HTN, HLD, COPD, alcohol, and tobacco abuse diorder  Vitals: VSS ex intermittent tachycardia but not sustained, with continuous pulse ox. On CCM - A-fib noted, team was made aware  Neuros: Unchanged - Disoriented to time. R pupil > L pupil. Strength RUE 4/5, AOE 5/5. RUE ataxic. R field cut.   IV: PIV SL  Labs/Electrolytes: K+, phos and mag replacement initiated, recheck scheduled for tomorrow  Resp/trach: WNL on RA  Diet: regular. Needs help/encourage to order meals. Stuart counts day 2.   Bowel status: Loose BM 10/13  : voiding spont via urinal   Skin: Scabbing to BLE  Pain: Intermittent lower back pain managed with scheduled tylenol and applied lidocaine patch  Activity: A1, GB, and walker. On chair for meals. Refuses chair and ambulation at times.  Plan: Addiction Service consult placed. medically ready for discharge, Awaiting TCU placement. Continue with current poc

## 2023-10-14 LAB
ANION GAP SERPL CALCULATED.3IONS-SCNC: 12 MMOL/L (ref 7–15)
BUN SERPL-MCNC: 12.8 MG/DL (ref 8–23)
CALCIUM SERPL-MCNC: 9.5 MG/DL (ref 8.8–10.2)
CHLORIDE SERPL-SCNC: 100 MMOL/L (ref 98–107)
CREAT SERPL-MCNC: 0.81 MG/DL (ref 0.67–1.17)
DEPRECATED HCO3 PLAS-SCNC: 24 MMOL/L (ref 22–29)
EGFRCR SERPLBLD CKD-EPI 2021: >90 ML/MIN/1.73M2
ERYTHROCYTE [DISTWIDTH] IN BLOOD BY AUTOMATED COUNT: 13.3 % (ref 10–15)
GLUCOSE SERPL-MCNC: 107 MG/DL (ref 70–99)
HCT VFR BLD AUTO: 36.4 % (ref 40–53)
HGB BLD-MCNC: 12 G/DL (ref 13.3–17.7)
MAGNESIUM SERPL-MCNC: 2.1 MG/DL (ref 1.7–2.3)
MCH RBC QN AUTO: 30.8 PG (ref 26.5–33)
MCHC RBC AUTO-ENTMCNC: 33 G/DL (ref 31.5–36.5)
MCV RBC AUTO: 94 FL (ref 78–100)
PHOSPHATE SERPL-MCNC: 2.8 MG/DL (ref 2.5–4.5)
PLATELET # BLD AUTO: 404 10E3/UL (ref 150–450)
POTASSIUM SERPL-SCNC: 4.3 MMOL/L (ref 3.4–5.3)
RBC # BLD AUTO: 3.89 10E6/UL (ref 4.4–5.9)
SODIUM SERPL-SCNC: 136 MMOL/L (ref 135–145)
WBC # BLD AUTO: 6.8 10E3/UL (ref 4–11)

## 2023-10-14 PROCEDURE — 99223 1ST HOSP IP/OBS HIGH 75: CPT | Performed by: INTERNAL MEDICINE

## 2023-10-14 PROCEDURE — 87340 HEPATITIS B SURFACE AG IA: CPT

## 2023-10-14 PROCEDURE — 93005 ELECTROCARDIOGRAM TRACING: CPT

## 2023-10-14 PROCEDURE — 250N000013 HC RX MED GY IP 250 OP 250 PS 637

## 2023-10-14 PROCEDURE — 250N000013 HC RX MED GY IP 250 OP 250 PS 637: Performed by: PSYCHIATRY & NEUROLOGY

## 2023-10-14 PROCEDURE — 86704 HEP B CORE ANTIBODY TOTAL: CPT

## 2023-10-14 PROCEDURE — 250N000013 HC RX MED GY IP 250 OP 250 PS 637: Performed by: NURSE PRACTITIONER

## 2023-10-14 PROCEDURE — 93010 ELECTROCARDIOGRAM REPORT: CPT | Performed by: INTERNAL MEDICINE

## 2023-10-14 PROCEDURE — 36415 COLL VENOUS BLD VENIPUNCTURE: CPT

## 2023-10-14 PROCEDURE — 99418 PROLNG IP/OBS E/M EA 15 MIN: CPT | Performed by: INTERNAL MEDICINE

## 2023-10-14 PROCEDURE — 85027 COMPLETE CBC AUTOMATED: CPT

## 2023-10-14 PROCEDURE — 250N000011 HC RX IP 250 OP 636: Performed by: NURSE PRACTITIONER

## 2023-10-14 PROCEDURE — 99207 PR APP CREDIT; MD BILLING SHARED VISIT: CPT | Performed by: PHYSICIAN ASSISTANT

## 2023-10-14 PROCEDURE — 83735 ASSAY OF MAGNESIUM: CPT | Performed by: NURSE PRACTITIONER

## 2023-10-14 PROCEDURE — 80048 BASIC METABOLIC PNL TOTAL CA: CPT

## 2023-10-14 PROCEDURE — 258N000003 HC RX IP 258 OP 636

## 2023-10-14 PROCEDURE — 84100 ASSAY OF PHOSPHORUS: CPT | Performed by: NURSE PRACTITIONER

## 2023-10-14 PROCEDURE — 86706 HEP B SURFACE ANTIBODY: CPT

## 2023-10-14 PROCEDURE — 99232 SBSQ HOSP IP/OBS MODERATE 35: CPT | Mod: GC | Performed by: PSYCHIATRY & NEUROLOGY

## 2023-10-14 PROCEDURE — 120N000002 HC R&B MED SURG/OB UMMC

## 2023-10-14 RX ORDER — METOPROLOL TARTRATE 1 MG/ML
5 INJECTION, SOLUTION INTRAVENOUS ONCE
Status: DISCONTINUED | OUTPATIENT
Start: 2023-10-14 | End: 2023-10-14

## 2023-10-14 RX ADMIN — FOLIC ACID 1 MG: 1 TABLET ORAL at 07:49

## 2023-10-14 RX ADMIN — TICAGRELOR 90 MG: 90 TABLET ORAL at 07:49

## 2023-10-14 RX ADMIN — Medication 1 TABLET: at 07:49

## 2023-10-14 RX ADMIN — SPIRONOLACTONE 25 MG: 25 TABLET ORAL at 07:49

## 2023-10-14 RX ADMIN — FUROSEMIDE 40 MG: 40 TABLET ORAL at 07:49

## 2023-10-14 RX ADMIN — POTASSIUM & SODIUM PHOSPHATES POWDER PACK 280-160-250 MG 1 PACKET: 280-160-250 PACK at 17:56

## 2023-10-14 RX ADMIN — ASPIRIN 81 MG CHEWABLE TABLET 81 MG: 81 TABLET CHEWABLE at 07:49

## 2023-10-14 RX ADMIN — ACETAMINOPHEN 1000 MG: 500 TABLET ORAL at 06:05

## 2023-10-14 RX ADMIN — ACETAMINOPHEN 1000 MG: 500 TABLET ORAL at 21:51

## 2023-10-14 RX ADMIN — HEPARIN SODIUM 5000 UNITS: 5000 INJECTION, SOLUTION INTRAVENOUS; SUBCUTANEOUS at 06:06

## 2023-10-14 RX ADMIN — POTASSIUM & SODIUM PHOSPHATES POWDER PACK 280-160-250 MG 1 PACKET: 280-160-250 PACK at 20:03

## 2023-10-14 RX ADMIN — HEPARIN SODIUM 5000 UNITS: 5000 INJECTION, SOLUTION INTRAVENOUS; SUBCUTANEOUS at 21:51

## 2023-10-14 RX ADMIN — ACETAMINOPHEN 1000 MG: 500 TABLET ORAL at 15:00

## 2023-10-14 RX ADMIN — SENNOSIDES AND DOCUSATE SODIUM 1 TABLET: 50; 8.6 TABLET ORAL at 20:03

## 2023-10-14 RX ADMIN — ATORVASTATIN CALCIUM 80 MG: 80 TABLET, FILM COATED ORAL at 20:03

## 2023-10-14 RX ADMIN — THIAMINE HCL TAB 100 MG 100 MG: 100 TAB at 07:49

## 2023-10-14 RX ADMIN — LOSARTAN POTASSIUM 100 MG: 100 TABLET, FILM COATED ORAL at 07:49

## 2023-10-14 RX ADMIN — TICAGRELOR 90 MG: 90 TABLET ORAL at 20:03

## 2023-10-14 RX ADMIN — METOPROLOL SUCCINATE 100 MG: 100 TABLET, EXTENDED RELEASE ORAL at 07:49

## 2023-10-14 RX ADMIN — SODIUM CHLORIDE 500 ML: 9 INJECTION, SOLUTION INTRAVENOUS at 15:31

## 2023-10-14 RX ADMIN — HEPARIN SODIUM 5000 UNITS: 5000 INJECTION, SOLUTION INTRAVENOUS; SUBCUTANEOUS at 15:01

## 2023-10-14 ASSESSMENT — ACTIVITIES OF DAILY LIVING (ADL)
ADLS_ACUITY_SCORE: 39
ADLS_ACUITY_SCORE: 41
ADLS_ACUITY_SCORE: 39
ADLS_ACUITY_SCORE: 41
ADLS_ACUITY_SCORE: 39

## 2023-10-14 NOTE — PROGRESS NOTES
Calorie Count  Intake recorded for: 10/13  Total Kcals: 615 Total Protein: 21g  Kcals from Hospital Food: 615   Protein: 21g  Kcals from Outside Food (average): 0 Protein: 0g  # Meals Ordered from Kitchen: 3  # Meals Recorded: 1 (100% 2 pepsis, 60% fresh fruit cup, 5% beef pot roast w/ beef gravy, mashed potatoes w/ beef gravy, carrots)  # Supplements Recorded: 1 (100% 1 Ensure Enlive)

## 2023-10-14 NOTE — PLAN OF CARE
Status: Patient admitted om 10/3 with left MCA/OMER stroke  Vitals: Hypotensive with HR up to 160s, 500 mL bolus given - pt went into a-fib with RVR - RRT called, bolus with adequate relief, page providers if sustaining HR above 120s - is in known a-fib  Neuros: Disoriented to time, Right pupil (2mm) larger than left (1mm), RUE 4/5 slightly ataxic  IV: PIV SL  Labs/Electrolytes: K, mag and phos replaced, re-draw scheduled for tomorrow  Diet: Regular - duke counts day 3/3, ate 100% of meals  Bowel status: Last Bm today  : Voiding spontaneously in urinal  Skin: Intact except RLQ bruising and bleeding today after heparin shot, MD aware, gauze and tape over area to stop bleeding.  Pain: Denies  Activity: Up with A1 and walker - bed alarm on at all times  Social: cooperative with cares  Plan: Addiction service saw patient today, discharge to TCU pending placement, continue to monitor and follow POC

## 2023-10-14 NOTE — PROVIDER NOTIFICATION
Dr. Peña paged regarding patient being in a.flutter with multiple PVCs, HR range from , see chart.

## 2023-10-14 NOTE — PROGRESS NOTES
Care Management Follow Up    Length of Stay (days): 11    Expected Discharge Date: 10/17/2023     Concerns to be Addressed: discharge planning   Patient plan of care discussed at interdisciplinary rounds: Yes    Anticipated Discharge Disposition:  TCU     Anticipated Discharge Services:  post-acute rehab services  Anticipated Discharge DME:  TBD    Patient/family educated on Medicare website which has current facility and service quality ratings: Pt provided with in-network [employer] facility list  Education Provided on the Discharge Plan: Yes  Patient/Family in Agreement with the Plan: yes    Referrals Placed by CM/SW:      Pending:   [facility]  [place] [address]  Ph: [phone]  10/14: Referral sent    [facility]  [address] [place] [address]  P: [phone] ([facility] - [facility])   F: [phone]  10/14: Referral sent    [facility]  [address] [place] [address]  P: [phone] ([place])  F: [phone]  10/14: Referral sent    [facility]  [address] [place] [address]  P: [phone]  Fax: [phone]  10/14: Referral sent    [facility] [address]    [address] [place] [address]  Main Phone: [phone]  Main Fax: [phone]   Admissions Phone [facility]: [phone]  [facility] Cell [phone]  Admissions Fax: [phone]  10/14: Referral sent      Declined:   [facility] [place] [place]  # [phone] (admissions)  10/12: referral sent via epic  10/14: Not in network with [employer]    Private pay costs discussed: Not applicable    Additional Information:  SW following for discharge planning. PMR consult recommending TCU. Pt originally had ARU recommendations but was declined by ARU facilities as they felt pt was a better candidate for TCU.     SW met with pt at bedside to obtain in-network TCU choices. Pt has [employer] insurance. Pt reported that he had  "\"kind of\" looked over the in-network [employer] TCU list provided to him yesterday. SW went through list with pt and pt reported that he was fine with SW sending referrals to the first 5 TCUs listed. Pt requested that SW update pt spouse. SW to keep pt updated on TCU referral process.     SW left  with pt spouse (Dinorah) requesting call back to provide update on pt discharge plan.     SW made TCU referrals as noted above to in-network facilities.     ODIN Samuels  [place]   [facility]     Social Work and Care Management Department       SEARCHABLE in [employer] - search SOCIAL WORK       [place] (0800 - 1630) Saturday and Sunday     Units: 4A, 4C, & 4E Pager: [phone]     Units: 5A, 5B, & 5C Pager: [phone]     Units: 6A & 6B   Pager: [phone]     Units: 6C & 6D Pager: [phone]     Units: 7A & 7B  Pager: [phone]     Units: 7C & 7D Pager: [phone]     Unit: [place] ED Pager: [phone]      [facility] ([id number]) Saturday and Sunday      Units: 5 Ortho, 5 Med/Surg & WB ED  Pager:[phone]     Units: 6 Med/Surg, 8A, & 10A ICU  Pager: [phone]        After hours (1630 - 0000) Saturday & Sunday; ([id number]) Mon-Fri; ([phone]) [facility] Recognized Holidays     Units: ALL  Pager: [phone]     "

## 2023-10-14 NOTE — PROGRESS NOTES
Status: Pt admitted for acute L MCA/OMER stroke, s/p left carotid stenting and angioplasty on 10/3.   PMHx: Falls at home, recent multiple rib fractures, L sided CVA (2022), PE (09/23), A-fib on Xarelto noncompliant, CAD, PAD< HTN, HLD, COPD, alcohol, and tobacco abuse diorder  Vitals: VSS ex intermittent tachycardia but not sustained, with continuous pulse ox. On CCM - A-fib noted, team was made aware  Neuros: Unchanged - Disoriented to time. R pupil > L pupil. Strength RUE 4/5, AOE 5/5. RUE ataxic. R field cut.   IV: PIV SL  Labs/Electrolytes: K+, phos and mag replacement initiated, recheck scheduled Resp/trach: WNL on RA  Diet: regular. On Stuart counts.   Bowel status: Loose BM 10/13  : voiding spont via urinal   Skin: Scabbing to BLE  Pain: denies pain  Activity: A1, GB, and walker.   Plan: Addiction Service consult placed. medically ready for discharge, Awaiting TCU placement. Continue with current poc

## 2023-10-14 NOTE — PROGRESS NOTES
[id number]  Status: Patient admitted om 10/3 with left MCA/OMER stroke  Vitals: VSS  Neuros: Disoriented to time, Right pupil (2mm) larger than left (1mm), RUE 4/5 slightly ataxic  IV: PIV saline locked  Labs/Electrolytes: K, mag and phos replaced, re-draw scheduled for tomorrow  Diet: Regular  Bowel status: Last Bm today  : Voiding spontaneously in urinal  Skin: Intact  Pain: Denies  Activity: Up with A1 and walker  Social: cooperative with cares  Plan: Addiction service consult ordered, discharge to TCU pending placement, continue to monitor and follow POC

## 2023-10-14 NOTE — CONSULTS
[facility]   Consult Note - Addiction Service     Date of Admission:  10/3/2023    Consult Requested by: Neurology  Reason for Consult: severe alcohol dependence    Assessment & Plan   Eber Jin is a 61 yo with a PMHx of HFrEF, Afib on zeralto (with adherence challenges), PAD, PEs, recurrent falls with fractures, transferred from OSH with stroke and SAH.  Concerns exist that underlying severe alcohol dependence is a major contributor to ongoing medical issues.      # Severe Alcohol Use Disorder   # medication non-adherence  # complications from non-adherence including stroke  # multiple falls with fractures, complicated by alcohol use  ALthough he clearly has a long history of severe alcohol use disorder, despite several severe medical consequences and relationship as well as legal issues, he is not able to set a goal re: alcohol intake today.  We discussed different options in case he decides to cut down, including medications to manage cravings, including acamprosate and naltrexone.  However, he isn't ready to commit at this time.  - he also is not interested in additional resources at this time.  - we will reassess tomorrow    # Mental health:He denies having mental health causes for alcohol intake, and declines interventions    # Immunization review:   Consider Hep A IgG and Hep B serologies     # Peer Support:   -Our peer  will meet the patient if agreeable and still hospitalized on Thursday, to provide additional outpatient resources  -To contact Alison Peer  from [facility] ([facility]): call or text: [phone]    # Addiction Social Worker:   Our addiction social worker Denton Wagner can be contacted if needed, on her pager [phone] or texted/called at [phone]    # Linkage to Care:   He currently declines interventions or linkage to supports.  Will reassess again tomorrow    The patient's care was discussed with the  Bedside Nurse, Patient, and Primary team.    I spent 120 minutes on the unit/floor managing the care of Eber Jin. Over 50% of my time was spent on the following:   Significant education and counseling spent on: how substance use disorders and dependence occur, and how it can become a chronic relapsing and remitting medical condition.  In addition, the pharmacology of medical treatments including naltrexone, the importance of follow up, and Harm Reduction advice on how to use substances in a less harmful way why trying to cut down were discussed today.      Adrian Bridges MD  [facility]   Contact information available via [facility] Paging/Directory  Please see sign in/sign out for up to date coverage information    ChAT team (Addiction Consult Team): Coverage Monday-Friday 8-4pm     ______________________________________________________________________    Chief Complaint   Alcohol dependence    History is obtained from the patient    History of Present Illness   Eber Jin is a 61 yo with a PMHx of HFrEF, Afib on zeralto (with adherence challenges), PAD, PEs, recurrent falls with fractures, transferred from OSH with stroke and SAH.  Concerns exist that underlying severe alcohol dependence is a major contributor to ongoing medical issues.      Although conversational, he has a relatively flat affect, and provides short answers to questions.    Alcohol use history:   Reports drinking alcohol frequently since he turned 19.  Currently, reports having 4 12 ounce beer several times a week, and 1/2 pint a liquor 1-2 times per week.    Has been seen be addiction medicine consult teams while hospitalized in the past; at [facility] in 9/2023, and declined medications for alcohol use disorder.  At that time, and in previous social work notes, there were concerns that he was minimizing intake.  Social work notes from the past also mentions that his wife Dinorah as concerns  about his drinking; he confirms that she is bothered by his intake.    Has done AA in the past.  Went to residential treatment 16 year ago.      He reports drinking starts later in the day most days.  Says he hasn't really tried to cut down, and isn't sure about goals re: alcohol intake.  He reports drinking in the past to socialize, but now, mostly due to boredom.  He denies drinking to self-medicate trauma, pain, depression, anxiety, or PTSD. He is not sure if he is interested in medications to help him cut down, since he doesn't have a goal yet.    Withdrawal history: he denies withdrawing in the past      Other substances used: denies      Active or prior justice related issues secondary to substance use: DUI x 2, last around 2018  History of physical or sex abuse:    Identified race/ethnicity/important cultural/spiritual/ethnic identities: Black  Employed: retired/laid off in 2020  Housing status/insecurity: , has 3 children.    Transportation status/insecurity: gets rdies from friends  Telephone status/insecurity: has phone  Additional family member or friend who can support health goals: wife, although she has significant medical needs    Review of Systems   The 10 point Review of Systems is negative other than noted in the HPI or here.      Past Medical History:   Diagnosis Date    Accelerated hypertension     Arthritis     CAD (coronary artery disease)     Cerebrovascular accident (CVA), unspecified mechanism (H)     CHF (congestive heart failure) (H)     Chronic atrial fibrillation (H)     on rivaroxaban    COPD (chronic obstructive pulmonary disease) (H)     Heart failure (H)     ischemic, EF 45 % im 3/2021    HLD (hyperlipidemia)     HTN (hypertension)     Hypertensive urgency     Myocardial infarction (H)     PAD (peripheral artery disease) (H24)     Peripheral vascular disease with claudication (H24)     Prolonged Q-T interval on ECG        Past Surgical History:   Procedure Laterality Date     CARDIAC CATHETERIZATION      IR ABDOMINAL AORTOGRAM  8/6/2013    IR CAROTID CEREBRAL ANGIOGRAM BILATERAL  10/3/2023    IR EXTREMITY ANGIOGRAM BILATERAL  8/6/2013       Social History   Social History     Socioeconomic History    Marital status:      Spouse name: Not on file    Number of children: Not on file    Years of education: Not on file    Highest education level: Not on file   Occupational History    Not on file   Tobacco Use    Smoking status: Every Day     Packs/day: 0.50     Years: 30.00     Additional pack years: 0.00     Total pack years: 15.00     Types: Cigarettes    Smokeless tobacco: Never   Vaping Use    Vaping Use: Never used   Substance and Sexual Activity    Alcohol use: Not Currently     Alcohol/week: 1.0 - 2.0 standard drink of alcohol     Comment: Alcoholic Drinks/day: rare use    Drug use: No    Sexual activity: Yes     Partners: Female   Other Topics Concern    Not on file   Social History Narrative    Patient is not on supplemental O2 at home. Patient does not use any assistive device for ambulation. Full code.      Social Determinants of Health     Financial Resource Strain: Not on file   Food Insecurity: Not on file   Transportation Needs: Not on file   Physical Activity: Not on file   Stress: Not on file   Social Connections: Not on file   Interpersonal Safety: Not on file   Housing Stability: Not on file       Family History   I have reviewed this patient's family history and updated it with pertinent information if needed.  Family History   Problem Relation Age of Onset    Heart Failure Mother     No Known Problems Father     No Known Problems Brother          Medications   I have reviewed this patient's current medications    Allergies   No Known Allergies    Physical Exam   Temp: 98  F (36.7  C) Temp src: Oral BP: 111/86 Pulse: 80   Resp: 16 SpO2: 99 % O2 Device: None (Room air)      Gen: NAD  HEENT: EOMI, PERRL, MMM  CV: extremities warm and well perfused  Resp: breathing  comfortably on RA  : deferred  Msk: no LE edema  Skin: no rashes      Due to regulation of Title 42 of the Code of Federal Regulations (CFR) Part 2: Confidentiality laws apply to this note and the information wherein.  Thus, this note cannot be copy and pasted into any other health care staff's note nor can it be included in general medical records sent to ANY outside agency without the patient's written consent.

## 2023-10-14 NOTE — PROVIDER NOTIFICATION
"Text page sent to Dr. Peña at [phone]:  \"FYI - pt got subQ Hep shot at 0600, site still bleeding. Pressure dressing placed.\"  "

## 2023-10-14 NOTE — PROVIDER NOTIFICATION
10/14/23 1553   Call Information   Date of Call 10/14/23   Time of Call 1540   Name of person requesting the team Dee  (bedside RN)   Title of person requesting team RN   RRT Arrival time 1545   Time RRT ended 1610   Reason for call   Type of RRT Adult   Primary reason for call Cardiovascular   Cardiovascular Other (describe)  (Afib with RVR)   Was patient transferred from the ED, ICU, or PACU within last 24 hours prior to RRT call? No   SBAR   Situation Pt with new onset Afib RVR   Background From provider note-history of tobacco use, HTN, HLD, CAD, HFrEF 35%, paroxysmal a-fib on xarelto and aspirin, recent unprovoked PE (in the setting of medication non-compliance), R pontine infarct 03/2021 with baseline mild left leg weakness c/b recurrent falls and gait imbalance, COPD, recent admission 9/20-24/23 to [facility] for rib fractures and PE who was initially admitted to [facility] ED 10/2/23 for surgical workup of multiple traumatic L displaced rib fractures in setting of recurrent falls, transferred to [facility] for consideration of thrombectomy after stroke code called 10/3/23 for aphasia, found to have acute L MCA/OMER watershed infarcts and possible L M2 superior division occlusion. Initial NIHSS 17. He underwent L carotid stenting and angioplasty. Post CT head with L frontal SAH that was stable on repeat scan.   Notable History/Conditions Congestive heart failure;Neurological;Recent surgery   Assessment Pt noted to intitially be in afib RVR but responsive to IV fluid bolus and now a-fib 100s to 120s. Vital signs otherwise stable. Pt reports no distress or new symptoms. Pt resting comfortably in bed.   Interventions Fluid bolus;Other (describe)  (Primary team to consult cardiology)   Adjustments to Recommend none- agree with provider   RRT Team   Attending/Primary/Covering Physician Neurology   Date Attending Physician notified 10/14/23   Time Attending Physician notified 1540   Physician(s) Timothy Kang    Lead RN Allison HOLM & Adelita Price   RT n/a   Post RRT Intervention Assessment   Post RRT Assessment Stable/Improved   Date Follow Up Done 10/14/23   Time Follow Up Done 1218

## 2023-10-14 NOTE — PROGRESS NOTES
Pt refusing to wear c-pap while sleeping and dropping down to 65% and apneic while asleep. Provider notified.

## 2023-10-14 NOTE — CODE/RAPID RESPONSE
Rapid Response Team Note    Assessment   In assessment a rapid response was called on Eber Jin due to  atrial fibrillation with RVR . This presentation is likely due to asymptomatic AF with RVR.     Plan   -  Complete 1000 mL bolus ordered by primary  -  Monitor HR on telemetry  -  Consider IV metoprolol 5mg q 15 min x 3 vs cardiology consult if better rate control desired   -  The Neurology primary team was paged and currently awaiting a response.  -  Disposition: The patient will remain on the current unit. We will continue to monitor this patient closely.  -  Reassessment and plan follow-up will be performed by the primary team      Timothy Arguello PA-C  [facility] RRT [facility] Job Code Contact #[phone]  [facility] Paging/Directory    Hospital Course   Brief Summary of events leading to rapid response:   RRT called for AF w RVR.  Longstanding AF. Admitted to neurology with stroke.  Has been in AF since admission, now with RVR.  -160's x 15 minutes.  BP borderline low.  Primary team ordered a fluid bolus which just started.  HR 90-110s on arrival.  Patient denies dizziness, palpitations, chest pain or dyspnea. K and Mg at goal. Has underlying COPD and CEZAR.  Has not been using CPAP.  No hypoxia on RA.     Admission Diagnosis:   Stroke (H) [I63.9]    Physical Exam   Temp: 98.2  F (36.8  C) Temp  Min: 98  F (36.7  C)  Max: 99.1  F (37.3  C)  Resp: 21 Resp  Min: 0  Max: 39  SpO2: 100 % SpO2  Min: 65 %  Max: 100 %  Pulse: (!) 127 Pulse  Min: 73  Max: 158    No data recorded  BP: (!) 87/77 Systolic (24hrs), Av , Min:80 , Max:142   Diastolic (24hrs), Av, Min:62, Max:90     I/Os: I/O last 3 completed shifts:  In: 500 [P.O.:500]  Out: 650 [Urine:650]     Exam:   General: in no acute distress  Mental Status: AAOx4.  CV:  Irregularly irregular. No murmurs or gallops.  Pulm:  Scattered exp wheeze.   Ext:  no edema    Significant Results and Procedures     I have personally reviewed the following data over  the past 24 hrs:    6.8  \   12.0 (L)   / 404     136 100 12.8 /  107 (H)   4.3 24 0.81 \       Imaging results reviewed over the past 24 hrs:   No results found for this or any previous visit (from the past 24 hour(s)).

## 2023-10-14 NOTE — PROGRESS NOTES
[facility]    Stroke Progress Note    Interval Events  - No acute events over night, no complaints or questions this morning. Still willing to speak with addiction medicine   - Working on TCU placement - unfortunately, his insurance does not allow him to be at the same TCU as his wife  - Plan to assess patient if he's amenable to sleep study outpatient given apneic event while sleeping with desat to 65% per nursing  - Hep A IgG and Hep B serologies per addiction medicine    Today with a-fib with RVR; EKG confirmed a-fib. For some time, with sustained rates > 140, max 158. Soft pressures, averaging 80-90's / 70's. Rapid response called by nursing. Gave 500 ml IVF with improvement in heart rate.    HPI Summary  Eber Jin is a 62 year old man with history of tobacco use, HTN, HLD, CAD, HFrEF 35%, paroxysmal a-fib on xarelto and aspirin, recent unprovoked PE (in the setting of medication non-compliance), R pontine infarct 03/2021 with baseline mild left leg weakness c/b recurrent falls and gait imbalance, COPD, recent admission 9/20-24/23 to [facility] for rib fractures and PE who was initially admitted to [place] ED 10/2/23 for surgical workup of multiple traumatic L displaced rib fractures in setting of recurrent falls, transferred to [facility] for consideration of thrombectomy after stroke code called 10/3/23 for aphasia, found to have acute L MCA/OMER watershed infarcts and possible L M2 superior division occlusion. Initial NIHSS 17. He underwent L carotid stenting and angioplasty. Post CT head with L frontal SAH that was stable on repeat scan.      Stroke Evaluation Summarized     MRI/Head CT Post Proedure CT 10/3/23: Acute left frontal subarachnoid hemorrhage.         MRI:  1.  Multiple foci of acute to subacute ischemic change throughout the left cerebral hemisphere, distribution is typical of watershed ischemia.  2.  Additional acute to subacute  ischemic change in the left occipital lobe.  3.  Age-related changes as above   Intracranial Vasculature 1. The internal carotid arteries are diminutive opacification to the ICA terminus is and proximal anterior cerebral and middle cerebral arteries with poor visualization of the distal anterior vasculature.      2. No significant contrast opacification is identified within the posterior circulation.     3. While these findings can be seen in setting of hypoperfusion, injection related artifact could have a similar appearance. MRI brain/MRA head and neck is recommended for further evaluation.   Cervical Vasculature 1. Atherosclerotic plaque results in critical, 90% or greater, stenosis of the proximal left ICA.      2. Right vertebral artery occlusion.      3. The left vertebral artery remains patent throughout its course, however shortly after entering the intracranial compartment both vertebral arteries, basilar artery, and posterior circulation are not visualized.      Echocardiogram No cardiac source for embolus identified   The visual ejection fraction is 55-60%.   The right atria appears normal. Mild left atrial enlargement is present.    EKG/Telemetry Atrial fibrillation with rapid ventricular response    Other Testing Not Applicable       LDL  10/4/2023: 87 mg/dL   A1C  10/3/2023: 5.3 %   Troponin No lab value available in past 48 hrs         Impression   Eber Jin is a 62 year old man with history of tobacco use, HTN, HLD, CAD, HFrEF 35%, paroxysmal a-fib on xarelto and aspirin, recent unprovoked PE (in the setting of medication non-compliance), who was initially admitted to [facility] ED 10/2/23 for surgical workup of multiple traumatic L displaced rib fractures in setting of recurrent falls, transferred to [facility] for consideration of thrombectomy after stroke code called 10/3/23 for aphasia, found to have acute L MCA/OMER border zone infarcts and L M2 superior division occlusion.      Etiology of L M2 occlusion most likely extracranial atherosclerosis. Not a candidate for TNK with his Xarelto and no LVO for thrombectomy. He was taken for left carotid stenting and angioplasty. Post-procedure CT head revealed new L frontal SAH that has been stable on repeat stability scans.      Plan  #Acute L MCA/OMER border zone infarcts   #Possible L M2 occlusion  #L ICA stenosis > 90% s/p stenting and angioplasty  #Acute L frontal SAH  #Hx R pontine stroke 03/2021  - Neurochecks every 4 hours  - SBP goal < 180 mmHg  - Continue dual antiplatelet therapy (DAPT): Brilinta 90 mg BID and aspirin 81 mg daily  - DAPT x 2 weeks from stent placement (10/17/23)   - Repeat CT head at that time  - If CT head stable, plan to restart PTA Xarelto & continue Brilinta   - Per discussion with neuro-IR:   - Order carotid US in 1 month at discharge   - Follow up with neuro-IR (Dr. Perry) in 1 month  - Vascular surgery strongly recommends aspirin over cilostazol for lifelong antiplatelet therapy given extent of PAD  - Statin: Continue PTA Lipitor 80 mg daily,    - A1c (5.3%)  - PT/OT/SLP recommending TCU, trying to arrange to be at same location as wife  - Stroke Education  - Depression (PHQ9 - 3) and Apnea Screens (yes only to fatigue throughout the day) both negative 10/10/23  - Nevertheless, would place referral to assess for sleep apnea if patient is willing to do this as patient has had multiple episodes this admission where nursing is concerned he's apneic when sleeping. 10/14 with documented desat to 65%.     #Unprovoked PE (LLL segmental and subsegmental) 09/07/23  #Multiple rib fracture L rib 4 -12 fx  09/07/23  #Recent traumatic hemothorax   In setting of recurrent falls, discovered during admission to [facility]. No worsening shortness of breath, chest pains during admission and no evidence of right heart strain on initial TTE, however has been over one week without anticoagulation.   - Per general  surgery team at [facility] [facility], patient is cleared to restart anticoagulation as needed  - Hypercoagulability panel (APLS) labs negative 10/10  - Holding Xarelto (see above)  - Repeat TTE 10/9/23 shows EF 45 - 50%, no PFO or thrombus  - Repeat CTPE 10/09 negative with resolution of prior PE's  - Bilateral Lower extremity Dopplers 9/23 normal    #Paroxysmal A-fib ([id number] of 5) with RVR   - S/p 500 ml IVF with resolution of RVR today  - PTA metoprolol succinate 100 mg  - PRN IV metoprolol 2.5 for rate above 120 bpm  - Hold PTA xarelto 20 mg per above      #Hypertension  #HFrEF (EF of 35% on 9/9/2023)  #CAD prior MI  #PVD  Noted to have run of tachyarrhythmia overnight 10/08 around 0300. Rhythm concerning for atrial flutter. Patient was asymptomatic throughout, denying chest pain, palpitations, lightheadedness. Rhythm abated with metoprolol prn.   - PTA Losartan 100 mg daily   - PTA spironolactone 25 mg daily  - PTA metoprolol succinate 100 mg  - PTA Lasix 40 mg daily  - Hold PTA xarelto 20 mg per above   - PRN IV metoprolol 2.5 for rate above 120 bpm  - Cardiac monitoring  - TTE - no cardiac source for emoblus, EF 55-60%  - PRN Labetalol and Hydralazine     #PAD, bilateral   #AAA, infrarenal, 3.2 cm  #Iliac artery aneurysm, 20 mm right   CTA with run off 10/4/2023 - all chronic findings   - Vascular sugery consulted  - Continue surveillance for AAA, no acute interventions deemed necessary  - Vascular surgery strongly recommends lifelong antiplatelet therapy given extent of his PAD, with strong preference for aspirin over cilostazol  - Has follow up with Dr. Talbot of vascular surgery at [facility]     #Multiple displaced rib fractures   -Hold xarelto per above   -Tylenol 1 g q8h  -Incentive spirometry every 2 hours   - Per general surgery at [facility]: no follow-up needed post-discharge     #COPD  -Maintain O2 saturations greater than 92%     #Alcohol use disorder   #Tobacco use disorder   -Encourage cessation    -Addiction medicine consulted, appreciate recs    -Patient is currently not interested in any interventions or linkage supports   -Hep A IgG and Hep B serologies as recommended by addiction medicine  -Their peer  will meet the patient if agreeable and still hospitalized on Thursday, to provide additional outpatient resources      ICU Checklist  Lines/tubes/drains: PIV  FEN: regular diet, thin liquids  PPX: DVT - SCDs  and SQH; GI - not indicated  Code: Full Code      Patient Follow-up    - With neuro-IR and vascular surgery as above     The patient was discussed with Stroke Staff, Dr. Johnson.      Sunshine Peña MD  PGY-3 Neurology Resident   ______________________________________________________    Clinically Significant Risk Factors              # Hypoalbuminemia: Lowest albumin = 3 g/dL at 10/3/2023 10:04 PM, will monitor as appropriate       # Hypertension: Noted on problem list    # Heart failure, NOS: heart failure noted on the problem list and last echo with EF 40-50%        # Severe Malnutrition: based on nutrition assessment             Medications   Scheduled Meds   acetaminophen  1,000 mg Oral or Feeding Tube Q8H    aspirin  81 mg Oral or Feeding Tube Daily    atorvastatin  80 mg Oral QPM    folic acid  1 mg Oral or Feeding Tube Daily    furosemide  40 mg Oral Daily    heparin ANTICOAGULANT  5,000 Units Subcutaneous Q8H    lidocaine  1-2 patch Transdermal Q24H    losartan  100 mg Oral Daily    metoprolol succinate ER  100 mg Oral Daily    multivitamin w/minerals  1 tablet Oral or Feeding Tube Daily    polyethylene glycol  17 g Oral or Feeding Tube Daily    potassium & sodium phosphates  1 packet Oral 4x Daily    senna-docusate  1-2 tablet Oral or NG Tube BID    sodium chloride (PF)  3 mL Intracatheter Q8H    spironolactone  25 mg Oral Daily    thiamine  100 mg Oral Daily    ticagrelor  90 mg Oral BID       Infusion Meds   - MEDICATION INSTRUCTIONS -         PRN Meds  acetaminophen, hydrALAZINE,  labetalol, - MEDICATION INSTRUCTIONS -, melatonin, metoprolol, sodium chloride (PF)       PHYSICAL EXAMINATION  Temp:  [98  F (36.7  C)-99.1  F (37.3  C)] 98.2  F (36.8  C)  Pulse:  [] 152  Resp:  [0-39] 23  BP: ()/(62-90) 99/78  SpO2:  [65 %-100 %] 100 %      Neurologic  Mental Status:  alert, conversing appropriately, oriented to self and age but not month or year. Follows commands, speech clear and fluent  Cranial Nerves:  EOMI with normal smooth pursuit, R inferior quadrantanopia; facial sensation intact and symmetric, facial movements symmetric; hearing not formally tested but intact to conversation, palate elevation symmetric and uvula midline, no dysarthria, shoulder shrug strong bilaterally, tongue protrusion midline   Motor:  normal muscle bulk, no abnormal movements, able to move all limbs spontaneously. Mild drift, including pronator drift in RUE but not to bed. Subtly satellites around R arm. Decreased speed with R finger tapping.  Reflexes:   deferred  Sensory:  light touch sensation intact and symmetric throughout upper and lower extremities, no extinction on double simultaneous stimulation   Coordination: mild ataxia RUE on finger-to-nose (likely in setting of weakness); all other limbs show normal finger-to-nose and heel-to-shin bilaterally without dysmetria  Station/Gait: deferred    Stroke Scales    NIHSS  1a. Level of Consciousness 0-->Alert, keenly responsive   1b. LOC Questions 1   1c. LOC Commands 0-->Performs both tasks correctly   2.   Best Gaze 0-->Normal   3.   Visual 1-->Partial hemianopia   4.   Facial Palsy 0-->Normal symmetrical movements   5a. Motor Arm, Left 0-->No drift, limb holds 90 (or 45) degrees for full 10 secs   5b. Motor Arm, Right 1-->Drift, limb holds 90 (or 45) degrees, but drifts down before full 10 secs, does not hit bed or other support   6a. Motor Leg, Left 0-->No drift, leg holds 30 degree position for full 5 secs   6b. Motor Leg, right 0-->No drift, leg  "holds 30 degree position for full 5 secs   7.   Limb Ataxia 1-->Present in one limb   8.   Sensory 0-->Normal, no sensory loss   9.   Best Language 0-->No aphasia, normal   10. Dysarthria 0-->Normal   11. Extinction and Inattention  0-->No abnormality   Total 4 (10/14/23 1153)       Imaging  I personally reviewed all imaging; relevant findings per HPI.     Lab Results Data   CBC  Recent Labs   Lab 10/14/23  0612 10/13/23  0547 10/12/23  0545   WBC 6.8 7.3 9.2   RBC 3.89* 3.85* 3.95*   HGB 12.0* 11.7* 12.0*   HCT 36.4* 36.8* 37.0*    386 401     Basic Metabolic Panel    Recent Labs   Lab 10/14/23  0612 10/13/23  0547 10/12/23  0545    135 137   POTASSIUM 4.3 3.7 4.3   CHLORIDE 100 101 101   CO2 24 25 26   BUN 12.8 13.8 15.2   CR 0.81 0.91 0.98   * 119* 92   LISA 9.5 9.4 9.3     Liver Panel  No results for input(s): \"PROTTOTAL\", \"ALBUMIN\", \"BILITOTAL\", \"ALKPHOS\", \"AST\", \"ALT\", \"BILIDIRECT\" in the last 168 hours.  INR    Recent Labs   Lab Test 10/10/23  1057 10/03/23  2137 10/03/23  1646   INR 0.98 1.10 1.13      Lipid Profile    Recent Labs   Lab Test 10/04/23  0604 10/03/23  2204 11/25/22  0922 03/07/21  0633   CHOL  --  160 201* 160   HDL  --  36* 38* 32*   LDL 87 103* 144* 106   TRIG  --  107 96 110     A1C    Recent Labs   Lab Test 10/03/23  2138 05/12/22  1604 07/19/18  1033   A1C 5.3 5.8* 5.8     Troponin  No results for input(s): \"CTROPT\", \"TROPONINIS\", \"TROPONINI\", \"GHTROP\" in the last 168 hours.       Data     "

## 2023-10-15 ENCOUNTER — APPOINTMENT (OUTPATIENT)
Dept: PHYSICAL THERAPY | Facility: CLINIC | Age: 63
DRG: 034 | End: 2023-10-15
Payer: COMMERCIAL

## 2023-10-15 LAB
ANION GAP SERPL CALCULATED.3IONS-SCNC: 13 MMOL/L (ref 7–15)
BUN SERPL-MCNC: 18.4 MG/DL (ref 8–23)
CALCIUM SERPL-MCNC: 9.3 MG/DL (ref 8.8–10.2)
CHLORIDE SERPL-SCNC: 100 MMOL/L (ref 98–107)
CREAT SERPL-MCNC: 0.84 MG/DL (ref 0.67–1.17)
DEPRECATED HCO3 PLAS-SCNC: 23 MMOL/L (ref 22–29)
EGFRCR SERPLBLD CKD-EPI 2021: >90 ML/MIN/1.73M2
ERYTHROCYTE [DISTWIDTH] IN BLOOD BY AUTOMATED COUNT: 13.2 % (ref 10–15)
GLUCOSE BLDC GLUCOMTR-MCNC: 93 MG/DL (ref 70–99)
GLUCOSE SERPL-MCNC: 88 MG/DL (ref 70–99)
HCT VFR BLD AUTO: 38.4 % (ref 40–53)
HGB BLD-MCNC: 12 G/DL (ref 13.3–17.7)
MAGNESIUM SERPL-MCNC: 1.9 MG/DL (ref 1.7–2.3)
MCH RBC QN AUTO: 30.5 PG (ref 26.5–33)
MCHC RBC AUTO-ENTMCNC: 31.3 G/DL (ref 31.5–36.5)
MCV RBC AUTO: 98 FL (ref 78–100)
PHOSPHATE SERPL-MCNC: 3.4 MG/DL (ref 2.5–4.5)
PLATELET # BLD AUTO: 367 10E3/UL (ref 150–450)
POTASSIUM SERPL-SCNC: 4.1 MMOL/L (ref 3.4–5.3)
RBC # BLD AUTO: 3.94 10E6/UL (ref 4.4–5.9)
SODIUM SERPL-SCNC: 136 MMOL/L (ref 135–145)
WBC # BLD AUTO: 6.9 10E3/UL (ref 4–11)

## 2023-10-15 PROCEDURE — 250N000011 HC RX IP 250 OP 636: Performed by: NURSE PRACTITIONER

## 2023-10-15 PROCEDURE — 120N000002 HC R&B MED SURG/OB UMMC

## 2023-10-15 PROCEDURE — 97530 THERAPEUTIC ACTIVITIES: CPT | Mod: GP

## 2023-10-15 PROCEDURE — 250N000013 HC RX MED GY IP 250 OP 250 PS 637

## 2023-10-15 PROCEDURE — 250N000013 HC RX MED GY IP 250 OP 250 PS 637: Performed by: NURSE PRACTITIONER

## 2023-10-15 PROCEDURE — 99233 SBSQ HOSP IP/OBS HIGH 50: CPT | Performed by: INTERNAL MEDICINE

## 2023-10-15 PROCEDURE — 250N000013 HC RX MED GY IP 250 OP 250 PS 637: Performed by: PSYCHIATRY & NEUROLOGY

## 2023-10-15 PROCEDURE — 80048 BASIC METABOLIC PNL TOTAL CA: CPT

## 2023-10-15 PROCEDURE — 84100 ASSAY OF PHOSPHORUS: CPT | Performed by: NURSE PRACTITIONER

## 2023-10-15 PROCEDURE — 36415 COLL VENOUS BLD VENIPUNCTURE: CPT

## 2023-10-15 PROCEDURE — 85027 COMPLETE CBC AUTOMATED: CPT

## 2023-10-15 PROCEDURE — 99232 SBSQ HOSP IP/OBS MODERATE 35: CPT | Mod: GC | Performed by: PSYCHIATRY & NEUROLOGY

## 2023-10-15 PROCEDURE — 97112 NEUROMUSCULAR REEDUCATION: CPT | Mod: GP

## 2023-10-15 PROCEDURE — 83735 ASSAY OF MAGNESIUM: CPT | Performed by: NURSE PRACTITIONER

## 2023-10-15 PROCEDURE — 86708 HEPATITIS A ANTIBODY: CPT

## 2023-10-15 RX ORDER — MAGNESIUM OXIDE 400 MG/1
400 TABLET ORAL EVERY 4 HOURS
Status: COMPLETED | OUTPATIENT
Start: 2023-10-15 | End: 2023-10-15

## 2023-10-15 RX ADMIN — MAGNESIUM OXIDE TAB 400 MG (241.3 MG ELEMENTAL MG) 400 MG: 400 (241.3 MG) TAB at 16:59

## 2023-10-15 RX ADMIN — POTASSIUM & SODIUM PHOSPHATES POWDER PACK 280-160-250 MG 1 PACKET: 280-160-250 PACK at 16:58

## 2023-10-15 RX ADMIN — METOPROLOL SUCCINATE 100 MG: 100 TABLET, EXTENDED RELEASE ORAL at 09:58

## 2023-10-15 RX ADMIN — ATORVASTATIN CALCIUM 80 MG: 80 TABLET, FILM COATED ORAL at 20:40

## 2023-10-15 RX ADMIN — LOSARTAN POTASSIUM 100 MG: 100 TABLET, FILM COATED ORAL at 09:56

## 2023-10-15 RX ADMIN — THIAMINE HCL TAB 100 MG 100 MG: 100 TAB at 09:58

## 2023-10-15 RX ADMIN — ACETAMINOPHEN 1000 MG: 500 TABLET ORAL at 22:03

## 2023-10-15 RX ADMIN — POTASSIUM & SODIUM PHOSPHATES POWDER PACK 280-160-250 MG 1 PACKET: 280-160-250 PACK at 20:40

## 2023-10-15 RX ADMIN — TICAGRELOR 90 MG: 90 TABLET ORAL at 20:40

## 2023-10-15 RX ADMIN — FUROSEMIDE 40 MG: 40 TABLET ORAL at 09:55

## 2023-10-15 RX ADMIN — Medication 1 TABLET: at 09:57

## 2023-10-15 RX ADMIN — HEPARIN SODIUM 5000 UNITS: 5000 INJECTION, SOLUTION INTRAVENOUS; SUBCUTANEOUS at 05:59

## 2023-10-15 RX ADMIN — POTASSIUM & SODIUM PHOSPHATES POWDER PACK 280-160-250 MG 1 PACKET: 280-160-250 PACK at 09:57

## 2023-10-15 RX ADMIN — TICAGRELOR 90 MG: 90 TABLET ORAL at 09:59

## 2023-10-15 RX ADMIN — ACETAMINOPHEN 1000 MG: 500 TABLET ORAL at 05:59

## 2023-10-15 RX ADMIN — SENNOSIDES AND DOCUSATE SODIUM 1 TABLET: 50; 8.6 TABLET ORAL at 20:40

## 2023-10-15 RX ADMIN — HEPARIN SODIUM 5000 UNITS: 5000 INJECTION, SOLUTION INTRAVENOUS; SUBCUTANEOUS at 13:21

## 2023-10-15 RX ADMIN — ACETAMINOPHEN 1000 MG: 500 TABLET ORAL at 13:20

## 2023-10-15 RX ADMIN — POTASSIUM & SODIUM PHOSPHATES POWDER PACK 280-160-250 MG 1 PACKET: 280-160-250 PACK at 13:21

## 2023-10-15 RX ADMIN — ASPIRIN 81 MG CHEWABLE TABLET 81 MG: 81 TABLET CHEWABLE at 09:57

## 2023-10-15 RX ADMIN — SPIRONOLACTONE 25 MG: 25 TABLET ORAL at 09:58

## 2023-10-15 RX ADMIN — FOLIC ACID 1 MG: 1 TABLET ORAL at 09:57

## 2023-10-15 RX ADMIN — HEPARIN SODIUM 5000 UNITS: 5000 INJECTION, SOLUTION INTRAVENOUS; SUBCUTANEOUS at 22:01

## 2023-10-15 RX ADMIN — MAGNESIUM OXIDE TAB 400 MG (241.3 MG ELEMENTAL MG) 400 MG: 400 (241.3 MG) TAB at 13:21

## 2023-10-15 ASSESSMENT — ACTIVITIES OF DAILY LIVING (ADL)
ADLS_ACUITY_SCORE: 39

## 2023-10-15 NOTE — PROGRESS NOTES
[facility]     Addiction Progress Note - Addiction Service        Date of Admission:  10/3/2023  Assessment & Plan       Eber Jin is a 61 yo with a PMHx of HFrEF, Afib on zeralto (with adherence challenges), PAD, PEs, recurrent falls with fractures, transferred from OSH with stroke and SAH.  Concerns exist that underlying severe alcohol dependence is a major contributor to ongoing medical issues.       # Severe Alcohol Use Disorder   # medication non-adherence  # complications from non-adherence including stroke  # multiple falls with fractures, complicated by alcohol use  Was able to connect with his wife Dinorah.  She reports being hospitalized herself, and hasn't been home since February (in and out of hospital/TCU); but he isn't home alone, as her adult children and grandchild (a 34 yo adult) are there.  She reports that he drinks heavily, everyday.  Every since he was laid off in 2020.  She says  she thinks he drinks due to a combination of boredom and depression.  When they met in 2008, he was working, and cared more about things' now, she she he doesn't seem to care.  Also, his behavior is worse when he drinks.  Ananda, for example.  She reports he either Drinks at home by himself, or or with friends.  Hwoever, his friends have started to decline to take him to the liquor store due to his declining health.  ALthough he clearly has a long history of severe alcohol use disorder, despite several severe medical consequences and relationship as well as legal issues, he is not able to set a goal re: alcohol intake today.  We discussed different options in case he decides to cut down, including medications to manage cravings, including acamprosate and naltrexone.  Today, he is able to voice a goal: wanting to cut down.  Would consider naltrexone  Recommendations/Plan:  Would highly recommend naltrexone, 50 mg Q am, for daily use.  He is considering this, but if  "he discharges, would prescribe either way, as he would benefit from having as an option.    - if he discharges today, please prescribe.      # Mental health:He denies having mental health causes for alcohol intake, and declines interventions.  However, he does likely have underlying depression. Would recommend continuing to discuss, as he would benefit from additional supports.    - he declined selective serotonin reuptake inhibitor toda y     # Immunization review:   Consider Hep A IgG and Hep B serologies     # Peer Support:   -Our peer  will meet the patient if agreeable and still hospitalized on Thursday, to provide additional outpatient resources  -To contact Alison Peer  from [facility] ([facility]): call or text: [phone]     # Addiction Social Worker:   Our addiction social worker Denton Wagner can be contacted if needed, on her pager [phone] or texted/called at [phone]     # Linkage to Care:   He currently declines interventions or linkage to supports.      The patient's care was discussed with the Bedside Nurse, Patient, Patient's Family, and Primary team.    Time Spent on this Encounter   I spent 50 minutes on the unit/floor managing the care of Eber Jin. Over 50% of my time was spent on the following:   - Counseling the patient and/or family regarding: prognosis, risks and benefits of treatment options, recommended follow-up, and medical compliance. I also counseled family on the way naltrexone works          Adrian Bridges MD on 10/15/2023 at 9:31 AM   Addiction Service   [facility]     Contact information available via [facility] Paging/Directory under \"addiction medicine\"        ______________________________________________________________________    Interval History   No acute events.   He says today that he wants to cut down on intake of alcohol.  Would consider naltrexone.  Declines mental health support    ROS:  " CV, Pulm, GI and  assessed. Pertinent positives as above, otherwise negative.     Data reviewed today: I reviewed all medications, new labs and imaging results over the last 24 hours.     Physical Exam   Vital Signs: Temp: 99.5  F (37.5  C) Temp src: Oral BP: 94/63 Pulse: 108   Resp: 16 SpO2: 96 % O2 Device: None (Room air)    Weight: 185 lbs 6.51 oz  Gen: NAD  HEENT: EOMI, PERRL, MMM  CV: extremities warm and well perfused  Resp: breathing comfortably on RA  : deferred  Msk: no LE edema  Skin: no rashes  Neuro: nonfocal exam      Due to regulation of Title 42 of the Code of Federal Regulations (CFR) Part 2: Confidentiality laws apply to this note and the information wherein.  Thus, this note cannot be copy and pasted into any other health care staff's note nor can it be included in general medical records sent to ANY outside agency without the patient's written consent.

## 2023-10-15 NOTE — PLAN OF CARE
Time of care: 2460-1260    Status: Admitted om 10/3 with left MCA/OMER stroke  Vitals: VSS since 1900, see previous RN note. Pt in A-fib, page providers if sustaining HR above 120s.  Neuros: Disoriented to time, denies n/t. R field cut and R pupil > L pupil. RUE strength 4/5 and slightly ataxic, all other 5/5.   IV: PIV SL  Labs/Electrolytes: K, mag and phos replaced, recheck in AM.   Diet: Regular diet, duke counts day 3/3   Bowel status: LBM 10/14  : Voiding spontaneously, urinal at bedside   Skin: Intact except RLQ bruising, gauze and tape in place from hep shot this AM   Pain: Denies  Activity: A1/walker. Bed alarm on at all times.  Social: No visitors this shift   Plan: Discharge TCU pending placement

## 2023-10-15 NOTE — PROGRESS NOTES
[facility]    Stroke Progress Note    Interval Events  - No acute events overnight  - No complaints this morning  - Continue to work towards TCU placement    HPI Summary  Eber Jin is a 62 year old man with history of tobacco use, HTN, HLD, CAD, HFrEF 35%, paroxysmal a-fib on xarelto and aspirin, recent unprovoked PE (in the setting of medication non-compliance), R pontine infarct 03/2021 with baseline mild left leg weakness c/b recurrent falls and gait imbalance, COPD, recent admission 9/20-24/23 to [facility] for rib fractures and PE who was initially admitted to [facility] ED 10/2/23 for surgical workup of multiple traumatic L displaced rib fractures in setting of recurrent falls, transferred to [facility] for consideration of thrombectomy after stroke code called 10/3/23 for aphasia, found to have acute L MCA/OMER watershed infarcts and possible L M2 superior division occlusion. Initial NIHSS 17. He underwent L carotid stenting and angioplasty. Post CT head with L frontal SAH that was stable on repeat scan.      Stroke Evaluation Summarized     MRI/Head CT Post Proedure CT 10/3/23: Acute left frontal subarachnoid hemorrhage.         MRI:  1.  Multiple foci of acute to subacute ischemic change throughout the left cerebral hemisphere, distribution is typical of watershed ischemia.  2.  Additional acute to subacute ischemic change in the left occipital lobe.  3.  Age-related changes as above   Intracranial Vasculature 1. The internal carotid arteries are diminutive opacification to the ICA terminus is and proximal anterior cerebral and middle cerebral arteries with poor visualization of the distal anterior vasculature.      2. No significant contrast opacification is identified within the posterior circulation.     3. While these findings can be seen in setting of hypoperfusion, injection related artifact could have a similar appearance. MRI brain/MRA  head and neck is recommended for further evaluation.   Cervical Vasculature 1. Atherosclerotic plaque results in critical, 90% or greater, stenosis of the proximal left ICA.      2. Right vertebral artery occlusion.      3. The left vertebral artery remains patent throughout its course, however shortly after entering the intracranial compartment both vertebral arteries, basilar artery, and posterior circulation are not visualized.      Echocardiogram No cardiac source for embolus identified   The visual ejection fraction is 55-60%.   The right atria appears normal. Mild left atrial enlargement is present.    EKG/Telemetry Atrial fibrillation with rapid ventricular response    Other Testing Not Applicable       LDL  10/4/2023: 87 mg/dL   A1C  10/3/2023: 5.3 %   Troponin No lab value available in past 48 hrs         Impression   Eber Jin is a 62 year old man with history of tobacco use, HTN, HLD, CAD, HFrEF 35%, paroxysmal a-fib on xarelto and aspirin, recent unprovoked PE (in the setting of medication non-compliance), who was initially admitted to [facility] ED 10/2/23 for surgical workup of multiple traumatic L displaced rib fractures in setting of recurrent falls, transferred to [facility] for consideration of thrombectomy after stroke code called 10/3/23 for aphasia, found to have acute L MCA/OMER border zone infarcts and L M2 superior division occlusion.     Etiology of L M2 occlusion most likely extracranial atherosclerosis. Not a candidate for TNK with his Xarelto and no LVO for thrombectomy. He was taken for left carotid stenting and angioplasty. Post-procedure CT head revealed new L frontal SAH that has been stable on repeat stability scans.      Plan  #Acute L MCA/OMER border zone infarcts   #Possible L M2 occlusion  #L ICA stenosis > 90% s/p stenting and angioplasty  #Acute L frontal SAH  #Hx R pontine stroke 03/2021  - Neurochecks every 4 hours  - SBP goal < 180 mmHg  - Continue dual  antiplatelet therapy (DAPT): Brilinta 90 mg BID and aspirin 81 mg daily  - DAPT x 2 weeks from stent placement (10/17/23)   - Repeat CT head at that time  - If CT head stable, plan to restart PTA Xarelto & continue Brilinta   - Per discussion with neuro-IR:   - Order carotid US in 1 month at discharge   - Follow up with neuro-IR (Dr. Perry) in 1 month  - Vascular surgery strongly recommends aspirin over cilostazol for lifelong antiplatelet therapy given extent of PAD  - Statin: Continue PTA Lipitor 80 mg daily,    - A1c (5.3%)  - PT/OT/SLP recommending TCU, trying to arrange to be at same location as wife  - Stroke Education  - Depression (PHQ9 - 3) and Apnea Screens (yes only to fatigue throughout the day) both negative 10/10/23  - Nevertheless, would place referral to assess for sleep apnea if patient is willing to do this as patient has had multiple episodes this admission where nursing is concerned he's apneic when sleeping. 10/14 with documented desat to 65%.     #Unprovoked PE (LLL segmental and subsegmental) 09/07/23  #Multiple rib fracture L rib 4 -12 fx  09/07/23  #Recent traumatic hemothorax   In setting of recurrent falls, discovered during admission to [facility]. No worsening shortness of breath, chest pains during admission and no evidence of right heart strain on initial TTE, however has been over one week without anticoagulation.   - Per general surgery team at [facility], patient is cleared to restart anticoagulation as needed  - Hypercoagulability panel (APLS) labs negative 10/10  - Holding Xarelto (see above)  - Repeat TTE 10/9/23 shows EF 45 - 50%, no PFO or thrombus  - Repeat CTPE 10/09 negative with resolution of prior PE's  - Bilateral Lower extremity Dopplers 9/23 normal     #Paroxysmal A-fib ([id number] of 5) with RVR   - S/p 500 ml IVF with resolution of RVR yesterday  - PTA metoprolol succinate 100 mg  - PRN IV metoprolol 2.5 for rate above 120 bpm  - Hold PTA xarelto 20  mg per above      #Hypertension  #HFrEF (EF of 35% on 9/9/2023)  #CAD prior MI  #PVD  Noted to have run of tachyarrhythmia overnight 10/08 around 0300. Rhythm concerning for atrial flutter. Patient was asymptomatic throughout, denying chest pain, palpitations, lightheadedness. Rhythm abated with metoprolol prn.   - PTA Losartan 100 mg daily   - PTA spironolactone 25 mg daily  - PTA metoprolol succinate 100 mg  - PTA Lasix 40 mg daily  - Hold PTA xarelto 20 mg per above   - PRN IV metoprolol 2.5 for rate above 120 bpm  - Cardiac monitoring  - TTE - no cardiac source for emoblus, EF 55-60%  - PRN Labetalol and Hydralazine     #PAD, bilateral   #AAA, infrarenal, 3.2 cm  #Iliac artery aneurysm, 20 mm right   CTA with run off 10/4/2023 - all chronic findings   - Vascular sugery consulted  - Continue surveillance for AAA, no acute interventions deemed necessary  - Vascular surgery strongly recommends lifelong antiplatelet therapy given extent of his PAD, with strong preference for aspirin over cilostazol  - Has follow up with Dr. Talbot of vascular surgery at [facility]     #Multiple displaced rib fractures   -Hold xarelto per above   -Tylenol 1 g q8h  -Incentive spirometry every 2 hours   - Per general surgery at [facility]: no follow-up needed post-discharge     #COPD  -Maintain O2 saturations greater than 92%     #Alcohol use disorder   #Tobacco use disorder   -Encourage cessation   -Addiction medicine consulted, appreciate recs               -Patient is currently not interested in any interventions or linkage supports              -Hep A IgG and Hep B serologies as recommended by addiction medicine  -Their peer  will meet the patient if agreeable and still hospitalized on Thursday, to provide additional outpatient resources      ICU Checklist  Lines/tubes/drains: PIV  FEN: regular diet, thin liquids  PPX: DVT - SCDs  and SQH; GI - not indicated  Code: Full Code      Patient Follow-up    - With neuro-IR and vascular  surgery as above     The patient was discussed with Stroke Staff, Dr. Johnson.     The patient was discussed with Stroke Staff, Dr. Johnson.     Adrian Cruz MD  Neurology Resident  Ascom: #[id number]  ______________________________________________________    Clinically Significant Risk Factors              # Hypoalbuminemia: Lowest albumin = 3 g/dL at 10/3/2023 10:04 PM, will monitor as appropriate       # Hypertension: Noted on problem list    # Heart failure, NOS: heart failure noted on the problem list and last echo with EF 40-50%        # Severe Malnutrition: based on nutrition assessment             Medications   Scheduled Meds   acetaminophen  1,000 mg Oral or Feeding Tube Q8H    aspirin  81 mg Oral or Feeding Tube Daily    atorvastatin  80 mg Oral QPM    folic acid  1 mg Oral or Feeding Tube Daily    furosemide  40 mg Oral Daily    heparin ANTICOAGULANT  5,000 Units Subcutaneous Q8H    lidocaine  1-2 patch Transdermal Q24H    losartan  100 mg Oral Daily    magnesium oxide  400 mg Oral Q4H    metoprolol succinate ER  100 mg Oral Daily    multivitamin w/minerals  1 tablet Oral or Feeding Tube Daily    polyethylene glycol  17 g Oral or Feeding Tube Daily    potassium & sodium phosphates  1 packet Oral 4x Daily    senna-docusate  1-2 tablet Oral or NG Tube BID    sodium chloride (PF)  3 mL Intracatheter Q8H    spironolactone  25 mg Oral Daily    thiamine  100 mg Oral Daily    ticagrelor  90 mg Oral BID       Infusion Meds   - MEDICATION INSTRUCTIONS -         PRN Meds  acetaminophen, hydrALAZINE, labetalol, - MEDICATION INSTRUCTIONS -, melatonin, metoprolol, sodium chloride (PF)       PHYSICAL EXAMINATION  Temp:  [98  F (36.7  C)-98.2  F (36.8  C)] 98  F (36.7  C)  Pulse:  [] 71  Resp:  [0-39] 16  BP: ()/(62-86) 126/85  SpO2:  [65 %-100 %] 100 %      Neurologic  Mental Status:  follows commands, speech clear and fluent, naming and repetition normal, oriented to person, place, situation, not to month or  year  Cranial Nerves:  PERRL, EOMI with normal smooth pursuit, facial sensation intact and symmetric, facial movements symmetric, hearing not formally tested but intact to conversation, palate elevation symmetric and uvula midline, no dysarthria, shoulder shrug strong bilaterally, tongue protrusion midline, RLQ visual field cut, blurry vision in RUQ of vision, normal visual fields on left  Motor:  normal muscle tone and bulk, no abnormal movements, able to move all limbs spontaneously, Mild drift, including pronator drift in RUE but not to bed. Subtly satellites around R arm. Decreased speed with R finger tapping.  Reflexes:   deferred  Sensory:  light touch sensation intact and symmetric throughout upper and lower extremities, no extinction on double simultaneous stimulation   Coordination:   mild ataxia RUE on finger-to-nose (likely in setting of weakness); all other limbs show normal finger-to-nose and heel-to-shin bilaterally without dysmetria  Station/Gait:  deferred    Stroke Scales    NIHSS  1a. Level of Consciousness 0-->Alert, keenly responsive   1b. LOC Questions 1-->Answers one question correctly   1c. LOC Commands 0-->Performs both tasks correctly   2.   Best Gaze 0-->Normal   3.   Visual 1-->Partial hemianopia   4.   Facial Palsy 0-->Normal symmetrical movements   5a. Motor Arm, Left 0-->No drift, limb holds 90 (or 45) degrees for full 10 secs   5b. Motor Arm, Right 1-->Drift, limb holds 90 (or 45) degrees, but drifts down before full 10 secs, does not hit bed or other support   6a. Motor Leg, Left 0-->No drift, leg holds 30 degree position for full 5 secs   6b. Motor Leg, right 0-->No drift, leg holds 30 degree position for full 5 secs   7.   Limb Ataxia 1-->Present in one limb   8.   Sensory 0-->Normal, no sensory loss   9.   Best Language 0-->No aphasia, normal   10. Dysarthria 0-->Normal   11. Extinction and Inattention  0-->No abnormality   Total 4 (10/15/23 1328)       Imaging  I personally  "reviewed all imaging; relevant findings per HPI.     Lab Results Data   CBC  Recent Labs   Lab 10/15/23  0448 10/14/23  0612 10/13/23  0547   WBC 6.9 6.8 7.3   RBC 3.94* 3.89* 3.85*   HGB 12.0* 12.0* 11.7*   HCT 38.4* 36.4* 36.8*    404 386     Basic Metabolic Panel    Recent Labs   Lab 10/15/23  0448 10/15/23  0431 10/14/23  0612 10/13/23  0547     --  136 135   POTASSIUM 4.1  --  4.3 3.7   CHLORIDE 100  --  100 101   CO2 23  --  24 25   BUN 18.4  --  12.8 13.8   CR 0.84  --  0.81 0.91   GLC 88 93 107* 119*   LISA 9.3  --  9.5 9.4     Liver Panel  No results for input(s): \"PROTTOTAL\", \"ALBUMIN\", \"BILITOTAL\", \"ALKPHOS\", \"AST\", \"ALT\", \"BILIDIRECT\" in the last 168 hours.  INR    Recent Labs   Lab Test 10/10/23  1057 10/03/23  2137 10/03/23  1646   INR 0.98 1.10 1.13      Lipid Profile    Recent Labs   Lab Test 10/04/23  0604 10/03/23  2204 11/25/22  0922 03/07/21  0633   CHOL  --  160 201* 160   HDL  --  36* 38* 32*   LDL 87 103* 144* 106   TRIG  --  107 96 110     A1C    Recent Labs   Lab Test 10/03/23  2138 05/12/22  1604 07/19/18  1033   A1C 5.3 5.8* 5.8     Troponin  No results for input(s): \"CTROPT\", \"TROPONINIS\", \"TROPONINI\", \"GHTROP\" in the last 168 hours.       Data     "

## 2023-10-15 NOTE — PROGRESS NOTES
Calorie Count  Intake recorded for: 10/14  Total Kcals: 1445 Total Protein: 76g  Kcals from Hospital Food: 1445   Protein: 76g  Kcals from Outside Food (average):0 Protein: 0g  # Meals Ordered from Kitchen: 4 meals  # Meals Recorded: 2 recorded (First - 100% scrambled egg, oatmeal, 2 capone, 8oz orange juice)  (Second - 100% ham sandwich)   # Supplements Recorded: 2 (100% 2 ensure enlive)

## 2023-10-15 NOTE — PLAN OF CARE
Goal Outcome Evaluation:      Plan of Care Reviewed With: patient    Overall Patient Progress: no changeOverall Patient Progress: no change    Outcome Evaluation: planning for discharge to TCU    Status: admitted 10/3 with left MCA/OMER stroke  Vitals: VSS/A.  Pt in A-fib.  Elevated HR at times.  Neuros: Alert. D/o to time.  R field cut.  R pupil > L pupil.  RUE strength 4/5, and slighly ataxic.  All other extremities 5/5  IV: PIV S/L  Labs/Electrolytes: Replacement ordered.  Labs reordered for tomorrow am.   Resp/trach: WDL  Diet: regular diet. Needs help with meal ordering and tray set-up.  Room service with assist ordered.  Bowel status: LBM today, 10/15.  Large loose.  : voiding spont.  Intermittent incontinence d/t urgency.  Pull-up brief in place.    Skin: intact ex RLQ bruising  Pain: denies  Activity: A1/GB/walker.  Bed alarm on at all times.  Up in chair today.   Social: no visitors were here this shift.  Plan: planning discharge to TCU, pending placement      Stroke Patients:   PLC scheduled or completed: completed 10/8  Pneumoboots in place: patient refused

## 2023-10-15 NOTE — PLAN OF CARE
Status:  Admitted om 10/3 with left MCA/OMER stroke   Vitals: VSS on RA. [facility]  Neuros: Disoriented to time, denies n/t. R field cut; R pupil slightly larger than L pupil. RUE slightly ataxic, strength 5/5   IV: PIV SL  Labs/Electrolytes: WNL  Resp/trach: Denies SOB  Diet: Regular diet with thin liquid  Bowel status: 10/14/2023  : voiding spont via bedside urinal  Skin: Intact except, gauze and tape still in place   Pain: Denies  Activity: A1/GB/walker. Not OOB this shift  Plan: PT/OT/SLP recommending TCU, trying to arrange to be at same location as wife. Continue to monitor and follow POC    Goal Outcome Evaluation:      Plan of Care Reviewed With: patient    Overall Patient Progress: improvingOverall Patient Progress: improving

## 2023-10-16 ENCOUNTER — APPOINTMENT (OUTPATIENT)
Dept: MRI IMAGING | Facility: CLINIC | Age: 63
DRG: 034 | End: 2023-10-16
Payer: COMMERCIAL

## 2023-10-16 ENCOUNTER — APPOINTMENT (OUTPATIENT)
Dept: OCCUPATIONAL THERAPY | Facility: CLINIC | Age: 63
DRG: 034 | End: 2023-10-16
Payer: COMMERCIAL

## 2023-10-16 LAB
ANION GAP SERPL CALCULATED.3IONS-SCNC: 12 MMOL/L (ref 7–15)
ATRIAL RATE - MUSE: NORMAL BPM
BUN SERPL-MCNC: 17.1 MG/DL (ref 8–23)
CALCIUM SERPL-MCNC: 9.7 MG/DL (ref 8.8–10.2)
CHLORIDE SERPL-SCNC: 102 MMOL/L (ref 98–107)
CREAT SERPL-MCNC: 0.74 MG/DL (ref 0.67–1.17)
DEPRECATED HCO3 PLAS-SCNC: 21 MMOL/L (ref 22–29)
DIASTOLIC BLOOD PRESSURE - MUSE: NORMAL MMHG
EGFRCR SERPLBLD CKD-EPI 2021: >90 ML/MIN/1.73M2
ERYTHROCYTE [DISTWIDTH] IN BLOOD BY AUTOMATED COUNT: 13.4 % (ref 10–15)
GLUCOSE SERPL-MCNC: 85 MG/DL (ref 70–99)
HAV IGG SER QL IA: REACTIVE
HBV CORE AB SERPL QL IA: NONREACTIVE
HBV SURFACE AB SERPL IA-ACNC: 19.1 M[IU]/ML
HBV SURFACE AB SERPL IA-ACNC: REACTIVE M[IU]/ML
HBV SURFACE AG SERPL QL IA: NONREACTIVE
HCT VFR BLD AUTO: 36.2 % (ref 40–53)
HGB BLD-MCNC: 11.9 G/DL (ref 13.3–17.7)
INTERPRETATION ECG - MUSE: NORMAL
MAGNESIUM SERPL-MCNC: 2 MG/DL (ref 1.7–2.3)
MCH RBC QN AUTO: 30.7 PG (ref 26.5–33)
MCHC RBC AUTO-ENTMCNC: 32.9 G/DL (ref 31.5–36.5)
MCV RBC AUTO: 93 FL (ref 78–100)
P AXIS - MUSE: NORMAL DEGREES
PHOSPHATE SERPL-MCNC: 4.1 MG/DL (ref 2.5–4.5)
PLATELET # BLD AUTO: 374 10E3/UL (ref 150–450)
POTASSIUM SERPL-SCNC: 5.1 MMOL/L (ref 3.4–5.3)
PR INTERVAL - MUSE: NORMAL MS
QRS DURATION - MUSE: 100 MS
QT - MUSE: 374 MS
QTC - MUSE: 474 MS
R AXIS - MUSE: 31 DEGREES
RBC # BLD AUTO: 3.88 10E6/UL (ref 4.4–5.9)
SODIUM SERPL-SCNC: 135 MMOL/L (ref 135–145)
SYSTOLIC BLOOD PRESSURE - MUSE: NORMAL MMHG
T AXIS - MUSE: 232 DEGREES
VENTRICULAR RATE- MUSE: 97 BPM
WBC # BLD AUTO: 7.1 10E3/UL (ref 4–11)

## 2023-10-16 PROCEDURE — 83735 ASSAY OF MAGNESIUM: CPT | Performed by: NURSE PRACTITIONER

## 2023-10-16 PROCEDURE — 120N000002 HC R&B MED SURG/OB UMMC

## 2023-10-16 PROCEDURE — 250N000013 HC RX MED GY IP 250 OP 250 PS 637: Performed by: NURSE PRACTITIONER

## 2023-10-16 PROCEDURE — 250N000013 HC RX MED GY IP 250 OP 250 PS 637

## 2023-10-16 PROCEDURE — 70551 MRI BRAIN STEM W/O DYE: CPT

## 2023-10-16 PROCEDURE — 70551 MRI BRAIN STEM W/O DYE: CPT | Mod: 26 | Performed by: STUDENT IN AN ORGANIZED HEALTH CARE EDUCATION/TRAINING PROGRAM

## 2023-10-16 PROCEDURE — 80048 BASIC METABOLIC PNL TOTAL CA: CPT

## 2023-10-16 PROCEDURE — 84100 ASSAY OF PHOSPHORUS: CPT | Performed by: NURSE PRACTITIONER

## 2023-10-16 PROCEDURE — 99232 SBSQ HOSP IP/OBS MODERATE 35: CPT | Mod: GC | Performed by: STUDENT IN AN ORGANIZED HEALTH CARE EDUCATION/TRAINING PROGRAM

## 2023-10-16 PROCEDURE — 85027 COMPLETE CBC AUTOMATED: CPT

## 2023-10-16 PROCEDURE — 250N000013 HC RX MED GY IP 250 OP 250 PS 637: Performed by: PSYCHIATRY & NEUROLOGY

## 2023-10-16 PROCEDURE — 250N000011 HC RX IP 250 OP 636: Performed by: NURSE PRACTITIONER

## 2023-10-16 PROCEDURE — 36415 COLL VENOUS BLD VENIPUNCTURE: CPT

## 2023-10-16 PROCEDURE — 97535 SELF CARE MNGMENT TRAINING: CPT | Mod: GO

## 2023-10-16 RX ORDER — MAGNESIUM OXIDE 400 MG/1
400 TABLET ORAL EVERY 4 HOURS
Status: COMPLETED | OUTPATIENT
Start: 2023-10-16 | End: 2023-10-16

## 2023-10-16 RX ORDER — NALTREXONE HYDROCHLORIDE 50 MG/1
50 TABLET, FILM COATED ORAL DAILY
Status: DISCONTINUED | OUTPATIENT
Start: 2023-10-16 | End: 2023-10-18 | Stop reason: HOSPADM

## 2023-10-16 RX ADMIN — MAGNESIUM OXIDE TAB 400 MG (241.3 MG ELEMENTAL MG) 400 MG: 400 (241.3 MG) TAB at 12:07

## 2023-10-16 RX ADMIN — FUROSEMIDE 40 MG: 40 TABLET ORAL at 09:01

## 2023-10-16 RX ADMIN — LOSARTAN POTASSIUM 100 MG: 100 TABLET, FILM COATED ORAL at 09:01

## 2023-10-16 RX ADMIN — HEPARIN SODIUM 5000 UNITS: 5000 INJECTION, SOLUTION INTRAVENOUS; SUBCUTANEOUS at 22:47

## 2023-10-16 RX ADMIN — METOPROLOL SUCCINATE 100 MG: 100 TABLET, EXTENDED RELEASE ORAL at 09:01

## 2023-10-16 RX ADMIN — FOLIC ACID 1 MG: 1 TABLET ORAL at 09:01

## 2023-10-16 RX ADMIN — POTASSIUM & SODIUM PHOSPHATES POWDER PACK 280-160-250 MG 1 PACKET: 280-160-250 PACK at 16:01

## 2023-10-16 RX ADMIN — MAGNESIUM OXIDE TAB 400 MG (241.3 MG ELEMENTAL MG) 400 MG: 400 (241.3 MG) TAB at 09:01

## 2023-10-16 RX ADMIN — HEPARIN SODIUM 5000 UNITS: 5000 INJECTION, SOLUTION INTRAVENOUS; SUBCUTANEOUS at 14:49

## 2023-10-16 RX ADMIN — POTASSIUM & SODIUM PHOSPHATES POWDER PACK 280-160-250 MG 1 PACKET: 280-160-250 PACK at 20:02

## 2023-10-16 RX ADMIN — TICAGRELOR 90 MG: 90 TABLET ORAL at 20:02

## 2023-10-16 RX ADMIN — SPIRONOLACTONE 25 MG: 25 TABLET ORAL at 09:01

## 2023-10-16 RX ADMIN — NALTREXONE HYDROCHLORIDE 50 MG: 50 TABLET, FILM COATED ORAL at 12:08

## 2023-10-16 RX ADMIN — ATORVASTATIN CALCIUM 80 MG: 80 TABLET, FILM COATED ORAL at 20:02

## 2023-10-16 RX ADMIN — THIAMINE HCL TAB 100 MG 100 MG: 100 TAB at 09:01

## 2023-10-16 RX ADMIN — ASPIRIN 81 MG CHEWABLE TABLET 81 MG: 81 TABLET CHEWABLE at 09:01

## 2023-10-16 RX ADMIN — POTASSIUM & SODIUM PHOSPHATES POWDER PACK 280-160-250 MG 1 PACKET: 280-160-250 PACK at 12:08

## 2023-10-16 RX ADMIN — TICAGRELOR 90 MG: 90 TABLET ORAL at 09:01

## 2023-10-16 RX ADMIN — POTASSIUM & SODIUM PHOSPHATES POWDER PACK 280-160-250 MG 1 PACKET: 280-160-250 PACK at 09:01

## 2023-10-16 RX ADMIN — Medication 1 TABLET: at 09:01

## 2023-10-16 RX ADMIN — ACETAMINOPHEN 1000 MG: 500 TABLET ORAL at 06:28

## 2023-10-16 RX ADMIN — HEPARIN SODIUM 5000 UNITS: 5000 INJECTION, SOLUTION INTRAVENOUS; SUBCUTANEOUS at 06:29

## 2023-10-16 ASSESSMENT — ACTIVITIES OF DAILY LIVING (ADL)
ADLS_ACUITY_SCORE: 35
ADLS_ACUITY_SCORE: 35
ADLS_ACUITY_SCORE: 39
ADLS_ACUITY_SCORE: 35
ADLS_ACUITY_SCORE: 39
ADLS_ACUITY_SCORE: 36
ADLS_ACUITY_SCORE: 35
ADLS_ACUITY_SCORE: 36
ADLS_ACUITY_SCORE: 39
ADLS_ACUITY_SCORE: 36

## 2023-10-16 NOTE — PLAN OF CARE
Goal Outcome Evaluation:      Plan of Care Reviewed With: patient    Overall Patient Progress: improvingOverall Patient Progress: improving    Outcome Evaluation: Walked around unit x2 with therapy    Status: Pt admitted for acute L MCA/OMER stroke, s/p left carotid stenting and angioplasty on 10/3.   Vitals: VSS ex intermittent tachycardia but not sustained, with continuous pulse ox. On CCM - A-fib noted, team was made aware  Neuros: Unchanged - Disoriented to time. R pupil > L pupil. Strength RUE 4/5, AOE 5/5. RUE ataxic. R field cut. Slight ataxic on RLE-Mds aware.    IV: PIV SL  Labs/Electrolytes: Phos and mag replaced. Redraw in AM.  Resp/trach: RA. On continuous pulse ox.   Diet: Regular. Good intake this shift   Bowel status: BM this shift 10/16 BS+  : Voiding spont via urinal   Skin: Scabbing to BLE  Pain: Denied.   Activity: A1/GB/walker. Walked with therapy this shift Plan: SW following for placement.   Updates this shift: RLE slightly ataxic. Plan to get MR of brain this afternoon. Checklist completed.

## 2023-10-16 NOTE — PROGRESS NOTES
[facility]    Stroke Progress Note    Interval Events  - No acute events overnight  - No complaints this morning  - Agreeable to naltrexone therapy per addiction medicine  - Continue to work towards TCU placement  - Right lower extremity ataxia noticed this morning. Unclear if this is new. No MRIs were done after Neuro IR procedure. Hence ordered Repeat MRI scan to look for any new Ischaemic insults.     HPI Summary  Eber Jin is a 62 year old man with history of tobacco use, HTN, HLD, CAD, HFrEF 35%, paroxysmal a-fib on xarelto and aspirin, recent unprovoked PE (in the setting of medication non-compliance), R pontine infarct 03/2021 with baseline mild left leg weakness c/b recurrent falls and gait imbalance, COPD, recent admission 9/20-24/23 to [facility] for rib fractures and PE who was initially admitted to [facility] ED 10/2/23 for surgical workup of multiple traumatic L displaced rib fractures in setting of recurrent falls, transferred to [facility] for consideration of thrombectomy after stroke code called 10/3/23 for aphasia, found to have acute L MCA/OMER watershed infarcts and possible L M2 superior division occlusion. Initial NIHSS 17. He underwent L carotid stenting and angioplasty. Post CT head with L frontal SAH that was stable on repeat scan.      Stroke Evaluation Summarized     MRI/Head CT Post Proedure CT 10/3/23: Acute left frontal subarachnoid hemorrhage.         MRI on 10/2/23:  1.  Multiple foci of acute to subacute ischemic change throughout the left cerebral hemisphere, distribution is typical of watershed ischemia.  2.  Additional acute to subacute ischemic change in the left occipital lobe.  3.  Age-related changes as above   Intracranial Vasculature 1. The internal carotid arteries are diminutive opacification to the ICA terminus is and proximal anterior cerebral and middle cerebral arteries with poor visualization of the distal  anterior vasculature.      2. No significant contrast opacification is identified within the posterior circulation.     3. While these findings can be seen in setting of hypoperfusion, injection related artifact could have a similar appearance. MRI brain/MRA head and neck is recommended for further evaluation.   Cervical Vasculature 1. Atherosclerotic plaque results in critical, 90% or greater, stenosis of the proximal left ICA.      2. Right vertebral artery occlusion.      3. The left vertebral artery remains patent throughout its course, however shortly after entering the intracranial compartment both vertebral arteries, basilar artery, and posterior circulation are not visualized.      Echocardiogram No cardiac source for embolus identified   The visual ejection fraction is 55-60%.   The right atria appears normal. Mild left atrial enlargement is present.    EKG/Telemetry Atrial fibrillation with rapid ventricular response    Other Testing Not Applicable       LDL  10/4/2023: 87 mg/dL   A1C  10/3/2023: 5.3 %   Troponin No lab value available in past 48 hrs         Impression   Eber Jin is a 62 year old man with history of tobacco use, HTN, HLD, CAD, HFrEF 35%, paroxysmal a-fib on xarelto and aspirin, recent unprovoked PE (in the setting of medication non-compliance), who was initially admitted to [facility] ED 10/2/23 for surgical workup of multiple traumatic L displaced rib fractures in setting of recurrent falls, transferred to [facility] for consideration of thrombectomy after stroke code called 10/3/23 for aphasia, found to have acute L MCA/OMER border zone infarcts and L M2 superior division occlusion.     Etiology of L M2 occlusion most likely extracranial atherosclerosis. Not a candidate for TNK with his Xarelto and no LVO for thrombectomy. He was taken for left carotid stenting and angioplasty. Post-procedure CT head revealed new L frontal SAH that has been stable on repeat stability scans.  Repeat MRI on 10/16 ordered after worsening unilateral R-sided upper and lower extremity ataxia, concerning for new breezy-procedural strokes.    Today's Updates:  - repeat MRI brain w/o to further evaluate new unilateral right upper and lower extremity ataxia  - started naltrexone 50mg once daily for alcohol use disorder     Plan  #Acute L MCA/OMER border zone infarcts   #Possible L M2 occlusion  #L ICA stenosis > 90% s/p stenting and angioplasty  #Acute L frontal SAH  #Hx R pontine stroke 03/2021  - Repeat MRI brain 10/16/23   - Neurochecks every 4 hours  - SBP goal < 180 mmHg  - Continue dual antiplatelet therapy (DAPT): Brilinta 90 mg BID and aspirin 81 mg daily  - DAPT x 2 weeks from stent placement (10/17/23)   - Repeat CT head at that time  - If CT head stable, plan to restart PTA Xarelto & continue Brilinta   - Per discussion with neuro-IR:   - Order carotid US in 1 month at discharge   - Follow up with neuro-IR (Dr. Perry) in 1 month  - Vascular surgery strongly recommends aspirin over cilostazol for lifelong antiplatelet therapy given extent of PAD  - Statin: Continue PTA Lipitor 80 mg daily,    - A1c (5.3%)  - PT/OT/SLP recommending TCU, trying to arrange to be at same location as wife  - Stroke Education  - Depression (PHQ9 - 3) and Apnea Screens (yes only to fatigue throughout the day) both negative 10/10/23  - Nevertheless, would place referral to assess for sleep apnea if patient is willing to do this as patient has had multiple episodes this admission where nursing is concerned he's apneic when sleeping. 10/14 with documented desat to 65%.     #Unprovoked PE (LLL segmental and subsegmental) 09/07/23  #Multiple rib fracture L rib 4 -12 fx  09/07/23  #Recent traumatic hemothorax   In setting of recurrent falls, discovered during admission to [facility]. No worsening shortness of breath, chest pains during admission and no evidence of right heart strain on initial TTE, however has been  over one week without anticoagulation.   - Per general surgery team at [place], patient is cleared to restart anticoagulation as needed  - Hypercoagulability panel (APLS) labs negative 10/10  - Holding Xarelto (see above)  - Repeat TTE 10/9/23 shows EF 45 - 50%, no PFO or thrombus  - Repeat CTPE 10/09 negative with resolution of prior PE's  - Bilateral Lower extremity Dopplers 9/23 normal     #Paroxysmal A-fib ([id number] of 5) with RVR   - S/p 500 ml IVF with resolution of RVR yesterday  - PTA metoprolol succinate 100 mg  - PRN IV metoprolol 2.5 for rate above 120 bpm  - Hold PTA xarelto 20 mg per above      #Hypertension  #HFrEF (EF of 35% on 9/9/2023)  #CAD prior MI  #PVD  Noted to have run of tachyarrhythmia overnight 10/08 around 0300. Rhythm concerning for atrial flutter. Patient was asymptomatic throughout, denying chest pain, palpitations, lightheadedness. Rhythm abated with metoprolol prn.   - PTA Losartan 100 mg daily   - PTA spironolactone 25 mg daily  - PTA metoprolol succinate 100 mg  - PTA Lasix 40 mg daily  - Hold PTA xarelto 20 mg per above   - PRN IV metoprolol 2.5 for rate above 120 bpm  - Cardiac monitoring  - TTE - no cardiac source for emoblus, EF 55-60%  - PRN Labetalol and Hydralazine     #PAD, bilateral   #AAA, infrarenal, 3.2 cm  #Iliac artery aneurysm, 20 mm right   CTA with run off 10/4/2023 - all chronic findings   - Vascular sugery consulted  - Continue surveillance for AAA, no acute interventions deemed necessary  - Vascular surgery strongly recommends lifelong antiplatelet therapy given extent of his PAD, with strong preference for aspirin over cilostazol  - Has follow up with Dr. Talbot of vascular surgery at [facility]     #Multiple displaced rib fractures   -Hold xarelto per above   -Tylenol 1 g q8h  -Incentive spirometry every 2 hours   - Per general surgery at [place]: no follow-up needed post-discharge     #COPD  -Maintain O2 saturations greater than 92%     #Alcohol use  disorder   #Tobacco use disorder   -Encourage cessation   -Addiction medicine consulted, appreciate recs               -naltrexone 50mg PO once daily started 10/16/2023              -Hep A IgG and Hep B serologies: WNL  -Their peer  will meet the patient if agreeable and still hospitalized on Thursday, to provide additional outpatient resources      ICU Checklist  Lines/tubes/drains: PIV  FEN: regular diet, thin liquids  PPX: DVT - SCDs  and SQH; GI - not indicated  Code: Full Code      Patient Follow-up    - With neuro-IR and vascular surgery as above     The patient was discussed with Stroke Staff, SHEELA Waters MD  Neurology Resident PGY 3  ASCOM: [id number]    ______________________________________________________    Clinically Significant Risk Factors              # Hypoalbuminemia: Lowest albumin = 3 g/dL at 10/3/2023 10:04 PM, will monitor as appropriate       # Hypertension: Noted on problem list    # Heart failure, NOS: heart failure noted on the problem list and last echo with EF 40-50%        # Severe Malnutrition: based on nutrition assessment             Medications   Scheduled Meds   acetaminophen  1,000 mg Oral or Feeding Tube Q8H    aspirin  81 mg Oral or Feeding Tube Daily    atorvastatin  80 mg Oral QPM    folic acid  1 mg Oral or Feeding Tube Daily    furosemide  40 mg Oral Daily    heparin ANTICOAGULANT  5,000 Units Subcutaneous Q8H    lidocaine  1-2 patch Transdermal Q24H    losartan  100 mg Oral Daily    metoprolol succinate ER  100 mg Oral Daily    multivitamin w/minerals  1 tablet Oral or Feeding Tube Daily    naltrexone  50 mg Oral Daily    polyethylene glycol  17 g Oral or Feeding Tube Daily    potassium & sodium phosphates  1 packet Oral 4x Daily    senna-docusate  1-2 tablet Oral or NG Tube BID    sodium chloride (PF)  3 mL Intracatheter Q8H    spironolactone  25 mg Oral Daily    thiamine  100 mg Oral Daily    ticagrelor  90 mg Oral BID       Infusion  Meds   - MEDICATION INSTRUCTIONS -         PRN Meds  acetaminophen, hydrALAZINE, labetalol, - MEDICATION INSTRUCTIONS -, melatonin, metoprolol, sodium chloride (PF)       PHYSICAL EXAMINATION  Temp:  [97.1  F (36.2  C)-99  F (37.2  C)] 99  F (37.2  C)  Pulse:  [68-90] 85  Resp:  [17-26] 22  BP: (100-130)/(66-91) 130/89  SpO2:  [99 %-100 %] 100 %      Neurologic  Mental Status:  follows commands, speech clear and fluent, naming and repetition normal, oriented to person, situation, month, not to place or year  Cranial Nerves:  PEERL, EOMI with normal smooth pursuit, facial sensation intact and symmetric, loss of R nasolabial fold but all other facial movements intact, hearing not formally tested but intact to conversation, no dysarthria, RLQ visiual field cut with normal visual fields on left.  Motor:  normal muscle tone and bulk, no abnormal movements, able to move all limbs spontaneously, Mild drift, including pronator drift in RUE but not to bed.  Reflexes:   deferred  Sensory:  light touch sensation intact and symmetric throughout upper and lower extremities, no extinction on double simultaneous stimulation   Coordination:   mild ataxia RUE on finger-to-nose and in RLE on heel-to-shin; all other limbs show normal finger-to-nose and heel-to-shin without dysmetria  Station/Gait:  deferred    Stroke Scales    NIHSS  1a. Level of Consciousness 0-->Alert, keenly responsive   1b. LOC Questions 1-->Answers one question correctly   1c. LOC Commands 0-->Performs both tasks correctly   2.   Best Gaze 0-->Normal   3.   Visual 1-->Partial hemianopia   4.   Facial Palsy 1--> Loss of nasolabial fold   5a. Motor Arm, Left 0-->No drift, limb holds 90 (or 45) degrees for full 10 secs   5b. Motor Arm, Right 1-->Drift, limb holds 90 (or 45) degrees, but drifts down before full 10 secs, does not hit bed or other support   6a. Motor Leg, Left 0-->No drift, leg holds 30 degree position for full 5 secs   6b. Motor Leg, right 0-->No  "drift, leg holds 30 degree position for full 5 secs   7.   Limb Ataxia 2 -->Present in two limbs (RUE and RLE)   8.   Sensory 0-->Normal, no sensory loss   9.   Best Language 0-->No aphasia, normal   10. Dysarthria 0-->Normal   11. Extinction and Inattention  0-->No abnormality   Total 6 (10/16/23 1300)       Imaging  I personally reviewed all imaging; relevant findings per HPI.     Lab Results Data   CBC  Recent Labs   Lab 10/16/23  0614 10/15/23  0448 10/14/23  0612   WBC 7.1 6.9 6.8   RBC 3.88* 3.94* 3.89*   HGB 11.9* 12.0* 12.0*   HCT 36.2* 38.4* 36.4*    367 404     Basic Metabolic Panel    Recent Labs   Lab 10/16/23  0614 10/15/23  0448 10/15/23  0431 10/14/23  0612    136  --  136   POTASSIUM 5.1 4.1  --  4.3   CHLORIDE 102 100  --  100   CO2 21* 23  --  24   BUN 17.1 18.4  --  12.8   CR 0.74 0.84  --  0.81   GLC 85 88 93 107*   LISA 9.7 9.3  --  9.5     Liver Panel  No results for input(s): \"PROTTOTAL\", \"ALBUMIN\", \"BILITOTAL\", \"ALKPHOS\", \"AST\", \"ALT\", \"BILIDIRECT\" in the last 168 hours.  INR    Recent Labs   Lab Test 10/10/23  1057 10/03/23  2137 10/03/23  1646   INR 0.98 1.10 1.13      Lipid Profile    Recent Labs   Lab Test 10/04/23  0604 10/03/23  2204 11/25/22  0922 03/07/21  0633   CHOL  --  160 201* 160   HDL  --  36* 38* 32*   LDL 87 103* 144* 106   TRIG  --  107 96 110     A1C    Recent Labs   Lab Test 10/03/23  2138 05/12/22  1604 07/19/18  1033   A1C 5.3 5.8* 5.8     Troponin  No results for input(s): \"CTROPT\", \"TROPONINIS\", \"TROPONINI\", \"GHTROP\" in the last 168 hours.       Data     "

## 2023-10-16 NOTE — PROGRESS NOTES
CLINICAL NUTRITION SERVICES - BRIEF NOTE      Reason for RD note: Following up on kcal cts    New Findings/Chart Review:  Oral Diet/Intake: Regular Diet + Thin Liquids. Suspect some incomplete data on kcal cts d/t more meals ordered than data recorded. Doing fair with kcal and protein intake (100% minimum kcal needs and ~73% minimum pro needs per kcal ct data on 10/12 & 10/14 - days of most complete data)   Kcal cts 10/12-10/14:  10/12     Total Kcals: 1858     Total Protein: 69g  Kcals from Hospital Food: 1858                                 Protein: 69g  Kcals from Outside Food (average):0            Protein: 0g  # Meals Ordered from Kitchen: 4 meals  # Meals Recorded: 3 meals  (First - 100% 1 sausage ambrose, 1 Mozambican toast w/ syrup and butter, 50% 1 8oz orange juice)  (Second - 100% 2 Pepsi)  (Third - 100% 1 Pepsi, 50%  salad w/ Mozambican dressing)  # Supplements Recorded: 100% 2 Ensure Enlive    10/13     Total Kcals: 615       Total Protein: 21g  Kcals from Hospital Food: 615                                   Protein: 21g  Kcals from Outside Food (average): 0           Protein: 0g  # Meals Ordered from Kitchen: 3  # Meals Recorded: 1 (100% 2 pepsis, 60% fresh fruit cup, 5% beef pot roast w/ beef gravy, mashed potatoes w/ beef gravy, carrots)  # Supplements Recorded: 1 (100% 1 Ensure Enlive)    10/14     Total Kcals: 1445     Total Protein: 76g  Kcals from Hospital Food: 1445                                 Protein: 76g  Kcals from Outside Food (average):0            Protein: 0g  # Meals Ordered from Kitchen: 4 meals  # Meals Recorded: 2 recorded (First - 100% scrambled egg, oatmeal, 2 capone, 8oz orange juice)  (Second - 100% ham sandwich)   # Supplements Recorded: 2 (100% 2 ensure enlive)     Interventions:  Continue Ensure Enlive @ 10am & 2pm - pt is taking per kcal cts    Nutrition will continue to follow per protocol.    Naila Nichols RD, LD  Pager: [phone]

## 2023-10-16 NOTE — PROGRESS NOTES
"Care Management Follow Up    Length of Stay (days): 13    Expected Discharge Date: Patient medically ready for discharge as of 10/7/2023     Concerns to be Addressed:    Disposition planning   Patient plan of care discussed at interdisciplinary rounds: Yes    Anticipated Discharge Disposition:  Short term rehab placement     Anticipated Discharge Services:   OT and PMR are recommending TCU placement, Physical Therapy is recommending ARU  Anticipated Discharge DME:   Not applicable at this time    Patient/family educated on Medicare website which has current facility and service quality ratings: yes  Education Provided on the Discharge Plan: yes  Patient/Family in Agreement with the Plan: yes    Referrals Placed by CM/SW:   Post acute rehab facility's  Private pay costs discussed: Not applicable at this time    Additional Information:  MARIELA is following pt for discharge planning.  Per Dr. Koroma, pt remains medically ready for discharge.    Per chart review, OT and PMR are recommending TCU placement, Physical Therapy is recommending ARU.  Pt was declined for admit to [facility] at [facility], CC at [facility] and by [facility].   These ARU's all recommended TCU.  For the above reasons, TCU is being pursued.  Pt was declined for admit to [facility] as they are out of network with pt's insurance.  MARIELA placed a follow up call to Admissions at [facility] (Alexa).   MARIELA informed Alexa that pt is medically ready for discharge.  Alexa indicates that she has offered the available bed to \"someone else.\"  MARIELA received a call from Central Admissions (Paige) for [facility] [place], stating that pt has been assessed and approved for admit. Paige asked whether or not pt will discharge on cpap.  MARIELA spoke with pt's floor nurse (Lynne) who states that pt is not using CPAP at [facility] and is also not using O2.  MARIELA spoke with Dr. Koroma who indicates that pt will not discharge on cpap. Dr. Koroma " indicates that pt will likely discharge with orders for future outpt sleep study.  Paige also asked whether or not pt will discharge on Suboxone.  Per Dr. Koroma, pt will not discharge on Suboxone but will discharge on Naltrexone.  MARIELA relayed this information to Paige.  Paige states that she will submit a prior auth request to pt's insurance and will update this SW will prior auth is received.      MARIELA will continue to follow for discharge planning.    RAMO Khan  Social Work, 6A  Phone:  [phone]  Pager:  [phone]  10/16/2023       KIERRA Gatica

## 2023-10-16 NOTE — PROGRESS NOTES
SPIRITUAL HEALTH SERVICES Consult Note  [facility] 6A    I visited Eber per length of stay.  He told me he was trying to rest right now so I said I could come back another time.     Rita Castelan  Chaplain Resident  Pager [phone]    * [facility] remains available 24/7 for emergent requests/referrals, either by having the switchboard page the on-call  or by entering an ASAP/STAT consult in Epic (this will also page the on-call ). Routine Epic consults receive an initial response within 24 hours.*

## 2023-10-16 NOTE — PLAN OF CARE
Goal Outcome Evaluation:      Plan of Care Reviewed With: patient    Overall Patient Progress: no change    Status: Admitted om 10/3 with left MCA/OMER stroke  Vitals: Known A-fib, page providers if sustaining HR above 120s. On RA   Neuros: Disoriented to time, denies n/t. R field cut and R pupil > L pupil. RUE strength 4/5 and slightly ataxic, all other 5/5.   IV: PIV SL  Labs/Electrolytes: Replaced, recheck in AM.   Diet: Regular diet, fair intake    Bowel status: LBM 10/15  : Voiding spontaneously, urinal at bedside   Skin: Intact except RLQ bruising, gauze and tape in place.   Pain: Denies  Activity: A1/walker. Bed alarm on at all times. Up in chair x1  Social: No visitors this shift   Plan: Discharge TCU pending placement

## 2023-10-16 NOTE — PLAN OF CARE
Goal Outcome Evaluation:      Plan of Care Reviewed With: patient    Overall Patient Progress: no changeOverall Patient Progress: no change    Status:  Admitted om 10/3 with left MCA/OMER stroke   Vitals: VSS on RA. [facility]  Neuros: Disoriented to time, denies n/t. R field cut; R pupil slightly > L pupil. RUE slightly ataxic 4/5, all others extremities 5/5   IV: PIV SL  Labs/Electrolytes: WNL  Resp/trach: Denies SOB  Diet: Regular diet with thin liquid  Bowel status: 10/15/2023  : voiding spont via bedside urinal  Skin: Intact except, gauze and tape on LLQ  d/t bleeding from heparin shot  Pain: Denies  Activity: A1/GB/walker. Not OOB this shift  Plan:  discharge to TCU, pending placement. Continue to monitor and follow POC

## 2023-10-17 ENCOUNTER — APPOINTMENT (OUTPATIENT)
Dept: CT IMAGING | Facility: CLINIC | Age: 63
DRG: 034 | End: 2023-10-17
Payer: COMMERCIAL

## 2023-10-17 ENCOUNTER — APPOINTMENT (OUTPATIENT)
Dept: PHYSICAL THERAPY | Facility: CLINIC | Age: 63
DRG: 034 | End: 2023-10-17
Payer: COMMERCIAL

## 2023-10-17 ENCOUNTER — APPOINTMENT (OUTPATIENT)
Dept: OCCUPATIONAL THERAPY | Facility: CLINIC | Age: 63
DRG: 034 | End: 2023-10-17
Payer: COMMERCIAL

## 2023-10-17 LAB
ANION GAP SERPL CALCULATED.3IONS-SCNC: 11 MMOL/L (ref 7–15)
BUN SERPL-MCNC: 15.1 MG/DL (ref 8–23)
CALCIUM SERPL-MCNC: 9.7 MG/DL (ref 8.8–10.2)
CHLORIDE SERPL-SCNC: 98 MMOL/L (ref 98–107)
CREAT SERPL-MCNC: 0.88 MG/DL (ref 0.67–1.17)
DEPRECATED HCO3 PLAS-SCNC: 26 MMOL/L (ref 22–29)
EGFRCR SERPLBLD CKD-EPI 2021: >90 ML/MIN/1.73M2
ERYTHROCYTE [DISTWIDTH] IN BLOOD BY AUTOMATED COUNT: 13.6 % (ref 10–15)
GLUCOSE SERPL-MCNC: 96 MG/DL (ref 70–99)
HCT VFR BLD AUTO: 36.5 % (ref 40–53)
HGB BLD-MCNC: 11.7 G/DL (ref 13.3–17.7)
MAGNESIUM SERPL-MCNC: 2 MG/DL (ref 1.7–2.3)
MCH RBC QN AUTO: 30.7 PG (ref 26.5–33)
MCHC RBC AUTO-ENTMCNC: 32.1 G/DL (ref 31.5–36.5)
MCV RBC AUTO: 96 FL (ref 78–100)
PHOSPHATE SERPL-MCNC: 3.3 MG/DL (ref 2.5–4.5)
PLATELET # BLD AUTO: 362 10E3/UL (ref 150–450)
POTASSIUM SERPL-SCNC: 4.7 MMOL/L (ref 3.4–5.3)
RBC # BLD AUTO: 3.81 10E6/UL (ref 4.4–5.9)
SODIUM SERPL-SCNC: 135 MMOL/L (ref 135–145)
WBC # BLD AUTO: 9.2 10E3/UL (ref 4–11)

## 2023-10-17 PROCEDURE — 83735 ASSAY OF MAGNESIUM: CPT | Performed by: NURSE PRACTITIONER

## 2023-10-17 PROCEDURE — 250N000013 HC RX MED GY IP 250 OP 250 PS 637

## 2023-10-17 PROCEDURE — 85027 COMPLETE CBC AUTOMATED: CPT

## 2023-10-17 PROCEDURE — 250N000011 HC RX IP 250 OP 636: Performed by: NURSE PRACTITIONER

## 2023-10-17 PROCEDURE — 97530 THERAPEUTIC ACTIVITIES: CPT | Mod: GP

## 2023-10-17 PROCEDURE — 250N000013 HC RX MED GY IP 250 OP 250 PS 637: Performed by: PSYCHIATRY & NEUROLOGY

## 2023-10-17 PROCEDURE — 70450 CT HEAD/BRAIN W/O DYE: CPT

## 2023-10-17 PROCEDURE — 36415 COLL VENOUS BLD VENIPUNCTURE: CPT

## 2023-10-17 PROCEDURE — 99232 SBSQ HOSP IP/OBS MODERATE 35: CPT | Performed by: INTERNAL MEDICINE

## 2023-10-17 PROCEDURE — 97112 NEUROMUSCULAR REEDUCATION: CPT | Mod: GO

## 2023-10-17 PROCEDURE — 97535 SELF CARE MNGMENT TRAINING: CPT | Mod: GO

## 2023-10-17 PROCEDURE — 250N000013 HC RX MED GY IP 250 OP 250 PS 637: Performed by: NURSE PRACTITIONER

## 2023-10-17 PROCEDURE — 97116 GAIT TRAINING THERAPY: CPT | Mod: GP

## 2023-10-17 PROCEDURE — 80048 BASIC METABOLIC PNL TOTAL CA: CPT

## 2023-10-17 PROCEDURE — 120N000002 HC R&B MED SURG/OB UMMC

## 2023-10-17 PROCEDURE — 99232 SBSQ HOSP IP/OBS MODERATE 35: CPT | Mod: GC | Performed by: STUDENT IN AN ORGANIZED HEALTH CARE EDUCATION/TRAINING PROGRAM

## 2023-10-17 PROCEDURE — 84100 ASSAY OF PHOSPHORUS: CPT | Performed by: NURSE PRACTITIONER

## 2023-10-17 PROCEDURE — 70450 CT HEAD/BRAIN W/O DYE: CPT | Mod: 26 | Performed by: RADIOLOGY

## 2023-10-17 RX ORDER — MAGNESIUM OXIDE 400 MG/1
400 TABLET ORAL EVERY 4 HOURS
Status: COMPLETED | OUTPATIENT
Start: 2023-10-17 | End: 2023-10-17

## 2023-10-17 RX ADMIN — SPIRONOLACTONE 25 MG: 25 TABLET ORAL at 09:44

## 2023-10-17 RX ADMIN — METOPROLOL SUCCINATE 100 MG: 100 TABLET, EXTENDED RELEASE ORAL at 09:47

## 2023-10-17 RX ADMIN — HEPARIN SODIUM 5000 UNITS: 5000 INJECTION, SOLUTION INTRAVENOUS; SUBCUTANEOUS at 05:01

## 2023-10-17 RX ADMIN — ASPIRIN 81 MG CHEWABLE TABLET 81 MG: 81 TABLET CHEWABLE at 09:45

## 2023-10-17 RX ADMIN — FOLIC ACID 1 MG: 1 TABLET ORAL at 09:47

## 2023-10-17 RX ADMIN — HEPARIN SODIUM 5000 UNITS: 5000 INJECTION, SOLUTION INTRAVENOUS; SUBCUTANEOUS at 13:18

## 2023-10-17 RX ADMIN — TICAGRELOR 90 MG: 90 TABLET ORAL at 19:57

## 2023-10-17 RX ADMIN — MAGNESIUM OXIDE TAB 400 MG (241.3 MG ELEMENTAL MG) 400 MG: 400 (241.3 MG) TAB at 13:18

## 2023-10-17 RX ADMIN — MAGNESIUM OXIDE TAB 400 MG (241.3 MG ELEMENTAL MG) 400 MG: 400 (241.3 MG) TAB at 09:48

## 2023-10-17 RX ADMIN — POTASSIUM & SODIUM PHOSPHATES POWDER PACK 280-160-250 MG 1 PACKET: 280-160-250 PACK at 09:48

## 2023-10-17 RX ADMIN — RIVAROXABAN 20 MG: 20 TABLET, FILM COATED ORAL at 16:05

## 2023-10-17 RX ADMIN — Medication 1 TABLET: at 09:47

## 2023-10-17 RX ADMIN — FUROSEMIDE 40 MG: 40 TABLET ORAL at 09:47

## 2023-10-17 RX ADMIN — POTASSIUM & SODIUM PHOSPHATES POWDER PACK 280-160-250 MG 1 PACKET: 280-160-250 PACK at 19:57

## 2023-10-17 RX ADMIN — ATORVASTATIN CALCIUM 80 MG: 80 TABLET, FILM COATED ORAL at 19:57

## 2023-10-17 RX ADMIN — HEPARIN SODIUM 5000 UNITS: 5000 INJECTION, SOLUTION INTRAVENOUS; SUBCUTANEOUS at 22:17

## 2023-10-17 RX ADMIN — POTASSIUM & SODIUM PHOSPHATES POWDER PACK 280-160-250 MG 1 PACKET: 280-160-250 PACK at 13:21

## 2023-10-17 RX ADMIN — NALTREXONE HYDROCHLORIDE 50 MG: 50 TABLET, FILM COATED ORAL at 09:52

## 2023-10-17 RX ADMIN — POTASSIUM & SODIUM PHOSPHATES POWDER PACK 280-160-250 MG 1 PACKET: 280-160-250 PACK at 16:05

## 2023-10-17 RX ADMIN — THIAMINE HCL TAB 100 MG 100 MG: 100 TAB at 09:48

## 2023-10-17 RX ADMIN — LOSARTAN POTASSIUM 100 MG: 100 TABLET, FILM COATED ORAL at 09:45

## 2023-10-17 RX ADMIN — TICAGRELOR 90 MG: 90 TABLET ORAL at 09:48

## 2023-10-17 RX ADMIN — SENNOSIDES AND DOCUSATE SODIUM 1 TABLET: 50; 8.6 TABLET ORAL at 19:57

## 2023-10-17 RX ADMIN — ACETAMINOPHEN 1000 MG: 500 TABLET ORAL at 05:00

## 2023-10-17 ASSESSMENT — ACTIVITIES OF DAILY LIVING (ADL)
ADLS_ACUITY_SCORE: 35
ADLS_ACUITY_SCORE: 39
ADLS_ACUITY_SCORE: 39
ADLS_ACUITY_SCORE: 35

## 2023-10-17 NOTE — PLAN OF CARE
Status: Pt admitted for acute L MCA/OMER stroke, s/p left carotid stenting and angioplasty on 10/3.   Vitals: VSS on RA; afib   Neuros: D/o to time. Alert. R visual neglect. R pupil > L. RUE ataxic, 4/5.   IV: L PIV SL  Resp/trach: WNL  Diet: Regular diet, swallows pills whole  Bowel status: LBM 10/16  :  Voiding w/ bedside urinal  Skin: Scabbing to BLE, bruising to abd from hep shots   Pain: Mild HA managed with tylenol   Activity: Up with 1 GB/walker  Plan: TCU vs. ARU

## 2023-10-17 NOTE — PROGRESS NOTES
Care Management Follow Up    Length of Stay (days): 14    Expected Discharge Date:   10/17/2023     Concerns to be Addressed:     Disposition planning  Patient plan of care discussed at interdisciplinary rounds:  yes    Anticipated Discharge Disposition:  Discharge to home     Anticipated Discharge Services:  Arrangements for discharge to home will be coordinated by the 6A RN CC  Anticipated Discharge DME:   Not applicable at this time    Patient/family educated on Medicare website which has current facility and service quality ratings: yes  Education Provided on the Discharge Plan: yes  Patient/Family in Agreement with the Plan: yes    Referrals Placed by CM/SW:  Post acute care facility's  Private pay costs discussed: Not applicable at this time    Additional Information:  Per Dr. Koroma, pt remains medically ready for discharge.  Dr. Koroma informed this SW that he completed the Peer to Peer with pt's insurance company.  Dr. Koroma states that insurance concluded that they will not prior authorize a rehab stay.   Dr. Koroma states that insurance indicated that pt is ambulating household distances.   MARIELA updated Central Admissions for [place] [place] (Paige) who states that she is not surprised by the outcome based on information provided by [facility] Therapy's.  Paige specifically references that pt was walking 300 feet on 10/16/2023.    MARIELA updated the 6A RNCC  who confirms that she will follow up in regards to coordinating discharge to home.  MARIELA phoned pt's wife and met with pt and informed both that insurance denied coverage for a rehab stay as per content of therapy notes, pt is performing to well and does not meet criteria for coverage.    No further SW intervention is planned at this time as discharge to home is anticipated and will be coordinated by the 6A RNCC.    SW available should add'l needs arise.      RAMO Khan  Social Work, 6A  Phone:  [phone]  Pager:  [phone]  10/17/2023        JINA GaticaW

## 2023-10-17 NOTE — PLAN OF CARE
Goal Outcome Evaluation:    Status: Pt admitted for acute L MCA/OMER stroke, s/p left carotid stenting and angioplasty on 10/3.   Vitals: VSS on RA, CCM and pulse ox, A-fib-team aware  Neuros: Unchanged, Aox3 d/t time, R pupil > L pupil, Strength RUE 4/5, AOE 5/5. RUE ataxic. R field cut. Slight ataxic on RLE-Mds aware.     IV: PIV SL  Labs/Electrolytes: Phos replaced this gabby, redraws ordered  Resp/trach: WNL, denies SOB  Diet: Regular  Bowel status: LBM 10/16, bowel meds denied  :  Voiding w/ bedside urinal  Skin: Scabbing to BLE, bruising to abdomin, bleeding to abdomin-Team paged and assessed at the bedside, dressing changed  Pain: Denies, denied scheduled tylenol  Activity: Ax1 GB/walker  Plan: SW following for placement, continue POC  Updates this shift: MRI completed, results posted, MD at the bedside to assess abdominal bleeding    Stroke Patients: Yes  PLC scheduled or completed:  Completed 10/8  Pneumoboots in place: refused          Plan of Care Reviewed With: patient    Overall Patient Progress: improvingOverall Patient Progress: improving    Outcome Evaluation: MRI completed

## 2023-10-17 NOTE — PROGRESS NOTES
Care Management Follow Up     Length of Stay (days): 14     Expected Discharge Date: Patient medically ready for discharge as of 10/7/2023     Concerns to be Addressed:    Disposition planning   Patient plan of care discussed at interdisciplinary rounds: Yes     Anticipated Discharge Disposition:  Short term rehab placement     Anticipated Discharge Services:   OT and PMR are recommending TCU placement, Physical Therapy is recommending ARU  Anticipated Discharge DME:   Not applicable at this time     Patient/family educated on Medicare website which has current facility and service quality ratings: yes  Education Provided on the Discharge Plan: yes  Patient/Family in Agreement with the Plan: yes     Referrals Placed by CM/SW:   Post acute rehab facility's  Private pay costs discussed: Not applicable at this time     Additional Information:  SW is following pt for discharge planning.  Per Dr. Koroma, pt remains medically ready for discharge.    TCU placement is being pursued.   Pt has been assessed and approved for admit to (per Paige in [facility])  [facility] pending receipt of prior auth from insurance.  MARIELA phoned Paige this am to check status of prior auth.  Paige states that pt's insurance ([employer]) requested completion of a peer to peer today before 1pm.  Paige indicated that the MD at [facility] would need to call [employer] at [phone], option 5 and provide reference number [id number] to complete the peer to peer.  MARIELA provided this information to Dr. Koroma who confirms that he will complete the peer to peer before 1pm today and will notify this SW when this task is complete.     SW will continue to follow for discharge planning.    RAMO Khan  Social Work, 6A  Phone:  [phone]  Pager:  [phone]  10/17/2023

## 2023-10-17 NOTE — PLAN OF CARE
Status: admitted for acute L MCA/OMER stroke and underwent L carotid stenting and angio. HX of previous stroke in 2021  Vitals: hypotension this afternoon but within parameters. Pt has known Afib but no notice of A-flutter this shift.  Neuros: unchanged: Oriented to self and stroke consistently, intermittently d/o to date but knows october and 2023 with choices, occasionally thinks at home instead of hospital. R. pupil >L. Mild RUE ataxia. 5/5 throughout with RUE minimally weaker. NIHSS 2. R field cut.  IV: PIV SL'd  Labs/Electrolytes: WNL  Resp/trach: WNL  Diet: tolerating regular diet without difficulty.  Bowel status: LBM 10/16  : voiding without difficulty into urinal  Skin: bruising and scabbing scattered t/o  Pain: denies  Activity: up with 1, GB, walker  Social: no visitors or phone calls this shift  Plan: discharging home likely  Updates this shift: pt pleasant and appear withdrawn.

## 2023-10-17 NOTE — PROGRESS NOTES
[facility]    Stroke Progress Note    Interval Events  - No acute events overnight  - No complaints this morning  - Abdominal bleeding yesterday evening at [facility] injection sites  - Insurance did not approve of TCU placement as patient is able to mobilize without assistance well above what is required for home limits  - Plan to discharge brilinta today and start patient on Xeralto 20 mg daily with ASA.       HPI Summary  Eber Jin is a 62 year old man with history of tobacco use, HTN, HLD, CAD, HFrEF 35%, paroxysmal a-fib on xarelto and aspirin, recent unprovoked PE (in the setting of medication non-compliance), R pontine infarct 03/2021 with baseline mild left leg weakness c/b recurrent falls and gait imbalance, COPD, recent admission 9/20-24/23 to [facility] for rib fractures and PE who was initially admitted to [facility] ED 10/2/23 for surgical workup of multiple traumatic L displaced rib fractures in setting of recurrent falls, transferred to [facility] for consideration of thrombectomy after stroke code called 10/3/23 for aphasia, found to have acute L MCA/OMER watershed infarcts and possible L M2 superior division occlusion. Initial NIHSS 17. He underwent L carotid stenting and angioplasty. Post CT head with L frontal SAH that was stable on repeat scan. MR brain repeated on 10/16 after patient presented with unilateral R sided ataxia, found to be negative for acute infarct. Repeat CT head on 10/17 shows resolution of L frontal SAH, plan to stop Brilinta and continue on PTA Xarelto and aspirin.      Stroke Evaluation Summarized     MRI/Head CT Post Proedure CT 10/3/23: Acute left frontal subarachnoid hemorrhage.         MRI on 10/2/23:  1.  Multiple foci of acute to subacute ischemic change throughout the left cerebral hemisphere, distribution is typical of watershed ischemia.  2.  Additional acute to subacute ischemic change in the left occipital  lobe.  3.  Age-related changes as above    MRI on 10/16/23:  1. Multiple old subacute/early chronic infarcts in the left cerebral  watershed zones, which were noted from brain MRI from [facility] on 10/3/2023. No definite acute infarct identified.  2. No significant change in left frontal subarachnoid hemorrhage.  3. Mild small vessel ischemic disease and diffuse cerebral atrophy.  4. Few punctate foci of susceptibility, consistent with chronic  microhemorrhage.    CT head w/o 10/17/23:  Resolution of L frontal SAH   Intracranial Vasculature 1. The internal carotid arteries are diminutive opacification to the ICA terminus is and proximal anterior cerebral and middle cerebral arteries with poor visualization of the distal anterior vasculature.      2. No significant contrast opacification is identified within the posterior circulation.     3. While these findings can be seen in setting of hypoperfusion, injection related artifact could have a similar appearance. MRI brain/MRA head and neck is recommended for further evaluation.   Cervical Vasculature 1. Atherosclerotic plaque results in critical, 90% or greater, stenosis of the proximal left ICA.      2. Right vertebral artery occlusion.      3. The left vertebral artery remains patent throughout its course, however shortly after entering the intracranial compartment both vertebral arteries, basilar artery, and posterior circulation are not visualized.      Echocardiogram No cardiac source for embolus identified   The visual ejection fraction is 55-60%.   The right atria appears normal. Mild left atrial enlargement is present.    EKG/Telemetry Atrial fibrillation with rapid ventricular response    Other Testing Not Applicable       LDL  10/4/2023: 87 mg/dL   A1C  10/3/2023: 5.3 %   Troponin No lab value available in past 48 hrs         Impression   Eber Jin is a 62 year old man with history of tobacco use, HTN, HLD, CAD, HFrEF 35%, paroxysmal  a-fib on xarelto and aspirin, recent unprovoked PE (in the setting of medication non-compliance), who was initially admitted to [facility] ED 10/2/23 for surgical workup of multiple traumatic L displaced rib fractures in setting of recurrent falls, transferred to [facility] for consideration of thrombectomy after stroke code called 10/3/23 for aphasia, found to have acute L MCA/OMER border zone infarcts and L M2 superior division occlusion.     Etiology of L M2 occlusion most likely extracranial atherosclerosis. Not a candidate for TNK with his Xarelto and no LVO for thrombectomy. He was taken for left carotid stenting and angioplasty. Post-procedure CT head revealed new L frontal SAH that has been stable on repeat stability scans. Repeat MRI on 10/16 ordered after worsening unilateral R-sided upper and lower extremity ataxia, concerning for new breezy-procedural strokes. MRI notable for no acute infarcts. Repeat CT repeated on 10/17 showing resolution of SAH and 2 weeks out from stent placement, okay to restart anticoagulation.    Today's Updates:  - Repeat CT head today showing resolution of SAH  - Start PTA Xarelto starting tomorrow (10/18) and continue on aspirin  - Stop Brilinta starting tomorrow (10/18)     Plan  #Acute L MCA/OMER border zone infarcts   #Possible L M2 occlusion  #L ICA stenosis > 90% s/p stenting and angioplasty  #Acute L frontal SAH, resolved  #Hx R pontine stroke 03/2021  - Repeat MRI brain 10/16/23   - Neurochecks every 4 hours  - SBP goal < 180 mmHg  - DAPT x 2 weeks from stent placement (10/17/23)   - Repeat CT head on 10/17 with resolution of SAH  - restart PTA Xarelto 20mg in AM (10/18) & continue aspirin 81mg  - Stop Brilinta in AM (10/18)  - Per discussion with neuro-IR:   - Order carotid US in 1 month at discharge   - Follow up with neuro-IR (Dr. Perry) in 1 month  - Vascular surgery strongly recommends aspirin over cilostazol for lifelong antiplatelet therapy given extent of  PAD  - Statin: Continue PTA Lipitor 80 mg daily,    - A1c (5.3%)  - PT/OT/SLP recommending TCU, trying to arrange to be at same location as wife  - Stroke Education  - Depression (PHQ9 - 3) and Apnea Screens (yes only to fatigue throughout the day) both negative 10/10/23  - Nevertheless, would place referral to assess for sleep apnea if patient is willing to do this as patient has had multiple episodes this admission where nursing is concerned he's apneic when sleeping. 10/14 with documented desat to 65%.     #Unprovoked PE (LLL segmental and subsegmental) 09/07/23  #Multiple rib fracture L rib 4 -12 fx  09/07/23  #Recent traumatic hemothorax   In setting of recurrent falls, discovered during admission to [facility]. No worsening shortness of breath, chest pains during admission and no evidence of right heart strain on initial TTE, however has been over one week without anticoagulation.   - Per general surgery team at [facility] [place], patient is cleared to restart anticoagulation as needed  - Hypercoagulability panel (APLS) labs negative 10/10  - PTA Xarelto restart on 10/18  - Repeat TTE 10/9/23 shows EF 45 - 50%, no PFO or thrombus  - Repeat CTPE 10/09 negative with resolution of prior PE's  - Bilateral Lower extremity Dopplers 9/23 normal     #Paroxysmal A-fib ([id number] of 5) with RVR   - S/p 500 ml IVF with resolution of RVR yesterday  - PTA metoprolol succinate 100 mg  - PRN IV metoprolol 2.5 for rate above 120 bpm  - PTA xarelto 20 mg starting 10/18/2023     #Hypertension  #HFrEF (EF of 35% on 9/9/2023)  #CAD prior MI  #PVD  Noted to have run of tachyarrhythmia overnight 10/08 around 0300. Rhythm concerning for atrial flutter. Patient was asymptomatic throughout, denying chest pain, palpitations, lightheadedness. Rhythm abated with metoprolol prn.   - PTA Losartan 100 mg daily   - PTA spironolactone 25 mg daily  - PTA metoprolol succinate 100 mg  - PTA Lasix 40 mg daily  - PTA xarelto 20 mg daily    - PRN IV metoprolol 2.5 for rate above 120 bpm  - Cardiac monitoring  - TTE - no cardiac source for breanneus, EF 55-60%  - PRN Labetalol and Hydralazine     #PAD, bilateral   #AAA, infrarenal, 3.2 cm  #Iliac artery aneurysm, 20 mm right   CTA with run off 10/4/2023 - all chronic findings   - Vascular sugery consulted  - Continue surveillance for AAA, no acute interventions deemed necessary  - Vascular surgery strongly recommends lifelong antiplatelet therapy given extent of his PAD, with strong preference for aspirin over cilostazol  - Has follow up with Dr. Talbot of vascular surgery at [facility]     #Multiple displaced rib fractures   -Tylenol 1 g q8h  -Incentive spirometry every 2 hours   - Per general surgery at [facility]: no follow-up needed post-discharge     #COPD  -Maintain O2 saturations greater than 92%     #Alcohol use disorder   #Tobacco use disorder   -Encourage cessation   -Addiction medicine consulted, appreciate recs               -naltrexone 50mg PO once daily started 10/16/2023              -Hep A IgG and Hep B serologies: WNL  -Their peer  will meet the patient if agreeable and still hospitalized on Thursday, to provide additional outpatient resources      ICU Checklist  Lines/tubes/drains: PIV  FEN: regular diet, thin liquids  PPX: DVT - SCDs  and SQH; GI - not indicated  Code: Full Code      Patient Follow-up    - With neuro-IR and vascular surgery as above     The patient was discussed with Stroke Staff, SHEELA Waters MD  Neurology Resident PGY 3  ASCOM: [id number]    ______________________________________________________    Clinically Significant Risk Factors              # Hypoalbuminemia: Lowest albumin = 3 g/dL at 10/3/2023 10:04 PM, will monitor as appropriate       # Hypertension: Noted on problem list    # Heart failure, NOS: heart failure noted on the problem list and last echo with EF 40-50%        # Severe Malnutrition: based on nutrition  assessment             Medications   Scheduled Meds   acetaminophen  1,000 mg Oral or Feeding Tube Q8H    aspirin  81 mg Oral or Feeding Tube Daily    atorvastatin  80 mg Oral QPM    folic acid  1 mg Oral or Feeding Tube Daily    furosemide  40 mg Oral Daily    heparin ANTICOAGULANT  5,000 Units Subcutaneous Q8H    lidocaine  1-2 patch Transdermal Q24H    losartan  100 mg Oral Daily    magnesium oxide  400 mg Oral Q4H    metoprolol succinate ER  100 mg Oral Daily    multivitamin w/minerals  1 tablet Oral or Feeding Tube Daily    naltrexone  50 mg Oral Daily    polyethylene glycol  17 g Oral or Feeding Tube Daily    potassium & sodium phosphates  1 packet Oral 4x Daily    senna-docusate  1-2 tablet Oral or NG Tube BID    sodium chloride (PF)  3 mL Intracatheter Q8H    spironolactone  25 mg Oral Daily    thiamine  100 mg Oral Daily    ticagrelor  90 mg Oral BID       Infusion Meds   - MEDICATION INSTRUCTIONS -         PRN Meds  acetaminophen, hydrALAZINE, labetalol, - MEDICATION INSTRUCTIONS -, melatonin, metoprolol, sodium chloride (PF)       PHYSICAL EXAMINATION  Temp:  [97.9  F (36.6  C)-99  F (37.2  C)] 98.2  F (36.8  C)  Pulse:  [70-92] 81  Resp:  [18-26] 19  BP: (111-143)/(85-91) 123/87  SpO2:  [97 %-100 %] 97 %      Neurologic  Mental Status:  follows commands, speech clear and fluent, naming and repetition normal, oriented to person, situation, month, not to place or year  Cranial Nerves:  PEERL, EOMI with normal smooth pursuit, facial sensation intact and symmetric, loss of R nasolabial fold but all other facial movements intact, tongue midline, hearing not formally tested but intact to conversation, no dysarthria, RLQ visual field cut with normal visual fields on left.  Motor:  normal muscle tone and bulk, no abnormal movements, able to move all limbs spontaneously,    Reflexes:   deferred  Sensory:  light touch sensation intact and symmetric throughout upper and lower extremities, no extinction on double  "simultaneous stimulation   Coordination:   mild ataxia RUE on finger-to-nose and in RLE on heel-to-shin; all other limbs show normal finger-to-nose and heel-to-shin without dysmetria ; HTS limited secondary to weakness  Station/Gait:  steady gait using a walker    Stroke Scales    NIHSS  1a. Level of Consciousness 0-->Alert, keenly responsive   1b. LOC Questions 1-->Answers one question correctly   1c. LOC Commands 0-->Performs both tasks correctly   2.   Best Gaze 0-->Normal   3.   Visual 1-->Partial hemianopia   4.   Facial Palsy 1--> Loss of nasolabial fold   5a. Motor Arm, Left 0-->No drift, limb holds 90 (or 45) degrees for full 10 secs   5b. Motor Arm, Right 1-->Drift, limb holds 90 (or 45) degrees, but drifts down before full 10 secs, does not hit bed or other support   6a. Motor Leg, Left 0-->No drift, leg holds 30 degree position for full 5 secs   6b. Motor Leg, right 0-->No drift, leg holds 30 degree position for full 5 secs   7.   Limb Ataxia 2 -->Present in two limbs (RUE and RLE)   8.   Sensory 0-->Normal, no sensory loss   9.   Best Language 0-->No aphasia, normal   10. Dysarthria 0-->Normal   11. Extinction and Inattention  0-->No abnormality   Total 6 (10/17/23 1210)       Imaging  I personally reviewed all imaging; relevant findings per HPI.     Lab Results Data   CBC  Recent Labs   Lab 10/17/23  0551 10/16/23  0614 10/15/23  0448   WBC 9.2 7.1 6.9   RBC 3.81* 3.88* 3.94*   HGB 11.7* 11.9* 12.0*   HCT 36.5* 36.2* 38.4*    374 367     Basic Metabolic Panel    Recent Labs   Lab 10/17/23  0551 10/16/23  0614 10/15/23  0448    135 136   POTASSIUM 4.7 5.1 4.1   CHLORIDE 98 102 100   CO2 26 21* 23   BUN 15.1 17.1 18.4   CR 0.88 0.74 0.84   GLC 96 85 88   LISA 9.7 9.7 9.3     Liver Panel  No results for input(s): \"PROTTOTAL\", \"ALBUMIN\", \"BILITOTAL\", \"ALKPHOS\", \"AST\", \"ALT\", \"BILIDIRECT\" in the last 168 hours.  INR    Recent Labs   Lab Test 10/10/23  1057 10/03/23  2137 10/03/23  1646   INR 0.98 " "1.10 1.13      Lipid Profile    Recent Labs   Lab Test 10/04/23  0604 10/03/23  2204 11/25/22  0922 03/07/21  0633   CHOL  --  160 201* 160   HDL  --  36* 38* 32*   LDL 87 103* 144* 106   TRIG  --  107 96 110     A1C    Recent Labs   Lab Test 10/03/23  2138 05/12/22  1604 07/19/18  1033   A1C 5.3 5.8* 5.8     Troponin  No results for input(s): \"CTROPT\", \"TROPONINIS\", \"TROPONINI\", \"GHTROP\" in the last 168 hours.       Data     "

## 2023-10-18 VITALS
SYSTOLIC BLOOD PRESSURE: 112 MMHG | OXYGEN SATURATION: 100 % | WEIGHT: 142.64 LBS | HEART RATE: 80 BPM | DIASTOLIC BLOOD PRESSURE: 84 MMHG | RESPIRATION RATE: 18 BRPM | TEMPERATURE: 98.5 F | BODY MASS INDEX: 19.89 KG/M2

## 2023-10-18 LAB
ANION GAP SERPL CALCULATED.3IONS-SCNC: 13 MMOL/L (ref 7–15)
BUN SERPL-MCNC: 18.5 MG/DL (ref 8–23)
CALCIUM SERPL-MCNC: 9.7 MG/DL (ref 8.8–10.2)
CHLORIDE SERPL-SCNC: 98 MMOL/L (ref 98–107)
CREAT SERPL-MCNC: 0.93 MG/DL (ref 0.67–1.17)
DEPRECATED HCO3 PLAS-SCNC: 23 MMOL/L (ref 22–29)
EGFRCR SERPLBLD CKD-EPI 2021: >90 ML/MIN/1.73M2
ERYTHROCYTE [DISTWIDTH] IN BLOOD BY AUTOMATED COUNT: 13.5 % (ref 10–15)
GLUCOSE SERPL-MCNC: 104 MG/DL (ref 70–99)
HCT VFR BLD AUTO: 37.3 % (ref 40–53)
HGB BLD-MCNC: 12.1 G/DL (ref 13.3–17.7)
MAGNESIUM SERPL-MCNC: 1.9 MG/DL (ref 1.7–2.3)
MCH RBC QN AUTO: 30 PG (ref 26.5–33)
MCHC RBC AUTO-ENTMCNC: 32.4 G/DL (ref 31.5–36.5)
MCV RBC AUTO: 92 FL (ref 78–100)
PHOSPHATE SERPL-MCNC: 4.4 MG/DL (ref 2.5–4.5)
PLATELET # BLD AUTO: 361 10E3/UL (ref 150–450)
POTASSIUM SERPL-SCNC: 4.6 MMOL/L (ref 3.4–5.3)
RBC # BLD AUTO: 4.04 10E6/UL (ref 4.4–5.9)
SODIUM SERPL-SCNC: 134 MMOL/L (ref 135–145)
WBC # BLD AUTO: 9.7 10E3/UL (ref 4–11)

## 2023-10-18 PROCEDURE — 250N000013 HC RX MED GY IP 250 OP 250 PS 637

## 2023-10-18 PROCEDURE — 83735 ASSAY OF MAGNESIUM: CPT | Performed by: NURSE PRACTITIONER

## 2023-10-18 PROCEDURE — 80048 BASIC METABOLIC PNL TOTAL CA: CPT

## 2023-10-18 PROCEDURE — 250N000013 HC RX MED GY IP 250 OP 250 PS 637: Performed by: PSYCHIATRY & NEUROLOGY

## 2023-10-18 PROCEDURE — 250N000013 HC RX MED GY IP 250 OP 250 PS 637: Performed by: NURSE PRACTITIONER

## 2023-10-18 PROCEDURE — 250N000011 HC RX IP 250 OP 636: Performed by: NURSE PRACTITIONER

## 2023-10-18 PROCEDURE — 99239 HOSP IP/OBS DSCHRG MGMT >30: CPT | Mod: GC | Performed by: STUDENT IN AN ORGANIZED HEALTH CARE EDUCATION/TRAINING PROGRAM

## 2023-10-18 PROCEDURE — 85027 COMPLETE CBC AUTOMATED: CPT

## 2023-10-18 PROCEDURE — 99232 SBSQ HOSP IP/OBS MODERATE 35: CPT | Performed by: INTERNAL MEDICINE

## 2023-10-18 PROCEDURE — 36415 COLL VENOUS BLD VENIPUNCTURE: CPT

## 2023-10-18 PROCEDURE — 84100 ASSAY OF PHOSPHORUS: CPT | Performed by: NURSE PRACTITIONER

## 2023-10-18 RX ORDER — ASPIRIN 81 MG/1
81 TABLET, CHEWABLE ORAL DAILY
Qty: 90 TABLET | Refills: 3 | Status: SHIPPED | OUTPATIENT
Start: 2023-10-18 | End: 2024-10-12

## 2023-10-18 RX ORDER — FOLIC ACID 1 MG/1
1 TABLET ORAL DAILY
Qty: 90 TABLET | Refills: 3 | Status: SHIPPED | OUTPATIENT
Start: 2023-10-18 | End: 2024-10-12

## 2023-10-18 RX ORDER — NALTREXONE HYDROCHLORIDE 50 MG/1
50 TABLET, FILM COATED ORAL DAILY
Qty: 30 TABLET | Refills: 2 | Status: SHIPPED | OUTPATIENT
Start: 2023-10-18 | End: 2024-01-16

## 2023-10-18 RX ADMIN — FUROSEMIDE 40 MG: 40 TABLET ORAL at 07:46

## 2023-10-18 RX ADMIN — SPIRONOLACTONE 25 MG: 25 TABLET ORAL at 07:46

## 2023-10-18 RX ADMIN — ACETAMINOPHEN 1000 MG: 500 TABLET ORAL at 05:03

## 2023-10-18 RX ADMIN — LOSARTAN POTASSIUM 100 MG: 100 TABLET, FILM COATED ORAL at 07:46

## 2023-10-18 RX ADMIN — METOPROLOL SUCCINATE 100 MG: 100 TABLET, EXTENDED RELEASE ORAL at 07:46

## 2023-10-18 RX ADMIN — HEPARIN SODIUM 5000 UNITS: 5000 INJECTION, SOLUTION INTRAVENOUS; SUBCUTANEOUS at 05:03

## 2023-10-18 RX ADMIN — NALTREXONE HYDROCHLORIDE 50 MG: 50 TABLET, FILM COATED ORAL at 07:46

## 2023-10-18 RX ADMIN — POTASSIUM & SODIUM PHOSPHATES POWDER PACK 280-160-250 MG 1 PACKET: 280-160-250 PACK at 07:52

## 2023-10-18 RX ADMIN — FOLIC ACID 1 MG: 1 TABLET ORAL at 07:46

## 2023-10-18 RX ADMIN — THIAMINE HCL TAB 100 MG 100 MG: 100 TAB at 07:46

## 2023-10-18 RX ADMIN — ASPIRIN 81 MG CHEWABLE TABLET 81 MG: 81 TABLET CHEWABLE at 07:46

## 2023-10-18 RX ADMIN — Medication 1 TABLET: at 07:46

## 2023-10-18 ASSESSMENT — ACTIVITIES OF DAILY LIVING (ADL)
ADLS_ACUITY_SCORE: 35

## 2023-10-18 ASSESSMENT — PATIENT HEALTH QUESTIONNAIRE - PHQ9: SUM OF ALL RESPONSES TO PHQ QUESTIONS 1-9: 3

## 2023-10-18 NOTE — PROGRESS NOTES
...........Care Management Discharge Note    Discharge Date: 10/18/2023       Discharge Disposition:  Home    Discharge Services:  [facility] #[phone]                                       RN PT/OT HHA    Discharge DME:      Discharge Transportation: Penny Valadez will provide transport home today.    Private pay costs discussed: Not applicable      Patient/family educated on Medicare website which has current facility and service quality ratings: no    Education Provided on the Discharge Plan: Yes  Persons Notified of Discharge Plans:  Patients Penny Valadez  Patient/Family in Agreement with the Plan: yes    Handoff Referral Completed: Yes    Additional Information:  Patient medically stable for discharge to home today. I have spoken to Pts DaughterPenny who states she can provide transport but needs help with gas. I have obtained a $25 Holiday gas card from the [facility] Care Management Dept. Penny agrees to  her Father and appreciative of the gas card.  Penny states she will be in front of the hospital today at 2:30.  [facility] updated regarding discharge today.    Annette Tilley RN   care coordinator #[phone]

## 2023-10-18 NOTE — PLAN OF CARE
Occupational Therapy Discharge Summary    Reason for therapy discharge:    Discharged to home with home therapy.    Progress towards therapy goal(s). See goals on Care Plan in [facility] electronic health record for goal details.  Goals partially met.  Barriers to achieving goals:   discharge from facility.    Therapy recommendation(s):    Continued therapy is recommended.  Rationale/Recommendations:  Per last OT who worked with pt, recommending continued rehab at TCU however pt unable to be accepted. Recommending home with assist and HH therapies to progress IND/safety and return to PLOF.

## 2023-10-18 NOTE — PROGRESS NOTES
CLINICAL NUTRITION SERVICES - REASSESSMENT NOTE     Nutrition Prescription    RECOMMENDATIONS FOR MDs/PROVIDERS TO ORDER:  - Encouragement of adequate PO    Malnutrition Status:    - Severe malnutrition in the context of chronic illness/disease    Recommendations already ordered by Registered Dietitian (RD):  - Continue oral nutrition supplements and/or per pt preference     Future/Additional Recommendations:  - PO/supp adequacy      EVALUATION OF THE PROGRESS TOWARD GOALS   Diet: Regular + Ensure   Intake:    10/12     Total Kcals: 1858     Total Protein: 69g   10/13     Total Kcals: 615       Total Protein: 21g   10/14     Total Kcals: 1445     Total Protein: 76g   -------------------------------------------------------------------  1306 kcals (20 kcals/kg or 79%) and 55 gm PRO (0.8 gm//kg or 70%)     NEW FINDINGS   Nutrition/GI: Pt on regular diet; tolerating well. Per most recent [facility] counts, pt taking 79% energy needs and 70% current protein needs     Dosing Weight: 66 kg    RE-ASSESSED NUTRITION NEEDS (protein for maintenance)   Estimated Protein Needs: 79-99 grams protein/day (1.2 - 1.5 grams of pro/kg)   Justification: Increased needs   Estimated Fluid Needs: (1 mL/kcal)   Justification: Maintenance and Per provider pending fluid status     Neuro: Pt ready for discharge     Weights: overall stable since amdit. Last weight 10/17    MALNUTRITION  % Intake: < 75% for > 7 days (moderate)  % Weight Loss: Up to 10% in 6 months (moderate malnutrition)  Subcutaneous Fat Loss: Upper arm and Thoracic/intercostal: mild; unclear baseline but did have weight loss over 8 months   Muscle Loss: Scapular bone: moderate, Thoracic region (clavicle, acromium bone, deltoid, trapezius, pectoral), Upper arm (bicep, tricep), Lower arm  (forearm), Dorsal hand: all moderate, Upper leg (quadricep, hamstring), Patellar region, and Posterior calf: all severe   Fluid Accumulation/Edema: None noted  Malnutrition Diagnosis: Severe  malnutrition in the context of chronic illness/disease     Previous Goals   Patient to consume % of nutritionally adequate meal trays TID, or the equivalent with supplements/snacks.   Evaluation: Met for energy     Previous Nutrition Diagnosis  Inadequate oral intake related to reduced appetite as evidenced by pt report of dislike of food limiting intake, wt loss, tissue wasting     Evaluation: Improving    CURRENT NUTRITION DIAGNOSIS  Predicted inadequate nutrient intake related to hospital course/LOS as evidenced by variable intakes since admit with recently improving PO    INTERVENTIONS  Implementation  Medical food supplement therapy    Goals  Patient to consume % of nutritionally adequate meal trays TID, or the equivalent with supplements/snacks.    Monitoring/Evaluation  Progress toward goals will be monitored and evaluated per protocol.    Lauren Greer RD, LD, [facility]  Neuro ICU  Pager: [phone]

## 2023-10-18 NOTE — PLAN OF CARE
Goal Outcome Evaluation:    Status: Pt admitted for acute L MCA/OMER stroke, s/p left carotid stenting and angioplasty on 10/3.   Vitals: VSS on RA, CCM and pulse ox, A-fib-team aware  Neuros: Unchanged, Aox3 d/t time, R pupil > L pupil, Strength RUE 4/5, AOE 5/5. RUE ataxic. R field cut. Slight ataxic on RLE   IV: PIV SL  Labs/Electrolytes: Phos replaced this gabby, redraws ordered  Resp/trach: WNL, denies SOB  Diet: Regular  Bowel status: LBM 10/17  : Voiding w/ bedside urinal   Skin: Scabbing to BLE, bruising and bleeding to abdomen from hep shots  Pain: Denies  Activity: Ax1 GB/walker   Plan: Discharge home likely w/ home care, prior auth was denied per SW note    Stroke Patients: Yes  PLC scheduled or completed:  Completed 10/8  Pneumoboots in place: refused        Plan of Care Reviewed With: patient    Overall Patient Progress: no changeOverall Patient Progress: no change

## 2023-10-18 NOTE — PROVIDER NOTIFICATION
Discharge order in place since this AM, unable to find safe dispo plan at this time, no family/friend ride located. RNCC working on safe ride.

## 2023-10-18 NOTE — PLAN OF CARE
Physical Therapy Discharge Summary    Reason for therapy discharge:    Discharged to home with home therapy.    Progress towards therapy goal(s). See goals on Care Plan in [facility] electronic health record for goal details.  Goals partially met.  Barriers to achieving goals:   limited tolerance for therapy and discharge from facility.    Therapy recommendation(s):    Continued therapy is recommended.  Rationale/Recommendations:  Pt would benefit from continued PT to progress strength and IND with mobility especially in the house when alone.

## 2023-10-18 NOTE — PROGRESS NOTES
[facility]     Addiction Progress Note - Addiction Service        Date of Admission:  10/3/2023  Assessment & Plan       Eber Jin is a 63 yo with a PMHx of HFrEF, Afib on zeralto (with adherence challenges), PAD, PEs, recurrent falls with fractures, transferred from OSH with stroke and SAH.  Concerns exist that underlying severe alcohol dependence is a major contributor to ongoing medical issues.       # Severe Alcohol Use Disorder   # medication non-adherence  # complications from non-adherence including stroke  # multiple falls with fractures, complicated by alcohol use  Please refer to note by Dr. Bridges on 11/15.  Today he did not feel that he needed any support on drinking alcohol.  Declined therapy, treatment.  Last hepatic panel was normal on 10/3/23  -Has been started on naltrexone 50mg on 10.16 which can be continued by his PCP.  -Not currently interested in Addiction medicine as outpatient.    # Mental health:He denies having mental health causes for alcohol intake, and declines interventions.  However, he does likely have underlying depression. Would recommend continuing to discuss, as he would benefit from additional supports.    - he declined meds     # Immunization review:   Consider Hep A IgG and Hep B serologies     # Peer Support:   -Our peer  will meet the patient if agreeable and still hospitalized on Thursday, to provide additional outpatient resources  -To contact Alison Peer  from [facility] ([facility]): call or text: [phone]     # Addiction Social Worker:   Our addiction social worker Denton Wagner can be contacted if needed, on her pager [phone] or texted/called at [phone]     # Linkage to Care:   He currently declines interventions or linkage to supports.    The patient's care was discussed with the Bedside Nurse, Patient, Patient's Family, and Primary team.    Time Spent on this Encounter   I  "spent 50 minutes on the unit/floor managing the care of Eber Jin. Over 50% of my time was spent on the following:   - Counseling the patient and/or family regarding: prognosis, risks and benefits of treatment options, recommended follow-up, and medical compliance. I also counseled family on the way naltrexone works     Supa Webber MD on 10/15/2023 at 9:31 AM   Addiction Service   [facility]     Contact information available via [facility] Paging/Directory under \"addiction medicine\"      ______________________________________________________________________    Interval History   No acute events.     He feels like he has strong family support  Started on naltrexone on 10.16.23  Did not want therapy for alcohol use  Not interested in peer support to come.  Discharge was started but unclear if he has place to go yet.    ROS:  CV, Pulm, GI and  assessed. Pertinent positives as above, otherwise negative.     Data reviewed today: I reviewed all medications, new labs and imaging results over the last 24 hours.     Physical Exam   /84 (BP Location: Left arm)   Pulse 80   Temp 98.5  F (36.9  C) (Oral)   Resp 18   Wt 64.7 kg (142 lb 10.2 oz)   SpO2 100%   BMI 19.89 kg/m      Gen: NAD  HEENT: EOMI, PERRL, MMM  CV: extremities warm and well perfused  Resp: breathing comfortably on RA  : deferred  Msk: no LE edema  Skin: no rashes  Neuro: nonfocal exam      Due to regulation of Title 42 of the Code of Federal Regulations (CFR) Part 2: Confidentiality laws apply to this note and the information wherein.  Thus, this note cannot be copy and pasted into any other health care staff's note nor can it be included in general medical records sent to ANY outside agency without the patient's written consent.   "

## 2023-10-18 NOTE — PLAN OF CARE
Status: Pt admitted for acute L MCA/OMER stroke, s/p left carotid stenting and angioplasty on 10/3.   Vitals: VSS on RA; afib. 5 beat run of vtach this shift; MD notified   Neuros: D/o to time. Intermittent mild R facial droop. Alert. R visual neglect. R pupil > L. RUE ataxic, 4/5.   IV: L PIV SL  Resp/trach: WNL  Diet: Regular diet, swallows pills whole  Bowel status: [facility] 10/17  :  Voiding w/ bedside urinal, intermittent incontinence  Skin: Scabbing to BLE, bruising to abd from hep shots   Pain: Mild HA managed with scheduled tylenol   Activity: Up with 1 GB  Plan: Possible discharge today to home

## 2023-10-18 NOTE — PLAN OF CARE
Discharge time/date: 10/18 1430  Walked or Wheelchair: wheelchair  PIV removed: yes  Reviewed AVS with patient: yes  Medication due times added to AVS in EPIC: n/a  Verbalized understanding of discharge with teachback: yes  Medications retrieved from pharmacy: yes  Supplies sent home: n/a  Belongings from security with patient: n/a

## 2023-10-19 ENCOUNTER — TELEPHONE (OUTPATIENT)
Dept: NEUROLOGY | Facility: CLINIC | Age: 63
End: 2023-10-19
Payer: COMMERCIAL

## 2023-10-19 NOTE — TELEPHONE ENCOUNTER
[facility] Call Center    Phone Message    May a detailed message be left on voicemail: yes     Reason for Call: 3. Follow up with Any Stroke provider, at [facility] Neurology clinic at [facility] for follow up of Left MCA stroke, within 6-8 weeks  for hospital follow- up. The following labs/tests are recommended: Bilateral Carotid Ultrasound.     Action Taken: Other: Routed to [facility] Adult [facility]    Travel Screening: Not Applicable

## 2023-10-20 ENCOUNTER — PATIENT OUTREACH (OUTPATIENT)
Dept: CARE COORDINATION | Facility: CLINIC | Age: 63
End: 2023-10-20
Payer: COMMERCIAL

## 2023-10-20 NOTE — LETTER
October 25, 2023      Eber Jin  [address]  [place] [address]             Dear Eber,    I am a stroke nurse who works with the doctors and staff at the stroke clinics to manage patient care. I am here to answer any questions or concerns about your stroke care.       A stroke nurse knows the health care system. My goals are to help you during your recovery and to improve your access to care. I will work with your stroke team to help you identify your health and social needs.      We will:    Create a care plan that supports communication between you and your stroke care team.   Help you obtain health care and local resources.   Give you the information and knowledge you may need for your stroke care.    Work with you to remove any barriers you may have during your stroke recovery.     We work to give you the highest quality healthcare. Please call me at [phone] with any questions about your stroke care or recovery.     Sincerely,      Samanta MURO, RN, SCRN  RN Stroke Neurology Care Coordinator  [facility] Neuroscience Service Line       Enclosed: None

## 2023-10-20 NOTE — PROGRESS NOTES
Stroke RN Care Coordination - Unable to Reach / Voicemail Note     Stroke RN Care Coordinator Outreach:  Clinic Care Coordination - Initial (Stroke RNCC Outreach)     Outreach attempted x 1.      Call rang straight to voicemail and his mailbox was full so I was unable to leave a message. Was given the option to send a SMS notification to the pt with my callback number so completed that action.    Stroke RN Care Coordinator will try to reach patient again in 1-2 business days.    Samanta Pichardo BS, RN, SCRN  RN Stroke Neurology Care Coordinator  [facility] Neuroscience Service Line

## 2023-10-23 ENCOUNTER — PATIENT OUTREACH (OUTPATIENT)
Dept: CARE COORDINATION | Facility: CLINIC | Age: 63
End: 2023-10-23
Payer: COMMERCIAL

## 2023-10-23 NOTE — PROGRESS NOTES
Clinic Care Coordination Contact  [facility]/Voicemail- TCM call - last attempt       Clinical Data: Care Coordinator Outreach  Outreach attempted x 2.  Left message on patient's voicemail with call back information and requested return call.  Plan: Care Coordinator will send unable to contact letter with care coordinator contact information via mail. Care Coordinator will do no further outreaches at this time.

## 2023-10-23 NOTE — LETTER
[facility] CARE COORDINATION    October 23, 2023    Eber Jin  [address]  [place] [address]      Dear Eber,        I am a  clinic care coordinator who works with Hima Boyle MD with the [facility]. I have been trying to reach you recently to introduce Clinic Care Coordination. Below is a description of clinic care coordination and how I can further assist you.       The clinic care coordination team is made up of a registered nurse, , financial resource worker and community health worker who understand the health care system. The goal of clinic care coordination is to help you manage your health and improve access to the health care system. Our team works alongside your provider to assist you in determining your health and social needs. We can help you obtain health care and community resources, providing you with necessary information and education. We can work with you through any barriers and develop a care plan that helps coordinate and strengthen the communication between you and your care team.  Our services are voluntary and are offered without charge to you personally.    Please feel free to contact me with any questions or concerns regarding care coordination and what we can offer.      We are focused on providing you with the highest-quality healthcare experience possible.    Sincerely,       Savanah Pratt, GALINA    Care Coordinator  [address]  [address]  [place] [address]   Office: [phone]  Fax: [phone]

## 2023-10-24 ENCOUNTER — TELEPHONE (OUTPATIENT)
Dept: FAMILY MEDICINE | Facility: CLINIC | Age: 63
End: 2023-10-24
Payer: COMMERCIAL

## 2023-10-24 NOTE — TELEPHONE ENCOUNTER
Reason for Call:  Home Health Care    Sona with [facility] called regarding (reason for call): Verbal order    Orders are needed for this patient. SNV    PT: 1 x a week for 1 week; 2 x a week for 3 weeks and 1 x a week for 3 weeks    OT: 1 x a week for 1 week; 2 x a week for 1 week; 1 x a week for 2 weeks    Skilled Nursin x a week for 6 weeks    Pt Provider: [place]     Phone [facility] Nurse can be reached at: [phone]    Can we leave a detailed message on this number? YES     Sona also reported patient has NOT been taking any of his prescribed medications.  Patient was recently hospitalized for CVA.  Prescribed Xarelto and not taking it.  RN advised to have patient call to make a schedule hospital follow up with pcp.    Best Time: yes    Call taken on 10/24/2023 at 4:17 PM by Khris Fischer RN

## 2023-10-25 NOTE — PROGRESS NOTES
Stroke RN Care Coordination - Unable to Reach / Voicemail Note     Stroke RN Care Coordinator Outreach:  Clinic Care Coordination - Initial (Stroke RNCC Outreach)     Outreach attempted x 2.      Unable to leave message as call goes straight to  and his mailbox is full.    Stroke RN Care Coordinator will send Stroke Care Coordination Introduction letter with stroke care coordinator contact information and explanation of stroke care coordination services via mail. Stroke RN Care Coordinator will do no further outreaches at this time.    Samanta Pichardo BS, RN, SCRN  RN Stroke Neurology Care Coordinator  [facility] Neuroscience Service Line

## 2023-10-25 NOTE — TELEPHONE ENCOUNTER
RN called Sona with [facility] with no answer.  Detailed message left on secure vm with following provider verbal order for services requested.  RN also request Sona to inform patient to call into clinic to make a hospital follow up visit with pcp.  RN called patient in attempt to assist with appt, however, no answer and his vm is full so unable to leave a message.    MAE Smart, RN  [facility]         Please approve necessary PT OT skilled nursing orders, agreed with plan thanks

## 2023-10-30 ENCOUNTER — TELEPHONE (OUTPATIENT)
Dept: NEUROSURGERY | Facility: CLINIC | Age: 63
End: 2023-10-30
Payer: COMMERCIAL

## 2023-10-31 ENCOUNTER — TELEPHONE (OUTPATIENT)
Dept: NEUROSURGERY | Facility: CLINIC | Age: 63
End: 2023-10-31

## 2023-11-02 ENCOUNTER — TELEPHONE (OUTPATIENT)
Dept: NEUROSURGERY | Facility: CLINIC | Age: 63
End: 2023-11-02
Payer: COMMERCIAL

## 2023-11-16 ENCOUNTER — MEDICAL CORRESPONDENCE (OUTPATIENT)
Dept: HEALTH INFORMATION MANAGEMENT | Facility: CLINIC | Age: 63
End: 2023-11-16
Payer: COMMERCIAL

## 2023-11-16 DIAGNOSIS — Z53.9 DIAGNOSIS NOT YET DEFINED: Primary | ICD-10-CM

## 2023-11-16 PROCEDURE — G0180 MD CERTIFICATION HHA PATIENT: HCPCS | Performed by: FAMILY MEDICINE

## 2023-11-20 ENCOUNTER — TELEPHONE (OUTPATIENT)
Dept: FAMILY MEDICINE | Facility: CLINIC | Age: 63
End: 2023-11-20
Payer: COMMERCIAL

## 2023-11-20 NOTE — TELEPHONE ENCOUNTER
Home Health Care    Reason for call:  Verbal order    Orders are needed for this patient.  Early discharge from all services from [facility] on 11/10/2023. Per Татьяна, patient daughter state patient went out of town. [facility] had attempted to get more information of patient from daughter Heike, but no response from daughter at all.    Pt Provider: Dr. Boyle    Phone Number Homecare Nurse can be reached at: [phone]    Can we leave a detailed message on this number? YES      Okay to leave a detailed message?: No at Cell number on file:    Telephone Information:   Mobile [phone]

## 2023-11-27 ENCOUNTER — MEDICAL CORRESPONDENCE (OUTPATIENT)
Dept: HEALTH INFORMATION MANAGEMENT | Facility: CLINIC | Age: 63
End: 2023-11-27
Payer: COMMERCIAL

## 2023-11-29 NOTE — TELEPHONE ENCOUNTER
Writer called Татьяна back regarding provider's message below. No answer, left detailed voicemail, with clinic call back number.     If Татьяна from [facility] calls back, please let them know Dr. Boyle already gave OK per orders below. Thanks.    Closing encounter.    JINA LundyN, RN   [facility]

## 2023-12-05 NOTE — TELEPHONE ENCOUNTER
Neftaly PT from [facility] [phone]    Reason for call:   Non-medications adherence    Symptom or request: Update provider patient hs not been taking any of his prescribed medications.  Family is aware but has not yet intervene.  Patient lives with family.   Chart reviewed:  Patient was recently seen in the ER at [place] on 9/20/23 with following issue:          Chief Complaint   Patient presents with    Shortness of Breath         FINAL IMPRESSION:  1. Closed fracture of multiple ribs with flail chest, initial encounter    2. Pleural effusion    3. Atrial flutter with rapid ventricular response (H)       It also appeared patient was transferred out to [facility] for further evaluation of the trauma from a fall approximate two weeks prior.    Patient last seen by PCP:  11/25/2022 with no follow up appointment.    Duration (how long have symptoms been present): No symptoms reported from PT.     Phone Number patient can be reached at:   [phone]    Best Time:  yes    Can we leave a detailed message on this number:  YES     Will route to provider for an update.         Told to return in 1 year, jan. 2024, due for TSH in May 2024

## 2024-01-27 DIAGNOSIS — I10 ESSENTIAL HYPERTENSION, BENIGN: ICD-10-CM

## 2024-01-29 RX ORDER — LOSARTAN POTASSIUM 100 MG/1
100 TABLET ORAL DAILY
Qty: 90 TABLET | Refills: 3 | Status: SHIPPED | OUTPATIENT
Start: 2024-01-29

## 2024-04-18 ENCOUNTER — TELEPHONE (OUTPATIENT)
Dept: FAMILY MEDICINE | Facility: CLINIC | Age: 64
End: 2024-04-18
Payer: COMMERCIAL

## 2024-04-18 NOTE — TELEPHONE ENCOUNTER
Patient Quality Outreach    Patient is due for the following:   Physical Annual Wellness Visit    Next Steps:   Schedule a Annual Wellness Visit    Type of outreach:    Phone, left message for patient/parent to call back.      Questions for provider review:    None           Korina Rachel MA

## 2024-05-16 NOTE — TELEPHONE ENCOUNTER
Patient Quality Outreach    Patient is due for the following:   Physical Annual Wellness Visit    Next Steps:   Schedule a Annual Wellness Visit    Type of outreach:    Phone, left message for patient/parent to call back.    Next Steps:  Reach out within 90 days via Phone.    Max number of attempts reached: Yes. Will try again in 90 days if patient still on fail list.    Questions for provider review:    None           Korina Rachel MA

## 2025-01-22 NOTE — PROGRESS NOTES
[facility]     Addiction Progress Note - Addiction Service        Date of Admission:  10/3/2023  Assessment & Plan       Eber Jin is a 63 yo with a PMHx of HFrEF, Afib on zeralto (with adherence challenges), PAD, PEs, recurrent falls with fractures, transferred from OSH with stroke and SAH.  Concerns exist that underlying severe alcohol dependence is a major contributor to ongoing medical issues.       # Severe Alcohol Use Disorder   # medication non-adherence  # complications from non-adherence including stroke  # multiple falls with fractures, complicated by alcohol use  Please refer to note by Dr. Bridges on 11/15.  Today he did not feel that he needed any support on drinking alcohol.  Declined therapy, treatment or medications.  Yesterday he appeared interested in naltrexone to cut down alcohol but not today.  We will continue to talk to him about this while in the hospital.    # Mental health:He denies having mental health causes for alcohol intake, and declines interventions.  However, he does likely have underlying depression. Would recommend continuing to discuss, as he would benefit from additional supports.    - he declined meds     # Immunization review:   Consider Hep A IgG and Hep B serologies     # Peer Support:   -Our peer  will meet the patient if agreeable and still hospitalized on Thursday, to provide additional outpatient resources  -To contact Alison Peer  from [facility] ([facility]): call or text: [phone]     # Addiction Social Worker:   Our addiction social worker Denton Wagner can be contacted if needed, on her pager [phone] or texted/called at [phone]     # Linkage to Care:   He currently declines interventions or linkage to supports.    The patient's care was discussed with the Bedside Nurse, Patient, Patient's Family, and Primary team.    Time Spent on this Encounter   I spent 50 minutes on the  Pt says all meds need to go to [facility], NOT [facility]    "unit/floor managing the care of Eber Jin. Over 50% of my time was spent on the following:   - Counseling the patient and/or family regarding: prognosis, risks and benefits of treatment options, recommended follow-up, and medical compliance. I also counseled family on the way naltrexone works     Suap Webber MD on 10/15/2023 at 9:31 AM   Addiction Service   [facility]     Contact information available via [facility] Paging/Directory under \"addiction medicine\"        ______________________________________________________________________    Interval History   No acute events.     He feels like he has strong family support  Did not want medications for alcohol use  Did not want therapy for alcohol use  Not interested in peer support to come.    ROS:  CV, Pulm, GI and  assessed. Pertinent positives as above, otherwise negative.     Data reviewed today: I reviewed all medications, new labs and imaging results over the last 24 hours.     Physical Exam   /73 (BP Location: Left arm)   Pulse 68   Temp 98.1  F (36.7  C) (Oral)   Resp 16   Wt 64.7 kg (142 lb 10.2 oz)   SpO2 97%   BMI 19.89 kg/m      Gen: NAD  HEENT: EOMI, PERRL, MMM  CV: extremities warm and well perfused  Resp: breathing comfortably on RA  : deferred  Msk: no LE edema  Skin: no rashes  Neuro: nonfocal exam      Due to regulation of Title 42 of the Code of Federal Regulations (CFR) Part 2: Confidentiality laws apply to this note and the information wherein.  Thus, this note cannot be copy and pasted into any other health care staff's note nor can it be included in general medical records sent to ANY outside agency without the patient's written consent.   "

## 2025-04-20 NOTE — PLAN OF CARE
FOCUS/GOAL  Medication management, Mobility, and Safety management    ASSESSMENT, INTERVENTIONS AND CONTINUING PLAN FOR GOAL:  Pt was A/O, able to use call light and make needs known to staff. Denies pain, SOB, chest pain nor dizziness. VSS. A1 walker with transfers. On regular diet, thin liquids, takes medicines whole. Cont of BL, no BM this shift, LBM-3/16/21. On MAP, failed to called writer for medications, reminded. Will continue with current POC.     4 = No assist / stand by assistance

## (undated) RX ORDER — HEPARIN SODIUM 1000 [USP'U]/ML
INJECTION, SOLUTION INTRAVENOUS; SUBCUTANEOUS
Status: DISPENSED
Start: 2023-10-03

## (undated) RX ORDER — FENTANYL CITRATE 50 UG/ML
INJECTION, SOLUTION INTRAMUSCULAR; INTRAVENOUS
Status: DISPENSED
Start: 2023-10-03

## (undated) RX ORDER — GLYCOPYRROLATE 0.2 MG/ML
INJECTION, SOLUTION INTRAMUSCULAR; INTRAVENOUS
Status: DISPENSED
Start: 2023-10-03